# Patient Record
Sex: MALE | Race: WHITE | NOT HISPANIC OR LATINO | URBAN - METROPOLITAN AREA
[De-identification: names, ages, dates, MRNs, and addresses within clinical notes are randomized per-mention and may not be internally consistent; named-entity substitution may affect disease eponyms.]

---

## 2017-01-30 ENCOUNTER — OUTPATIENT (OUTPATIENT)
Dept: OUTPATIENT SERVICES | Facility: HOSPITAL | Age: 72
LOS: 1 days | End: 2017-01-30
Payer: MEDICARE

## 2017-01-30 ENCOUNTER — APPOINTMENT (OUTPATIENT)
Dept: NUCLEAR MEDICINE | Facility: HOSPITAL | Age: 72
End: 2017-01-30

## 2017-01-30 DIAGNOSIS — I72.0 ANEURYSM OF CAROTID ARTERY: ICD-10-CM

## 2017-01-31 ENCOUNTER — APPOINTMENT (OUTPATIENT)
Dept: NUCLEAR MEDICINE | Facility: HOSPITAL | Age: 72
End: 2017-01-31

## 2017-01-31 PROCEDURE — 78102 BONE MARROW IMAGING LTD: CPT

## 2017-01-31 PROCEDURE — 78802 RP LOCLZJ TUM WHBDY 1 D IMG: CPT

## 2017-01-31 PROCEDURE — A9541: CPT

## 2017-01-31 PROCEDURE — A9570: CPT

## 2017-03-16 ENCOUNTER — APPOINTMENT (OUTPATIENT)
Dept: VASCULAR SURGERY | Facility: CLINIC | Age: 72
End: 2017-03-16

## 2017-03-16 VITALS
BODY MASS INDEX: 18.96 KG/M2 | HEART RATE: 60 BPM | DIASTOLIC BLOOD PRESSURE: 80 MMHG | HEIGHT: 72 IN | SYSTOLIC BLOOD PRESSURE: 175 MMHG | TEMPERATURE: 97.5 F | WEIGHT: 140 LBS

## 2017-03-16 VITALS — DIASTOLIC BLOOD PRESSURE: 87 MMHG | SYSTOLIC BLOOD PRESSURE: 158 MMHG | HEART RATE: 74 BPM

## 2017-03-16 DIAGNOSIS — I72.0 ANEURYSM OF CAROTID ARTERY: ICD-10-CM

## 2017-04-11 ENCOUNTER — OUTPATIENT (OUTPATIENT)
Dept: OUTPATIENT SERVICES | Facility: HOSPITAL | Age: 72
LOS: 1 days | End: 2017-04-11
Payer: MEDICARE

## 2017-04-11 VITALS
OXYGEN SATURATION: 97 % | TEMPERATURE: 98 F | SYSTOLIC BLOOD PRESSURE: 128 MMHG | DIASTOLIC BLOOD PRESSURE: 51 MMHG | HEIGHT: 68 IN | HEART RATE: 55 BPM | WEIGHT: 141.98 LBS | RESPIRATION RATE: 15 BRPM

## 2017-04-11 DIAGNOSIS — I72.0 ANEURYSM OF CAROTID ARTERY: ICD-10-CM

## 2017-04-11 DIAGNOSIS — Z95.810 PRESENCE OF AUTOMATIC (IMPLANTABLE) CARDIAC DEFIBRILLATOR: ICD-10-CM

## 2017-04-11 DIAGNOSIS — Z95.810 PRESENCE OF AUTOMATIC (IMPLANTABLE) CARDIAC DEFIBRILLATOR: Chronic | ICD-10-CM

## 2017-04-11 DIAGNOSIS — I48.91 UNSPECIFIED ATRIAL FIBRILLATION: ICD-10-CM

## 2017-04-11 DIAGNOSIS — I73.9 PERIPHERAL VASCULAR DISEASE, UNSPECIFIED: ICD-10-CM

## 2017-04-11 DIAGNOSIS — I65.21 OCCLUSION AND STENOSIS OF RIGHT CAROTID ARTERY: Chronic | ICD-10-CM

## 2017-04-11 DIAGNOSIS — R94.31 ABNORMAL ELECTROCARDIOGRAM [ECG] [EKG]: ICD-10-CM

## 2017-04-11 DIAGNOSIS — M48.9 SPONDYLOPATHY, UNSPECIFIED: ICD-10-CM

## 2017-04-11 DIAGNOSIS — E11.9 TYPE 2 DIABETES MELLITUS WITHOUT COMPLICATIONS: ICD-10-CM

## 2017-04-11 LAB
ANTIBODY ID 1_1: SIGNIFICANT CHANGE UP
APTT BLD: 48.4 SEC — HIGH (ref 27.5–37.4)
BLD GP AB SCN SERPL QL: POSITIVE — SIGNIFICANT CHANGE UP
BUN SERPL-MCNC: 22 MG/DL — SIGNIFICANT CHANGE UP (ref 7–23)
CALCIUM SERPL-MCNC: 9.8 MG/DL — SIGNIFICANT CHANGE UP (ref 8.4–10.5)
CHLORIDE SERPL-SCNC: 93 MMOL/L — LOW (ref 98–107)
CO2 SERPL-SCNC: 26 MMOL/L — SIGNIFICANT CHANGE UP (ref 22–31)
CREAT SERPL-MCNC: 0.94 MG/DL — SIGNIFICANT CHANGE UP (ref 0.5–1.3)
DAT C3-SP REAG RBC QL: NEGATIVE — SIGNIFICANT CHANGE UP
DAT POLY-SP REAG RBC QL: NEGATIVE — SIGNIFICANT CHANGE UP
DIRECT COOMBS IGG: NEGATIVE — SIGNIFICANT CHANGE UP
GLUCOSE SERPL-MCNC: 130 MG/DL — HIGH (ref 70–99)
HBA1C BLD-MCNC: 7.1 % — HIGH (ref 4–5.6)
HCT VFR BLD CALC: 37.4 % — LOW (ref 39–50)
HGB BLD-MCNC: 12.4 G/DL — LOW (ref 13–17)
INR BLD: 2.36 — HIGH (ref 0.88–1.17)
MCHC RBC-ENTMCNC: 29.2 PG — SIGNIFICANT CHANGE UP (ref 27–34)
MCHC RBC-ENTMCNC: 33.2 % — SIGNIFICANT CHANGE UP (ref 32–36)
MCV RBC AUTO: 88.2 FL — SIGNIFICANT CHANGE UP (ref 80–100)
PLATELET # BLD AUTO: 183 K/UL — SIGNIFICANT CHANGE UP (ref 150–400)
PMV BLD: 11.3 FL — SIGNIFICANT CHANGE UP (ref 7–13)
POTASSIUM SERPL-MCNC: 5.1 MMOL/L — SIGNIFICANT CHANGE UP (ref 3.5–5.3)
POTASSIUM SERPL-SCNC: 5.1 MMOL/L — SIGNIFICANT CHANGE UP (ref 3.5–5.3)
PROTHROM AB SERPL-ACNC: 26.9 SEC — HIGH (ref 9.8–13.1)
RBC # BLD: 4.24 M/UL — SIGNIFICANT CHANGE UP (ref 4.2–5.8)
RBC # FLD: 15.4 % — HIGH (ref 10.3–14.5)
RH IG SCN BLD-IMP: POSITIVE — SIGNIFICANT CHANGE UP
SODIUM SERPL-SCNC: 135 MMOL/L — SIGNIFICANT CHANGE UP (ref 135–145)
WBC # BLD: 6.64 K/UL — SIGNIFICANT CHANGE UP (ref 3.8–10.5)
WBC # FLD AUTO: 6.64 K/UL — SIGNIFICANT CHANGE UP (ref 3.8–10.5)

## 2017-04-11 PROCEDURE — 93010 ELECTROCARDIOGRAM REPORT: CPT

## 2017-04-11 PROCEDURE — 86077 PHYS BLOOD BANK SERV XMATCH: CPT

## 2017-04-11 NOTE — H&P PST ADULT - PROBLEM SELECTOR PLAN 7
EKG faxed to Dr Gatica s/w farzaneh   As per Farzaneh ekg comparison by Cardiologist melvi office " no change" EKG faxed to Dr Gatica s/w Farzaneh   As per Farzaneh ekg comparison by Cardiologist in office " no change"

## 2017-04-11 NOTE — H&P PST ADULT - RS GEN PE MLT RESP DETAILS PC
clear to auscultation bilaterally/good air movement/O2 sat 97 %/breath sounds equal/respirations non-labored/airway patent

## 2017-04-11 NOTE — H&P PST ADULT - PROBLEM SELECTOR PLAN 1
Right Carotid Stent Insertion Arch Aortofram , Right Carotid Angiogram, Insertion Distal Protective Device , Protective Device Left Atrium Sten Procedure as scheduled ; Right Carotid Stent Insertion Arch Aortofram , Right Carotid Angiogram, Insertion Distal Protective Device , Protective Device Left Atrium Stent ; pt denies surgery Left ; call to surgeons office ; s/w Jocelin ; Jocelin to s/w surgeon regarding procedure ; consent to signed dos with surgeon    Pre op instructions including Pepcid and Hibeclens ; pt appears to have a good understanding of pre o-p instructions    Pt to Dr Ybarra for pre op m/c  Pt to Dr Gatica for pre op cardiac clearance

## 2017-04-11 NOTE — H&P PST ADULT - PROBLEM SELECTOR PLAN 3
rx xarelto  Call to surgeon regarding xarelto; s/w Jocelin   awaiting response rx xarelto  Call to surgeon regarding xarelto; s/w Jocelin   awaiting response    As per cardiologist Dr Gatica ; may DC 3 days pre op if necessary ;pt denies asa cardiac clearance sent Jocelin @ surgeons office awaiting response regarding xarelto    To call Dr Gatica 4/12/17 regarding xarelto rx xarelto  Call to surgeon regarding xarelto; s/w Jocelin   awaiting response    As per cardiologist Dr Gatica ; may DC 3 days pre op if necessary ;pt denies asa cardiac clearance sent Jocelin @ surgeons office awaiting response regarding xarelto    As per Marilyn @ surgeons office ; to follow Dr Gatica instructions ; Jocelin contacted pt with instruction    ? remains need asa; "s/p angiogram with peripheral stent ;reviewed with Dr Vail s/w Jocelin @ surgeons office defers to cardiologist s/w  Farzaneh @ Dr Gatica office . Farzaneh will s/w  cardiologist and they will contact surgeons office 4/13/17 with recommendations rx xarelto  Call to surgeon regarding xarelto; s/w Jocelin   awaiting response    As per cardiologist Dr Gatica ; may DC 3 days pre op if necessary ;pt denies asa cardiac clearance sent Jocelin @ surgeons office awaiting response regarding xarelto    As per Marilyn @ surgeons office ; to follow Dr Gatica instructions ; Jocelin contacted pt with instruction    ? remains :? need asa; "s/p angiogram with peripheral stent ;reviewed with Dr Vail s/w Jocelin @ surgeons office defers to cardiologist s/w  Farzaneh @ Dr Gatica office . Farzaneh will s/w  cardiologist and they will contact surgeons office 4/13/17 with recommendations  Request updated cardiac clearance addressing  any medication changes pre op

## 2017-04-11 NOTE — H&P PST ADULT - PROBLEM SELECTOR PLAN 2
s/p angiogram with stent s/p peripheral  angiogram with stent s/p peripheral  angiogram with stent  see problem 3

## 2017-04-11 NOTE — H&P PST ADULT - MALLAMPATI CLASS
Class II - visualization of the soft palate, fauces, and uvula s/p Cervical Fusion ; pt with  extremely limited mobility Cervical Spine/Class IV (difficult) - the soft palate is not visible at all

## 2017-04-11 NOTE — H&P PST ADULT - PSH
Carotid stenosis, right  2013 ? right CEA  S/P angioplasty with stent  left leg  S/P cervical spinal fusion  2002, 1 plate & 4 rods  S/P femoral-popliteal bypass surgery  x 2, 2013, right s/p Revision AICD (automatic cardioverter/defibrillator) present  10/21/14  Carotid stenosis, right  2013 ? right CEA  S/P angioplasty with stent  left leg  S/P cervical spinal fusion  2002, 1 plate & 4 rods  S/P femoral-popliteal bypass surgery  x 2, 2013, right s/p Revision AICD (automatic cardioverter/defibrillator) present  10/21/14  Carotid stenosis, right  Right CEA 2013  S/P angioplasty with stent  left leg  S/P cervical spinal fusion  2002, 1 plate & 4 rods  S/P femoral-popliteal bypass surgery  x 2, 2013, right s/p Revision

## 2017-04-11 NOTE — H&P PST ADULT - HISTORY OF PRESENT ILLNESS
Pt is a 72 y.o. male ; pt scheduled 12/16 for cataract surgery ; during pre op clearance ; pt was referred to surgeon for evaluation right carotid ; pt states studies showed " pseudo aneurysm right carotid ; pt now presents for Right Carotid Stent Insertion Right Carotid Angiogram Pt is a 72 y.o. male ; pt scheduled 12/16 for cataract surgery ; during pre op clearance ; pt was referred to surgeon for evaluation right carotid artery  ; pt states studies showed " pseudo aneurysm right carotid ; pt now presents for Right Carotid Stent Insertion Right Carotid Angiogram;[ see plan of care ]

## 2017-04-11 NOTE — H&P PST ADULT - FUNCTIONAL LEVEL PRIOR: TOILETING
(1) assistive equipment/or toilet high seat or toilet high seat/  Needs to be catheterized/(1) assistive equipment

## 2017-04-11 NOTE — H&P PST ADULT - LYMPHATIC
supraclavicular L/supraclavicular R/anterior cervical R/anterior cervical L/posterior cervical R/posterior cervical L

## 2017-04-11 NOTE — H&P PST ADULT - PMH
Atrial fibrillation  since 2013  Carotid artery occlusion    GERD (gastroesophageal reflux disease)    HTN (hypertension)    Hyperlipidemia    Lung cancer  small cell, stage 4, 1997, radiation & chemo- on remission  Neuropathy    PAD (peripheral artery disease)    Type 2 diabetes mellitus  glucose this am 116 Atrial fibrillation  since 2013  Carotid artery occlusion    GERD (gastroesophageal reflux disease)    HTN (hypertension)    Hyperlipidemia    Lung cancer  small cell, stage 4, 1997, radiation & chemo- on remission  Neuropathy    PAD (peripheral artery disease)    PVD (peripheral vascular disease)  s/p angioplasty with stent  Type 2 diabetes mellitus  glucose this am 116 AICD (automatic cardioverter/defibrillator) present    Atrial fibrillation  since 2013  Carotid artery occlusion    GERD (gastroesophageal reflux disease)    HTN (hypertension)    Hyperlipidemia    Lung cancer  small cell, stage 4, 1997, radiation & chemo- on remission  Neuropathy    PAD (peripheral artery disease)    PVD (peripheral vascular disease)  s/p angioplasty with stent  Type 2 diabetes mellitus  glucose this am 116 AICD (automatic cardioverter/defibrillator) present    Atrial fibrillation  since 2013  Carotid artery occlusion    GERD (gastroesophageal reflux disease)    HTN (hypertension)    Hyperlipidemia    Lung cancer  small cell, stage 4, 1997, radiation & chemo- on remission  Neuropathy    PAD (peripheral artery disease)    PVD (peripheral vascular disease)  s/p angioplasty with stent ; pt states Left Carotid Artery occluded  Type 2 diabetes mellitus  glucose this am 116

## 2017-04-11 NOTE — H&P PST ADULT - NSANTHOSAYNRD_GEN_A_CORE
No. GILDARDO screening performed.  STOP BANG Legend: 0-2 = LOW Risk; 3-4 = INTERMEDIATE Risk; 5-8 = HIGH Risk

## 2017-04-12 ENCOUNTER — OUTPATIENT (OUTPATIENT)
Dept: OUTPATIENT SERVICES | Facility: HOSPITAL | Age: 72
LOS: 1 days | End: 2017-04-12

## 2017-04-12 DIAGNOSIS — Z95.810 PRESENCE OF AUTOMATIC (IMPLANTABLE) CARDIAC DEFIBRILLATOR: Chronic | ICD-10-CM

## 2017-04-12 DIAGNOSIS — I65.21 OCCLUSION AND STENOSIS OF RIGHT CAROTID ARTERY: Chronic | ICD-10-CM

## 2017-04-12 LAB
BLD GP AB SCN SERPL QL: NEGATIVE — SIGNIFICANT CHANGE UP
RH IG SCN BLD-IMP: POSITIVE — SIGNIFICANT CHANGE UP

## 2017-04-18 RX ORDER — SODIUM CHLORIDE 9 MG/ML
1000 INJECTION, SOLUTION INTRAVENOUS
Qty: 0 | Refills: 0 | Status: DISCONTINUED | OUTPATIENT
Start: 2017-04-19 | End: 2017-04-19

## 2017-04-18 NOTE — ASU PATIENT PROFILE, ADULT - PSH
AICD (automatic cardioverter/defibrillator) present  10/21/14  Carotid stenosis, right  Right CEA 2013  S/P angioplasty with stent  left leg  S/P cervical spinal fusion  2002, 1 plate & 4 rods  S/P femoral-popliteal bypass surgery  x 2, 2013, right s/p Revision

## 2017-04-18 NOTE — ASU PATIENT PROFILE, ADULT - PMH
AICD (automatic cardioverter/defibrillator) present    Atrial fibrillation  since 2013  Carotid artery occlusion    GERD (gastroesophageal reflux disease)    HTN (hypertension)    Hyperlipidemia    Lung cancer  small cell, stage 4, 1997, radiation & chemo- on remission  Neuropathy    PAD (peripheral artery disease)    PVD (peripheral vascular disease)  s/p angioplasty with stent ; pt states Left Carotid Artery occluded  Type 2 diabetes mellitus  glucose this am 116

## 2017-04-19 ENCOUNTER — APPOINTMENT (OUTPATIENT)
Dept: VASCULAR SURGERY | Facility: HOSPITAL | Age: 72
End: 2017-04-19

## 2017-04-19 ENCOUNTER — INPATIENT (INPATIENT)
Facility: HOSPITAL | Age: 72
LOS: 0 days | Discharge: HOME | End: 2017-04-20
Attending: SURGERY | Admitting: SURGERY
Payer: MEDICARE

## 2017-04-19 VITALS
HEIGHT: 68 IN | OXYGEN SATURATION: 100 % | HEART RATE: 53 BPM | SYSTOLIC BLOOD PRESSURE: 155 MMHG | DIASTOLIC BLOOD PRESSURE: 62 MMHG | TEMPERATURE: 98 F | WEIGHT: 141.98 LBS | RESPIRATION RATE: 16 BRPM

## 2017-04-19 DIAGNOSIS — I65.21 OCCLUSION AND STENOSIS OF RIGHT CAROTID ARTERY: Chronic | ICD-10-CM

## 2017-04-19 DIAGNOSIS — Z95.810 PRESENCE OF AUTOMATIC (IMPLANTABLE) CARDIAC DEFIBRILLATOR: Chronic | ICD-10-CM

## 2017-04-19 DIAGNOSIS — I72.0 ANEURYSM OF CAROTID ARTERY: ICD-10-CM

## 2017-04-19 PROCEDURE — 37215 TRANSCATH STENT CCA W/EPS: CPT | Mod: GC

## 2017-04-19 PROCEDURE — 37216 TRANSCATH STENT CCA W/O EPS: CPT

## 2017-04-19 RX ORDER — METOPROLOL TARTRATE 50 MG
100 TABLET ORAL DAILY
Qty: 0 | Refills: 0 | Status: DISCONTINUED | OUTPATIENT
Start: 2017-04-20 | End: 2017-04-20

## 2017-04-19 RX ORDER — HYDRALAZINE HCL 50 MG
5 TABLET ORAL ONCE
Qty: 0 | Refills: 0 | Status: COMPLETED | OUTPATIENT
Start: 2017-04-19 | End: 2017-04-19

## 2017-04-19 RX ORDER — DOCUSATE SODIUM 100 MG
100 CAPSULE ORAL THREE TIMES A DAY
Qty: 0 | Refills: 0 | Status: DISCONTINUED | OUTPATIENT
Start: 2017-04-19 | End: 2017-04-20

## 2017-04-19 RX ORDER — ATORVASTATIN CALCIUM 80 MG/1
20 TABLET, FILM COATED ORAL AT BEDTIME
Qty: 0 | Refills: 0 | Status: DISCONTINUED | OUTPATIENT
Start: 2017-04-19 | End: 2017-04-20

## 2017-04-19 RX ORDER — OXYCODONE HYDROCHLORIDE 5 MG/1
5 TABLET ORAL EVERY 4 HOURS
Qty: 0 | Refills: 0 | Status: DISCONTINUED | OUTPATIENT
Start: 2017-04-19 | End: 2017-04-19

## 2017-04-19 RX ORDER — DEXTROSE 50 % IN WATER 50 %
25 SYRINGE (ML) INTRAVENOUS ONCE
Qty: 0 | Refills: 0 | Status: DISCONTINUED | OUTPATIENT
Start: 2017-04-19 | End: 2017-04-20

## 2017-04-19 RX ORDER — SODIUM CHLORIDE 9 MG/ML
1000 INJECTION INTRAMUSCULAR; INTRAVENOUS; SUBCUTANEOUS
Qty: 0 | Refills: 0 | Status: DISCONTINUED | OUTPATIENT
Start: 2017-04-19 | End: 2017-04-19

## 2017-04-19 RX ORDER — OXYCODONE HYDROCHLORIDE 5 MG/1
10 TABLET ORAL EVERY 6 HOURS
Qty: 0 | Refills: 0 | Status: DISCONTINUED | OUTPATIENT
Start: 2017-04-19 | End: 2017-04-19

## 2017-04-19 RX ORDER — INSULIN LISPRO 100/ML
VIAL (ML) SUBCUTANEOUS
Qty: 0 | Refills: 0 | Status: DISCONTINUED | OUTPATIENT
Start: 2017-04-19 | End: 2017-04-20

## 2017-04-19 RX ORDER — SODIUM CHLORIDE 9 MG/ML
1000 INJECTION, SOLUTION INTRAVENOUS
Qty: 0 | Refills: 0 | Status: DISCONTINUED | OUTPATIENT
Start: 2017-04-19 | End: 2017-04-20

## 2017-04-19 RX ORDER — GLUCAGON INJECTION, SOLUTION 0.5 MG/.1ML
1 INJECTION, SOLUTION SUBCUTANEOUS ONCE
Qty: 0 | Refills: 0 | Status: DISCONTINUED | OUTPATIENT
Start: 2017-04-19 | End: 2017-04-20

## 2017-04-19 RX ORDER — DEXTROSE 50 % IN WATER 50 %
1 SYRINGE (ML) INTRAVENOUS ONCE
Qty: 0 | Refills: 0 | Status: DISCONTINUED | OUTPATIENT
Start: 2017-04-19 | End: 2017-04-20

## 2017-04-19 RX ORDER — DEXTROSE 50 % IN WATER 50 %
12.5 SYRINGE (ML) INTRAVENOUS ONCE
Qty: 0 | Refills: 0 | Status: DISCONTINUED | OUTPATIENT
Start: 2017-04-19 | End: 2017-04-20

## 2017-04-19 RX ORDER — HEPARIN SODIUM 5000 [USP'U]/ML
5000 INJECTION INTRAVENOUS; SUBCUTANEOUS EVERY 8 HOURS
Qty: 0 | Refills: 0 | Status: DISCONTINUED | OUTPATIENT
Start: 2017-04-19 | End: 2017-04-20

## 2017-04-19 RX ORDER — ONDANSETRON 8 MG/1
4 TABLET, FILM COATED ORAL ONCE
Qty: 0 | Refills: 0 | Status: DISCONTINUED | OUTPATIENT
Start: 2017-04-19 | End: 2017-04-19

## 2017-04-19 RX ORDER — CLOPIDOGREL BISULFATE 75 MG/1
75 TABLET, FILM COATED ORAL DAILY
Qty: 0 | Refills: 0 | Status: DISCONTINUED | OUTPATIENT
Start: 2017-04-20 | End: 2017-04-20

## 2017-04-19 RX ORDER — ACETAMINOPHEN 500 MG
650 TABLET ORAL EVERY 6 HOURS
Qty: 0 | Refills: 0 | Status: DISCONTINUED | OUTPATIENT
Start: 2017-04-19 | End: 2017-04-19

## 2017-04-19 RX ORDER — DIGOXIN 250 MCG
0.12 TABLET ORAL DAILY
Qty: 0 | Refills: 0 | Status: DISCONTINUED | OUTPATIENT
Start: 2017-04-19 | End: 2017-04-20

## 2017-04-19 RX ORDER — FENTANYL CITRATE 50 UG/ML
25 INJECTION INTRAVENOUS
Qty: 0 | Refills: 0 | Status: DISCONTINUED | OUTPATIENT
Start: 2017-04-19 | End: 2017-04-19

## 2017-04-19 RX ORDER — LOSARTAN POTASSIUM 100 MG/1
100 TABLET, FILM COATED ORAL DAILY
Qty: 0 | Refills: 0 | Status: DISCONTINUED | OUTPATIENT
Start: 2017-04-20 | End: 2017-04-20

## 2017-04-19 RX ADMIN — Medication 5 MILLIGRAM(S): at 10:57

## 2017-04-19 RX ADMIN — SODIUM CHLORIDE 50 MILLILITER(S): 9 INJECTION INTRAMUSCULAR; INTRAVENOUS; SUBCUTANEOUS at 10:45

## 2017-04-19 RX ADMIN — HEPARIN SODIUM 5000 UNIT(S): 5000 INJECTION INTRAVENOUS; SUBCUTANEOUS at 21:55

## 2017-04-20 ENCOUNTER — TRANSCRIPTION ENCOUNTER (OUTPATIENT)
Age: 72
End: 2017-04-20

## 2017-04-20 VITALS
TEMPERATURE: 98 F | OXYGEN SATURATION: 99 % | RESPIRATION RATE: 18 BRPM | HEART RATE: 55 BPM | SYSTOLIC BLOOD PRESSURE: 148 MMHG | DIASTOLIC BLOOD PRESSURE: 82 MMHG

## 2017-04-20 LAB
BUN SERPL-MCNC: 17 MG/DL — SIGNIFICANT CHANGE UP (ref 7–23)
CALCIUM SERPL-MCNC: 9.4 MG/DL — SIGNIFICANT CHANGE UP (ref 8.4–10.5)
CHLORIDE SERPL-SCNC: 93 MMOL/L — LOW (ref 98–107)
CO2 SERPL-SCNC: 24 MMOL/L — SIGNIFICANT CHANGE UP (ref 22–31)
CREAT SERPL-MCNC: 0.8 MG/DL — SIGNIFICANT CHANGE UP (ref 0.5–1.3)
GLUCOSE SERPL-MCNC: 129 MG/DL — HIGH (ref 70–99)
HCT VFR BLD CALC: 36.2 % — LOW (ref 39–50)
HGB BLD-MCNC: 11.8 G/DL — LOW (ref 13–17)
MAGNESIUM SERPL-MCNC: 1.3 MG/DL — LOW (ref 1.6–2.6)
MCHC RBC-ENTMCNC: 28.6 PG — SIGNIFICANT CHANGE UP (ref 27–34)
MCHC RBC-ENTMCNC: 32.6 % — SIGNIFICANT CHANGE UP (ref 32–36)
MCV RBC AUTO: 87.9 FL — SIGNIFICANT CHANGE UP (ref 80–100)
PHOSPHATE SERPL-MCNC: 2.7 MG/DL — SIGNIFICANT CHANGE UP (ref 2.5–4.5)
PLATELET # BLD AUTO: 134 K/UL — LOW (ref 150–400)
PMV BLD: 11.3 FL — SIGNIFICANT CHANGE UP (ref 7–13)
POTASSIUM SERPL-MCNC: 3.8 MMOL/L — SIGNIFICANT CHANGE UP (ref 3.5–5.3)
POTASSIUM SERPL-SCNC: 3.8 MMOL/L — SIGNIFICANT CHANGE UP (ref 3.5–5.3)
RBC # BLD: 4.12 M/UL — LOW (ref 4.2–5.8)
RBC # FLD: 15.2 % — HIGH (ref 10.3–14.5)
SODIUM SERPL-SCNC: 133 MMOL/L — LOW (ref 135–145)
WBC # BLD: 6.72 K/UL — SIGNIFICANT CHANGE UP (ref 3.8–10.5)
WBC # FLD AUTO: 6.72 K/UL — SIGNIFICANT CHANGE UP (ref 3.8–10.5)

## 2017-04-20 PROCEDURE — 93882 EXTRACRANIAL UNI/LTD STUDY: CPT | Mod: 26,RT

## 2017-04-20 RX ORDER — DOCUSATE SODIUM 100 MG
1 CAPSULE ORAL
Qty: 0 | Refills: 0 | COMMUNITY
Start: 2017-04-20

## 2017-04-20 RX ORDER — MAGNESIUM SULFATE 500 MG/ML
2 VIAL (ML) INJECTION
Qty: 0 | Refills: 0 | Status: COMPLETED | OUTPATIENT
Start: 2017-04-20 | End: 2017-04-20

## 2017-04-20 RX ORDER — DIGOXIN 250 MCG
1 TABLET ORAL
Qty: 0 | Refills: 0 | COMMUNITY

## 2017-04-20 RX ORDER — RIVAROXABAN 15 MG-20MG
1 KIT ORAL
Qty: 0 | Refills: 0 | COMMUNITY

## 2017-04-20 RX ORDER — POTASSIUM CHLORIDE 20 MEQ
20 PACKET (EA) ORAL ONCE
Qty: 0 | Refills: 0 | Status: COMPLETED | OUTPATIENT
Start: 2017-04-20 | End: 2017-04-20

## 2017-04-20 RX ORDER — DIGOXIN 250 MCG
1 TABLET ORAL
Qty: 0 | Refills: 0 | DISCHARGE
Start: 2017-04-20

## 2017-04-20 RX ADMIN — HEPARIN SODIUM 5000 UNIT(S): 5000 INJECTION INTRAVENOUS; SUBCUTANEOUS at 05:56

## 2017-04-20 RX ADMIN — Medication 20 MILLIEQUIVALENT(S): at 10:57

## 2017-04-20 RX ADMIN — Medication 50 GRAM(S): at 10:56

## 2017-04-20 RX ADMIN — Medication 0.12 MILLIGRAM(S): at 05:56

## 2017-04-20 RX ADMIN — Medication 100 MILLIGRAM(S): at 05:56

## 2017-04-20 RX ADMIN — Medication 63.75 MILLIMOLE(S): at 10:57

## 2017-04-20 RX ADMIN — LOSARTAN POTASSIUM 100 MILLIGRAM(S): 100 TABLET, FILM COATED ORAL at 05:56

## 2017-04-20 NOTE — PHYSICAL THERAPY INITIAL EVALUATION ADULT - ADDITIONAL COMMENTS
Pt reports that he lives in a private house with wife with ~ 3 steps to negotiate to enter; bedroom is on the first floor. Prior to hospital admission pt ambulated independently using Rolling walker. Pt required occasional assistance with dressing. Pt denies any recent fall; with his last fall being from 3 years ago.    Pt was left comfortable in bed, NAD, all lines intact, all precautions maintained, with call bell in reach, bed alarm on, wife @ bedside, and RN aware of PT evaluation.

## 2017-04-20 NOTE — DISCHARGE NOTE ADULT - MEDICATION SUMMARY - MEDICATIONS TO TAKE
I will START or STAY ON the medications listed below when I get home from the hospital:    vitamin D  --   1 tab oral daily  -- Indication: For supplement    losartan 100 mg oral tablet  -- 1 tab(s) by mouth once a day  -- Indication: For BP med    digoxin 125 mcg (0.125 mg) oral tablet  -- 1 tab(s) by mouth once a day  -- Indication: For Atrial fibrillation    Tradjenta 5 mg oral tablet  -- 1 tab(s) by mouth once a day  -- Indication: For Diabetes mellitus    metFORMIN 1000 mg oral tablet  --  by mouth once a day  -- Indication: For Diabetes mellitus    atorvastatin 20 mg oral tablet  -- 1 tab(s) by mouth once a day  -- Indication: For Cardiovascular medication    Metoprolol Succinate  mg oral tablet, extended release  --  by mouth once a day  -- Indication: For Atrial fibrillation    docusate sodium 100 mg oral capsule  -- 1 cap(s) by mouth 3 times a day  -- Indication: For stool softener    multivitamin  -- 1 tab(s) by mouth once a day. last dose 04/10/2017  -- Indication: For supplement    Vitamin B12  --  by mouth 3000mcg daily  -- Indication: For supplement

## 2017-04-20 NOTE — DISCHARGE NOTE ADULT - CARE PROVIDERS DIRECT ADDRESSES
[email or web address]

## 2017-04-20 NOTE — PHYSICAL THERAPY INITIAL EVALUATION ADULT - DIAGNOSIS, PT EVAL
Pt admitted for pseudo aneurysm right carotid and presents with decreased strength, decreased balance, and difficulty with ambulation.

## 2017-04-20 NOTE — DISCHARGE NOTE ADULT - PLAN OF CARE
s/p right carotid stent insertion WOUND CARE:    BATHING: Please do not submerge wound underwater. You may shower and/or sponge bathe.  ACTIVITY: No heavy lifting or straining. Otherwise, you may return to your usual level of physical activity. If you are taking narcotic pain medication (such as Percocet) DO NOT drive a car, operate machinery or make important decisions.  DIET: Return to your usual diet.  NOTIFY YOUR SURGEON IF: You have any bleeding that does not stop, any pus draining from your wound(s), any fever (over 100.4 F) or chills, persistent nausea/vomiting, persistent diarrhea, or if your pain is not controlled on your discharge pain medications.  FOLLOW-UP: Please follow up with your primary care physician in one week regarding your hospitalization  Please f/u with Dr Wright as an outpatient, please call [phone] to schedule appointment please monitor your glucose level and f/u with your PMD/Endocrinologist as an outpatient please continue to take your home meds and f/u with your PMD/Cardiologist please continue to take your home meds and f/u with your Cardiologist WOUND CARE: please keep incision clean and dry    BATHING: Please do not submerge wound underwater. You may shower and/or sponge bathe.  ACTIVITY: No heavy lifting or straining. Otherwise, you may return to your usual level of physical activity. If you are taking narcotic pain medication (such as Percocet) DO NOT drive a car, operate machinery or make important decisions.  DIET: Return to your usual diet.  NOTIFY YOUR SURGEON IF: You have any bleeding that does not stop, any pus draining from your wound(s), any fever (over 100.4 F) or chills, persistent nausea/vomiting, persistent diarrhea, or if your pain is not controlled on your discharge pain medications.  FOLLOW-UP: Please follow up with your primary care physician in one week regarding your hospitalization  Please f/u with Dr Wright as an outpatient on Thursday, please call [phone] to schedule appointment

## 2017-04-20 NOTE — DISCHARGE NOTE ADULT - MEDICATION SUMMARY - MEDICATIONS TO STOP TAKING
I will STOP taking the medications listed below when I get home from the hospital:    Xarelto 20 mg oral tablet  -- 1 tab(s) by mouth once a day

## 2017-04-20 NOTE — DISCHARGE NOTE ADULT - CARE PROVIDER_API CALL
David Wright), Surgery; Vascular Surgery  [address] [place] [address]  Phone: [phone]  Fax: [phone]

## 2017-04-20 NOTE — PHYSICAL THERAPY INITIAL EVALUATION ADULT - GENERAL OBSERVATIONS, REHAB EVAL
Pt encountered in semisupine position, no distress, AxOx4, with periods of forgetfulness, with +IV, and wife @ bedside

## 2017-04-20 NOTE — DISCHARGE NOTE ADULT - CARE PLAN
Principal Discharge DX:	Aneurysm of carotid artery  Goal:	s/p right carotid stent insertion  Instructions for follow-up, activity and diet:	WOUND CARE:    BATHING: Please do not submerge wound underwater. You may shower and/or sponge bathe.  ACTIVITY: No heavy lifting or straining. Otherwise, you may return to your usual level of physical activity. If you are taking narcotic pain medication (such as Percocet) DO NOT drive a car, operate machinery or make important decisions.  DIET: Return to your usual diet.  NOTIFY YOUR SURGEON IF: You have any bleeding that does not stop, any pus draining from your wound(s), any fever (over 100.4 F) or chills, persistent nausea/vomiting, persistent diarrhea, or if your pain is not controlled on your discharge pain medications.  FOLLOW-UP: Please follow up with your primary care physician in one week regarding your hospitalization  Please f/u with Dr Wright as an outpatient, please call [phone] to schedule appointment  Secondary Diagnosis:	Diabetes mellitus  Instructions for follow-up, activity and diet:	please monitor your glucose level and f/u with your PMD/Endocrinologist as an outpatient  Secondary Diagnosis:	HTN (hypertension)  Instructions for follow-up, activity and diet:	please continue to take your home meds and f/u with your PMD/Cardiologist  Secondary Diagnosis:	Atrial fibrillation  Instructions for follow-up, activity and diet:	please continue to take your home meds and f/u with your Cardiologist Principal Discharge DX:	Aneurysm of carotid artery  Goal:	s/p right carotid stent insertion  Instructions for follow-up, activity and diet:	WOUND CARE: please keep incision clean and dry    BATHING: Please do not submerge wound underwater. You may shower and/or sponge bathe.  ACTIVITY: No heavy lifting or straining. Otherwise, you may return to your usual level of physical activity. If you are taking narcotic pain medication (such as Percocet) DO NOT drive a car, operate machinery or make important decisions.  DIET: Return to your usual diet.  NOTIFY YOUR SURGEON IF: You have any bleeding that does not stop, any pus draining from your wound(s), any fever (over 100.4 F) or chills, persistent nausea/vomiting, persistent diarrhea, or if your pain is not controlled on your discharge pain medications.  FOLLOW-UP: Please follow up with your primary care physician in one week regarding your hospitalization  Please f/u with Dr Wright as an outpatient on Thursday, please call [phone] to schedule appointment  Secondary Diagnosis:	Diabetes mellitus  Instructions for follow-up, activity and diet:	please monitor your glucose level and f/u with your PMD/Endocrinologist as an outpatient  Secondary Diagnosis:	HTN (hypertension)  Instructions for follow-up, activity and diet:	please continue to take your home meds and f/u with your PMD/Cardiologist  Secondary Diagnosis:	Atrial fibrillation  Instructions for follow-up, activity and diet:	please continue to take your home meds and f/u with your Cardiologist Principal Discharge DX:	Aneurysm of carotid artery  Goal:	s/p right carotid stent insertion  Instructions for follow-up, activity and diet:	WOUND CARE: please keep incision clean and dry    BATHING: Please do not submerge wound underwater. You may shower and/or sponge bathe.  ACTIVITY: No heavy lifting or straining. Otherwise, you may return to your usual level of physical activity. If you are taking narcotic pain medication (such as Percocet) DO NOT drive a car, operate machinery or make important decisions.  DIET: Return to your usual diet.  NOTIFY YOUR SURGEON IF: You have any bleeding that does not stop, any pus draining from your wound(s), any fever (over 100.4 F) or chills, persistent nausea/vomiting, persistent diarrhea, or if your pain is not controlled on your discharge pain medications.  FOLLOW-UP: Please follow up with your primary care physician in one week regarding your hospitalization  Please f/u with Dr Wright as an outpatient on Thursday, please call [phone] to schedule appointment  Secondary Diagnosis:	Diabetes mellitus  Instructions for follow-up, activity and diet:	please monitor your glucose level and f/u with your PMD/Endocrinologist as an outpatient  Secondary Diagnosis:	HTN (hypertension)  Instructions for follow-up, activity and diet:	please continue to take your home meds and f/u with your PMD/Cardiologist  Secondary Diagnosis:	Atrial fibrillation  Instructions for follow-up, activity and diet:	please continue to take your home meds and f/u with your Cardiologist Principal Discharge DX:	Aneurysm of carotid artery  Goal:	s/p right carotid stent insertion  Instructions for follow-up, activity and diet:	WOUND CARE: please keep incision clean and dry    BATHING: Please do not submerge wound underwater. You may shower and/or sponge bathe.  ACTIVITY: No heavy lifting or straining. Otherwise, you may return to your usual level of physical activity. If you are taking narcotic pain medication (such as Percocet) DO NOT drive a car, operate machinery or make important decisions.  DIET: Return to your usual diet.  NOTIFY YOUR SURGEON IF: You have any bleeding that does not stop, any pus draining from your wound(s), any fever (over 100.4 F) or chills, persistent nausea/vomiting, persistent diarrhea, or if your pain is not controlled on your discharge pain medications.  FOLLOW-UP: Please follow up with your primary care physician in one week regarding your hospitalization  Please f/u with Dr Wright as an outpatient on Thursday, please call [phone] to schedule appointment  Secondary Diagnosis:	Diabetes mellitus  Instructions for follow-up, activity and diet:	please monitor your glucose level and f/u with your PMD/Endocrinologist as an outpatient  Secondary Diagnosis:	HTN (hypertension)  Instructions for follow-up, activity and diet:	please continue to take your home meds and f/u with your PMD/Cardiologist  Secondary Diagnosis:	Atrial fibrillation  Instructions for follow-up, activity and diet:	please continue to take your home meds and f/u with your Cardiologist

## 2017-04-20 NOTE — DISCHARGE NOTE ADULT - PATIENT PORTAL LINK FT
“You can access the Follow[facility] Patient Portal, offered by [facility], by registering with the following website: [email or web address]”

## 2017-04-20 NOTE — DISCHARGE NOTE ADULT - HOSPITAL COURSE
Pt is a 72 y.o. male how was scheduled 12/16 for cataract surgery, during pre op clearance pt was referred to surgeon for evaluation of right carotid artery.  Studies showed pseudo aneurysm of right carotid    4/19 pt same day admission for right carotid stent insertion.  Pt tolerated procedure well. Post operative course uncomplicated.    4/20 pt underwent follow up carotid duplex which showed ....................    Per Attending pt now stable for discharge. Pt to follow up with Dr Wright as an outpatient, instructed to call to schedule appointment Pt is a 72 y.o. male how was scheduled 12/16 for cataract surgery, during pre op clearance pt was referred to surgeon for evaluation of right carotid artery.  Studies showed pseudo aneurysm of right carotid    4/19 pt same day admission for right carotid stent insertion.  Pt tolerated procedure well. Post operative course uncomplicated.    4/20 pt underwent follow up carotid duplex prior to discharge.    Per Attending pt now stable for discharge. Pt to follow up with Dr Wright as an outpatient on Thursday, instructed to call to schedule appointment Pt is a 72 y.o. male how was scheduled 12/16 for cataract surgery, during pre op clearance pt was referred to surgeon for evaluation of right carotid artery.  Studies showed pseudo aneurysm of right carotid    4/19 pt same day admission for right carotid stent insertion.  Pt tolerated procedure well. Post operative course uncomplicated.    4/20 pt underwent follow up carotid duplex prior to discharge.    Per Attending pt now stable for discharge. Pt to stop Xarelto and Plavix and follow up with Dr Wright as an outpatient on Thursday, instructed to call to schedule appointment

## 2017-04-20 NOTE — PHYSICAL THERAPY INITIAL EVALUATION ADULT - PERTINENT HX OF CURRENT PROBLEM, REHAB EVAL
Pt is a 72 y.o. male ; pt scheduled 12/16 for cataract surgery ; during pre op clearance ; pt was referred to surgeon for evaluation right carotid artery  ; pt states studies showed " pseudo aneurysm right carotid ; pt now presents for Right Carotid Stent Insertion Right Carotid Angiogram;

## 2017-04-20 NOTE — PHYSICAL THERAPY INITIAL EVALUATION ADULT - PLANNED THERAPY INTERVENTIONS, PT EVAL
bed mobility training/gait training/strengthening/balance training/transfer training/postural re-education

## 2017-04-21 DIAGNOSIS — I72.0 ANEURYSM OF CAROTID ARTERY: ICD-10-CM

## 2017-04-27 ENCOUNTER — TRANSCRIPTION ENCOUNTER (OUTPATIENT)
Age: 72
End: 2017-04-27

## 2017-05-08 ENCOUNTER — TRANSCRIPTION ENCOUNTER (OUTPATIENT)
Age: 72
End: 2017-05-08

## 2017-07-24 ENCOUNTER — OUTPATIENT (OUTPATIENT)
Dept: OUTPATIENT SERVICES | Facility: HOSPITAL | Age: 72
LOS: 1 days | End: 2017-07-24
Payer: MEDICARE

## 2017-07-24 VITALS
HEART RATE: 66 BPM | SYSTOLIC BLOOD PRESSURE: 110 MMHG | RESPIRATION RATE: 16 BRPM | HEIGHT: 72 IN | WEIGHT: 130.95 LBS | TEMPERATURE: 97 F | DIASTOLIC BLOOD PRESSURE: 60 MMHG

## 2017-07-24 DIAGNOSIS — I77.1 STRICTURE OF ARTERY: ICD-10-CM

## 2017-07-24 DIAGNOSIS — I65.21 OCCLUSION AND STENOSIS OF RIGHT CAROTID ARTERY: Chronic | ICD-10-CM

## 2017-07-24 DIAGNOSIS — R23.8 OTHER SKIN CHANGES: ICD-10-CM

## 2017-07-24 DIAGNOSIS — R94.31 ABNORMAL ELECTROCARDIOGRAM [ECG] [EKG]: ICD-10-CM

## 2017-07-24 DIAGNOSIS — E11.9 TYPE 2 DIABETES MELLITUS WITHOUT COMPLICATIONS: ICD-10-CM

## 2017-07-24 DIAGNOSIS — Z95.810 PRESENCE OF AUTOMATIC (IMPLANTABLE) CARDIAC DEFIBRILLATOR: ICD-10-CM

## 2017-07-24 DIAGNOSIS — I65.29 OCCLUSION AND STENOSIS OF UNSPECIFIED CAROTID ARTERY: Chronic | ICD-10-CM

## 2017-07-24 DIAGNOSIS — I48.91 UNSPECIFIED ATRIAL FIBRILLATION: ICD-10-CM

## 2017-07-24 DIAGNOSIS — Z95.810 PRESENCE OF AUTOMATIC (IMPLANTABLE) CARDIAC DEFIBRILLATOR: Chronic | ICD-10-CM

## 2017-07-24 DIAGNOSIS — N39.0 URINARY TRACT INFECTION, SITE NOT SPECIFIED: ICD-10-CM

## 2017-07-24 LAB
APTT BLD: 31.8 SEC — SIGNIFICANT CHANGE UP (ref 27.5–37.4)
BLD GP AB SCN SERPL QL: NEGATIVE — SIGNIFICANT CHANGE UP
HBA1C BLD-MCNC: 5.9 % — HIGH (ref 4–5.6)
INR BLD: 1.1 — SIGNIFICANT CHANGE UP (ref 0.88–1.17)
PROTHROM AB SERPL-ACNC: 12.3 SEC — SIGNIFICANT CHANGE UP (ref 9.8–13.1)
RH IG SCN BLD-IMP: POSITIVE — SIGNIFICANT CHANGE UP

## 2017-07-24 PROCEDURE — 93010 ELECTROCARDIOGRAM REPORT: CPT

## 2017-07-24 RX ORDER — LOSARTAN POTASSIUM 100 MG/1
1 TABLET, FILM COATED ORAL
Qty: 0 | Refills: 0 | COMMUNITY

## 2017-07-24 RX ORDER — SODIUM CHLORIDE 9 MG/ML
1000 INJECTION, SOLUTION INTRAVENOUS
Qty: 0 | Refills: 0 | Status: DISCONTINUED | OUTPATIENT
Start: 2017-07-26 | End: 2017-07-26

## 2017-07-24 RX ORDER — METOPROLOL TARTRATE 50 MG
0 TABLET ORAL
Qty: 0 | Refills: 0 | COMMUNITY

## 2017-07-24 RX ORDER — METFORMIN HYDROCHLORIDE 850 MG/1
0 TABLET ORAL
Qty: 0 | Refills: 0 | COMMUNITY

## 2017-07-24 NOTE — H&P PST ADULT - PROBLEM SELECTOR PLAN 2
Patient instructed not to take diabetic medication in am of surgery and to hold evening dose of Metformin on 7/25/2017  Will check FSBS in am of surgery

## 2017-07-24 NOTE — H&P PST ADULT - GENITOURINARY COMMENTS
last UTI 7/2017 on Nitrofurantion last UTI 7/2017 on Nitrofurantion, h/o urinary retention and self cath 3 - 4 x day

## 2017-07-24 NOTE — H&P PST ADULT - PROBLEM SELECTOR PLAN 3
OR booking notified, will obtain pacemaker interrogation report  Cardiology clearance requested, will obtain echo result

## 2017-07-24 NOTE — H&P PST ADULT - RS GEN PE MLT RESP DETAILS PC
airway patent/good air movement/O2 sat 97 %/respirations non-labored/clear to auscultation bilaterally/breath sounds equal no rales/good air movement/respirations non-labored/clear to auscultation bilaterally/breath sounds equal/no wheezes/no rhonchi/airway patent

## 2017-07-24 NOTE — H&P PST ADULT - MALLAMPATI CLASS
Class IV (difficult) - the soft palate is not visible at all/s/p Cervical Fusion ; pt with  extremely limited mobility Cervical Spine s/p Cervical Fusion ; pt with  extremely limited mobility Cervical Spine/Class II - visualization of the soft palate, fauces, and uvula

## 2017-07-24 NOTE — H&P PST ADULT - GASTROINTESTINAL DETAILS
soft/nontender/bowel sounds normal no masses palpable/soft/nontender/no distention/bowel sounds normal

## 2017-07-24 NOTE — H&P PST ADULT - PROBLEM SELECTOR PLAN 1
72 yr old male scheduled for angioplasty right leg bypass right leg angiogram on 7/26/2017  Pre op instruction given and patient verbalized understanding  3 Positive on STOP BANG questioner, GILDARDO precaution recommended, OR booking notified

## 2017-07-24 NOTE — H&P PST ADULT - ATTENDING COMMENTS
72 year old gentleman with severe PAD. He has a stenosis in his right leg bypass. He presents for an angioplasty of the right leg bypass. It cannot be done percutaneously via the contra-lateral groin due to iliac stents. The procedure, risks and alternatives were explained and informed consent was obtained.

## 2017-07-24 NOTE — H&P PST ADULT - PMH
AICD (automatic cardioverter/defibrillator) present    Atrial fibrillation  since 2013  Carotid artery occlusion  left  GERD (gastroesophageal reflux disease)    HTN (hypertension)    Hyperlipidemia    Lung cancer  small cell, stage 4, 1997, radiation & chemo- on remission  Neuropathy    PAD (peripheral artery disease)    PVD (peripheral vascular disease)  s/p angioplasty with stent ; pt states Left Carotid Artery occluded  Type 2 diabetes mellitus  glucose this am 116

## 2017-07-24 NOTE — H&P PST ADULT - LYMPHATIC
anterior cervical L/posterior cervical R/supraclavicular R/anterior cervical R/posterior cervical L/supraclavicular L

## 2017-07-24 NOTE — H&P PST ADULT - PSH
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

## 2017-07-24 NOTE — H&P PST ADULT - PROBLEM SELECTOR PLAN 6
Noted open wound stage 2 sacrum, surgical coordinator notified, according to patient's spouse PCP recommended wound care management by wound specialist

## 2017-07-24 NOTE — H&P PST ADULT - GENERAL GENITOURINARY SYMPTOMS
hematuria/pt self catherizes 4 x daily with assistance of spouse/dysuria/urinary hesitancy pt self cauterizes 4 x daily with assistance of spouse/urinary hesitancy

## 2017-07-24 NOTE — H&P PST ADULT - HISTORY OF PRESENT ILLNESS
Pt is a 72 y.o. male ; pt scheduled 12/16 for cataract surgery ; during pre op clearance ; pt was referred to surgeon for evaluation right carotid artery  ; pt states studies showed " pseudo aneurysm right carotid ; pt now presents for Right Carotid Stent Insertion 4/2017 Pt is a 72 yr old male PMH PAD, PVD, HTN scheduled 12/16 for cataract surgery ; during pre op clearance ; pt was referred to surgeon for evaluation right carotid artery  pt states studies showed " pseudo aneurysm right carotid s/p Right Carotid Stent Insertion 4/2017. Patient was seen by Dr. Wright last Friday s/p angiogram patient stated" the circulation of right leg is very bad scheduled for bypass". Patient scheduled for Angioplasty right leg bypass right leg angiogram on 7/26/2017 Pt is a 72 yr old male PMH PAD, PVD, HTN scheduled 12/16 for cataract surgery ; during pre op clearance ; pt was referred to surgeon for evaluation right carotid artery  pt states studies showed " pseudo aneurysm right carotid s/p Right Carotid Stent Insertion 4/2017. Patient was seen by Dr. Wright last Friday 7/21 s/p angioplasty patient stated" the circulation of right leg is very bad scheduled for bypass". Patient scheduled for Angioplasty right leg bypass right leg angiogram on 7/26/2017

## 2017-07-25 NOTE — ASU PATIENT PROFILE, ADULT - LOCATION
coccyx- no drainage, yellow in appearance, approximately 1.5cm w by 1.5cm l. coccyx- no drainage, yellow in appearance, approximately 1.5cm w by 1.5cm l. pt has redness to his sacral area

## 2017-07-26 ENCOUNTER — OUTPATIENT (OUTPATIENT)
Dept: INPATIENT UNIT | Facility: HOSPITAL | Age: 72
LOS: 1 days | Discharge: HOME | End: 2017-07-26

## 2017-07-26 VITALS
TEMPERATURE: 98 F | HEART RATE: 76 BPM | RESPIRATION RATE: 16 BRPM | OXYGEN SATURATION: 98 % | DIASTOLIC BLOOD PRESSURE: 878 MMHG | SYSTOLIC BLOOD PRESSURE: 182 MMHG

## 2017-07-26 VITALS
DIASTOLIC BLOOD PRESSURE: 70 MMHG | HEIGHT: 72 IN | HEART RATE: 50 BPM | WEIGHT: 130.95 LBS | OXYGEN SATURATION: 96 % | RESPIRATION RATE: 16 BRPM | TEMPERATURE: 98 F | SYSTOLIC BLOOD PRESSURE: 165 MMHG

## 2017-07-26 DIAGNOSIS — I65.29 OCCLUSION AND STENOSIS OF UNSPECIFIED CAROTID ARTERY: Chronic | ICD-10-CM

## 2017-07-26 DIAGNOSIS — I65.21 OCCLUSION AND STENOSIS OF RIGHT CAROTID ARTERY: Chronic | ICD-10-CM

## 2017-07-26 DIAGNOSIS — I77.1 STRICTURE OF ARTERY: ICD-10-CM

## 2017-07-26 DIAGNOSIS — Z95.810 PRESENCE OF AUTOMATIC (IMPLANTABLE) CARDIAC DEFIBRILLATOR: Chronic | ICD-10-CM

## 2017-07-26 LAB
BUN SERPL-MCNC: 20 MG/DL — SIGNIFICANT CHANGE UP (ref 7–23)
CALCIUM SERPL-MCNC: 9.4 MG/DL — SIGNIFICANT CHANGE UP (ref 8.4–10.5)
CHLORIDE SERPL-SCNC: 93 MMOL/L — LOW (ref 98–107)
CO2 SERPL-SCNC: 26 MMOL/L — SIGNIFICANT CHANGE UP (ref 22–31)
CREAT SERPL-MCNC: 0.85 MG/DL — SIGNIFICANT CHANGE UP (ref 0.5–1.3)
GLUCOSE SERPL-MCNC: 133 MG/DL — HIGH (ref 70–99)
POTASSIUM SERPL-MCNC: 4.8 MMOL/L — SIGNIFICANT CHANGE UP (ref 3.5–5.3)
POTASSIUM SERPL-SCNC: 4.8 MMOL/L — SIGNIFICANT CHANGE UP (ref 3.5–5.3)
SODIUM SERPL-SCNC: 131 MMOL/L — LOW (ref 135–145)

## 2017-07-26 RX ORDER — HYDRALAZINE HCL 50 MG
10 TABLET ORAL ONCE
Qty: 0 | Refills: 0 | Status: COMPLETED | OUTPATIENT
Start: 2017-07-26 | End: 2017-07-26

## 2017-07-26 RX ORDER — FENTANYL CITRATE 50 UG/ML
25 INJECTION INTRAVENOUS
Qty: 0 | Refills: 0 | Status: DISCONTINUED | OUTPATIENT
Start: 2017-07-26 | End: 2017-07-26

## 2017-07-26 RX ADMIN — Medication 10 MILLIGRAM(S): at 11:50

## 2017-07-26 NOTE — ASU DISCHARGE PLAN (ADULT/PEDIATRIC). - MEDICATION SUMMARY - MEDICATIONS TO TAKE
I will START or STAY ON the medications listed below when I get home from the hospital:    vitamin D  --   2000 units daily  -- Indication: For Supplement    digoxin 125 mcg (0.125 mg) oral tablet  -- 1 tab(s) by mouth once a day  -- Indication: For Cardiovascular    Xarelto 20 mg oral tablet  -- 1 tab(s) by mouth once a day, last dose 7/19  -- Indication: For anticoagulation    Tradjenta 5 mg oral tablet  -- 1 tab(s) by mouth once a day  -- Indication: For DM    metFORMIN 1000 mg oral tablet  --  by mouth once a day,  -- Indication: For DM    atorvastatin 20 mg oral tablet  -- 1 tab(s) by mouth once a day  -- Indication: For HLD    Plavix 75 mg oral tablet  -- 1 tab(s) by mouth once a day am  -- Indication: For anticoagulation    metoprolol tartrate 100 mg oral tablet  --  1.5 tabs by mouth  daily  -- Indication: For HTN    nitrofurantoin macrocrystals 100 mg oral capsule  --  by mouth 2 times a day for 7 days, began 7/20/17   -- Indication: For infection    multivitamin  -- 1 tab(s) by mouth once a day last dose 7/24  -- Indication: For Supplement    Vitamin B12  --  by mouth 3000 mcg daily  -- Indication: For Supplement

## 2017-07-26 NOTE — ASU DISCHARGE PLAN (ADULT/PEDIATRIC). - ASU FOLLOWUP
911 or go to the nearest Emergency Room or call 911 or go to the nearest emergency room/911 or go to the nearest Emergency Room

## 2017-07-26 NOTE — ASU PREOP CHECKLIST - COMMENTS
pt took nitrofurantin, digoxin, plavix, famotiidine, lopressor and antibotic at 4 am with sip of water pt took nitrofurantin, digoxin, plavix, famotiidine, lopressor, tradjenta and atorvastatin at 4 am with a sip of water

## 2017-07-26 NOTE — ASU DISCHARGE PLAN (ADULT/PEDIATRIC). - SPECIAL INSTRUCTIONS
WOUND CARE:  Please keep incisions clean and dry. Please do not Scrub or rub incisions. Do not use lotion or powder on incisions  BATHING: Please do not submerge wound underwater. You may shower and/or sponge bathe.  ACTIVITY: No heavy lifting or straining. Otherwise, you may return to your usual level of physical activity. If you are taking narcotic pain medication (such as Percocet) DO NOT drive a car, operate machinery or make important decisions.  DIET: Return to your usual diet.  NOTIFY YOUR SURGEON IF: You have any bleeding that does not stop, any pus draining from your wound(s), any fever (over 100.4 F) or chills, persistent nausea/vomiting, persistent diarrhea, or if your pain is not controlled on your discharge pain medications.  FOLLOW-UP: Please follow up with your primary care physician in one week regarding your hospitalization.  Please follow up with your surgeon, Dr. Wright [phone].  Please make an appointment for 1 to 2 weeks after your procedure.

## 2017-07-26 NOTE — ASU PREOP CHECKLIST - 2.
pt has a wound to the coccyx area of buttocks, pt also has redness to the sacral area pt has a decubti wound to the coccyx area above his buttocks. the wound is yellow in appearance, no drainage.pt also has redness to the sacral area.

## 2017-07-26 NOTE — ASU DISCHARGE PLAN (ADULT/PEDIATRIC). - NOTIFY
Swelling that continues/Unable to Urinate/Pain not relieved by Medications/Excessive Diarrhea/Numbness, color, or temperature change to extremity/Numbness, tingling/Inability to Tolerate Liquids or Foods/Fever greater than 101/Persistent Nausea and Vomiting/Bleeding that does not stop

## 2017-08-05 ENCOUNTER — TRANSCRIPTION ENCOUNTER (OUTPATIENT)
Age: 72
End: 2017-08-05

## 2017-10-05 NOTE — ASU PREOP CHECKLIST - HAIR REMOVAL
Received letter from [employer] stating that clindamycin gel 1% is not covered by pt's insurance, no alternative given. PA paperwork for [employer] completed and faxed. hair removal not indicated

## 2018-07-16 PROBLEM — I73.9 PERIPHERAL VASCULAR DISEASE, UNSPECIFIED: Chronic | Status: ACTIVE | Noted: 2017-04-11

## 2019-03-24 ENCOUNTER — INPATIENT (INPATIENT)
Facility: HOSPITAL | Age: 74
LOS: 2 days | Discharge: OTHER INSTITUTION | DRG: 699 | End: 2019-03-27
Attending: HOSPITALIST | Admitting: HOSPITALIST
Payer: MEDICARE

## 2019-03-24 VITALS — WEIGHT: 125 LBS

## 2019-03-24 DIAGNOSIS — I65.29 OCCLUSION AND STENOSIS OF UNSPECIFIED CAROTID ARTERY: Chronic | ICD-10-CM

## 2019-03-24 DIAGNOSIS — Z95.810 PRESENCE OF AUTOMATIC (IMPLANTABLE) CARDIAC DEFIBRILLATOR: Chronic | ICD-10-CM

## 2019-03-24 DIAGNOSIS — I65.21 OCCLUSION AND STENOSIS OF RIGHT CAROTID ARTERY: Chronic | ICD-10-CM

## 2019-03-24 DIAGNOSIS — K59.00 CONSTIPATION, UNSPECIFIED: ICD-10-CM

## 2019-03-24 LAB
ALBUMIN SERPL ELPH-MCNC: 3.3 G/DL — SIGNIFICANT CHANGE UP (ref 3.3–5)
ALP SERPL-CCNC: 81 U/L — SIGNIFICANT CHANGE UP (ref 40–120)
ALT FLD-CCNC: 15 U/L — SIGNIFICANT CHANGE UP (ref 12–78)
ANION GAP SERPL CALC-SCNC: 11 MMOL/L — SIGNIFICANT CHANGE UP (ref 5–17)
APPEARANCE UR: ABNORMAL
AST SERPL-CCNC: 21 U/L — SIGNIFICANT CHANGE UP (ref 15–37)
BASOPHILS # BLD AUTO: 0 K/UL — SIGNIFICANT CHANGE UP (ref 0–0.2)
BASOPHILS NFR BLD AUTO: 0 % — SIGNIFICANT CHANGE UP (ref 0–2)
BILIRUB SERPL-MCNC: 0.7 MG/DL — SIGNIFICANT CHANGE UP (ref 0.2–1.2)
BILIRUB UR-MCNC: NEGATIVE — SIGNIFICANT CHANGE UP
BUN SERPL-MCNC: 36 MG/DL — HIGH (ref 7–23)
CALCIUM SERPL-MCNC: 9.4 MG/DL — SIGNIFICANT CHANGE UP (ref 8.5–10.1)
CHLORIDE SERPL-SCNC: 93 MMOL/L — LOW (ref 96–108)
CO2 SERPL-SCNC: 26 MMOL/L — SIGNIFICANT CHANGE UP (ref 22–31)
COLOR SPEC: YELLOW — SIGNIFICANT CHANGE UP
CREAT SERPL-MCNC: 0.83 MG/DL — SIGNIFICANT CHANGE UP (ref 0.5–1.3)
DIFF PNL FLD: ABNORMAL
DIGOXIN SERPL-MCNC: 1 NG/ML — SIGNIFICANT CHANGE UP (ref 0.8–2)
EOSINOPHIL # BLD AUTO: 0 K/UL — SIGNIFICANT CHANGE UP (ref 0–0.5)
EOSINOPHIL NFR BLD AUTO: 0 % — SIGNIFICANT CHANGE UP (ref 0–6)
GLUCOSE SERPL-MCNC: 177 MG/DL — HIGH (ref 70–99)
GLUCOSE UR QL: NEGATIVE — SIGNIFICANT CHANGE UP
HCT VFR BLD CALC: 34.1 % — LOW (ref 39–50)
HGB BLD-MCNC: 11.1 G/DL — LOW (ref 13–17)
KETONES UR-MCNC: ABNORMAL
LEUKOCYTE ESTERASE UR-ACNC: ABNORMAL
LYMPHOCYTES # BLD AUTO: 0.87 K/UL — LOW (ref 1–3.3)
LYMPHOCYTES # BLD AUTO: 8 % — LOW (ref 13–44)
MCHC RBC-ENTMCNC: 27.3 PG — SIGNIFICANT CHANGE UP (ref 27–34)
MCHC RBC-ENTMCNC: 32.6 GM/DL — SIGNIFICANT CHANGE UP (ref 32–36)
MCV RBC AUTO: 83.8 FL — SIGNIFICANT CHANGE UP (ref 80–100)
MONOCYTES # BLD AUTO: 1.74 K/UL — HIGH (ref 0–0.9)
MONOCYTES NFR BLD AUTO: 16 % — HIGH (ref 2–14)
NEUTROPHILS # BLD AUTO: 8.17 K/UL — HIGH (ref 1.8–7.4)
NEUTROPHILS NFR BLD AUTO: 73 % — SIGNIFICANT CHANGE UP (ref 43–77)
NITRITE UR-MCNC: NEGATIVE — SIGNIFICANT CHANGE UP
NRBC # BLD: SIGNIFICANT CHANGE UP /100 WBCS (ref 0–0)
OB PNL STL: NEGATIVE — SIGNIFICANT CHANGE UP
PH UR: 5 — SIGNIFICANT CHANGE UP (ref 5–8)
PLATELET # BLD AUTO: 280 K/UL — SIGNIFICANT CHANGE UP (ref 150–400)
POTASSIUM SERPL-MCNC: 3.8 MMOL/L — SIGNIFICANT CHANGE UP (ref 3.5–5.3)
POTASSIUM SERPL-SCNC: 3.8 MMOL/L — SIGNIFICANT CHANGE UP (ref 3.5–5.3)
PROT SERPL-MCNC: 8.2 G/DL — SIGNIFICANT CHANGE UP (ref 6–8.3)
PROT UR-MCNC: 150 MG/DL
RBC # BLD: 4.07 M/UL — LOW (ref 4.2–5.8)
RBC # FLD: 14.6 % — HIGH (ref 10.3–14.5)
SODIUM SERPL-SCNC: 130 MMOL/L — LOW (ref 135–145)
SP GR SPEC: 1.02 — SIGNIFICANT CHANGE UP (ref 1.01–1.02)
UROBILINOGEN FLD QL: NEGATIVE — SIGNIFICANT CHANGE UP
WBC # BLD: 10.89 K/UL — HIGH (ref 3.8–10.5)
WBC # FLD AUTO: 10.89 K/UL — HIGH (ref 3.8–10.5)

## 2019-03-24 PROCEDURE — 74019 RADEX ABDOMEN 2 VIEWS: CPT | Mod: 26

## 2019-03-24 PROCEDURE — 99285 EMERGENCY DEPT VISIT HI MDM: CPT

## 2019-03-24 RX ORDER — SODIUM CHLORIDE 9 MG/ML
1000 INJECTION INTRAMUSCULAR; INTRAVENOUS; SUBCUTANEOUS ONCE
Qty: 0 | Refills: 0 | Status: COMPLETED | OUTPATIENT
Start: 2019-03-24 | End: 2019-03-24

## 2019-03-24 RX ORDER — METHENAMINE MANDELATE 1 G
0 TABLET ORAL
Qty: 0 | Refills: 0 | COMMUNITY

## 2019-03-24 RX ADMIN — SODIUM CHLORIDE 1000 MILLILITER(S): 9 INJECTION INTRAMUSCULAR; INTRAVENOUS; SUBCUTANEOUS at 22:30

## 2019-03-24 RX ADMIN — SODIUM CHLORIDE 1000 MILLILITER(S): 9 INJECTION INTRAMUSCULAR; INTRAVENOUS; SUBCUTANEOUS at 21:30

## 2019-03-24 RX ADMIN — Medication 5 MILLIGRAM(S): at 23:30

## 2019-03-24 RX ADMIN — Medication 1 ENEMA: at 21:30

## 2019-03-24 NOTE — ED PROVIDER NOTE - ATTENDING CONTRIBUTION TO CARE
75 yo male c/o constipation for over 2 weeks, with associated abdominal pain, taking colace x 1 week, vomited his miralax today.  Unable to ambulate, not eating/drinking, no help at home as per family.  No fever/chills.      pt alert, awake, no acute distress, ncat, rrr, cta b/l, abd hard, mildly distended, no rebound, no guarding, nontender    labs wnl, large of amount of stool removed from disimpaction, will admit for social reasons, possible rehab

## 2019-03-24 NOTE — ED PROVIDER NOTE - SKIN, MLM
Skin normal color for race, warm, dry and intact. No evidence of rash. stage 2 ulcer buttock, back, sacrum

## 2019-03-24 NOTE — ED PROVIDER NOTE - CARE PLAN
Principal Discharge DX:	Constipation, unspecified constipation type Principal Discharge DX:	Constipation, unspecified constipation type  Secondary Diagnosis:	Difficulty walking  Secondary Diagnosis:	Failure to thrive in adult

## 2019-03-24 NOTE — ED PROVIDER NOTE - OBJECTIVE STATEMENT
74 y male presents with constipation, 74 y male presents with constipation, abdominal pain, started on colace 3 tabs qd x 1 week , seen by PMD Dr Ybarra 1 week ago, and seen by Dr Messina 1 week ago, wife states given miralax today x 1 dose, and patient vomited.  denies hx of abdominal surgeries, former smoker.  PMH:  AICD (automatic cardioverter/defibrillator) present    Atrial fibrillation  since 2013  Carotid artery occlusion  left  GERD (gastroesophageal reflux disease)    HTN (hypertension)    Hyperlipidemia    Lung cancer  small cell, stage 4, 1997, radiation & chemo- on remission  Neuropathy    PAD (peripheral artery disease)    PVD (peripheral vascular disease)  s/p angioplasty with stent ; pt states Left Carotid Artery occluded  Type 2 diabetes mellitus  glucose this am 116

## 2019-03-24 NOTE — ED ADULT NURSE REASSESSMENT NOTE - NS ED NURSE REASSESS COMMENT FT1
Pt was disimpacted by MD Rios- copious amount of stool was obtained.  Wife states that she catheterizes pt 4 times a day,  he does not urinate on his own-pt was straight cathed for urine sample about 100cc of cloudy yellow urine obtained

## 2019-03-24 NOTE — ED ADULT NURSE NOTE - CADM POA URETHRAL CATHETER
After Visit Summary   7/27/2017    Hoda Brush    MRN: [id number]           Patient Information     Date Of Birth          1954        Visit Information        Provider Department      7/27/2017 7:45 AM Jeremiah Enriquez; Steffi Webb PA-C [facility]        Today's Diagnoses     Malignant neoplasm of left female breast, unspecified estrogen receptor status, unspecified site of breast (H)    -  1      Care Instructions    Schedule general physical and come fasting for 8 hours so we can check cholesterol  Follow up with breast center  Return to clinic for any new or worsening symptoms or go to ER Urgent care in off hours              Follow-ups after your visit        Additional Services     BREAST CENTER REFERRAL       Your provider has referred you to: [facility]: [facility] [phone]   [email or web address]    Please be aware that coverage of these services is subject to the terms and limitations of your health insurance plan.  Call member services at your health plan with any benefit or coverage questions.      Please bring the following with you to your appointment:    (1) Any X-Rays, CTs or MRIs which have been performed.  Contact the facility where they were done to arrange for  prior to your scheduled appointment.    (2) List of current medications   (3) This referral request   (4) Any documents/labs given to you for this referral                  Who to contact     If you have questions or need follow up information about today's clinic visit or your schedule please contact [facility] directly at [phone].  Normal or non-critical lab and imaging results will be communicated to you by MyChart, letter or phone within 4 business days after the clinic has received the results. If you do not hear from us within 7 days, please contact the  "clinic through [employer] or phone. If you have a critical or abnormal lab result, we will notify you by phone as soon as possible.  Submit refill requests through "[employer] or call your pharmacy and they will forward the refill request to us. Please allow 3 business days for your refill to be completed.          Additional Information About Your Visit        [employer] Information     "[employer] lets you send messages to your doctor, view your test results, renew your prescriptions, schedule appointments and more. To sign up, go to [email or web address] . Click on \"Log in\" on the left side of the screen, which will take you to the Welcome page. Then click on \"Sign up Now\" on the right side of the page.     You will be asked to enter the access code listed below, as well as some personal information. Please follow the directions to create your username and password.     Your access code is: [email or web address]  Expires: 10/25/2017  8:18 AM     Your access code will  in 90 days. If you need help or a new code, please call your [place] clinic or [phone].        Care EveryWhere ID     This is your Care EveryWhere ID. This could be used by other organizations to access your [place] medical records  [id number]        Your Vitals Were     Pulse Temperature Pulse Oximetry BMI (Body Mass Index)          83 98.4  F (36.9  C) (Oral) 99% 29.89 kg/m2         Blood Pressure from Last 3 Encounters:   17 124/80   16 118/61    Weight from Last 3 Encounters:   17 175 lb 1.6 oz (79.4 kg)   16 174 lb 2.6 oz (79 kg)              We Performed the Following     BREAST CENTER REFERRAL        Primary Care Provider    Physician No Ref-Primary       No address on file        Equal Access to Services     NOE VAZQUEZ : Luz Marina Ramsey, ro jefferson, jamal wilkins, alexa beltran. So [facility] [phone].    ATENCIÓN: Si habla español, tiene a lagunas disposición servicios " juan de asistencia lingüística. Luis joshi [phone].    We comply with applicable federal civil rights laws and [place] laws. We do not discriminate on the basis of race, color, national origin, age, disability sex, sexual orientation or gender identity.            Thank you!     Thank you for choosing [facility]  for your care. Our goal is always to provide you with excellent care. Hearing back from our patients is one way we can continue to improve our services. Please take a few minutes to complete the written survey that you may receive in the mail after your visit with us. Thank you!             Your Updated Medication List - Protect others around you: Learn how to safely use, store and throw away your medicines at www.disposemymeds.org.          This list is accurate as of: 7/27/17  8:18 AM.  Always use your most recent med list.                   Brand Name Dispense Instructions for use Diagnosis    HYDROcodone-acetaminophen 5-325 MG per tablet    NORCO    8 tablet    Take 1 tablet by mouth every 8 hours as needed for moderate to severe pain        TAMOXIFEN CITRATE PO              No

## 2019-03-24 NOTE — ED ADULT TRIAGE NOTE - CHIEF COMPLAINT QUOTE
constipation * 17 days- abdominal pain- was prescribed colace since thursday- was given miralax today by family

## 2019-03-24 NOTE — ED ADULT NURSE NOTE - OBJECTIVE STATEMENT
Pt BIBA for constipation for 3 weeks as per family - pt hasn't been eating or drinking as per wife and has been very weak -not getting out of bed - family uses a lift to get him OOB

## 2019-03-24 NOTE — ED ADULT NURSE NOTE - NSIMPLEMENTINTERV_GEN_ALL_ED
Implemented All Fall with Harm Risk Interventions:  [place] to call system. Call bell, personal items and telephone within reach. Instruct patient to call for assistance. Room bathroom lighting operational. Non-slip footwear when patient is off stretcher. Physically safe environment: no spills, clutter or unnecessary equipment. Stretcher in lowest position, wheels locked, appropriate side rails in place. Provide visual cue, wrist band, yellow gown, etc. Monitor gait and stability. Monitor for mental status changes and reorient to person, place, and time. Review medications for side effects contributing to fall risk. Reinforce activity limits and safety measures with patient and family. Provide visual clues: red socks.

## 2019-03-24 NOTE — ED ADULT NURSE NOTE - NS ED NOTE ABUSE SUSPICION NEGLECT YN
Your current Orthopaedic problem we are working together to treat is:  Left knee revision.      It is recommended you schedule a follow-up appointment with Dr. Marko Jansen in 4 weeks.    Office hours are 8:00 am to 5:00 pm Monday through Friday. If it is urgent that you speak with someone outside of these hours, our [place] 3ROAM Call Center will be able to assist you. You can reach the office by calling the [facility] scheduling line at [phone].    We do highly recommend [employer], if you do not already have this. You can request access via the internet or by simply talking with a  at any of the clinics.  [email or web address].      Thank you for choosing [facility] as your Orthopaedic provider!     No

## 2019-03-25 DIAGNOSIS — R33.9 RETENTION OF URINE, UNSPECIFIED: ICD-10-CM

## 2019-03-25 DIAGNOSIS — I65.29 OCCLUSION AND STENOSIS OF UNSPECIFIED CAROTID ARTERY: ICD-10-CM

## 2019-03-25 DIAGNOSIS — E11.9 TYPE 2 DIABETES MELLITUS WITHOUT COMPLICATIONS: ICD-10-CM

## 2019-03-25 DIAGNOSIS — R62.7 ADULT FAILURE TO THRIVE: ICD-10-CM

## 2019-03-25 DIAGNOSIS — K59.00 CONSTIPATION, UNSPECIFIED: ICD-10-CM

## 2019-03-25 DIAGNOSIS — I48.91 UNSPECIFIED ATRIAL FIBRILLATION: ICD-10-CM

## 2019-03-25 DIAGNOSIS — R11.10 VOMITING, UNSPECIFIED: ICD-10-CM

## 2019-03-25 DIAGNOSIS — N39.0 URINARY TRACT INFECTION, SITE NOT SPECIFIED: ICD-10-CM

## 2019-03-25 DIAGNOSIS — I10 ESSENTIAL (PRIMARY) HYPERTENSION: ICD-10-CM

## 2019-03-25 DIAGNOSIS — K21.9 GASTRO-ESOPHAGEAL REFLUX DISEASE WITHOUT ESOPHAGITIS: ICD-10-CM

## 2019-03-25 DIAGNOSIS — Z29.9 ENCOUNTER FOR PROPHYLACTIC MEASURES, UNSPECIFIED: ICD-10-CM

## 2019-03-25 DIAGNOSIS — E78.5 HYPERLIPIDEMIA, UNSPECIFIED: ICD-10-CM

## 2019-03-25 PROBLEM — Z95.810 PRESENCE OF AUTOMATIC (IMPLANTABLE) CARDIAC DEFIBRILLATOR: Chronic | Status: ACTIVE | Noted: 2017-04-11

## 2019-03-25 LAB
ANION GAP SERPL CALC-SCNC: 9 MMOL/L — SIGNIFICANT CHANGE UP (ref 5–17)
BUN SERPL-MCNC: 29 MG/DL — HIGH (ref 7–23)
CALCIUM SERPL-MCNC: 9.1 MG/DL — SIGNIFICANT CHANGE UP (ref 8.5–10.1)
CHLORIDE SERPL-SCNC: 95 MMOL/L — LOW (ref 96–108)
CO2 SERPL-SCNC: 28 MMOL/L — SIGNIFICANT CHANGE UP (ref 22–31)
CREAT SERPL-MCNC: 0.84 MG/DL — SIGNIFICANT CHANGE UP (ref 0.5–1.3)
GLUCOSE SERPL-MCNC: 203 MG/DL — HIGH (ref 70–99)
HBA1C BLD-MCNC: 5.9 % — HIGH (ref 4–5.6)
HCT VFR BLD CALC: 31.3 % — LOW (ref 39–50)
HCV AB S/CO SERPL IA: 0.68 S/CO — SIGNIFICANT CHANGE UP (ref 0–0.79)
HCV AB SERPL-IMP: SIGNIFICANT CHANGE UP
HGB BLD-MCNC: 10.1 G/DL — LOW (ref 13–17)
MCHC RBC-ENTMCNC: 27.3 PG — SIGNIFICANT CHANGE UP (ref 27–34)
MCHC RBC-ENTMCNC: 32.3 GM/DL — SIGNIFICANT CHANGE UP (ref 32–36)
MCV RBC AUTO: 84.6 FL — SIGNIFICANT CHANGE UP (ref 80–100)
NRBC # BLD: 0 /100 WBCS — SIGNIFICANT CHANGE UP (ref 0–0)
PLATELET # BLD AUTO: 245 K/UL — SIGNIFICANT CHANGE UP (ref 150–400)
POTASSIUM SERPL-MCNC: 4 MMOL/L — SIGNIFICANT CHANGE UP (ref 3.5–5.3)
POTASSIUM SERPL-SCNC: 4 MMOL/L — SIGNIFICANT CHANGE UP (ref 3.5–5.3)
PROCALCITONIN SERPL-MCNC: 0.11 NG/ML — HIGH (ref 0–0.04)
RBC # BLD: 3.7 M/UL — LOW (ref 4.2–5.8)
RBC # FLD: 14.6 % — HIGH (ref 10.3–14.5)
SODIUM SERPL-SCNC: 132 MMOL/L — LOW (ref 135–145)
WBC # BLD: 14.6 K/UL — HIGH (ref 3.8–10.5)
WBC # FLD AUTO: 14.6 K/UL — HIGH (ref 3.8–10.5)

## 2019-03-25 PROCEDURE — 74019 RADEX ABDOMEN 2 VIEWS: CPT | Mod: 26

## 2019-03-25 PROCEDURE — 99223 1ST HOSP IP/OBS HIGH 75: CPT | Mod: AI,GC

## 2019-03-25 PROCEDURE — 12345: CPT | Mod: NC

## 2019-03-25 RX ORDER — DEXTROSE 50 % IN WATER 50 %
15 SYRINGE (ML) INTRAVENOUS ONCE
Qty: 0 | Refills: 0 | Status: DISCONTINUED | OUTPATIENT
Start: 2019-03-25 | End: 2019-03-27

## 2019-03-25 RX ORDER — DEXTROSE 50 % IN WATER 50 %
12.5 SYRINGE (ML) INTRAVENOUS ONCE
Qty: 0 | Refills: 0 | Status: DISCONTINUED | OUTPATIENT
Start: 2019-03-25 | End: 2019-03-27

## 2019-03-25 RX ORDER — METOPROLOL TARTRATE 50 MG
150 TABLET ORAL DAILY
Qty: 0 | Refills: 0 | Status: DISCONTINUED | OUTPATIENT
Start: 2019-03-25 | End: 2019-03-27

## 2019-03-25 RX ORDER — INSULIN LISPRO 100/ML
VIAL (ML) SUBCUTANEOUS AT BEDTIME
Qty: 0 | Refills: 0 | Status: DISCONTINUED | OUTPATIENT
Start: 2019-03-25 | End: 2019-03-27

## 2019-03-25 RX ORDER — CLOPIDOGREL BISULFATE 75 MG/1
75 TABLET, FILM COATED ORAL DAILY
Qty: 0 | Refills: 0 | Status: DISCONTINUED | OUTPATIENT
Start: 2019-03-25 | End: 2019-03-27

## 2019-03-25 RX ORDER — GLYCERIN ADULT
1 SUPPOSITORY, RECTAL RECTAL AT BEDTIME
Qty: 0 | Refills: 0 | Status: DISCONTINUED | OUTPATIENT
Start: 2019-03-25 | End: 2019-03-27

## 2019-03-25 RX ORDER — RIVAROXABAN 15 MG-20MG
20 KIT ORAL EVERY 24 HOURS
Qty: 0 | Refills: 0 | Status: DISCONTINUED | OUTPATIENT
Start: 2019-03-25 | End: 2019-03-27

## 2019-03-25 RX ORDER — CEFTRIAXONE 500 MG/1
2 INJECTION, POWDER, FOR SOLUTION INTRAMUSCULAR; INTRAVENOUS ONCE
Qty: 0 | Refills: 0 | Status: COMPLETED | OUTPATIENT
Start: 2019-03-25 | End: 2019-03-25

## 2019-03-25 RX ORDER — ACETAMINOPHEN 500 MG
650 TABLET ORAL EVERY 6 HOURS
Qty: 0 | Refills: 0 | Status: DISCONTINUED | OUTPATIENT
Start: 2019-03-25 | End: 2019-03-27

## 2019-03-25 RX ORDER — PREGABALIN 225 MG/1
1000 CAPSULE ORAL DAILY
Qty: 0 | Refills: 0 | Status: DISCONTINUED | OUTPATIENT
Start: 2019-03-25 | End: 2019-03-27

## 2019-03-25 RX ORDER — CEFTRIAXONE 500 MG/1
INJECTION, POWDER, FOR SOLUTION INTRAMUSCULAR; INTRAVENOUS
Qty: 0 | Refills: 0 | Status: DISCONTINUED | OUTPATIENT
Start: 2019-03-25 | End: 2019-03-25

## 2019-03-25 RX ORDER — SENNA PLUS 8.6 MG/1
2 TABLET ORAL AT BEDTIME
Qty: 0 | Refills: 0 | Status: DISCONTINUED | OUTPATIENT
Start: 2019-03-25 | End: 2019-03-27

## 2019-03-25 RX ORDER — POLYETHYLENE GLYCOL 3350 17 G/17G
17 POWDER, FOR SOLUTION ORAL DAILY
Qty: 0 | Refills: 0 | Status: DISCONTINUED | OUTPATIENT
Start: 2019-03-25 | End: 2019-03-27

## 2019-03-25 RX ORDER — ASCORBIC ACID 60 MG
500 TABLET,CHEWABLE ORAL DAILY
Qty: 0 | Refills: 0 | Status: DISCONTINUED | OUTPATIENT
Start: 2019-03-25 | End: 2019-03-27

## 2019-03-25 RX ORDER — DOCUSATE SODIUM 100 MG
100 CAPSULE ORAL THREE TIMES A DAY
Qty: 0 | Refills: 0 | Status: DISCONTINUED | OUTPATIENT
Start: 2019-03-25 | End: 2019-03-27

## 2019-03-25 RX ORDER — ATORVASTATIN CALCIUM 80 MG/1
20 TABLET, FILM COATED ORAL AT BEDTIME
Qty: 0 | Refills: 0 | Status: DISCONTINUED | OUTPATIENT
Start: 2019-03-25 | End: 2019-03-27

## 2019-03-25 RX ORDER — DEXTROSE 50 % IN WATER 50 %
25 SYRINGE (ML) INTRAVENOUS ONCE
Qty: 0 | Refills: 0 | Status: DISCONTINUED | OUTPATIENT
Start: 2019-03-25 | End: 2019-03-27

## 2019-03-25 RX ORDER — CHOLECALCIFEROL (VITAMIN D3) 125 MCG
1000 CAPSULE ORAL DAILY
Qty: 0 | Refills: 0 | Status: DISCONTINUED | OUTPATIENT
Start: 2019-03-25 | End: 2019-03-27

## 2019-03-25 RX ORDER — INSULIN LISPRO 100/ML
VIAL (ML) SUBCUTANEOUS
Qty: 0 | Refills: 0 | Status: DISCONTINUED | OUTPATIENT
Start: 2019-03-25 | End: 2019-03-27

## 2019-03-25 RX ORDER — DIGOXIN 250 MCG
0.12 TABLET ORAL DAILY
Qty: 0 | Refills: 0 | Status: DISCONTINUED | OUTPATIENT
Start: 2019-03-25 | End: 2019-03-27

## 2019-03-25 RX ORDER — SIMETHICONE 80 MG/1
80 TABLET, CHEWABLE ORAL THREE TIMES A DAY
Qty: 0 | Refills: 0 | Status: DISCONTINUED | OUTPATIENT
Start: 2019-03-25 | End: 2019-03-27

## 2019-03-25 RX ORDER — LOSARTAN POTASSIUM 100 MG/1
25 TABLET, FILM COATED ORAL DAILY
Qty: 0 | Refills: 0 | Status: DISCONTINUED | OUTPATIENT
Start: 2019-03-25 | End: 2019-03-27

## 2019-03-25 RX ORDER — GLUCAGON INJECTION, SOLUTION 0.5 MG/.1ML
1 INJECTION, SOLUTION SUBCUTANEOUS ONCE
Qty: 0 | Refills: 0 | Status: DISCONTINUED | OUTPATIENT
Start: 2019-03-25 | End: 2019-03-27

## 2019-03-25 RX ORDER — METOPROLOL TARTRATE 50 MG
0 TABLET ORAL
Qty: 0 | Refills: 0 | COMMUNITY

## 2019-03-25 RX ORDER — SODIUM CHLORIDE 9 MG/ML
1000 INJECTION, SOLUTION INTRAVENOUS
Qty: 0 | Refills: 0 | Status: DISCONTINUED | OUTPATIENT
Start: 2019-03-25 | End: 2019-03-27

## 2019-03-25 RX ORDER — LACTOBACILLUS ACIDOPHILUS 100MM CELL
1 CAPSULE ORAL
Qty: 0 | Refills: 0 | Status: DISCONTINUED | OUTPATIENT
Start: 2019-03-25 | End: 2019-03-27

## 2019-03-25 RX ADMIN — RIVAROXABAN 20 MILLIGRAM(S): KIT at 17:28

## 2019-03-25 RX ADMIN — ATORVASTATIN CALCIUM 20 MILLIGRAM(S): 80 TABLET, FILM COATED ORAL at 21:40

## 2019-03-25 RX ADMIN — Medication 100 MILLIGRAM(S): at 05:58

## 2019-03-25 RX ADMIN — PREGABALIN 1000 MICROGRAM(S): 225 CAPSULE ORAL at 11:55

## 2019-03-25 RX ADMIN — Medication 1000 UNIT(S): at 11:55

## 2019-03-25 RX ADMIN — Medication 1: at 17:28

## 2019-03-25 RX ADMIN — Medication 100 MILLIGRAM(S): at 13:08

## 2019-03-25 RX ADMIN — Medication 1 TABLET(S): at 11:55

## 2019-03-25 RX ADMIN — POLYETHYLENE GLYCOL 3350 17 GRAM(S): 17 POWDER, FOR SOLUTION ORAL at 11:55

## 2019-03-25 RX ADMIN — LOSARTAN POTASSIUM 25 MILLIGRAM(S): 100 TABLET, FILM COATED ORAL at 05:58

## 2019-03-25 RX ADMIN — Medication 1 TABLET(S): at 17:30

## 2019-03-25 RX ADMIN — Medication 150 MILLIGRAM(S): at 06:00

## 2019-03-25 RX ADMIN — Medication 0.12 MILLIGRAM(S): at 05:58

## 2019-03-25 RX ADMIN — Medication 1 TABLET(S): at 12:46

## 2019-03-25 RX ADMIN — CEFTRIAXONE 100 GRAM(S): 500 INJECTION, POWDER, FOR SOLUTION INTRAMUSCULAR; INTRAVENOUS at 05:58

## 2019-03-25 RX ADMIN — Medication 2: at 11:55

## 2019-03-25 RX ADMIN — SIMETHICONE 80 MILLIGRAM(S): 80 TABLET, CHEWABLE ORAL at 21:41

## 2019-03-25 RX ADMIN — CLOPIDOGREL BISULFATE 75 MILLIGRAM(S): 75 TABLET, FILM COATED ORAL at 11:55

## 2019-03-25 RX ADMIN — SIMETHICONE 80 MILLIGRAM(S): 80 TABLET, CHEWABLE ORAL at 13:08

## 2019-03-25 RX ADMIN — Medication 500 MILLIGRAM(S): at 11:55

## 2019-03-25 NOTE — PHYSICAL THERAPY INITIAL EVALUATION ADULT - PERTINENT HX OF CURRENT PROBLEM, REHAB EVAL
pt admitted 3/24 due ti abdominal pain and constipation x 1 week. Pt's wife states that pt has hard;y stood or walked in 2 weeks. PMH: HTN ,a-fib, ACID, DM type II, CAD ,s/p angioplasty w/stent. HLD, h/o lung CA, FTT

## 2019-03-25 NOTE — CONSULT NOTE ADULT - ASSESSMENT
75yo male with pmhx HTN, atrial fibrillation, AICD in place, diabetes mellitus type 2, CAD s/p angioplasty and stent, HLD, Lung CA (s/p radiation and chemo in remission 1997), presents with c/o abd and constipation x 1 week.

## 2019-03-25 NOTE — H&P ADULT - NSHPPHYSICALEXAM_GEN_ALL_CORE
T(F): 97.5 (03-24-19 @ 20:48)  HR: 89 (03-24-19 @ 20:48)  BP: 141/70 (03-24-19 @ 20:48)  RR: 16 (03-24-19 @ 20:48)  SpO2: 98% (03-24-19 @ 20:48)      Physical Exam:  General: Well developed, well nourished, NAD  HEENT: NCAT, PERRLA, EOMI bl, moist mucous membranes   Neck: Supple, nontender, no mass  Neurology: A&Ox3, nonfocal, CN II-XII grossly intact, sensation intact, no gait abnormalities   Respiratory: CTA B/L, No W/R/R  CV: RRR, +S1/S2, no murmurs, rubs or gallops  Abdominal: Soft, NT, ND +BSx4  Extremities: No C/C/E, + peripheral pulses  MSK: Normal ROM, no joint erythema or warmth, no joint swelling   Skin: warm, dry, normal color, no rash or abnormal lesions T(F): 97.5 (03-24-19 @ 20:48)  HR: 89 (03-24-19 @ 20:48)  BP: 141/70 (03-24-19 @ 20:48)  RR: 16 (03-24-19 @ 20:48)  SpO2: 98% (03-24-19 @ 20:48)      Physical Exam:  General: elderly, NAD  HEENT: NCAT, PERRLA, EOMI bl, moist mucous membranes   Neck: Supple, nontender, no mass, surgical scar noted on left neck from endarterectomy   Neurology: A&Ox3, nonfocal, CN II-XII grossly intact, sensation intact   Respiratory: CTA B/L, No W/R/R  CV: irregularly irregular, +S1/S2, no murmurs, rubs or gallops  Abdominal: firm, NT, distended, +BSx4  Extremities: No C/C/E, + peripheral pulses  MSK: Normal ROM, no joint erythema or warmth, no joint swelling, no CVA tenderness

## 2019-03-25 NOTE — H&P ADULT - PROBLEM SELECTOR PLAN 10
Patient on Xarelto, Plavix  SCDs for DVT ppx  Carb consistent diet  Ambulate with assistance, walker    IMPROVE VTE Individual Risk Assessment          RISK                                                          Points    [  ] Previous VTE                                                3  [  ] Thrombophilia                                             2  [  ] Lower limb paralysis                                   2        (unable to hold up >15 seconds)    [  ] Current Cancer                                            2         (within 6 months)  [  ] Immobilization > 24 hrs                              1  [  ] ICU/CCU stay > 24 hours                            1  [  ] Age > 60                                                    1    IMPROVE VTE Score _____1____

## 2019-03-25 NOTE — PROGRESS NOTE ADULT - ATTENDING COMMENTS
Repeat abd xray results noted, GI to see patient , might need Colonoscopy   Nutritional Consult Repeat abd xray results noted, GI to see patient , might need Colonoscopy   Nutritional Consult  D/w daughter at bedside. All questions answered.  PT evaluation

## 2019-03-25 NOTE — H&P ADULT - PROBLEM SELECTOR PLAN 3
UA exhibited small amount of ketones, moderate leukocyte esterase, large amount of blood, and moderate bacteria load  Start Rocephin  f/u blood and urine cx  Tylenol 650mg PO PRN fever, pain

## 2019-03-25 NOTE — H&P ADULT - NSICDXPASTMEDICALHX_GEN_ALL_CORE_FT
PAST MEDICAL HISTORY:  AICD (automatic cardioverter/defibrillator) present     Atrial fibrillation since 2013    Carotid artery occlusion left    GERD (gastroesophageal reflux disease)     HTN (hypertension)     Hyperlipidemia     Lung cancer small cell, stage 4, 1997, radiation & chemo- on remission    Neuropathy     PAD (peripheral artery disease)     PVD (peripheral vascular disease) s/p angioplasty with stent ; pt states Left Carotid Artery occluded    Type 2 diabetes mellitus glucose this am 116

## 2019-03-25 NOTE — H&P ADULT - PROBLEM SELECTOR PLAN 1
s/p disimpaction with significant fecal load release  Start bowel regimen with colace, dulcolax, miralax and fleet enema PRN  Repeat abd xray in AM

## 2019-03-25 NOTE — H&P ADULT - NSHPSOCIALHISTORY_GEN_ALL_CORE
Former smoker, 50 pack year history (25years x 2ppd), quit 25 years ago  Drinks 1 beer per day  Denies drug use  Ambulates with walker  Diabetic diet  Retired,

## 2019-03-25 NOTE — CONSULT NOTE ADULT - SUBJECTIVE AND OBJECTIVE BOX
Chief Complaint:  Patient is a 74y old  Male who presents with a chief complaint of Constipation, Abd pain (25 Mar 2019 08:40)    AICD (automatic cardioverter/defibrillator) present  PVD (peripheral vascular disease)  Atrial fibrillation  Hyperlipidemia  HTN (hypertension)  Lung cancer  PAD (peripheral artery disease)  GERD (gastroesophageal reflux disease)  Carotid artery occlusion  Neuropathy  Type 2 diabetes mellitus  Carotid artery occlusion  AICD (automatic cardioverter/defibrillator) present  Carotid stenosis, right  S/P femoral-popliteal bypass surgery  S/P angioplasty with stent  S/P cervical spinal fusion     HPI:  73yo male with pmhx HTN, atrial fibrillation, AICD in place, diabetes mellitus type 2, CAD s/p angioplasty and stent, HLD, Lung CA (s/p radiation and chemo in remission ), presents with c/o abd and constipation x 1 week. Patient was seen by PMD and started on colace TID last week, without significant improvement. Patient could not tolerate miralax today, and vomited. Patient states he vomited with every attempt to BM. Patient sought further medical evaluation at [facility] ED. Patient also reports feeling progressively weaker over the past several weeks.  He stopped ambulating 2 weeks ago.  He just felt weak and didn't want to walk anymore.   Wife and daughter are concerned about this and felt he needed rehab.  Of note, pt also gets straight cath 3 times a day.     In the ED, patient vitals were T(F): 97.5, HR: 89, BP: 141/70, RR: 16, SpO2: 98% on RA. CBC exhibited WBC 10.80, RBC 4.07, Hgb 11.1, Hct 34.1. CMP exhibited Na 130, K 3.8, Cl 93, CO2 26, BUN 36, Cr 0.83, Gluc 177. UA exhibited small amount of ketones, moderate leukocyte esterase, large amount of blood, and moderate bacteria load. Abd Xray exhibited significant fecal load. Patient was disimpacted in the ED, with significant effect and relief. Patient was administered dulcolax and fleet enema x 1. Patient felt much improved after disimpaction. (25 Mar 2019 00:21)    gi consulted for constipation. chart reviewed. pt seen and examined. reports lower abd pain 2/2 no bm x5-6 days. associated w nausea, non bloody vomiting and dec po. reports hx of chronic constipation. denies prior hx of colon. thinks he had egd remotely and reportedly normal. denies abd sx. fhx nc. on plavix and xarelto at home. denies f/c/hematemesis/melena/brbpr. upon eval reports improvement in symptoms, no further n/v, no abd pain, having +bms. per rn, stools initially loose but now softer.    recent vs/labs/imaging reviewed- afebrile  mild leukocytosis no bands  ob neg  axrs as below    No Known Allergies      acetaminophen   Tablet .. 650 milliGRAM(s) Oral every 6 hours PRN  ascorbic acid 500 milliGRAM(s) Oral daily  atorvastatin 20 milliGRAM(s) Oral at bedtime  bisacodyl 5 milliGRAM(s) Oral every 12 hours PRN  cefTRIAXone   IVPB      cholecalciferol 1000 Unit(s) Oral daily  clopidogrel Tablet 75 milliGRAM(s) Oral daily  cyanocobalamin 1000 MICROGram(s) Oral daily  dextrose 40% Gel 15 Gram(s) Oral once PRN  dextrose 5%. 1000 milliLiter(s) IV Continuous <Continuous>  dextrose 50% Injectable 12.5 Gram(s) IV Push once  dextrose 50% Injectable 25 Gram(s) IV Push once  dextrose 50% Injectable 25 Gram(s) IV Push once  digoxin     Tablet 0.125 milliGRAM(s) Oral daily  docusate sodium 100 milliGRAM(s) Oral three times a day  glucagon  Injectable 1 milliGRAM(s) IntraMuscular once PRN  insulin lispro (HumaLOG) corrective regimen sliding scale   SubCutaneous three times a day before meals  insulin lispro (HumaLOG) corrective regimen sliding scale   SubCutaneous at bedtime  losartan 25 milliGRAM(s) Oral daily  metoprolol succinate  milliGRAM(s) Oral daily  multivitamin 1 Tablet(s) Oral daily  polyethylene glycol 3350 17 Gram(s) Oral daily  rivaroxaban 20 milliGRAM(s) Oral every 24 hours  saline laxative (FLEET) Rectal Enema 1 Enema Rectal once PRN  senna 2 Tablet(s) Oral at bedtime        FAMILY HISTORY:  No pertinent family history in first degree relatives        Review of Systems:    General:  No wt loss, fevers, chills, night sweats, fatigue  Eyes:  Good vision, no reported pain  ENT:  No sore throat, pain, runny nose, dysphagia  CV:  No pain, palpitations, no lightheadedness  Resp:  No dyspnea, cough, tachypnea, wheezing  GI: see above  :  No pain, bleeding, incontinence, nocturia  Muscle:  No pain, weakness  Neuro:  No weakness, tingling, memory problems  Psych:  No fatigue, insomnia, mood problems, depression  Endocrine:  No polyuria, polydypsia, cold/heat intolerance  Heme:  No petechiae, ecchymosis, easy bruisability  Skin:  No rash, tattoos, scars, edema    Relevant Family History:   n/c    Relevant Social History: n/c      Physical Exam:    Vital Signs:  Vital Signs Last 24 Hrs  T(C): 36.8 (25 Mar 2019 11:18), Max: 36.8 (25 Mar 2019 08:35)  T(F): 98.2 (25 Mar 2019 11:18), Max: 98.3 (25 Mar 2019 08:35)  HR: 70 (25 Mar 2019 11:18) (70 - 89)  BP: 154/61 (25 Mar 2019 11:18) (136/66 - 154/80)  BP(mean): --  RR: 18 (25 Mar 2019 11:18) (16 - 20)  SpO2: 99% (25 Mar 2019 11:18) (97% - 99%)  Daily     Daily     General:  nad frail  HEENT:  NC/AT  Chest:  dec bs  Cardiovascular:  Regular rhythm, S1, S2  Abdomen:  Soft, nt mild dt  Extremities:  no edema  Skin:  No rash  Neuro/Psych:  Awake alert responds appropriately    Laboratory:                            11.1   10.89 )-----------( 280      ( 24 Mar 2019 21:26 )             34.1     03-24    130<L>  |  93<L>  |  36<H>  ----------------------------<  177<H>  3.8   |  26  |  0.83    Ca    9.4      24 Mar 2019 21:26    TPro  8.2  /  Alb  3.3  /  TBili  0.7  /  DBili  x   /  AST  21  /  ALT  15  /  AlkPhos  81  03-24    LIVER FUNCTIONS - ( 24 Mar 2019 21:26 )  Alb: 3.3 g/dL / Pro: 8.2 g/dL / ALK PHOS: 81 U/L / ALT: 15 U/L / AST: 21 U/L / GGT: x             Urinalysis Basic - ( 24 Mar 2019 22:00 )    Color: Yellow / Appearance: Slightly Turbid / S.020 / pH: x  Gluc: x / Ketone: Small  / Bili: Negative / Urobili: Negative   Blood: x / Protein: 150 mg/dL / Nitrite: Negative   Leuk Esterase: Moderate / RBC: 11-25 /HPF / WBC 3-5   Sq Epi: x / Non Sq Epi: Few / Bacteria: Moderate        Imaging:      < from: Xray Abdomen 2 View PORTABLE -Routine (19 @ 07:47) >    EXAM:  XR ABDOMEN PORTABLE ROUTINE 2V                            PROCEDURE DATE:  2019          INTERPRETATION:  Clinical information: Abdominal distention and   constipation.    Abdomen supine and upright view.    Comparison: 3/24/2019.    There is persistent redundant, air distended colon with stool noted at   the level of the rectum.  No gross free intraperitoneal air is noted.    Impression:    Persistent distended colon, findings may be secondary to obstruction from   distal colonic or rectal fecal impaction.  Recommend further clinical correlation and evaluation with CT scan of the   abdomen and pelvis as clinically indicated.                ROSIE SIMS M.D., ATTENDING RADIOLOGIST  This document has been electronically signed. Mar 25 2019  8:29AM                < end of copied text >  < from: Xray Abdomen 2 Views (19 @ 22:33) >    EXAM:  XR ABDOMEN 2V                            PROCEDURE DATE:  2019          INTERPRETATION:  Clinical information: Constipation.    Abdomen supine and upright views.    There is redundant, air distended colon, with a large fecal load noted  within the right colon.  Air is noted to the level of the sigmoid colon.    No gross free intraperitoneal air is noted.    Bilateral surgical clips are present.    Impression:    Findings as discussed above, may be secondary to obstruction from distal   fecal impaction.                ROSIE SIMS M.D., ATTENDING RADIOLOGIST  This document has been electronically signed. Mar 25 2019  8:25AM                < end of copied text >

## 2019-03-25 NOTE — PROGRESS NOTE ADULT - SUBJECTIVE AND OBJECTIVE BOX
Patient is a 74y old  Male who presents with a chief complaint of Constipation, Abd pain (25 Mar 2019 00:21)       INTERVAL HPI/OVERNIGHT EVENTS: 73yo male with pmhx HTN, atrial fibrillation, diabetes mellitus type 2, CAD s/p angioplasty and stent, HLD, lung cancer ( s/p chemo, radiation years ago, now in remission)  presents with c/o abd and constipation x 1 week, admitted for constipation, failure to thrive .  No new symptoms, complaints. Denies chest pain, sob, abdominal pain now.         MEDICATIONS  (STANDING):  ascorbic acid 500 milliGRAM(s) Oral daily  atorvastatin 20 milliGRAM(s) Oral at bedtime  cefTRIAXone   IVPB      cholecalciferol 1000 Unit(s) Oral daily  clopidogrel Tablet 75 milliGRAM(s) Oral daily  cyanocobalamin 1000 MICROGram(s) Oral daily  dextrose 5%. 1000 milliLiter(s) (50 mL/Hr) IV Continuous <Continuous>  dextrose 50% Injectable 12.5 Gram(s) IV Push once  dextrose 50% Injectable 25 Gram(s) IV Push once  dextrose 50% Injectable 25 Gram(s) IV Push once  digoxin     Tablet 0.125 milliGRAM(s) Oral daily  docusate sodium 100 milliGRAM(s) Oral three times a day  insulin lispro (HumaLOG) corrective regimen sliding scale   SubCutaneous three times a day before meals  insulin lispro (HumaLOG) corrective regimen sliding scale   SubCutaneous at bedtime  losartan 25 milliGRAM(s) Oral daily  metoprolol succinate  milliGRAM(s) Oral daily  multivitamin 1 Tablet(s) Oral daily  polyethylene glycol 3350 17 Gram(s) Oral daily  rivaroxaban 20 milliGRAM(s) Oral every 24 hours  senna 2 Tablet(s) Oral at bedtime    MEDICATIONS  (PRN):  acetaminophen   Tablet .. 650 milliGRAM(s) Oral every 6 hours PRN Temp greater or equal to 38C (100.4F), Mild Pain (1 - 3)  bisacodyl 5 milliGRAM(s) Oral every 12 hours PRN Constipation  dextrose 40% Gel 15 Gram(s) Oral once PRN Blood Glucose LESS THAN 70 milliGRAM(s)/deciliter  glucagon  Injectable 1 milliGRAM(s) IntraMuscular once PRN Glucose LESS THAN 70 milligrams/deciliter  saline laxative (FLEET) Rectal Enema 1 Enema Rectal once PRN constipation      Allergies    No Known Allergies    Intolerances        REVIEW OF SYSTEMS:  CONSTITUTIONAL: No fever,  or fatigue  EYES: No eye pain, visual disturbances, or discharge  ENMT:  No difficulty hearing, tinnitus, vertigo; No sinus or throat pain  NECK: No pain or stiffness  RESPIRATORY: No cough, wheezing, chills or hemoptysis; No shortness of breath  CARDIOVASCULAR: No chest pain, palpitations, dizziness, or leg swelling  GASTROINTESTINAL: No abdominal or epigastric pain. No nausea, vomiting, or hematemesis; + Constipation   GENITOURINARY: No dysuria, frequency, hematuria, or incontinence  NEUROLOGICAL: No headaches, loss of strength, numbness, or tremors  SKIN: No itching, burning, rashes, or lesions   LYMPH NODES: No enlarged glands  ENDOCRINE: No heat or cold intolerance; No hair loss; No polydipsia or polyuria  MUSCULOSKELETAL: No joint pain or swelling; No muscle, back, or extremity pain  HEME/LYMPH: No easy bruising, or bleeding gums  ALLERGY AND IMMUNOLOGIC: No hives or eczema    Vital Signs Last 24 Hrs  T(C): 36.8 (25 Mar 2019 08:35), Max: 36.8 (25 Mar 2019 08:35)  T(F): 98.3 (25 Mar 2019 08:35), Max: 98.3 (25 Mar 2019 08:35)  HR: 70 (25 Mar 2019 08:35) (70 - 89)  BP: 149/76 (25 Mar 2019 08:35) (136/66 - 154/80)  BP(mean): --  RR: 18 (25 Mar 2019 08:35) (16 - 20)  SpO2: 98% (25 Mar 2019 08:35) (97% - 99%)    PHYSICAL EXAM:  GENERAL: NAD, Awake, Alert   HEAD:  Atraumatic, Normocephalic  EYES: EOMI, PERRLA, conjunctiva and sclera clear  ENMT: No tonsillar erythema, exudates, or enlargement; Moist mucous membranes  NECK: Supple, No JVD, Normal thyroid  NERVOUS SYSTEM:  Alert & Oriented X3, Good concentration; Motor Strength 5/5 B/L upper and lower extremities  CHEST/LUNG: Clear to auscultation bilaterally; No rales, rhonchi, wheezing, or rubs  HEART: S1S2+,  Regular rate and rhythm  ABDOMEN: Soft, Nontender, Nondistended; Bowel sounds present  EXTREMITIES:  2+ Peripheral Pulses, No clubbing, cyanosis, or edema  LYMPH: No lymphadenopathy noted    LABS:                        11.1   10.89 )-----------( 280      ( 24 Mar 2019 21:26 )             34.1     24 Mar 2019 21:26    130    |  93     |  36     ----------------------------<  177    3.8     |  26     |  0.83     Ca    9.4        24 Mar 2019 21:26    TPro  8.2    /  Alb  3.3    /  TBili  0.7    /  DBili  x      /  AST  21     /  ALT  15     /  AlkPhos  81     24 Mar 2019 21:26      CAPILLARY BLOOD GLUCOSE      POCT Blood Glucose.: 150 mg/dL (25 Mar 2019 07:51)    BLOOD CULTURE    RADIOLOGY & ADDITIONAL TESTS:    Imaging Personally Reviewed:  [ ] YES     Consultant(s) Notes Reviewed:      Care Discussed with Consultants/Other Providers:

## 2019-03-25 NOTE — H&P ADULT - ASSESSMENT
75yo male with pmhx HTN, atrial fibrillation, diabetes mellitus type 2, CAD s/p angioplasty and stent, HLD, presents with c/o abd and constipation x 1 week, admitted for constipation, failure to thrive

## 2019-03-25 NOTE — H&P ADULT - PROBLEM SELECTOR PLAN 2
Patient reports worsening weakness x several weeks  Physical therapy consult  Consider Social work consult pending PT eval

## 2019-03-25 NOTE — H&P ADULT - HISTORY OF PRESENT ILLNESS
75yo male with pmhx HTN, atrial fibrillation, diabetes mellitus type 2, CAD s/p angioplasty and stent, HLD, presents with c/o abd and constipation x 1 week. Patient was seen by PMD and started on colace TID last week, without significant improvement. Patient could not tolerated miralax today, and vomited. Patient sought further medical evaluation at [facility] ED.    In the ED, patient vitals were T(F): 97.5, HR: 89, BP: 141/70, RR: 16, SpO2: 98% on RA. CBC exhibited WBC 10.80, RBC 4.07, Hgb 11.1, Hct 34.1. CMP exhibited Na 130, K 3.8, Cl 93, CO2 26, BUN 36, Cr 0.83, Gluc 177. UA exhibited small amount of ketones, moderate leukocyte esterase, large amount of blood, and moderate bacteria load. Abd Xray exhibited significant fecal load. Patient was disimpacted in the ED, with significant effect and relief. Patient was administered dulcolax and fleet enema x 1. 75yo male with pmhx HTN, atrial fibrillation, AICD in place, diabetes mellitus type 2, CAD s/p angioplasty and stent, HLD, Lung CA (s/p radiation and chemo in remission 1997), presents with c/o abd and constipation x 1 week. Patient was seen by PMD and started on colace TID last week, without significant improvement. Patient could not tolerate miralax today, and vomited. Patient states he vomited with every attempt to BM. Patient sought further medical evaluation at [facility] ED. Patient also reports feeling progressively weaker over the past several weeks.    In the ED, patient vitals were T(F): 97.5, HR: 89, BP: 141/70, RR: 16, SpO2: 98% on RA. CBC exhibited WBC 10.80, RBC 4.07, Hgb 11.1, Hct 34.1. CMP exhibited Na 130, K 3.8, Cl 93, CO2 26, BUN 36, Cr 0.83, Gluc 177. UA exhibited small amount of ketones, moderate leukocyte esterase, large amount of blood, and moderate bacteria load. Abd Xray exhibited significant fecal load. Patient was disimpacted in the ED, with significant effect and relief. Patient was administered dulcolax and fleet enema x 1. Patient felt much improved after disimpaction. 73yo male with pmhx HTN, atrial fibrillation, AICD in place, diabetes mellitus type 2, CAD s/p angioplasty and stent, HLD, Lung CA (s/p radiation and chemo in remission 1997), presents with c/o abd and constipation x 1 week. Patient was seen by PMD and started on colace TID last week, without significant improvement. Patient could not tolerate miralax today, and vomited. Patient states he vomited with every attempt to BM. Patient sought further medical evaluation at [facility] ED. Patient also reports feeling progressively weaker over the past several weeks.  He stopped ambulating 2 weeks ago.  He just felt weak and didn't want to walk anymore.   Wife and daughter are concerned about this and felt he needed rehab.  Of note, pt also gets straight cath 3 times a day.     In the ED, patient vitals were T(F): 97.5, HR: 89, BP: 141/70, RR: 16, SpO2: 98% on RA. CBC exhibited WBC 10.80, RBC 4.07, Hgb 11.1, Hct 34.1. CMP exhibited Na 130, K 3.8, Cl 93, CO2 26, BUN 36, Cr 0.83, Gluc 177. UA exhibited small amount of ketones, moderate leukocyte esterase, large amount of blood, and moderate bacteria load. Abd Xray exhibited significant fecal load. Patient was disimpacted in the ED, with significant effect and relief. Patient was administered dulcolax and fleet enema x 1. Patient felt much improved after disimpaction.

## 2019-03-25 NOTE — PROGRESS NOTE ADULT - PROBLEM SELECTOR PLAN 1
s/p disimpaction with significant fecal load release  Start bowel regimen with colace, dulcolax, miralax and fleet enema PRN  Repeat abd xray results noted, GI to see patient , might need Colonoscopy

## 2019-03-25 NOTE — CONSULT NOTE ADULT - PROBLEM SELECTOR RECOMMENDATION 9
sp disimpaction in ed w +bms  axr shows persistent dt colon, may be 2/2 obstruction from distal colonic or rectal fecal impaction  currently asymptomatic  f/u bld cxs  give smog x1 now  cont aggressive bowel regimen- colace/senna/miralax/glycerin suppos qhs  start bacid tid, simethicone tid  diet as tolerated; if w obstructive symptoms revert to clears  monitor exam/stool output  may need further imaging/colonoscopy pending clinical course  will follow sp disimpaction in ed w +bms  axr shows persistent dt colon, may be 2/2 obstruction from distal colonic or rectal fecal impaction  currently asymptomatic  f/u bld cxs  give smog x1 now  cont aggressive bowel regimen- colace/senna/miralax/glycerin suppos qhs  start bacid tid, simethicone tid  diet as tolerated; if w obstructive symptoms revert to clears  monitor exam/stool output  repeat axr in am  denies hx of prior colonoscopy, will require further w/u if agreeable, will dw pt  will follow

## 2019-03-25 NOTE — PHYSICAL THERAPY INITIAL EVALUATION ADULT - RANGE OF MOTION EXAMINATION, REHAB EVAL
bilateral upper extremity ROM was WFL (within functional limits)/bilateral lower extremity ROM was WFL (within functional limits)/deficits as listed below/limited shoulder flexion and abduction to 60 degrees

## 2019-03-25 NOTE — H&P ADULT - NSICDXPASTSURGICALHX_GEN_ALL_CORE_FT
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

## 2019-03-25 NOTE — H&P ADULT - NSHPREVIEWOFSYSTEMS_GEN_ALL_CORE
Constitutional: denies fever, chills, diaphoresis   HEENT: denies blurry vision, difficulty hearing  Respiratory: denies SOB, OLIVARES, cough, sputum production, wheezing, hemoptysis  Cardiovascular: denies CP, palpitations, edema  Gastrointestinal: endorses constipation, denies nausea, vomiting, diarrhea, abdominal pain, melena, hematochezia   Genitourinary: denies dysuria, frequency, urgency, hematuria   Skin/Breast: denies rash, itching  Musculoskeletal: denies myalgias, joint swelling, muscle weakness  Neurologic: denies headache, weakness, dizziness, paresthesias, numbness/tingling  Psychiatric: denies feeling anxious, depressed, suicidal, homicidal thoughts  Hematology/Oncology: denies bruising, tender or enlarged lymph nodes   ROS negative except as noted above

## 2019-03-26 ENCOUNTER — TRANSCRIPTION ENCOUNTER (OUTPATIENT)
Age: 74
End: 2019-03-26

## 2019-03-26 LAB
-  AMIKACIN: SIGNIFICANT CHANGE UP
-  AMPICILLIN/SULBACTAM: SIGNIFICANT CHANGE UP
-  AMPICILLIN: SIGNIFICANT CHANGE UP
-  AZTREONAM: SIGNIFICANT CHANGE UP
-  CEFAZOLIN: SIGNIFICANT CHANGE UP
-  CEFEPIME: SIGNIFICANT CHANGE UP
-  CEFOXITIN: SIGNIFICANT CHANGE UP
-  CEFTRIAXONE: SIGNIFICANT CHANGE UP
-  CIPROFLOXACIN: SIGNIFICANT CHANGE UP
-  ERTAPENEM: SIGNIFICANT CHANGE UP
-  GENTAMICIN: SIGNIFICANT CHANGE UP
-  IMIPENEM: SIGNIFICANT CHANGE UP
-  LEVOFLOXACIN: SIGNIFICANT CHANGE UP
-  MEROPENEM: SIGNIFICANT CHANGE UP
-  NITROFURANTOIN: SIGNIFICANT CHANGE UP
-  PIPERACILLIN/TAZOBACTAM: SIGNIFICANT CHANGE UP
-  TIGECYCLINE: SIGNIFICANT CHANGE UP
-  TOBRAMYCIN: SIGNIFICANT CHANGE UP
-  TRIMETHOPRIM/SULFAMETHOXAZOLE: SIGNIFICANT CHANGE UP
ANION GAP SERPL CALC-SCNC: 7 MMOL/L — SIGNIFICANT CHANGE UP (ref 5–17)
BUN SERPL-MCNC: 23 MG/DL — SIGNIFICANT CHANGE UP (ref 7–23)
CALCIUM SERPL-MCNC: 8.9 MG/DL — SIGNIFICANT CHANGE UP (ref 8.5–10.1)
CHLORIDE SERPL-SCNC: 97 MMOL/L — SIGNIFICANT CHANGE UP (ref 96–108)
CO2 SERPL-SCNC: 30 MMOL/L — SIGNIFICANT CHANGE UP (ref 22–31)
CREAT SERPL-MCNC: 0.73 MG/DL — SIGNIFICANT CHANGE UP (ref 0.5–1.3)
CULTURE RESULTS: SIGNIFICANT CHANGE UP
GLUCOSE SERPL-MCNC: 135 MG/DL — HIGH (ref 70–99)
HCT VFR BLD CALC: 29 % — LOW (ref 39–50)
HGB BLD-MCNC: 9.4 G/DL — LOW (ref 13–17)
MCHC RBC-ENTMCNC: 27.6 PG — SIGNIFICANT CHANGE UP (ref 27–34)
MCHC RBC-ENTMCNC: 32.4 GM/DL — SIGNIFICANT CHANGE UP (ref 32–36)
MCV RBC AUTO: 85 FL — SIGNIFICANT CHANGE UP (ref 80–100)
METHOD TYPE: SIGNIFICANT CHANGE UP
NRBC # BLD: 0 /100 WBCS — SIGNIFICANT CHANGE UP (ref 0–0)
ORGANISM # SPEC MICROSCOPIC CNT: SIGNIFICANT CHANGE UP
ORGANISM # SPEC MICROSCOPIC CNT: SIGNIFICANT CHANGE UP
PLATELET # BLD AUTO: 218 K/UL — SIGNIFICANT CHANGE UP (ref 150–400)
POTASSIUM SERPL-MCNC: 3.5 MMOL/L — SIGNIFICANT CHANGE UP (ref 3.5–5.3)
POTASSIUM SERPL-SCNC: 3.5 MMOL/L — SIGNIFICANT CHANGE UP (ref 3.5–5.3)
RBC # BLD: 3.41 M/UL — LOW (ref 4.2–5.8)
RBC # FLD: 14.7 % — HIGH (ref 10.3–14.5)
SODIUM SERPL-SCNC: 134 MMOL/L — LOW (ref 135–145)
SPECIMEN SOURCE: SIGNIFICANT CHANGE UP
WBC # BLD: 9.55 K/UL — SIGNIFICANT CHANGE UP (ref 3.8–10.5)
WBC # FLD AUTO: 9.55 K/UL — SIGNIFICANT CHANGE UP (ref 3.8–10.5)

## 2019-03-26 PROCEDURE — 74177 CT ABD & PELVIS W/CONTRAST: CPT | Mod: 26

## 2019-03-26 PROCEDURE — 99233 SBSQ HOSP IP/OBS HIGH 50: CPT

## 2019-03-26 RX ORDER — SENNA PLUS 8.6 MG/1
2 TABLET ORAL
Qty: 0 | Refills: 0 | DISCHARGE
Start: 2019-03-26

## 2019-03-26 RX ORDER — SIMETHICONE 80 MG/1
1 TABLET, CHEWABLE ORAL
Qty: 0 | Refills: 0 | COMMUNITY
Start: 2019-03-26

## 2019-03-26 RX ORDER — IOHEXOL 300 MG/ML
500 INJECTION, SOLUTION INTRAVENOUS
Qty: 0 | Refills: 0 | Status: COMPLETED | OUTPATIENT
Start: 2019-03-26 | End: 2019-03-26

## 2019-03-26 RX ORDER — CEFTRIAXONE 500 MG/1
1 INJECTION, POWDER, FOR SOLUTION INTRAMUSCULAR; INTRAVENOUS ONCE
Qty: 0 | Refills: 0 | Status: COMPLETED | OUTPATIENT
Start: 2019-03-26 | End: 2019-03-26

## 2019-03-26 RX ORDER — POLYETHYLENE GLYCOL 3350 17 G/17G
17 POWDER, FOR SOLUTION ORAL
Qty: 0 | Refills: 0 | DISCHARGE
Start: 2019-03-26

## 2019-03-26 RX ORDER — CEFTRIAXONE 500 MG/1
INJECTION, POWDER, FOR SOLUTION INTRAMUSCULAR; INTRAVENOUS
Qty: 0 | Refills: 0 | Status: DISCONTINUED | OUTPATIENT
Start: 2019-03-26 | End: 2019-03-27

## 2019-03-26 RX ORDER — DOCUSATE SODIUM 100 MG
1 CAPSULE ORAL
Qty: 0 | Refills: 0 | DISCHARGE
Start: 2019-03-26

## 2019-03-26 RX ORDER — DOCUSATE SODIUM 100 MG
1 CAPSULE ORAL
Qty: 0 | Refills: 0 | COMMUNITY
Start: 2019-03-26

## 2019-03-26 RX ORDER — CEFTRIAXONE 500 MG/1
1 INJECTION, POWDER, FOR SOLUTION INTRAMUSCULAR; INTRAVENOUS EVERY 24 HOURS
Qty: 0 | Refills: 0 | Status: DISCONTINUED | OUTPATIENT
Start: 2019-03-27 | End: 2019-03-27

## 2019-03-26 RX ADMIN — Medication 1: at 17:12

## 2019-03-26 RX ADMIN — Medication 0.12 MILLIGRAM(S): at 05:27

## 2019-03-26 RX ADMIN — Medication 1 TABLET(S): at 17:12

## 2019-03-26 RX ADMIN — IOHEXOL 500 MILLILITER(S): 300 INJECTION, SOLUTION INTRAVENOUS at 12:08

## 2019-03-26 RX ADMIN — Medication 1 TABLET(S): at 12:08

## 2019-03-26 RX ADMIN — Medication 100 MILLIGRAM(S): at 17:12

## 2019-03-26 RX ADMIN — Medication 150 MILLIGRAM(S): at 05:29

## 2019-03-26 RX ADMIN — IOHEXOL 500 MILLILITER(S): 300 INJECTION, SOLUTION INTRAVENOUS at 11:22

## 2019-03-26 RX ADMIN — Medication 500 MILLIGRAM(S): at 12:08

## 2019-03-26 RX ADMIN — ATORVASTATIN CALCIUM 20 MILLIGRAM(S): 80 TABLET, FILM COATED ORAL at 21:18

## 2019-03-26 RX ADMIN — SIMETHICONE 80 MILLIGRAM(S): 80 TABLET, CHEWABLE ORAL at 05:27

## 2019-03-26 RX ADMIN — CLOPIDOGREL BISULFATE 75 MILLIGRAM(S): 75 TABLET, FILM COATED ORAL at 12:08

## 2019-03-26 RX ADMIN — CEFTRIAXONE 100 GRAM(S): 500 INJECTION, POWDER, FOR SOLUTION INTRAMUSCULAR; INTRAVENOUS at 16:26

## 2019-03-26 RX ADMIN — LOSARTAN POTASSIUM 25 MILLIGRAM(S): 100 TABLET, FILM COATED ORAL at 05:27

## 2019-03-26 RX ADMIN — POLYETHYLENE GLYCOL 3350 17 GRAM(S): 17 POWDER, FOR SOLUTION ORAL at 12:08

## 2019-03-26 RX ADMIN — Medication 1000 UNIT(S): at 12:10

## 2019-03-26 RX ADMIN — SIMETHICONE 80 MILLIGRAM(S): 80 TABLET, CHEWABLE ORAL at 17:12

## 2019-03-26 RX ADMIN — PREGABALIN 1000 MICROGRAM(S): 225 CAPSULE ORAL at 12:08

## 2019-03-26 RX ADMIN — Medication 1 TABLET(S): at 08:02

## 2019-03-26 RX ADMIN — Medication 100 MILLIGRAM(S): at 21:18

## 2019-03-26 RX ADMIN — RIVAROXABAN 20 MILLIGRAM(S): KIT at 17:12

## 2019-03-26 RX ADMIN — SENNA PLUS 2 TABLET(S): 8.6 TABLET ORAL at 21:18

## 2019-03-26 NOTE — DIETITIAN INITIAL EVALUATION ADULT. - PT NOT SOURCE
other (specify)/pt drowsy, disoriented; subjective information obtained mainly from pt's wife at bedside.

## 2019-03-26 NOTE — CHART NOTE - NSCHARTNOTEFT_GEN_A_CORE
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 Will start patient of IV Ceftriaxone, ID Dr. Reilly called. Also called Vascular Dr. Fisher and case discussed. He will see the patient in am. I d/w patient's wife and daughter at bedside. All questions answered.

## 2019-03-26 NOTE — PROGRESS NOTE ADULT - SUBJECTIVE AND OBJECTIVE BOX
Patient is a 74y old  Male who presents with a chief complaint of Constipation, Abd pain (26 Mar 2019 07:35)       INTERVAL HPI/OVERNIGHT EVENTS: No new symptoms, complaints. Has more bowel movements. Refusing Coloscopy.     MEDICATIONS  (STANDING):  ascorbic acid 500 milliGRAM(s) Oral daily  atorvastatin 20 milliGRAM(s) Oral at bedtime  cholecalciferol 1000 Unit(s) Oral daily  clopidogrel Tablet 75 milliGRAM(s) Oral daily  cyanocobalamin 1000 MICROGram(s) Oral daily  dextrose 5%. 1000 milliLiter(s) (50 mL/Hr) IV Continuous <Continuous>  dextrose 50% Injectable 12.5 Gram(s) IV Push once  dextrose 50% Injectable 25 Gram(s) IV Push once  dextrose 50% Injectable 25 Gram(s) IV Push once  digoxin     Tablet 0.125 milliGRAM(s) Oral daily  docusate sodium 100 milliGRAM(s) Oral three times a day  glycerin Suppository - Adult 1 Suppository(s) Rectal at bedtime  insulin lispro (HumaLOG) corrective regimen sliding scale   SubCutaneous three times a day before meals  insulin lispro (HumaLOG) corrective regimen sliding scale   SubCutaneous at bedtime  lactobacillus acidophilus 1 Tablet(s) Oral three times a day with meals  losartan 25 milliGRAM(s) Oral daily  metoprolol succinate  milliGRAM(s) Oral daily  multivitamin 1 Tablet(s) Oral daily  polyethylene glycol 3350 17 Gram(s) Oral daily  rivaroxaban 20 milliGRAM(s) Oral every 24 hours  senna 2 Tablet(s) Oral at bedtime  simethicone 80 milliGRAM(s) Chew three times a day    MEDICATIONS  (PRN):  acetaminophen   Tablet .. 650 milliGRAM(s) Oral every 6 hours PRN Temp greater or equal to 38C (100.4F), Mild Pain (1 - 3)  bisacodyl 5 milliGRAM(s) Oral every 12 hours PRN Constipation  dextrose 40% Gel 15 Gram(s) Oral once PRN Blood Glucose LESS THAN 70 milliGRAM(s)/deciliter  glucagon  Injectable 1 milliGRAM(s) IntraMuscular once PRN Glucose LESS THAN 70 milligrams/deciliter  saline laxative (FLEET) Rectal Enema 1 Enema Rectal once PRN constipation      Allergies    No Known Allergies    Intolerances        REVIEW OF SYSTEMS:  CONSTITUTIONAL: No fever, or fatigue  EYES: No eye pain, visual disturbances, or discharge  ENMT:  No difficulty hearing, tinnitus, vertigo; No sinus or throat pain  NECK: No pain or stiffness  RESPIRATORY: No cough, wheezing, chills or hemoptysis; No shortness of breath  CARDIOVASCULAR: No chest pain, palpitations, dizziness, or leg swelling  GASTROINTESTINAL: No abdominal or epigastric pain. No nausea, vomiting, or hematemesis; No diarrhea or constipation. No melena or hematochezia.  GENITOURINARY: No dysuria, frequency, hematuria, or incontinence  NEUROLOGICAL: No headaches, loss of strength, numbness, or tremors  SKIN: No itching, burning, rashes, or lesions   LYMPH NODES: No enlarged glands  ENDOCRINE: No heat or cold intolerance; No hair loss; No polydipsia or polyuria  MUSCULOSKELETAL: No joint pain or swelling; No muscle, back, or extremity pain  HEME/LYMPH: No easy bruising, or bleeding gums  ALLERGY AND IMMUNOLOGIC: No hives or eczema    Vital Signs Last 24 Hrs  T(C): 36.6 (26 Mar 2019 04:23), Max: 37 (25 Mar 2019 20:59)  T(F): 97.8 (26 Mar 2019 04:23), Max: 98.6 (25 Mar 2019 20:59)  HR: 84 (26 Mar 2019 04:23) (62 - 84)  BP: 124/63 (26 Mar 2019 04:23) (123/63 - 160/64)  BP(mean): --  RR: 18 (26 Mar 2019 04:23) (16 - 18)  SpO2: 98% (26 Mar 2019 04:23) (95% - 99%)    PHYSICAL EXAM:  GENERAL: NAD, Awake, Alert   HEAD:  Atraumatic, Normocephalic  EYES: EOMI, PERRLA, conjunctiva and sclera clear  ENMT: No tonsillar erythema, exudates, or enlargement; Moist mucous membranes  NECK: Supple, No JVD, Normal thyroid  NERVOUS SYSTEM:  Alert & Awake,  Motor Strength 5/5 B/L upper and lower extremities  CHEST/LUNG: Clear to auscultation bilaterally; No rales, rhonchi, wheezing, or rubs  HEART: S1S2+, Regular rate and rhythm  ABDOMEN: Soft, Nontender, Nondistended; Bowel sounds present  EXTREMITIES:  2+ Peripheral Pulses, No clubbing, cyanosis, or edema  LYMPH: No lymphadenopathy noted  SKIN: No rashes or lesions    LABS:                        10.1   14.60 )-----------( 245      ( 25 Mar 2019 14:50 )             31.3     25 Mar 2019 14:50    132    |  95     |  29     ----------------------------<  203    4.0     |  28     |  0.84     Ca    9.1        25 Mar 2019 14:50        CAPILLARY BLOOD GLUCOSE      POCT Blood Glucose.: 140 mg/dL (26 Mar 2019 07:31)  POCT Blood Glucose.: 182 mg/dL (25 Mar 2019 21:20)  POCT Blood Glucose.: 172 mg/dL (25 Mar 2019 16:39)  POCT Blood Glucose.: 221 mg/dL (25 Mar 2019 11:37)    BLOOD CULTURE  03-25 @ 00:34   >100,000 CFU/ml Gram Negative Rods  --  --    RADIOLOGY & ADDITIONAL TESTS:    Imaging Personally Reviewed:  [ ] YES     Consultant(s) Notes Reviewed:      Care Discussed with Consultants/Other Providers: Patient is a 74y old  Male who presents with a chief complaint of Constipation, Abd pain (26 Mar 2019 07:35)       INTERVAL HPI/OVERNIGHT EVENTS: No new symptoms, complaints. Had more bowel movements. Refusing Coloscopy.     MEDICATIONS  (STANDING):  ascorbic acid 500 milliGRAM(s) Oral daily  atorvastatin 20 milliGRAM(s) Oral at bedtime  cholecalciferol 1000 Unit(s) Oral daily  clopidogrel Tablet 75 milliGRAM(s) Oral daily  cyanocobalamin 1000 MICROGram(s) Oral daily  dextrose 5%. 1000 milliLiter(s) (50 mL/Hr) IV Continuous <Continuous>  dextrose 50% Injectable 12.5 Gram(s) IV Push once  dextrose 50% Injectable 25 Gram(s) IV Push once  dextrose 50% Injectable 25 Gram(s) IV Push once  digoxin     Tablet 0.125 milliGRAM(s) Oral daily  docusate sodium 100 milliGRAM(s) Oral three times a day  glycerin Suppository - Adult 1 Suppository(s) Rectal at bedtime  insulin lispro (HumaLOG) corrective regimen sliding scale   SubCutaneous three times a day before meals  insulin lispro (HumaLOG) corrective regimen sliding scale   SubCutaneous at bedtime  lactobacillus acidophilus 1 Tablet(s) Oral three times a day with meals  losartan 25 milliGRAM(s) Oral daily  metoprolol succinate  milliGRAM(s) Oral daily  multivitamin 1 Tablet(s) Oral daily  polyethylene glycol 3350 17 Gram(s) Oral daily  rivaroxaban 20 milliGRAM(s) Oral every 24 hours  senna 2 Tablet(s) Oral at bedtime  simethicone 80 milliGRAM(s) Chew three times a day    MEDICATIONS  (PRN):  acetaminophen   Tablet .. 650 milliGRAM(s) Oral every 6 hours PRN Temp greater or equal to 38C (100.4F), Mild Pain (1 - 3)  bisacodyl 5 milliGRAM(s) Oral every 12 hours PRN Constipation  dextrose 40% Gel 15 Gram(s) Oral once PRN Blood Glucose LESS THAN 70 milliGRAM(s)/deciliter  glucagon  Injectable 1 milliGRAM(s) IntraMuscular once PRN Glucose LESS THAN 70 milligrams/deciliter  saline laxative (FLEET) Rectal Enema 1 Enema Rectal once PRN constipation      Allergies    No Known Allergies    Intolerances        REVIEW OF SYSTEMS:  CONSTITUTIONAL: No fever, or fatigue  EYES: No eye pain, visual disturbances, or discharge  ENMT:  No difficulty hearing, tinnitus, vertigo; No sinus or throat pain  NECK: No pain or stiffness  RESPIRATORY: No cough, wheezing, chills or hemoptysis; No shortness of breath  CARDIOVASCULAR: No chest pain, palpitations, dizziness, or leg swelling  GASTROINTESTINAL: No abdominal or epigastric pain. No nausea, vomiting, or hematemesis; No diarrhea or constipation. No melena or hematochezia.  GENITOURINARY: No dysuria, frequency, hematuria, or incontinence  NEUROLOGICAL: No headaches, loss of strength, numbness, or tremors  SKIN: No itching, burning, rashes, or lesions   LYMPH NODES: No enlarged glands  ENDOCRINE: No heat or cold intolerance; No hair loss; No polydipsia or polyuria  MUSCULOSKELETAL: No joint pain or swelling; No muscle, back, or extremity pain  HEME/LYMPH: No easy bruising, or bleeding gums  ALLERGY AND IMMUNOLOGIC: No hives or eczema    Vital Signs Last 24 Hrs  T(C): 36.6 (26 Mar 2019 04:23), Max: 37 (25 Mar 2019 20:59)  T(F): 97.8 (26 Mar 2019 04:23), Max: 98.6 (25 Mar 2019 20:59)  HR: 84 (26 Mar 2019 04:23) (62 - 84)  BP: 124/63 (26 Mar 2019 04:23) (123/63 - 160/64)  BP(mean): --  RR: 18 (26 Mar 2019 04:23) (16 - 18)  SpO2: 98% (26 Mar 2019 04:23) (95% - 99%)    PHYSICAL EXAM:  GENERAL: NAD, Awake, Alert   HEAD:  Atraumatic, Normocephalic  EYES: EOMI, PERRLA, conjunctiva and sclera clear  ENMT: No tonsillar erythema, exudates, or enlargement; Moist mucous membranes  NECK: Supple, No JVD, Normal thyroid  NERVOUS SYSTEM:  Alert & Awake,  Motor Strength 5/5 B/L upper and lower extremities  CHEST/LUNG: Clear to auscultation bilaterally; No rales, rhonchi, wheezing, or rubs  HEART: S1S2+, Regular rate and rhythm  ABDOMEN: Soft, Nontender, Nondistended; Bowel sounds present  EXTREMITIES:  2+ Peripheral Pulses, No clubbing, cyanosis, or edema  LYMPH: No lymphadenopathy noted  SKIN: No rashes or lesions    LABS:                        10.1   14.60 )-----------( 245      ( 25 Mar 2019 14:50 )             31.3     25 Mar 2019 14:50    132    |  95     |  29     ----------------------------<  203    4.0     |  28     |  0.84     Ca    9.1        25 Mar 2019 14:50        CAPILLARY BLOOD GLUCOSE      POCT Blood Glucose.: 140 mg/dL (26 Mar 2019 07:31)  POCT Blood Glucose.: 182 mg/dL (25 Mar 2019 21:20)  POCT Blood Glucose.: 172 mg/dL (25 Mar 2019 16:39)  POCT Blood Glucose.: 221 mg/dL (25 Mar 2019 11:37)    BLOOD CULTURE  03-25 @ 00:34   >100,000 CFU/ml Gram Negative Rods  --  --    RADIOLOGY & ADDITIONAL TESTS:    Imaging Personally Reviewed:  [ ] YES     Consultant(s) Notes Reviewed:      Care Discussed with Consultants/Other Providers:

## 2019-03-26 NOTE — DIETITIAN INITIAL EVALUATION ADULT. - ENERGY NEEDS
Wt: 125lbs (admission), Ht: 72in, BMI: 16.9, IBW: 178lbs (+/-10%), %IBW: 70  No pressure injuries or edema noted at this time

## 2019-03-26 NOTE — DISCHARGE NOTE PROVIDER - NSDCCPCAREPLAN_GEN_ALL_CORE_FT
PRINCIPAL DISCHARGE DIAGNOSIS  Diagnosis: Constipation, unspecified constipation type  Assessment and Plan of Treatment: Resolved  Please f/u with GI if want to proceed with Coloscopy.   Continue bowel reimen as prescribed.      SECONDARY DISCHARGE DIAGNOSES  Diagnosis: Failure to thrive in adult  Assessment and Plan of Treatment: Continue Nutritional supplemnts  F/u with PCP

## 2019-03-26 NOTE — PROGRESS NOTE ADULT - PROBLEM SELECTOR PLAN 1
s/p disimpaction with significant fecal load release  Continue bowel regimen with colace, dulcolax, miralax and fleet enema PRN  Repeat abd xray today per GI. Refusing Coloscopy. Will order CT abd/Pelvis

## 2019-03-26 NOTE — DISCHARGE NOTE PROVIDER - CARE PROVIDER_API CALL
Francisco Hamilton (DO)  Gastroenterology; Internal Medicine  [address] [place] [id number]  Phone: [phone]  Fax: [phone]  Follow Up Time:

## 2019-03-26 NOTE — DISCHARGE NOTE PROVIDER - HOSPITAL COURSE
73yo male with pmhx HTN, atrial fibrillation, diabetes mellitus type 2, CAD s/p angioplasty and stent, HLD, presents with c/o abd and constipation x 1 week, admitted for constipation, failure to thrive. Patient was disimpacted and was started on bowel regimen. Serial abdominal X rays were taken. Patient refused Colonoscopy. Was seen by GI and Nutritional services. Symptoms improved after multiple bowel movements. PT suggested KEON and family in agreement . 75yo male with pmhx HTN, atrial fibrillation, diabetes mellitus type 2, CAD s/p angioplasty and stent, HLD, presents with c/o abd and constipation x 1 week, admitted for constipation, failure to thrive. Patient was disimpacted and was started on bowel regimen. Serial abdominal X rays were taken. Patient refused Colonoscopy. Was seen by GI and Nutritional services. Symptoms improved after multiple bowel movements. Ct abdomen pelvis  + for pyelonephritis and left femoral artery pseudoaneurysm . Started on antibiotics, will complete 7 days course of oral antibiotics at the [facility]. Vascular  Dr. Fisher also saw patient  and suggested likely chronic finding and patient refuse surgical repair anyway. Will f/u with his Vascular surgeon Dr. Wright as out patient if decides to go for surgical repair.  PT suggested KEON and family in agreement . 75yo male with pmhx HTN, atrial fibrillation, diabetes mellitus type 2, CAD s/p angioplasty and stent, HLD, presents with c/o abd and constipation x 1 week, admitted for constipation, failure to thrive. Patient was disimpacted and was started on bowel regimen. Serial abdominal X rays were taken. Patient refused Colonoscopy. Was seen by GI and Nutritional services. Symptoms improved after multiple bowel movements. Ct abdomen pelvis  + for pyelonephritis and left femoral artery pseudoaneurysm . Started on antibiotics. Also found to have left femoral artery pseudoaneurysm and is being transferred to [facility] under Dr. Wright's service for vascular intervention.

## 2019-03-26 NOTE — DIETITIAN INITIAL EVALUATION ADULT. - ADHERENCE
Pt was not following any dietary restrictions PTA, pt's wife understands the pt is a diabetic, however secondary to decreased appetite and PO intake was not restricting any of his food intake. Per chart pt was on Metformin PTA, current HgbA1c 5.9%, indicates good control.  Supplement use PTA includes Vitamin B12, MVI. Vitamin D, Folic acid with DHA, Iron, Calcium Magnesium citrate, Boost. NKFA./n/a

## 2019-03-26 NOTE — DISCHARGE NOTE PROVIDER - NSDCFUADDINST_GEN_ALL_CORE_FT
F/u with your Vacular Dr. Wright as soon as possible  F/u with PCP and all your doctors.   CBC, BMP in 1 to 2 days

## 2019-03-26 NOTE — CHART NOTE - NSCHARTNOTEFT_GEN_A_CORE
Upon Nutritional Assessment by the Registered Dietitian your patient was determined to meet criteria / has evidence of the following diagnosis/diagnoses:          [ ]  Mild Protein Calorie Malnutrition        [x ]  Moderate Protein Calorie Malnutrition        [ ] Severe Protein Calorie Malnutrition        [ ] Unspecified Protein Calorie Malnutrition        [x ] Underweight / BMI <19        [ ] Morbid Obesity / BMI > 40      Findings as based on:  •  Comprehensive nutrition assessment and consultation  consuming <75% of energy needs  13.79% wt loss  moderate to severe muscle and fat loss      Treatment:    The following diet has been recommended: Consistent CHO w/evening snack, Glucerna BID       PROVIDER Section:     By signing this assessment you are acknowledging and agree with the diagnosis/diagnoses assigned by the Registered Dietitian    Comments:

## 2019-03-26 NOTE — DIETITIAN INITIAL EVALUATION ADULT. - PROBLEM SELECTOR PROBLEM 5
Consult Date/Type/Reason


Admit Date/Time


Jan 22, 2019 at 00:03


Initial Consult Date


1/22/19


Type of Consultation:  Pulm/CCM


Requesting Provider:  DONALDO CARRILLO MD


Date/Time of Note


DATE: 2/19/19 


TIME: 09:35





Subjective


NO acute events - intubated - stable, now DNR





NO F/C/N/V/D + stable SOB





Objective


Vitals





Vital Signs


  Date      Temp  Pulse  Resp  B/P (MAP)   Pulse Ox  O2          O2 Flow    FiO2


Time                                                 Delivery    Rate


   2/19/19           93    17  99/71 (80)       100  Mechanical


     09:00                                           Ventilator


   2/19/19  98.4


     08:00


   2/19/19                                                                    40


     08:00








Intake and Output





2/18/19 2/18/19 2/19/19





1414:59


22:59


06:59





IntakeIntake Total


2004 ml


1900 ml


1862 ml





OutputOutput Total


535 ml


480 ml


795 ml





BalanceBalance


1469 ml


1420 ml


1067 ml











Exam


General: WN/WD/NAD, AOx comfortable 


HEENT: Unicetric/atraumatic/EOMI (does not follow commands)


NECK: JVD elevated, no thyromegaly - intubataed 


Lymph: no lymphadenopathy


HEART: regular with no S3, II/VI systolic murmur at apex


LUNGS: Coarse sounds


ABD: soft, NT, ND, +BS


: Intact


Neuro: non focal


SKIN: chronic changes


EXT: trace edema





Results/Medications


Result Diagram:  


2/19/19 0812                                                                    


           2/19/19 0500





Results 24 hrs





Laboratory Tests


Test
                                2/18/19
14:13  2/19/19
05:00  2/19/19
08:12


Sodium Level                                145  H          142


Potassium Level                              5.0            5.0


Chloride Level                               106            110


Carbon Dioxide Level                         32  H          35  H


Anion Gap                                      7            -3  L


Blood Urea Nitrogen                          55  H          68  H


Creatinine                                  0.86           0.78


Est Glomerular Filtrat Rate
mL/min   > 60  
        > 60  
        



Glucose Level                               229  H          217


Calcium Level                               8.3  L         8.0  L


White Blood Count                                          2.4  L         2.5  L


Red Blood Count                                           2.44  L        2.59  L


Hemoglobin                                                 7.0  L         7.2  L


Hematocrit                                                23.0  L        24.0  L


Mean Corpuscular Volume                                    94.3           92.7


Mean Corpuscular Hemoglobin                               28.7  L        27.8  L


Mean Corpuscular Hemoglobin
Concent  
                   30.4  L
       30.0  L



Red Cell Distribution Width                               16.6  H        16.7  H


Platelet Count                                              37  L          36  L


Mean Platelet Volume                                      12.5  H        12.4  H


Immature Granulocytes %                                  0.800  H        0.400


Neutrophils %                                             88.7  H        89.9  H


Lymphocytes %                                              7.6  L         7.3  L


Monocytes %                                                 2.9            2.4


Eosinophils %                                               0.0            0.0


Basophils %                                                 0.0            0.0


Nucleated Red Blood Cells %                                0.8  H          0.0


Immature Granulocytes #                                   0.020          0.010


Neutrophils #                                               2.1            2.2


Lymphocytes #                                              0.2  L         0.2  L


Monocytes #                                                0.1  L         0.1  L


Eosinophils #                                               0.0            0.0


Basophils #                                                 0.0            0.0


Nucleated Red Blood Cells #                                 0.0            0.0


Phosphorus Level                                           2.3  L


Magnesium Level                                            2.6  H





Home Meds


Reported Medications


Docusate Sodium* (Colace*) 100 Mg Capsule, 100 MG PO Q24H PRN for CONSTIPATION, 


#30 CAP


   1/21/19


Polyethylene Glycol* (Miralax*) 17 Gm Powd.pack, 17 GM PO DAILY PRN for AS 


NEEDED, #30 PACKET


   1/21/19


Acetaminophen* (Acetaminophen*) 325 Mg Tablet, 650 MG PO Q4H PRN for PAIN 1-


10/10, #30 TAB


   AND FEVER>101F


   1/21/19


Sennosides* (Senna Lax*) 8.6 Mg Tablet, 1 TAB PO AS NEEDED, TAB


   1/21/19


Mv,Minerals/Fa/Lycopene/Ginkgo (ONE DAILY MEN'S 50+ TABLET) 1 Each Tablet, 1 


EACH PO DAILY, TAB


   1/21/19


Divalproex Sodium* (Depakote*) 125 Mg Tablet.dr, 250 MG PO Q8H, #90 TAB


   1/21/19


Quetiapine Fumarate* (Seroquel*) 50 Mg Tablet, 50 MG PO Q8H, TAB


   1/21/19


Ipratropium-Albuterol (Ipratropium-Albuterol) 0.5-3 Mg/3 Ml Ampul.neb, 3 ML 


INHALATION Q4H PRN for WHEEZING AND SOB, #30 VIAL


   1/21/19


Budesonide-Formoterol Fumarate* (Symbicort*) 160-4.5 Hfa.aer.ad, 2 PUFF 


INHALATION BID, #1 EACH


   1/21/19


Medications





Current Medications


Ondansetron HCl (Zofran Inj) 4 mg Q6H  PRN IV NAUSEA AND/OR VOMITING Last admi


nistered on 2/1/19at 18:46; Admin Dose 4 MG;  Start 1/21/19 at 23:30


Acetaminophen (Tylenol Supp) 650 mg Q4H  PRN [place] PAIN LEVEL 1-3 OR FEVER;  Start 


1/21/19 at 23:30


Aspirin (Aspirin) 81 mg DAILY PO  Last administered on 2/19/19at 08:20; Admin 


Dose 81 MG;  Start 1/24/19 at 09:00


Famotidine (Pepcid) 20 mg Q12 PO  Last administered on 2/19/19at 08:20; Admin 


Dose 20 MG;  Start 1/24/19 at 21:00


Zolpidem Tartrate (Ambien) 5 mg HS  PRN PO INSOMNIA Last administered on 


2/5/19at 20:31; Admin Dose 5 MG;  Start 1/27/19 at 00:00


Guaifenesin/ Dextromethorphan (Robitussin Dm Liquid Cup) 5 ml Q6H  PRN PO COUGH 


Last administered on 2/10/19at 05:16; Admin Dose 5 ML;  Start 1/27/19 at 20:00


Morphine Sulfate (morphine) 6 mg Q4H  PRN PO SEVERE PAIN LEVEL 7-10 Last 


administered on 2/16/19at 08:35; Admin Dose 6 MG;  Start 1/30/19 at 21:30


Albuterol/ Ipratropium (Duoneb) 3 ml Q3H RESP THERAPY  PRN HHN SHORTNESS OF 


BREATH Last administered on 2/7/19at 18:47; Admin Dose 3 ML;  Start 1/31/19 at 


20:00


Methylprednisolone Sodium Succinate (Solu-Medrol) 40 mg Q6 IV  Last administered


on 2/19/19at 05:10; Admin Dose 40 MG;  Start 2/2/19 at 12:00


Alteplase, Recombinant (Cathflo (Activase)) 2 mg MAY REPEAT X1 PRN CATHETER IF 


CATHETER REMAINS OCCULUDED;  Start 2/2/19 at 11:00


Lorazepam (Ativan) 1 mg Q6H  PRN IV PSYCHOSIS Last administered on 2/15/19at 


02:58; Admin Dose 1 MG;  Start 2/7/19 at 02:00


Haloperidol (Haldol) 2 mg PRN  PRN IM AGITATION Last administered on 2/12/19at 


00:44; Admin Dose 2 MG;  Start 2/8/19 at 06:30


Risperidone (Risperdal) 2 mg BID PO  Last administered on 2/19/19at 08:20; Admin


Dose 2 MG;  Start 2/9/19 at 21:00


Divalproex Sodium (Depakote Sprinkle) 250 mg Q8H PO  Last administered on 


2/19/19at 05:10; Admin Dose 250 MG;  Start 2/10/19 at 21:00


Propofol 100 ml @  1.875 mls/ hr Q12H IV  Last administered on 2/15/19at 01:14; 


Admin Dose 7.5 MLS/HR;  Start 2/13/19 at 15:30


Ascorbic Acid (Vitamin C) 500 mg DAILY GTB  Last administered on 2/19/19at 


08:20; Admin Dose 500 MG;  Start 2/15/19 at 10:30


Phenylephrine HCl 80 mg/Dextrose 250 ml @  18.75 mls/ hr TITRATE IV  Last 


administered on 2/16/19at 21:39; Admin Dose 7.5 MLS/HR;  Start 2/16/19 at 01:00


Albuterol (Ventolin Hfa) 4 puff Q6H RESP  THERAPY INH  Last administered on 


2/19/19at 08:32; Admin Dose 4 PUFF;  Start 2/16/19 at 14:00


Ipratropium Bromide (Atrovent Hfa) 4 puff Q6H RESP  THERAPY INH  Last 


administered on 2/19/19at 08:32; Admin Dose 4 PUFF;  Start 2/16/19 at 14:00


Levofloxacin/ Dextrose 100 ml @  100 mls/hr Q24H IVPB  Last administered on 


2/18/19at 11:30; Admin Dose 100 MLS/HR;  Start 2/16/19 at 12:00


Midazolam HCl 50 ml @ 1 mls/hr TITRATE IV  Last administered on 2/18/19at 17:11;


Admin Dose 4 MLS/HR;  Start 2/17/19 at 10:00


Hydrochlorothiazide (Hydrochlorothiazide) 25 mg DAILY GTB ;  Start 2/18/19 at 


09:00





Assessment/Plan


Hospital Course (Demo Recall)


1.Hypotension- BP now maintained. Con't to monitor - BP better. 


2.Resp failure s/p re-intubation - pulm team follows.  NOt able to extubate now.





3.h/o cardiac arrest - etiology unclear - con't supportive RX now. Stable now - 


of tele. No cardiac intervention currently planned. Stable Vs. 


4.Pneumoperitoneum - self d/c chest tube  - stable SOB now.  Con't Rx. NO new 


fevers. 


5.Positive troponin-now trended neg - will monitor clinically for now. No 


intervention planned.  NO CP noted. 


6.renal failure - good urine output. 


7.Leukocytosis - on anti-Bx, con't med rx.  On anti-Bx. 


8. anemia - H/H stable - no bleeding noted. 


9, Thrombocytopenia - no active bleeds noted. 


10 DVT - low plts - defer to hematology for anti-coagulation. 


11. Bardy - sinus jose - no indication for pacer now. HR well controlled.











HARDY GUZMÁN MD                 Feb 19, 2019 09:37 Type 2 diabetes mellitus

## 2019-03-26 NOTE — DIETITIAN INITIAL EVALUATION ADULT. - OTHER INFO
Pt seen for nutrition consult for nutrition assessment. As per chart pt is a 74 year old male with a PMH of HTN, atrial fibrillation, diabetes mellitus type 2, CAD s/p angioplasty and stent, HLD, presents with c/o abd and constipation x 1 week, admitted for constipation, failure to thrive, possible GI obstruction.  Pt's wife reports pt currently with slightly improving appetite. Pt's current weight per chart (3/26) 132.9lbs, admission weight 125lbs. Pt's wife reports pt's current weight of 125-130lbs, reports UBW of 145lbs and states that he lost about 20lbs over the past 1-2 years secondary to decreased PO intake. Reports some chewing difficulties secondary missing teeth however reports that the food is soft enough on his current diet. Denies any GI distress at this time, +BM 3/25.

## 2019-03-26 NOTE — DIETITIAN INITIAL EVALUATION ADULT. - NS AS NUTRI INTERV MEALS SNACK
Continue with current diet of Consistent CHO w/ evening snack./Carbohydrate - modified diet/General/healthful diet

## 2019-03-26 NOTE — PROGRESS NOTE ADULT - SUBJECTIVE AND OBJECTIVE BOX
INTERVAL HPI/OVERNIGHT EVENTS:  pt seen and examined  did not sleep all night  denies n/v/abd pain  sp enema w +bms, now refusing am bowel regimen per nursing  tolerated some po yesterday  afebrile overnight labs pending    MEDICATIONS  (STANDING):  ascorbic acid 500 milliGRAM(s) Oral daily  atorvastatin 20 milliGRAM(s) Oral at bedtime  cholecalciferol 1000 Unit(s) Oral daily  clopidogrel Tablet 75 milliGRAM(s) Oral daily  cyanocobalamin 1000 MICROGram(s) Oral daily  dextrose 5%. 1000 milliLiter(s) (50 mL/Hr) IV Continuous <Continuous>  dextrose 50% Injectable 12.5 Gram(s) IV Push once  dextrose 50% Injectable 25 Gram(s) IV Push once  dextrose 50% Injectable 25 Gram(s) IV Push once  digoxin     Tablet 0.125 milliGRAM(s) Oral daily  docusate sodium 100 milliGRAM(s) Oral three times a day  glycerin Suppository - Adult 1 Suppository(s) Rectal at bedtime  insulin lispro (HumaLOG) corrective regimen sliding scale   SubCutaneous three times a day before meals  insulin lispro (HumaLOG) corrective regimen sliding scale   SubCutaneous at bedtime  lactobacillus acidophilus 1 Tablet(s) Oral three times a day with meals  losartan 25 milliGRAM(s) Oral daily  metoprolol succinate  milliGRAM(s) Oral daily  multivitamin 1 Tablet(s) Oral daily  polyethylene glycol 3350 17 Gram(s) Oral daily  rivaroxaban 20 milliGRAM(s) Oral every 24 hours  senna 2 Tablet(s) Oral at bedtime  simethicone 80 milliGRAM(s) Chew three times a day    MEDICATIONS  (PRN):  acetaminophen   Tablet .. 650 milliGRAM(s) Oral every 6 hours PRN Temp greater or equal to 38C (100.4F), Mild Pain (1 - 3)  bisacodyl 5 milliGRAM(s) Oral every 12 hours PRN Constipation  dextrose 40% Gel 15 Gram(s) Oral once PRN Blood Glucose LESS THAN 70 milliGRAM(s)/deciliter  glucagon  Injectable 1 milliGRAM(s) IntraMuscular once PRN Glucose LESS THAN 70 milligrams/deciliter  saline laxative (FLEET) Rectal Enema 1 Enema Rectal once PRN constipation      Allergies    No Known Allergies    Intolerances        Review of Systems:    General:  No wt loss, fevers, chills, night sweats, fatigue   Eyes:  Good vision, no reported pain  ENT:  No sore throat, pain, runny nose, dysphagia  CV:  No pain, palpitations, hypo/hypertension  Resp:  No dyspnea, cough, tachypnea, wheezing  GI:  No pain, No nausea, No vomiting, No diarrhea, No constipation, No weight loss, No fever, No pruritis, No rectal bleeding, No melena, No dysphagia  :  No pain, bleeding, incontinence, nocturia  Muscle:  No pain, weakness  Neuro:  No weakness, tingling, memory problems  Psych:  No fatigue, insomnia, mood problems, depression  Endocrine:  No polyuria, polydypsia, cold/heat intolerance  Heme:  No petechiae, ecchymosis, easy bruisability  Skin:  No rash, tattoos, scars, edema      Vital Signs Last 24 Hrs  T(C): 36.6 (26 Mar 2019 04:23), Max: 37 (25 Mar 2019 20:59)  T(F): 97.8 (26 Mar 2019 04:23), Max: 98.6 (25 Mar 2019 20:59)  HR: 84 (26 Mar 2019 04:23) (62 - 84)  BP: 124/63 (26 Mar 2019 04:23) (123/63 - 160/64)  BP(mean): --  RR: 18 (26 Mar 2019 04:23) (16 - 18)  SpO2: 98% (26 Mar 2019 04:23) (95% - 99%)    PHYSICAL EXAM:  General:  nad frail  HEENT:  NC/AT  Chest:  dec bs  Cardiovascular:  Regular rhythm, S1, S2  Abdomen:  Soft, nt mild dt  Extremities:  no edema  Skin:  No rash  Neuro/Psych:  Awake alert responds appropriately    LABS:                        10.1   14.60 )-----------( 245      ( 25 Mar 2019 14:50 )             31.3     03-25    132<L>  |  95<L>  |  29<H>  ----------------------------<  203<H>  4.0   |  28  |  0.84    Ca    9.1      25 Mar 2019 14:50    TPro  8.2  /  Alb  3.3  /  TBili  0.7  /  DBili  x   /  AST  21  /  ALT  15  /  AlkPhos  81  03-24      Urinalysis Basic - ( 24 Mar 2019 22:00 )    Color: Yellow / Appearance: Slightly Turbid / S.020 / pH: x  Gluc: x / Ketone: Small  / Bili: Negative / Urobili: Negative   Blood: x / Protein: 150 mg/dL / Nitrite: Negative   Leuk Esterase: Moderate / RBC: 11-25 /HPF / WBC 3-5   Sq Epi: x / Non Sq Epi: Few / Bacteria: Moderate        RADIOLOGY & ADDITIONAL TESTS:

## 2019-03-26 NOTE — DIETITIAN INITIAL EVALUATION ADULT. - PHYSICAL APPEARANCE
BMI 17.0; Pt agreeable to Nutrition Focused Physical Exam. Findings upon examination include severe clavicle, moderate temporale, moderate shoulder muscle loss; moderate orbital, mild triceps fat loss./underweight

## 2019-03-26 NOTE — PROGRESS NOTE ADULT - PROBLEM SELECTOR PLAN 1
resolving  sp disimpaction and enema w ++bms  f/u am labs  f/u am axr  f/u bld cxs  cont bowel regimen- colace/senna/miralax/glycerin suppos qhs  cont bacid tid, simethicone tid  diet as tolerated  monitor exam/stool output  denies hx of prior colonoscopy, dw dtr, she is unsure if pt will be able to tolerate procedure, final decision pending, if no plans for colon, rec ct a/p w con for further eval to r/o possible obstructing mass  will follow

## 2019-03-26 NOTE — CONSULT NOTE ADULT - SUBJECTIVE AND OBJECTIVE BOX
HPI:  75yo male with pmhx HTN, atrial fibrillation, AICD, DM 2, CAD s/p PCI, HLD, Lung CA (s/p  radiation and chemo in remission 1997), BPH with chronic retention and he get straight cathed   by wife 4x/day who presents with CC of weakness poor po intake. Pt is a poor historian. Also   constipated per wife  x 1 week. Had vomiting x 1 and with worsening weakness. In ED he was  disimpacted. UA +- positive and ucx with >100cfu/ml Kleb.     Infectious Disease consult was called to evaluate pt and for antibiotic management.    Past Medical & Surgical Hx:  PAST MEDICAL & SURGICAL HISTORY:  AICD (automatic cardioverter/defibrillator) present  PVD (peripheral vascular disease): s/p angioplasty with stent ; pt states Left Carotid Artery occluded  Atrial fibrillation: since 2013  Hyperlipidemia  HTN (hypertension)  Lung cancer: small cell, stage 4, 1997, radiation &amp; chemo- on remission  PAD (peripheral artery disease)  GERD (gastroesophageal reflux disease)  Carotid artery occlusion: left  Neuropathy  Type 2 diabetes mellitus: glucose this am 116  Carotid artery occlusion: right with pseudoaneurysm s/p stent and mesh 4/2017  AICD (automatic cardioverter/defibrillator) present: 10/21/14  Carotid stenosis, right: Right CEA 2013  S/P femoral-popliteal bypass surgery: x 2, 2013, right s/p Revision  S/P angioplasty with stent: left leg  S/P cervical spinal fusion: 2002, 1 plate &amp; 4 rods      Social History--  EtOH: denies   Tobacco: denies   Drug Use: denies   Lives with familiy    FAMILY HISTORY:  No pertinent family history in first degree relatives      Allergies  No Known Allergies    Home Medications:  atorvastatin 20 mg oral tablet: 1 tab(s) orally once a day (25 Mar 2019 00:33)  bisacodyl 5 mg oral delayed release tablet: 1 tab(s) orally every 12 hours, As needed, Constipation (26 Mar 2019 14:24)  calcium magnesium citrate: 1 tab(s) orally once a day (25 Mar 2019 00:33)  digoxin 125 mcg (0.125 mg) oral tablet: 1 tab(s) orally once a day (25 Mar 2019 00:33)  docusate sodium 100 mg oral capsule: 1 cap(s) orally 3 times a day (26 Mar 2019 14:24)  folic acid: orally once a day (25 Mar 2019 00:33)  iron: 130 milligram(s) orally once a day (25 Mar 2019 00:33)  losartan 25 mg oral tablet: 1 tab(s) orally once a day (25 Mar 2019 00:33)  metFORMIN 1000 mg oral tablet:  orally once a day, (25 Mar 2019 00:33)  Metoprolol Succinate  mg oral tablet, extended release: 1.5 tab(s) orally once a day (25 Mar 2019 00:33)  multivitamin: 1 tab(s) orally once a day last dose 7/24 (25 Mar 2019 00:33)  Plavix 75 mg oral tablet: 1 tab(s) orally once a day am (25 Mar 2019 00:33)  polyethylene glycol 3350 oral powder for reconstitution: 17 gram(s) orally once a day (26 Mar 2019 14:24)  senna oral tablet: 2 tab(s) orally once a day (at bedtime) (26 Mar 2019 14:24)  simethicone 80 mg oral tablet, chewable: 1 tab(s) orally 3 times a day (26 Mar 2019 14:24)  Tradjenta 5 mg oral tablet: 1 tab(s) orally once a day (25 Mar 2019 00:33)  Vitamin B12:  orally 3000 mcg daily (25 Mar 2019 00:33)  Vitamin C 500 mg oral tablet: 1 tab(s) orally once a day (25 Mar 2019 00:33)  vitamin D:   2000 units daily (25 Mar 2019 00:33)  Xarelto 20 mg oral tablet: 1 tab(s) orally once a day, last dose 7/19 (25 Mar 2019 00:33)      Current Inpatient Medications :    ANTIBIOTICS:   cefTRIAXone   IVPB          OTHER RELEVANT MEDICATIONS :  acetaminophen   Tablet .. 650 milliGRAM(s) Oral every 6 hours PRN  ascorbic acid 500 milliGRAM(s) Oral daily  atorvastatin 20 milliGRAM(s) Oral at bedtime  bisacodyl 5 milliGRAM(s) Oral every 12 hours PRN  cholecalciferol 1000 Unit(s) Oral daily  clopidogrel Tablet 75 milliGRAM(s) Oral daily  cyanocobalamin 1000 MICROGram(s) Oral daily  dextrose 40% Gel 15 Gram(s) Oral once PRN  dextrose 5%. 1000 milliLiter(s) IV Continuous <Continuous>  dextrose 50% Injectable 12.5 Gram(s) IV Push once  dextrose 50% Injectable 25 Gram(s) IV Push once  dextrose 50% Injectable 25 Gram(s) IV Push once  digoxin     Tablet 0.125 milliGRAM(s) Oral daily  docusate sodium 100 milliGRAM(s) Oral three times a day  glucagon  Injectable 1 milliGRAM(s) IntraMuscular once PRN  glycerin Suppository - Adult 1 Suppository(s) Rectal at bedtime  insulin lispro (HumaLOG) corrective regimen sliding scale   SubCutaneous three times a day before meals  insulin lispro (HumaLOG) corrective regimen sliding scale   SubCutaneous at bedtime  losartan 25 milliGRAM(s) Oral daily  metoprolol succinate  milliGRAM(s) Oral daily  multivitamin 1 Tablet(s) Oral daily  polyethylene glycol 3350 17 Gram(s) Oral daily  rivaroxaban 20 milliGRAM(s) Oral every 24 hours  saline laxative (FLEET) Rectal Enema 1 Enema Rectal once PRN  senna 2 Tablet(s) Oral at bedtime  simethicone 80 milliGRAM(s) Chew three times a day      ROS:  Unable to obtain due to : pt's condition      I&O's Detail    25 Mar 2019 07:01  -  26 Mar 2019 07:00  --------------------------------------------------------  IN:  Total IN: 0 mL    OUT:    Voided: 220 mL  Total OUT: 220 mL    Total NET: -220 mL      26 Mar 2019 07:01  -  26 Mar 2019 20:41  --------------------------------------------------------  IN:  Total IN: 0 mL    OUT:    Voided: 350 mL  Total OUT: 350 mL    Total NET: -350 mL      Physical Exam:  Vital Signs Last 24 Hrs  T(C): 36.6 (26 Mar 2019 20:39), Max: 37 (25 Mar 2019 20:59)  T(F): 97.8 (26 Mar 2019 20:39), Max: 98.6 (25 Mar 2019 20:59)  HR: 76 (26 Mar 2019 20:39) (52 - 84)  BP: 123/71 (26 Mar 2019 20:39) (123/63 - 124/63)  RR: 19 (26 Mar 2019 20:39) (18 - 19)  SpO2: 95% (26 Mar 2019 20:39) (95% - 98%)      General:, in no acute distress Weak frail  Eyes: sclera anicteric, pupils equal and reactive to light  ENMT: buccal mucosa moist, pharynx not injected  Neck: supple, trachea midline  Lungs: Decreased  no wheeze/rhonchi  Cardiovascular: regular rate and rhythm, S1 S2  Abdomen: soft, nontender, no organomegaly present, bowel sounds normal  Neurological:  alert and oriented x1, Cranial Nerves II-XII grossly intact  Skin:no increased ecchymosis/petechiae/purpura  Lymph Nodes: no palpable cervical/supraclavicular lymph nodes enlargements  Extremities: no cyanosis/clubbing/edema    Labs:  Complete Blood Count (03.25.19 @ 14:50)    Nucleated RBC: 0 /100 WBCs    WBC Count: 14.60 K/uL    RBC Count: 3.70 M/uL    Hemoglobin: 10.1 g/dL    Hematocrit: 31.3 %    Mean Cell Volume: 84.6 fl    Mean Cell Hemoglobin: 27.3 pg    Mean Cell Hemoglobin Conc: 32.3 gm/dL    Red Cell Distrib Width: 14.6 %    Platelet Count - Automated: 245 K/uL                           9.4    9.55  )-----------( 218      ( 26 Mar 2019 09:04 )             29.0   03-26    134<L>  |  97  |  23  ----------------------------<  135<H>  3.5   |  30  |  0.73    Ca    8.9      26 Mar 2019 09:04    TPro  8.2  /  Alb  3.3  /  TBili  0.7  /  DBili  x   /  AST  21  /  ALT  15  /  AlkPhos  81  03-24      RECENT CULTURES:    Culture - Blood (collected 25 Mar 2019 09:12)  Source: .Blood Blood-Peripheral  Preliminary Report (26 Mar 2019 10:01):    No growth to date.    Culture - Blood (collected 25 Mar 2019 09:12)  Source: .Blood Blood-Peripheral  Preliminary Report (26 Mar 2019 10:01):    No growth to date.    Culture - Urine (collected 25 Mar 2019 00:34)  Source: .Urine Clean Catch (Midstream)  Final Report (26 Mar 2019 20:13):    >100,000 CFU/ml Klebsiella pneumoniae  Organism: Klebsiella pneumoniae (26 Mar 2019 20:13)  Organism: Klebsiella pneumoniae (26 Mar 2019 20:13)      -  Amikacin: S <=16      -  Ampicillin: R >16 These ampicillin results predict results for amoxicillin      -  Ampicillin/Sulbactam: S <=8/4      -  Aztreonam: S <=4      -  Cefazolin: S <=8 For uncomplicated UTI with K. pneumoniae, E. coli, or P. mirablis: NISHI <=16 is sensitive and NISHI >=32 is resistant. This also predicts results for oral agents cefaclor, cefdinir, cefpodoxime, cefprozil, cefuroxime axetil, cephalexin and locarbef for uncomplicated UTI. Note that some isolates may be susceptible to these agents while testing resistant to cefazolin.      -  Cefepime: S <=4      -  Cefoxitin: S <=8      -  Ceftriaxone: S <=1 Enterobacter, Citrobacter, and Serratia may develop resistance during prolonged therapy      -  Ciprofloxacin: S <=1      -  Ertapenem: S <=1      -  Gentamicin: S <=4      -  Imipenem: S <=1      -  Levofloxacin: S <=2      -  Meropenem: S <=1      -  Nitrofurantoin: R >64 Should not be used to treat pyelonephritis      -  Piperacillin/Tazobactam: S <=16      -  Tigecycline: S <=2      -  Tobramycin: S <=4      -  Trimethoprim/Sulfamethoxazole: S <=2/38      Method Type: NISHI    RADIOLOGY & ADDITIONAL STUDIES:    EXAM:  CT ABDOMEN AND PELVIS OC IC                            PROCEDURE DATE:  03/26/2019          INTERPRETATION:  CT ABDOMEN AND PELVIS    CLINICAL INFORMATION:  Abdominal pain and severe constipation.    PROCEDURE:  Using multislice helical CT, oral contrast, and following the   intravenous administration of 95 cc Omnipaque 350 , 2.5 mm sections were   obtained from the domes of the diaphragm to the ischial tuberosities.    Multiplanar MPR's were performed.    COMPARISON: None available    FINDINGS:      Partially imaged branching nodular opacities right middle lobe, which   could reflect infectious/inflammatory airway disease.  There is probable trace right-sided pleural effusion and atelectatic   changes at the right lung base.    The liver and spleen appear unremarkable.    Cholelithiasis is noted.  No biliary ductal dilatation is noted.    The adrenal glands and pancreas are unremarkable in appearance.    There is bilateral perinephric stranding, more pronounced on the right.  There is patchy decreased enhancement at the interpolar kidney.  No hydronephrosis is noted.  Urinary bladder is distended, with small amount of air. Correlate for   recent instrumentation versus infection.    Above findings may be secondary to urinary tract infection with   pyelonephritis.    There is prominent atherosclerotic calcification of the abdominal aorta,   with aortobiiliac stent.  There is a large contrast-filled, lobular sac filling the left inguinal   canal, that is immediately adjacent to and appears to communicate with   the femoral artery. Finding is compatible with a pseudoaneurysm, and   measures approximately 8.7 cm in craniocaudad dimension.    There are bilateral inguinal helical scanning luminal surgical clips.    The evaluation of the stomach is limited without distention.  There is a large fecal load with distention of the transverse and right   colon.  No bowel obstruction is noted.  There is bowel wall thickening/submucosal edema involving the rectum and   distal sigmoid colon, finding which may be associated with a   proctocolitis.  No localized intra-abdominal fluid collection or pneumoperitoneum is   noted.    There are multilevel degenerative changes of lumbar spine.  There is a age indeterminate compression deformities of the L1 and L3   vertebral bodies.  There are chronic left-sided rib fracture deformities.    Impression:    Large left femoral artery pseudoaneurysm.  This finding was discussed with Dr. Best  by telephone at the time of   interpretation on 3/26/2019.    Bilateral perinephric stranding with patchy decreased enhancement right   kidney, and small amount of air within the urinary bladder, recommend   further clinical correlation for urinary tract infection and   pyelonephritis.  Differential diagnosis for peripheral decreased attenuation within the   kidney includes renal infarct.    Findings suggestive of proctocolitis as discussed; no bowel obstruction   noted.    Cholelithiasis.    Assessment :   75yo male with pmhx HTN, atrial fibrillation, AICD, DM 2, CAD s/p PCI, HLD, Lung CA (s/p  radiation and chemo in remission 1997), BPH with chronic retention and he get straight cathed   by wife 4x/day admitted with fecal impaction and stercoral colitis- disimpacted   Also with likely UTI ?rt pyelo on CT  Large left femoral artery pseudoaneurysm      Plan :   Cont Rocephin  Trend wbc  Asp precautions  Bowel regiment per GI    D/w Hospitalist    Continue with present regiment .  Approptiate use of antibiotics and adverse effects reviewed.      I have discussed the above plan of care with patient's family in detail. They expressed understanding of the treatment plan . Risks, benefits and alternatives discussed in detail. I have asked if they have any questions or concerns and appropriately addressed them to the best of my ability .      > 45 minutes spent in direct patient care reviewing  the notes, lab data/ imaging , discussion with multidisciplinary team. All questions were addressed and answered to the best of my capacity .    Thank you for allowing me to participate in the care of your patient .      Bradley Elliott MD  Infectious Disease  [phone] HPI:  75yo male with pmhx HTN, atrial fibrillation, AICD, DM 2, CAD s/p PCI, HLD, Lung CA (s/p  radiation and chemo in remission 1997), BPH with chronic retention and he get straight cathed   by wife 4x/day who presents with CC of weakness poor po intake. Pt is a poor historian. Also   constipated per wife  x 1 week. Had vomiting x 1 and with worsening weakness. In ED he was  disimpacted. UA +- positive and ucx with >100cfu/ml Kleb.     Infectious Disease consult was called to evaluate pt and for antibiotic management.    Past Medical & Surgical Hx:  PAST MEDICAL & SURGICAL HISTORY:  AICD (automatic cardioverter/defibrillator) present  PVD (peripheral vascular disease): s/p angioplasty with stent ; pt states Left Carotid Artery occluded  Atrial fibrillation: since 2013  Hyperlipidemia  HTN (hypertension)  Lung cancer: small cell, stage 4, 1997, radiation &amp; chemo- on remission  PAD (peripheral artery disease)  GERD (gastroesophageal reflux disease)  Carotid artery occlusion: left  Neuropathy  Type 2 diabetes mellitus: glucose this am 116  Carotid artery occlusion: right with pseudoaneurysm s/p stent and mesh 4/2017  AICD (automatic cardioverter/defibrillator) present: 10/21/14  Carotid stenosis, right: Right CEA 2013  S/P femoral-popliteal bypass surgery: x 2, 2013, right s/p Revision  S/P angioplasty with stent: left leg  S/P cervical spinal fusion: 2002, 1 plate &amp; 4 rods      Social History--  EtOH: denies   Tobacco: denies   Drug Use: denies   Lives with familiy    FAMILY HISTORY:  No pertinent family history in first degree relatives      Allergies  No Known Allergies    Home Medications:  atorvastatin 20 mg oral tablet: 1 tab(s) orally once a day (25 Mar 2019 00:33)  bisacodyl 5 mg oral delayed release tablet: 1 tab(s) orally every 12 hours, As needed, Constipation (26 Mar 2019 14:24)  calcium magnesium citrate: 1 tab(s) orally once a day (25 Mar 2019 00:33)  digoxin 125 mcg (0.125 mg) oral tablet: 1 tab(s) orally once a day (25 Mar 2019 00:33)  docusate sodium 100 mg oral capsule: 1 cap(s) orally 3 times a day (26 Mar 2019 14:24)  folic acid: orally once a day (25 Mar 2019 00:33)  iron: 130 milligram(s) orally once a day (25 Mar 2019 00:33)  losartan 25 mg oral tablet: 1 tab(s) orally once a day (25 Mar 2019 00:33)  metFORMIN 1000 mg oral tablet:  orally once a day, (25 Mar 2019 00:33)  Metoprolol Succinate  mg oral tablet, extended release: 1.5 tab(s) orally once a day (25 Mar 2019 00:33)  multivitamin: 1 tab(s) orally once a day last dose 7/24 (25 Mar 2019 00:33)  Plavix 75 mg oral tablet: 1 tab(s) orally once a day am (25 Mar 2019 00:33)  polyethylene glycol 3350 oral powder for reconstitution: 17 gram(s) orally once a day (26 Mar 2019 14:24)  senna oral tablet: 2 tab(s) orally once a day (at bedtime) (26 Mar 2019 14:24)  simethicone 80 mg oral tablet, chewable: 1 tab(s) orally 3 times a day (26 Mar 2019 14:24)  Tradjenta 5 mg oral tablet: 1 tab(s) orally once a day (25 Mar 2019 00:33)  Vitamin B12:  orally 3000 mcg daily (25 Mar 2019 00:33)  Vitamin C 500 mg oral tablet: 1 tab(s) orally once a day (25 Mar 2019 00:33)  vitamin D:   2000 units daily (25 Mar 2019 00:33)  Xarelto 20 mg oral tablet: 1 tab(s) orally once a day, last dose 7/19 (25 Mar 2019 00:33)      Current Inpatient Medications :    ANTIBIOTICS:   cefTRIAXone   IVPB          OTHER RELEVANT MEDICATIONS :  acetaminophen   Tablet .. 650 milliGRAM(s) Oral every 6 hours PRN  ascorbic acid 500 milliGRAM(s) Oral daily  atorvastatin 20 milliGRAM(s) Oral at bedtime  bisacodyl 5 milliGRAM(s) Oral every 12 hours PRN  cholecalciferol 1000 Unit(s) Oral daily  clopidogrel Tablet 75 milliGRAM(s) Oral daily  cyanocobalamin 1000 MICROGram(s) Oral daily  dextrose 40% Gel 15 Gram(s) Oral once PRN  dextrose 5%. 1000 milliLiter(s) IV Continuous <Continuous>  dextrose 50% Injectable 12.5 Gram(s) IV Push once  dextrose 50% Injectable 25 Gram(s) IV Push once  dextrose 50% Injectable 25 Gram(s) IV Push once  digoxin     Tablet 0.125 milliGRAM(s) Oral daily  docusate sodium 100 milliGRAM(s) Oral three times a day  glucagon  Injectable 1 milliGRAM(s) IntraMuscular once PRN  glycerin Suppository - Adult 1 Suppository(s) Rectal at bedtime  insulin lispro (HumaLOG) corrective regimen sliding scale   SubCutaneous three times a day before meals  insulin lispro (HumaLOG) corrective regimen sliding scale   SubCutaneous at bedtime  losartan 25 milliGRAM(s) Oral daily  metoprolol succinate  milliGRAM(s) Oral daily  multivitamin 1 Tablet(s) Oral daily  polyethylene glycol 3350 17 Gram(s) Oral daily  rivaroxaban 20 milliGRAM(s) Oral every 24 hours  saline laxative (FLEET) Rectal Enema 1 Enema Rectal once PRN  senna 2 Tablet(s) Oral at bedtime  simethicone 80 milliGRAM(s) Chew three times a day      ROS:  Unable to obtain due to : pt's condition      I&O's Detail    25 Mar 2019 07:01  -  26 Mar 2019 07:00  --------------------------------------------------------  IN:  Total IN: 0 mL    OUT:    Voided: 220 mL  Total OUT: 220 mL    Total NET: -220 mL      26 Mar 2019 07:01  -  26 Mar 2019 20:41  --------------------------------------------------------  IN:  Total IN: 0 mL    OUT:    Voided: 350 mL  Total OUT: 350 mL    Total NET: -350 mL      Physical Exam:  Vital Signs Last 24 Hrs  T(C): 36.6 (26 Mar 2019 20:39), Max: 37 (25 Mar 2019 20:59)  T(F): 97.8 (26 Mar 2019 20:39), Max: 98.6 (25 Mar 2019 20:59)  HR: 76 (26 Mar 2019 20:39) (52 - 84)  BP: 123/71 (26 Mar 2019 20:39) (123/63 - 124/63)  RR: 19 (26 Mar 2019 20:39) (18 - 19)  SpO2: 95% (26 Mar 2019 20:39) (95% - 98%)      General:, in no acute distress Weak frail  Eyes: sclera anicteric, pupils equal and reactive to light  ENMT: buccal mucosa moist, pharynx not injected  Neck: supple, trachea midline  Lungs: Decreased  no wheeze/rhonchi  Cardiovascular: regular rate and rhythm, S1 S2  Abdomen: soft, nontender, no organomegaly present, bowel sounds normal  Neurological:  alert and oriented x1, Cranial Nerves II-XII grossly intact  Skin:no increased ecchymosis/petechiae/purpura  Lymph Nodes: no palpable cervical/supraclavicular lymph nodes enlargements  Extremities: no cyanosis/clubbing/edema    Labs:  Complete Blood Count (03.25.19 @ 14:50)    Nucleated RBC: 0 /100 WBCs    WBC Count: 14.60 K/uL    RBC Count: 3.70 M/uL    Hemoglobin: 10.1 g/dL    Hematocrit: 31.3 %    Mean Cell Volume: 84.6 fl    Mean Cell Hemoglobin: 27.3 pg    Mean Cell Hemoglobin Conc: 32.3 gm/dL    Red Cell Distrib Width: 14.6 %    Platelet Count - Automated: 245 K/uL                           9.4    9.55  )-----------( 218      ( 26 Mar 2019 09:04 )             29.0   03-26    134<L>  |  97  |  23  ----------------------------<  135<H>  3.5   |  30  |  0.73    Ca    8.9      26 Mar 2019 09:04    TPro  8.2  /  Alb  3.3  /  TBili  0.7  /  DBili  x   /  AST  21  /  ALT  15  /  AlkPhos  81  03-24      RECENT CULTURES:    Culture - Blood (collected 25 Mar 2019 09:12)  Source: .Blood Blood-Peripheral  Preliminary Report (26 Mar 2019 10:01):    No growth to date.    Culture - Blood (collected 25 Mar 2019 09:12)  Source: .Blood Blood-Peripheral  Preliminary Report (26 Mar 2019 10:01):    No growth to date.    Culture - Urine (collected 25 Mar 2019 00:34)  Source: .Urine Clean Catch (Midstream)  Final Report (26 Mar 2019 20:13):    >100,000 CFU/ml Klebsiella pneumoniae  Organism: Klebsiella pneumoniae (26 Mar 2019 20:13)  Organism: Klebsiella pneumoniae (26 Mar 2019 20:13)      -  Amikacin: S <=16      -  Ampicillin: R >16 These ampicillin results predict results for amoxicillin      -  Ampicillin/Sulbactam: S <=8/4      -  Aztreonam: S <=4      -  Cefazolin: S <=8 For uncomplicated UTI with K. pneumoniae, E. coli, or P. mirablis: NISHI <=16 is sensitive and NISHI >=32 is resistant. This also predicts results for oral agents cefaclor, cefdinir, cefpodoxime, cefprozil, cefuroxime axetil, cephalexin and locarbef for uncomplicated UTI. Note that some isolates may be susceptible to these agents while testing resistant to cefazolin.      -  Cefepime: S <=4      -  Cefoxitin: S <=8      -  Ceftriaxone: S <=1 Enterobacter, Citrobacter, and Serratia may develop resistance during prolonged therapy      -  Ciprofloxacin: S <=1      -  Ertapenem: S <=1      -  Gentamicin: S <=4      -  Imipenem: S <=1      -  Levofloxacin: S <=2      -  Meropenem: S <=1      -  Nitrofurantoin: R >64 Should not be used to treat pyelonephritis      -  Piperacillin/Tazobactam: S <=16      -  Tigecycline: S <=2      -  Tobramycin: S <=4      -  Trimethoprim/Sulfamethoxazole: S <=2/38      Method Type: NISHI    RADIOLOGY & ADDITIONAL STUDIES:    EXAM:  CT ABDOMEN AND PELVIS OC IC                            PROCEDURE DATE:  03/26/2019          INTERPRETATION:  CT ABDOMEN AND PELVIS    CLINICAL INFORMATION:  Abdominal pain and severe constipation.    PROCEDURE:  Using multislice helical CT, oral contrast, and following the   intravenous administration of 95 cc Omnipaque 350 , 2.5 mm sections were   obtained from the domes of the diaphragm to the ischial tuberosities.    Multiplanar MPR's were performed.    COMPARISON: None available    FINDINGS:      Partially imaged branching nodular opacities right middle lobe, which   could reflect infectious/inflammatory airway disease.  There is probable trace right-sided pleural effusion and atelectatic   changes at the right lung base.    The liver and spleen appear unremarkable.    Cholelithiasis is noted.  No biliary ductal dilatation is noted.    The adrenal glands and pancreas are unremarkable in appearance.    There is bilateral perinephric stranding, more pronounced on the right.  There is patchy decreased enhancement at the interpolar kidney.  No hydronephrosis is noted.  Urinary bladder is distended, with small amount of air. Correlate for   recent instrumentation versus infection.    Above findings may be secondary to urinary tract infection with   pyelonephritis.    There is prominent atherosclerotic calcification of the abdominal aorta,   with aortobiiliac stent.  There is a large contrast-filled, lobular sac filling the left inguinal   canal, that is immediately adjacent to and appears to communicate with   the femoral artery. Finding is compatible with a pseudoaneurysm, and   measures approximately 8.7 cm in craniocaudad dimension.    There are bilateral inguinal helical scanning luminal surgical clips.    The evaluation of the stomach is limited without distention.  There is a large fecal load with distention of the transverse and right   colon.  No bowel obstruction is noted.  There is bowel wall thickening/submucosal edema involving the rectum and   distal sigmoid colon, finding which may be associated with a   proctocolitis.  No localized intra-abdominal fluid collection or pneumoperitoneum is   noted.    There are multilevel degenerative changes of lumbar spine.  There is a age indeterminate compression deformities of the L1 and L3   vertebral bodies.  There are chronic left-sided rib fracture deformities.    Impression:    Large left femoral artery pseudoaneurysm.  This finding was discussed with Dr. Best  by telephone at the time of   interpretation on 3/26/2019.    Bilateral perinephric stranding with patchy decreased enhancement right   kidney, and small amount of air within the urinary bladder, recommend   further clinical correlation for urinary tract infection and   pyelonephritis.  Differential diagnosis for peripheral decreased attenuation within the   kidney includes renal infarct.    Findings suggestive of proctocolitis as discussed; no bowel obstruction   noted.    Cholelithiasis.    Assessment :   75yo male with pmhx HTN, atrial fibrillation, AICD, DM 2, CAD s/p PCI, HLD, Lung CA (s/p  radiation and chemo in remission 1997), BPH with chronic retention and he get straight cathed   by wife 4x/day admitted with fecal impaction and stercoral colitis- disimpacted   Also with likely UTI ?rt pyelo on CT  Large left femoral artery pseudoaneurysm  Hyponatremia poss sec dehydration    Plan :   Cont Rocephin  Trend wbc  Asp precautions  Bowel regiment per GI    D/w Hospitalist    Continue with present regiment .  Approptiate use of antibiotics and adverse effects reviewed.      I have discussed the above plan of care with patient's family in detail. They expressed understanding of the treatment plan . Risks, benefits and alternatives discussed in detail. I have asked if they have any questions or concerns and appropriately addressed them to the best of my ability .      > 45 minutes spent in direct patient care reviewing  the notes, lab data/ imaging , discussion with multidisciplinary team. All questions were addressed and answered to the best of my capacity .    Thank you for allowing me to participate in the care of your patient .      Bradley Elliott MD  Infectious Disease  [phone]

## 2019-03-26 NOTE — DIETITIAN INITIAL EVALUATION ADULT. - PERTINENT MEDS FT
Humalog sliding scale, Vitamin C, Lipitor, Dulcolax, Vitamin D3, Colace, Lactobacillus acidophilus, Cozaar, Toprol XL, MVI, Miralax, Fleet enema, Senna

## 2019-03-26 NOTE — DIETITIAN INITIAL EVALUATION ADULT. - NS AS NUTRI INTERV ED CONTENT
Pt's wife made aware and encouraged small frequent meals with nutrient dense foods, take sips of Glucerna throughout the day, adequate protein intake. RD to remain available.

## 2019-03-27 ENCOUNTER — INPATIENT (INPATIENT)
Facility: HOSPITAL | Age: 74
LOS: 12 days | Discharge: NURSING, REHAB OR LONG-TERM CARE | End: 2019-04-09
Attending: SURGERY | Admitting: SURGERY
Payer: MEDICARE

## 2019-03-27 ENCOUNTER — TRANSCRIPTION ENCOUNTER (OUTPATIENT)
Age: 74
End: 2019-03-27

## 2019-03-27 VITALS
RESPIRATION RATE: 16 BRPM | OXYGEN SATURATION: 99 % | TEMPERATURE: 98 F | SYSTOLIC BLOOD PRESSURE: 150 MMHG | HEART RATE: 61 BPM | WEIGHT: 134.04 LBS | HEIGHT: 72 IN | DIASTOLIC BLOOD PRESSURE: 68 MMHG

## 2019-03-27 VITALS
SYSTOLIC BLOOD PRESSURE: 135 MMHG | HEART RATE: 60 BPM | TEMPERATURE: 97 F | RESPIRATION RATE: 14 BRPM | DIASTOLIC BLOOD PRESSURE: 58 MMHG | OXYGEN SATURATION: 98 %

## 2019-03-27 DIAGNOSIS — Z95.810 PRESENCE OF AUTOMATIC (IMPLANTABLE) CARDIAC DEFIBRILLATOR: Chronic | ICD-10-CM

## 2019-03-27 DIAGNOSIS — I72.9 ANEURYSM OF UNSPECIFIED SITE: ICD-10-CM

## 2019-03-27 DIAGNOSIS — L95.9 VASCULITIS LIMITED TO THE SKIN, UNSPECIFIED: ICD-10-CM

## 2019-03-27 DIAGNOSIS — N12 TUBULO-INTERSTITIAL NEPHRITIS, NOT SPECIFIED AS ACUTE OR CHRONIC: ICD-10-CM

## 2019-03-27 DIAGNOSIS — I65.21 OCCLUSION AND STENOSIS OF RIGHT CAROTID ARTERY: Chronic | ICD-10-CM

## 2019-03-27 DIAGNOSIS — D64.9 ANEMIA, UNSPECIFIED: ICD-10-CM

## 2019-03-27 DIAGNOSIS — I65.29 OCCLUSION AND STENOSIS OF UNSPECIFIED CAROTID ARTERY: Chronic | ICD-10-CM

## 2019-03-27 LAB
ANION GAP SERPL CALC-SCNC: 9 MMOL/L — SIGNIFICANT CHANGE UP (ref 5–17)
APTT BLD: 36.7 SEC — SIGNIFICANT CHANGE UP (ref 28.5–37)
BUN SERPL-MCNC: 19 MG/DL — SIGNIFICANT CHANGE UP (ref 7–23)
CALCIUM SERPL-MCNC: 8.5 MG/DL — SIGNIFICANT CHANGE UP (ref 8.5–10.1)
CHLORIDE SERPL-SCNC: 95 MMOL/L — LOW (ref 96–108)
CO2 SERPL-SCNC: 29 MMOL/L — SIGNIFICANT CHANGE UP (ref 22–31)
CREAT SERPL-MCNC: 0.74 MG/DL — SIGNIFICANT CHANGE UP (ref 0.5–1.3)
GLUCOSE SERPL-MCNC: 186 MG/DL — HIGH (ref 70–99)
HCT VFR BLD CALC: 28.7 % — LOW (ref 39–50)
HGB BLD-MCNC: 9.3 G/DL — LOW (ref 13–17)
INR BLD: 1.61 RATIO — HIGH (ref 0.88–1.16)
MCHC RBC-ENTMCNC: 27.5 PG — SIGNIFICANT CHANGE UP (ref 27–34)
MCHC RBC-ENTMCNC: 32.4 GM/DL — SIGNIFICANT CHANGE UP (ref 32–36)
MCV RBC AUTO: 84.9 FL — SIGNIFICANT CHANGE UP (ref 80–100)
NRBC # BLD: 0 /100 WBCS — SIGNIFICANT CHANGE UP (ref 0–0)
PLATELET # BLD AUTO: 202 K/UL — SIGNIFICANT CHANGE UP (ref 150–400)
POTASSIUM SERPL-MCNC: 3.5 MMOL/L — SIGNIFICANT CHANGE UP (ref 3.5–5.3)
POTASSIUM SERPL-SCNC: 3.5 MMOL/L — SIGNIFICANT CHANGE UP (ref 3.5–5.3)
PROTHROM AB SERPL-ACNC: 18.6 SEC — HIGH (ref 10–12.9)
RBC # BLD: 3.38 M/UL — LOW (ref 4.2–5.8)
RBC # FLD: 14.5 % — SIGNIFICANT CHANGE UP (ref 10.3–14.5)
SODIUM SERPL-SCNC: 133 MMOL/L — LOW (ref 135–145)
WBC # BLD: 6.19 K/UL — SIGNIFICANT CHANGE UP (ref 3.8–10.5)
WBC # FLD AUTO: 6.19 K/UL — SIGNIFICANT CHANGE UP (ref 3.8–10.5)

## 2019-03-27 PROCEDURE — 80162 ASSAY OF DIGOXIN TOTAL: CPT

## 2019-03-27 PROCEDURE — 85610 PROTHROMBIN TIME: CPT

## 2019-03-27 PROCEDURE — 87086 URINE CULTURE/COLONY COUNT: CPT

## 2019-03-27 PROCEDURE — 80048 BASIC METABOLIC PNL TOTAL CA: CPT

## 2019-03-27 PROCEDURE — 99285 EMERGENCY DEPT VISIT HI MDM: CPT | Mod: 25

## 2019-03-27 PROCEDURE — 87040 BLOOD CULTURE FOR BACTERIA: CPT

## 2019-03-27 PROCEDURE — 74177 CT ABD & PELVIS W/CONTRAST: CPT

## 2019-03-27 PROCEDURE — 82272 OCCULT BLD FECES 1-3 TESTS: CPT

## 2019-03-27 PROCEDURE — 74019 RADEX ABDOMEN 2 VIEWS: CPT

## 2019-03-27 PROCEDURE — 84145 PROCALCITONIN (PCT): CPT

## 2019-03-27 PROCEDURE — 85027 COMPLETE CBC AUTOMATED: CPT

## 2019-03-27 PROCEDURE — 97162 PT EVAL MOD COMPLEX 30 MIN: CPT

## 2019-03-27 PROCEDURE — 85730 THROMBOPLASTIN TIME PARTIAL: CPT

## 2019-03-27 PROCEDURE — 99239 HOSP IP/OBS DSCHRG MGMT >30: CPT

## 2019-03-27 PROCEDURE — 82962 GLUCOSE BLOOD TEST: CPT

## 2019-03-27 PROCEDURE — 83036 HEMOGLOBIN GLYCOSYLATED A1C: CPT

## 2019-03-27 PROCEDURE — 80053 COMPREHEN METABOLIC PANEL: CPT

## 2019-03-27 PROCEDURE — 86803 HEPATITIS C AB TEST: CPT

## 2019-03-27 PROCEDURE — 87186 SC STD MICRODIL/AGAR DIL: CPT

## 2019-03-27 PROCEDURE — 36415 COLL VENOUS BLD VENIPUNCTURE: CPT

## 2019-03-27 PROCEDURE — 81001 URINALYSIS AUTO W/SCOPE: CPT

## 2019-03-27 RX ORDER — CEFUROXIME AXETIL 250 MG
500 TABLET ORAL EVERY 12 HOURS
Qty: 0 | Refills: 0 | Status: DISCONTINUED | OUTPATIENT
Start: 2019-03-27 | End: 2019-03-27

## 2019-03-27 RX ORDER — MESALAMINE 400 MG
1000 TABLET, DELAYED RELEASE (ENTERIC COATED) ORAL AT BEDTIME
Qty: 0 | Refills: 0 | Status: DISCONTINUED | OUTPATIENT
Start: 2019-03-27 | End: 2019-03-27

## 2019-03-27 RX ORDER — POLYETHYLENE GLYCOL 3350 17 G/17G
17 POWDER, FOR SOLUTION ORAL
Qty: 0 | Refills: 0 | Status: DISCONTINUED | OUTPATIENT
Start: 2019-03-27 | End: 2019-03-27

## 2019-03-27 RX ORDER — MESALAMINE 400 MG
1 TABLET, DELAYED RELEASE (ENTERIC COATED) ORAL
Qty: 0 | Refills: 0 | COMMUNITY
Start: 2019-03-27

## 2019-03-27 RX ORDER — LACTULOSE 10 G/15ML
15 SOLUTION ORAL ONCE
Qty: 0 | Refills: 0 | Status: COMPLETED | OUTPATIENT
Start: 2019-03-27 | End: 2019-03-27

## 2019-03-27 RX ORDER — CEFUROXIME AXETIL 250 MG
1 TABLET ORAL
Qty: 0 | Refills: 0 | COMMUNITY
Start: 2019-03-27

## 2019-03-27 RX ADMIN — RIVAROXABAN 20 MILLIGRAM(S): KIT at 17:24

## 2019-03-27 RX ADMIN — Medication 0.12 MILLIGRAM(S): at 06:29

## 2019-03-27 RX ADMIN — LOSARTAN POTASSIUM 25 MILLIGRAM(S): 100 TABLET, FILM COATED ORAL at 06:30

## 2019-03-27 RX ADMIN — SIMETHICONE 80 MILLIGRAM(S): 80 TABLET, CHEWABLE ORAL at 17:25

## 2019-03-27 RX ADMIN — Medication 1 TABLET(S): at 12:33

## 2019-03-27 RX ADMIN — Medication 500 MILLIGRAM(S): at 12:33

## 2019-03-27 RX ADMIN — Medication 1 TABLET(S): at 12:28

## 2019-03-27 RX ADMIN — Medication 1000 UNIT(S): at 12:33

## 2019-03-27 RX ADMIN — Medication 150 MILLIGRAM(S): at 06:30

## 2019-03-27 RX ADMIN — POLYETHYLENE GLYCOL 3350 17 GRAM(S): 17 POWDER, FOR SOLUTION ORAL at 17:28

## 2019-03-27 RX ADMIN — Medication 2: at 12:27

## 2019-03-27 RX ADMIN — Medication 1 TABLET(S): at 17:24

## 2019-03-27 RX ADMIN — Medication 1 TABLET(S): at 08:38

## 2019-03-27 RX ADMIN — Medication 500 MILLIGRAM(S): at 17:24

## 2019-03-27 RX ADMIN — CLOPIDOGREL BISULFATE 75 MILLIGRAM(S): 75 TABLET, FILM COATED ORAL at 12:33

## 2019-03-27 RX ADMIN — Medication 2: at 17:24

## 2019-03-27 RX ADMIN — PREGABALIN 1000 MICROGRAM(S): 225 CAPSULE ORAL at 12:33

## 2019-03-27 RX ADMIN — Medication 1: at 08:27

## 2019-03-27 NOTE — PROGRESS NOTE ADULT - PROBLEM SELECTOR PLAN 3
Left Femoral artery pseudoaneurysm  Seen by Vascular Dr. Fisher. Suggested likely chronic finding , patient refusing any surgical  repair. Will f/u with his vascular Dr. Wright , who did LE bypass on patient. If patient decides to go for surgical intervention

## 2019-03-27 NOTE — PROGRESS NOTE ADULT - PROBLEM SELECTOR PLAN 6
LDISS  carb consistent diet  f/u A1C
c/w Digoxin  c/w Xarelto  c/w metoprolol with hold parameters
c/w Digoxin  c/w Xarelto  c/w metoprolol with hold parameters

## 2019-03-27 NOTE — CONSULT NOTE ADULT - ASSESSMENT
74 y.o. male with PAD and multiple comorbidities has a asymptomatic left femoral artery  pseudoaneurysm. The patient was advised to go back to Dr. Wright who is his primary vascular surgeon for surgical repair  however he is refusing surgical intervention. The patient was given informed consent and the case was discussed with Dr. Best.

## 2019-03-27 NOTE — PROGRESS NOTE ADULT - ASSESSMENT
75yo male with pmhx HTN, atrial fibrillation, AICD in place, diabetes mellitus type 2, CAD s/p angioplasty and stent, HLD, Lung CA (s/p radiation and chemo in remission 1997), presents with c/o abd and constipation x 1 week.
73yo male with pmhx HTN, atrial fibrillation, AICD in place, diabetes mellitus type 2, CAD s/p angioplasty and stent, HLD, Lung CA (s/p radiation and chemo in remission 1997), presents with c/o abd and constipation x 1 week.
75yo male with pmhx HTN, atrial fibrillation, diabetes mellitus type 2, CAD s/p angioplasty and stent, HLD, lung cancer ( s/p chemo, radiation years ago, now in remission)  presents with c/o abd and constipation x 1 week, admitted for constipation, failure to thrive
75yo male with pmhx HTN, atrial fibrillation, diabetes mellitus type 2, CAD s/p angioplasty and stent, HLD, presents with c/o abd and constipation x 1 week, admitted for constipation, failure to thrive , CT abdomen/ pelvis + for suspected Pyelonephritis and also shows Left Femoral Artery Pseudoaneurysm
73yo male with pmhx HTN, atrial fibrillation, diabetes mellitus type 2, CAD s/p angioplasty and stent, HLD, presents with c/o abd and constipation x 1 week, admitted for constipation, failure to thrive

## 2019-03-27 NOTE — PHARMACOTHERAPY INTERVENTION NOTE - COMMENTS
Medication: Rivaroxaban 20mg Q24h (with food)   Indication: Afib  Family members/Caregiver present: Yes. If yes, who? Patient's wife and daughter  Handout provided: Yes  Provided information regarding dose, indication, what to do if miss dose, and possible side effects.    Anticoagulation education provided and accepted.

## 2019-03-27 NOTE — PROGRESS NOTE ADULT - PROBLEM SELECTOR PLAN 1
resolving  ct showed large fecal load wo obstruction and possible proctocolitis  f/u am labs  cont bowel regimen- colace/senna/miralax/lactulose/glycerin suppos qhs  cont bacid tid, simethicone tid  add canasa suppos hs  diet as tolerated  monitor exam/stool output  denies hx of prior colonoscopy, currently defers further w/u  will follow resolving  ct showed large fecal load wo obstruction and possible proctocolitis  f/u am labs  increase miralax to bid, give lactulose x1 now  add canasa suppos hs  cont bowel regimen  cont bacid tid, simethicone tid  diet as tolerated  monitor exam/stool output  denies hx of prior colonoscopy, currently defers further w/u  will follow

## 2019-03-27 NOTE — PROGRESS NOTE ADULT - PROBLEM SELECTOR PROBLEM 3
GERD (gastroesophageal reflux disease)
Anemia
Pseudoaneurysm
UTI (urinary tract infection)
UTI (urinary tract infection)

## 2019-03-27 NOTE — PROGRESS NOTE ADULT - SUBJECTIVE AND OBJECTIVE BOX
INTERVAL HPI/OVERNIGHT EVENTS:  pt seen and examined  denies n/v, c/o mild suprapubic pain  having +bms  per overnight rn pt needed ot be straight cath'd, no s/s active gib overnight  afebrile overnight labs pending    MEDICATIONS  (STANDING):  ascorbic acid 500 milliGRAM(s) Oral daily  atorvastatin 20 milliGRAM(s) Oral at bedtime  cefTRIAXone   IVPB 1 Gram(s) IV Intermittent every 24 hours  cefTRIAXone   IVPB      cholecalciferol 1000 Unit(s) Oral daily  clopidogrel Tablet 75 milliGRAM(s) Oral daily  cyanocobalamin 1000 MICROGram(s) Oral daily  dextrose 5%. 1000 milliLiter(s) (50 mL/Hr) IV Continuous <Continuous>  dextrose 50% Injectable 12.5 Gram(s) IV Push once  dextrose 50% Injectable 25 Gram(s) IV Push once  dextrose 50% Injectable 25 Gram(s) IV Push once  digoxin     Tablet 0.125 milliGRAM(s) Oral daily  docusate sodium 100 milliGRAM(s) Oral three times a day  glycerin Suppository - Adult 1 Suppository(s) Rectal at bedtime  insulin lispro (HumaLOG) corrective regimen sliding scale   SubCutaneous three times a day before meals  insulin lispro (HumaLOG) corrective regimen sliding scale   SubCutaneous at bedtime  lactobacillus acidophilus 1 Tablet(s) Oral three times a day with meals  losartan 25 milliGRAM(s) Oral daily  metoprolol succinate  milliGRAM(s) Oral daily  multivitamin 1 Tablet(s) Oral daily  polyethylene glycol 3350 17 Gram(s) Oral daily  rivaroxaban 20 milliGRAM(s) Oral every 24 hours  senna 2 Tablet(s) Oral at bedtime  simethicone 80 milliGRAM(s) Chew three times a day    MEDICATIONS  (PRN):  acetaminophen   Tablet .. 650 milliGRAM(s) Oral every 6 hours PRN Temp greater or equal to 38C (100.4F), Mild Pain (1 - 3)  bisacodyl 5 milliGRAM(s) Oral every 12 hours PRN Constipation  dextrose 40% Gel 15 Gram(s) Oral once PRN Blood Glucose LESS THAN 70 milliGRAM(s)/deciliter  glucagon  Injectable 1 milliGRAM(s) IntraMuscular once PRN Glucose LESS THAN 70 milligrams/deciliter  saline laxative (FLEET) Rectal Enema 1 Enema Rectal once PRN constipation      Allergies    No Known Allergies    Intolerances        Review of Systems:    General:  No wt loss, fevers, chills, night sweats, fatigue   Eyes:  Good vision, no reported pain  ENT:  No sore throat, pain, runny nose, dysphagia  CV:  No pain, palpitations, hypo/hypertension  Resp:  No dyspnea, cough, tachypnea, wheezing  GI:  No pain, No nausea, No vomiting, No diarrhea, No constipation, No weight loss, No fever, No pruritis, No rectal bleeding, No melena, No dysphagia  :  suprapubic pain  Muscle:  No pain, weakness  Neuro:  No weakness, tingling, memory problems  Psych:  No fatigue, insomnia, mood problems, depression  Endocrine:  No polyuria, polydypsia, cold/heat intolerance  Heme:  No petechiae, ecchymosis, easy bruisability  Skin:  No rash, tattoos, scars, edema      Vital Signs Last 24 Hrs  T(C): 36.6 (27 Mar 2019 04:28), Max: 36.6 (26 Mar 2019 20:39)  T(F): 97.8 (27 Mar 2019 04:28), Max: 97.8 (26 Mar 2019 20:39)  HR: 62 (27 Mar 2019 04:28) (52 - 76)  BP: 144/76 (27 Mar 2019 04:28) (123/71 - 144/76)  BP(mean): --  RR: 16 (27 Mar 2019 04:28) (16 - 19)  SpO2: 95% (27 Mar 2019 04:28) (95% - 96%)    PHYSICAL EXAM:    General:  nad frail  HEENT:  NC/AT  Chest:  dec bs  Cardiovascular:  Regular rhythm, S1, S2  Abdomen:  Soft, nt mild dt  Extremities:  no edema  Skin:  No rash  Neuro/Psych:  Awake alert responds appropriately        LABS:                        9.4    9.55  )-----------( 218      ( 26 Mar 2019 09:04 )             29.0     03-26    134<L>  |  97  |  23  ----------------------------<  135<H>  3.5   |  30  |  0.73    Ca    8.9      26 Mar 2019 09:04            RADIOLOGY & ADDITIONAL TESTS:  < from: CT Abdomen and Pelvis w/ Oral Cont and w/ IV Cont (03.26.19 @ 14:29) >    EXAM:  CT ABDOMEN AND PELVIS OC IC                            PROCEDURE DATE:  03/26/2019          INTERPRETATION:  CT ABDOMEN AND PELVIS    CLINICAL INFORMATION:  Abdominal pain and severe constipation.    PROCEDURE:  Using multislice helical CT, oral contrast, and following the   intravenous administration of 95 cc Omnipaque 350 , 2.5 mm sections were   obtained from the domes of the diaphragm to the ischial tuberosities.    Multiplanar MPR's were performed.    COMPARISON: None available    FINDINGS:      Partially imaged branching nodular opacities right middle lobe, which   could reflect infectious/inflammatory airway disease.  There is probable trace right-sided pleural effusion and atelectatic   changes at the right lung base.    The liver and spleen appear unremarkable.    Cholelithiasis is noted.  No biliary ductal dilatation is noted.    The adrenal glands and pancreas are unremarkable in appearance.    There is bilateral perinephric stranding, more pronounced on the right.  There is patchy decreased enhancement at the interpolar kidney.  No hydronephrosis is noted.  Urinary bladder is distended, with small amount of air. Correlate for   recent instrumentation versus infection.    Above findings may be secondary to urinary tract infection with   pyelonephritis.    There is prominent atherosclerotic calcification of the abdominal aorta,   with aortobiiliac stent.  There is a large contrast-filled, lobular sac filling the left inguinal   canal, that is immediately adjacent to and appears to communicate with   the femoral artery. Finding is compatible with a pseudoaneurysm, and   measures approximately 8.7 cm in craniocaudad dimension.    There are bilateral inguinal helical scanning luminal surgical clips.    The evaluation of the stomach is limited without distention.  There is a large fecal load with distention of the transverse and right   colon.  No bowel obstruction is noted.  There is bowel wall thickening/submucosal edema involving the rectum and   distal sigmoid colon, finding which may be associated with a   proctocolitis.  No localized intra-abdominal fluid collection or pneumoperitoneum is   noted.    There are multilevel degenerative changes of lumbar spine.  There is a age indeterminate compression deformities of the L1 and L3   vertebral bodies.  There are chronic left-sided rib fracture deformities.    Impression:    Large left femoral artery pseudoaneurysm.  This finding was discussed with Dr. Best  by telephone at the time of   interpretation on 3/26/2019.    Bilateral perinephric stranding with patchy decreased enhancement right   kidney, and small amount of air within the urinary bladder, recommend   further clinical correlation for urinary tract infection and   pyelonephritis.  Differential diagnosis for peripheral decreased attenuation within the   kidney includes renal infarct.    Findings suggestive of proctocolitis as discussed; no bowel obstruction   noted.    Cholelithiasis.                                    ROSIE SIMS M.D., ATTENDING RADIOLOGIST  This document has been electronically signed. Mar 26 2019  3:21PM                < end of copied text >

## 2019-03-27 NOTE — PROGRESS NOTE ADULT - PROBLEM SELECTOR PLAN 2
see above, resolved  anti emetics prn
Continue IV Ceftriaxone  ID following
Patient reports worsening weakness x several weeks  Nutritional consult   Physical therapy consult   Social work consult pending PT eval
see above, resolved  anti emetics prn
Patient reports worsening weakness x several weeks  Physical therapy consult  Consider Social work consult pending PT eval

## 2019-03-27 NOTE — PROGRESS NOTE ADULT - SUBJECTIVE AND OBJECTIVE BOX
Patient is a 74y old  Male who presents with a chief complaint of Constipation, Abd pain (27 Mar 2019 08:42)      INTERVAL HPI/OVERNIGHT EVENTS: Seen and examined at bedside. Denies abdominal pain, or any other new symptoms, complaints.     MEDICATIONS  (STANDING):  ascorbic acid 500 milliGRAM(s) Oral daily  atorvastatin 20 milliGRAM(s) Oral at bedtime  cefTRIAXone   IVPB 1 Gram(s) IV Intermittent every 24 hours  cefTRIAXone   IVPB      cholecalciferol 1000 Unit(s) Oral daily  clopidogrel Tablet 75 milliGRAM(s) Oral daily  cyanocobalamin 1000 MICROGram(s) Oral daily  dextrose 5%. 1000 milliLiter(s) (50 mL/Hr) IV Continuous <Continuous>  dextrose 50% Injectable 12.5 Gram(s) IV Push once  dextrose 50% Injectable 25 Gram(s) IV Push once  dextrose 50% Injectable 25 Gram(s) IV Push once  digoxin     Tablet 0.125 milliGRAM(s) Oral daily  docusate sodium 100 milliGRAM(s) Oral three times a day  glycerin Suppository - Adult 1 Suppository(s) Rectal at bedtime  insulin lispro (HumaLOG) corrective regimen sliding scale   SubCutaneous three times a day before meals  insulin lispro (HumaLOG) corrective regimen sliding scale   SubCutaneous at bedtime  lactobacillus acidophilus 1 Tablet(s) Oral three times a day with meals  lactulose Syrup 15 Gram(s) Oral once  losartan 25 milliGRAM(s) Oral daily  mesalamine Suppository 1000 milliGRAM(s) Rectal at bedtime  metoprolol succinate  milliGRAM(s) Oral daily  multivitamin 1 Tablet(s) Oral daily  polyethylene glycol 3350 17 Gram(s) Oral two times a day  rivaroxaban 20 milliGRAM(s) Oral every 24 hours  senna 2 Tablet(s) Oral at bedtime  simethicone 80 milliGRAM(s) Chew three times a day    MEDICATIONS  (PRN):  acetaminophen   Tablet .. 650 milliGRAM(s) Oral every 6 hours PRN Temp greater or equal to 38C (100.4F), Mild Pain (1 - 3)  bisacodyl 5 milliGRAM(s) Oral every 12 hours PRN Constipation  dextrose 40% Gel 15 Gram(s) Oral once PRN Blood Glucose LESS THAN 70 milliGRAM(s)/deciliter  glucagon  Injectable 1 milliGRAM(s) IntraMuscular once PRN Glucose LESS THAN 70 milligrams/deciliter  saline laxative (FLEET) Rectal Enema 1 Enema Rectal once PRN constipation      Allergies    No Known Allergies    Intolerances        REVIEW OF SYSTEMS:  CONSTITUTIONAL: No fever,  or fatigue  EYES: No eye pain, visual disturbances, or discharge  ENMT:  No difficulty hearing, tinnitus, vertigo; No sinus or throat pain  NECK: No pain or stiffness  RESPIRATORY: No cough, wheezing, chills or hemoptysis; No shortness of breath  CARDIOVASCULAR: No chest pain, palpitations, dizziness, or leg swelling  GASTROINTESTINAL: No abdominal or epigastric pain. No nausea, vomiting, or hematemesis; No diarrhea or constipation. No melena or hematochezia.  GENITOURINARY: No dysuria, frequency, hematuria, or incontinence  NEUROLOGICAL: No headaches, loss of strength, numbness, or tremors  SKIN: No itching, burning, rashes, or lesions   LYMPH NODES: No enlarged glands  ENDOCRINE: No heat or cold intolerance; No hair loss; No polydipsia or polyuria  MUSCULOSKELETAL: No joint pain or swelling; No muscle, back, or extremity pain  HEME/LYMPH: No easy bruising, or bleeding gums  ALLERGY AND IMMUNOLOGIC: No hives or eczema    Vital Signs Last 24 Hrs  T(C): 36.6 (27 Mar 2019 04:28), Max: 36.6 (26 Mar 2019 20:39)  T(F): 97.8 (27 Mar 2019 04:28), Max: 97.8 (26 Mar 2019 20:39)  HR: 62 (27 Mar 2019 04:28) (52 - 76)  BP: 144/76 (27 Mar 2019 04:28) (123/71 - 144/76)  BP(mean): --  RR: 16 (27 Mar 2019 04:28) (16 - 19)  SpO2: 95% (27 Mar 2019 04:28) (95% - 96%)    PHYSICAL EXAM:  GENERAL: NAD, Awake, Alert   HEAD:  Atraumatic, Normocephalic  EYES: EOMI, PERRLA, conjunctiva and sclera clear  ENMT: No tonsillar erythema, exudates, or enlargement; Moist mucous membranes  NECK: Supple, No JVD, Normal thyroid  NERVOUS SYSTEM:  Alert & Awake,  Motor Strength 5/5 B/L upper and lower extremities  CHEST/LUNG: Clear to auscultation bilaterally; No rales, rhonchi, wheezing, or rubs  HEART: S1S2+, Regular rate and rhythm  ABDOMEN: Soft, Nontender, Nondistended; Bowel sounds present  EXTREMITIES:  2+ Peripheral Pulses, No clubbing, cyanosis, or edema  LYMPH: No lymphadenopathy noted  SKIN: No rashes or lesions    LABS:                        9.3    6.19  )-----------( 202      ( 27 Mar 2019 08:43 )             28.7     26 Mar 2019 09:04    134    |  97     |  23     ----------------------------<  135    3.5     |  30     |  0.73     Ca    8.9        26 Mar 2019 09:04      PT/INR - ( 27 Mar 2019 08:43 )   PT: 18.6 sec;   INR: 1.61 ratio         PTT - ( 27 Mar 2019 08:43 )  PTT:36.7 sec  CAPILLARY BLOOD GLUCOSE      POCT Blood Glucose.: 192 mg/dL (27 Mar 2019 07:44)  POCT Blood Glucose.: 231 mg/dL (26 Mar 2019 21:13)  POCT Blood Glucose.: 171 mg/dL (26 Mar 2019 16:42)  POCT Blood Glucose.: 177 mg/dL (26 Mar 2019 11:34)    BLOOD CULTURE  03-25 @ 09:12   No growth to date.  --  --  03-25 @ 00:34   >100,000 CFU/ml Klebsiella pneumoniae  --  Klebsiella pneumoniae    RADIOLOGY & ADDITIONAL TESTS:    Imaging Personally Reviewed:  [ ] YES     Consultant(s) Notes Reviewed:      Care Discussed with Consultants/Other Providers:

## 2019-03-27 NOTE — PROGRESS NOTE ADULT - PROBLEM SELECTOR PROBLEM 4
Failure to thrive in adult
GERD (gastroesophageal reflux disease)
HTN (hypertension)
HTN (hypertension)

## 2019-03-27 NOTE — PROGRESS NOTE ADULT - PROBLEM SELECTOR PLAN 4
Nutritional support, supplements  PT   Encourage oral intake
c/w home losartan and metoprolol with hold parameters
gerd prec  ppi qd
c/w home losartan and metoprolol with hold parameters

## 2019-03-27 NOTE — PROGRESS NOTE ADULT - SUBJECTIVE AND OBJECTIVE BOX
RC APPLE is a 74yMale , patient examined and chart reviewed.     INTERVAL HPI/ OVERNIGHT EVENTS:   Weak afebrile.  No events. Awake alert.    PAST MEDICAL & SURGICAL HISTORY:  AICD (automatic cardioverter/defibrillator) present  PVD (peripheral vascular disease): s/p angioplasty with stent ; pt states Left Carotid Artery occluded  Atrial fibrillation: since 2013  Hyperlipidemia  HTN (hypertension)  Lung cancer: small cell, stage 4, 1997, radiation &amp; chemo- on remission  PAD (peripheral artery disease)  GERD (gastroesophageal reflux disease)  Carotid artery occlusion: left  Neuropathy  Type 2 diabetes mellitus: glucose this am 116  Carotid artery occlusion: right with pseudoaneurysm s/p stent and mesh 4/2017  AICD (automatic cardioverter/defibrillator) present: 10/21/14  Carotid stenosis, right: Right CEA 2013  S/P femoral-popliteal bypass surgery: x 2, 2013, right s/p Revision  S/P angioplasty with stent: left leg  S/P cervical spinal fusion: 2002, 1 plate &amp; 4 rods    For details regarding the patient's social history, family history, and other miscellaneous elements, please refer the initial infectious diseases consultation and/or the admitting history and physical examination for this admission.    ROS:  CONSTITUTIONAL:  Negative fever or chills  EYES:  Negative  blurry vision or double vision  CARDIOVASCULAR:  Negative for chest pain or palpitations  RESPIRATORY:  Negative for cough, wheezing, or SOB   GASTROINTESTINAL:  Negative for nausea, vomiting, diarrhea, constipation, or abdominal pain  GENITOURINARY:  Negative frequency, urgency or dysuria  NEUROLOGIC:  No headache, confusion, dizziness, lightheadedness  All other systems were reviewed and are negative     Current inpatient medications :    ANTIBIOTICS/RELEVANT:  cefuroxime   Tablet 500 milliGRAM(s) Oral every 12 hours  lactobacillus acidophilus 1 Tablet(s) Oral three times a day with meals      acetaminophen   Tablet .. 650 milliGRAM(s) Oral every 6 hours PRN  ascorbic acid 500 milliGRAM(s) Oral daily  atorvastatin 20 milliGRAM(s) Oral at bedtime  bisacodyl 5 milliGRAM(s) Oral every 12 hours PRN  cholecalciferol 1000 Unit(s) Oral daily  clopidogrel Tablet 75 milliGRAM(s) Oral daily  cyanocobalamin 1000 MICROGram(s) Oral daily  dextrose 40% Gel 15 Gram(s) Oral once PRN  dextrose 5%. 1000 milliLiter(s) IV Continuous <Continuous>  dextrose 50% Injectable 12.5 Gram(s) IV Push once  dextrose 50% Injectable 25 Gram(s) IV Push once  dextrose 50% Injectable 25 Gram(s) IV Push once  digoxin     Tablet 0.125 milliGRAM(s) Oral daily  docusate sodium 100 milliGRAM(s) Oral three times a day  glucagon  Injectable 1 milliGRAM(s) IntraMuscular once PRN  glycerin Suppository - Adult 1 Suppository(s) Rectal at bedtime  insulin lispro (HumaLOG) corrective regimen sliding scale   SubCutaneous three times a day before meals  insulin lispro (HumaLOG) corrective regimen sliding scale   SubCutaneous at bedtime  lactulose Syrup 15 Gram(s) Oral once  losartan 25 milliGRAM(s) Oral daily  mesalamine Suppository 1000 milliGRAM(s) Rectal at bedtime  metoprolol succinate  milliGRAM(s) Oral daily  multivitamin 1 Tablet(s) Oral daily  polyethylene glycol 3350 17 Gram(s) Oral two times a day  rivaroxaban 20 milliGRAM(s) Oral every 24 hours  saline laxative (FLEET) Rectal Enema 1 Enema Rectal once PRN  senna 2 Tablet(s) Oral at bedtime  simethicone 80 milliGRAM(s) Chew three times a day      Objective:    03-26 @ 07:01  -  03-27 @ 07:00  --------------------------------------------------------  IN: 240 mL / OUT: 1395 mL / NET: -1155 mL      T(C): 36.6 (03-27-19 @ 04:28), Max: 36.6 (03-26-19 @ 20:39)  HR: 62 (03-27-19 @ 04:28) (52 - 76)  BP: 144/76 (03-27-19 @ 04:28) (123/71 - 144/76)  RR: 16 (03-27-19 @ 04:28) (16 - 19)  SpO2: 95% (03-27-19 @ 04:28) (95% - 96%)  Wt(kg): --      Physical Exam:  General: in no acute distress weak frail  Eyes: sclera anicteric, pupils equal and reactive to light  ENMT: buccal mucosa moist, pharynx not injected  Neck: supple, trachea midline  Lungs: clear, no wheeze/rhonchi  Cardiovascular: regular rate and rhythm, S1 S2  Abdomen: soft, nontender, no organomegaly present, bowel sounds normal  Neurological: alert and oriented x3, Cranial Nerves II-XII grossly intact  Skin: no increased ecchymosis/petechiae/purpura  Lymph Nodes: no palpable cervical/supraclavicular lymph nodes enlargements  Extremities: no cyanosis/clubbing/edema      LABS:                          9.3    6.19  )-----------( 202      ( 27 Mar 2019 08:43 )             28.7       03-27    133<L>  |  95<L>  |  19  ----------------------------<  186<H>  3.5   |  29  |  0.74    Ca    8.5      27 Mar 2019 08:43        PT/INR - ( 27 Mar 2019 08:43 )   PT: 18.6 sec;   INR: 1.61 ratio         PTT - ( 27 Mar 2019 08:43 )  PTT:36.7 sec    MICROBIOLOGY:    Culture - Blood (collected 25 Mar 2019 09:12)  Source: .Blood Blood-Peripheral  Preliminary Report (26 Mar 2019 10:01):    No growth to date.    Culture - Blood (collected 25 Mar 2019 09:12)  Source: .Blood Blood-Peripheral  Preliminary Report (26 Mar 2019 10:01):    No growth to date.    Culture - Urine (collected 25 Mar 2019 00:34)  Source: .Urine Clean Catch (Midstream)  Final Report (26 Mar 2019 20:13):    >100,000 CFU/ml Klebsiella pneumoniae  Organism: Klebsiella pneumoniae (26 Mar 2019 20:13)  Organism: Klebsiella pneumoniae (26 Mar 2019 20:13)      -  Amikacin: S <=16      -  Ampicillin: R >16 These ampicillin results predict results for amoxicillin      -  Ampicillin/Sulbactam: S <=8/4      -  Aztreonam: S <=4      -  Cefazolin: S <=8 For uncomplicated UTI with K. pneumoniae, E. coli, or P. mirablis: NISHI <=16 is sensitive and NISHI >=32 is resistant. This also predicts results for oral agents cefaclor, cefdinir, cefpodoxime, cefprozil, cefuroxime axetil, cephalexin and locarbef for uncomplicated UTI. Note that some isolates may be susceptible to these agents while testing resistant to cefazolin.      -  Cefepime: S <=4      -  Cefoxitin: S <=8      -  Ceftriaxone: S <=1 Enterobacter, Citrobacter, and Serratia may develop resistance during prolonged therapy      -  Ciprofloxacin: S <=1      -  Ertapenem: S <=1      -  Gentamicin: S <=4      -  Imipenem: S <=1      -  Levofloxacin: S <=2      -  Meropenem: S <=1      -  Nitrofurantoin: R >64 Should not be used to treat pyelonephritis      -  Piperacillin/Tazobactam: S <=16      -  Tigecycline: S <=2      -  Tobramycin: S <=4      -  Trimethoprim/Sulfamethoxazole: S <=2/38      Method Type: NISHI    RADIOLOGY & ADDITIONAL STUDIES:  EXAM:  CT ABDOMEN AND PELVIS OC IC                            PROCEDURE DATE:  03/26/2019          INTERPRETATION:  CT ABDOMEN AND PELVIS    CLINICAL INFORMATION:  Abdominal pain and severe constipation.    PROCEDURE:  Using multislice helical CT, oral contrast, and following the   intravenous administration of 95 cc Omnipaque 350 , 2.5 mm sections were   obtained from the domes of the diaphragm to the ischial tuberosities.    Multiplanar MPR's were performed.    COMPARISON: None available    FINDINGS:      Partially imaged branching nodular opacities right middle lobe, which   could reflect infectious/inflammatory airway disease.  There is probable trace right-sided pleural effusion and atelectatic   changes at the right lung base.    The liver and spleen appear unremarkable.    Cholelithiasis is noted.  No biliary ductal dilatation is noted.    The adrenal glands and pancreas are unremarkable in appearance.    There is bilateral perinephric stranding, more pronounced on the right.  There is patchy decreased enhancement at the interpolar kidney.  No hydronephrosis is noted.  Urinary bladder is distended, with small amount of air. Correlate for   recent instrumentation versus infection.    Above findings may be secondary to urinary tract infection with   pyelonephritis.    There is prominent atherosclerotic calcification of the abdominal aorta,   with aortobiiliac stent.  There is a large contrast-filled, lobular sac filling the left inguinal   canal, that is immediately adjacent to and appears to communicate with   the femoral artery. Finding is compatible with a pseudoaneurysm, and   measures approximately 8.7 cm in craniocaudad dimension.    There are bilateral inguinal helical scanning luminal surgical clips.    The evaluation of the stomach is limited without distention.  There is a large fecal load with distention of the transverse and right   colon.  No bowel obstruction is noted.  There is bowel wall thickening/submucosal edema involving the rectum and   distal sigmoid colon, finding which may be associated with a   proctocolitis.  No localized intra-abdominal fluid collection or pneumoperitoneum is   noted.    There are multilevel degenerative changes of lumbar spine.  There is a age indeterminate compression deformities of the L1 and L3   vertebral bodies.  There are chronic left-sided rib fracture deformities.    Impression:    Large left femoral artery pseudoaneurysm.  This finding was discussed with Dr. Best  by telephone at the time of   interpretation on 3/26/2019.    Bilateral perinephric stranding with patchy decreased enhancement right   kidney, and small amount of air within the urinary bladder, recommend   further clinical correlation for urinary tract infection and   pyelonephritis.  Differential diagnosis for peripheral decreased attenuation within the   kidney includes renal infarct.    Findings suggestive of proctocolitis as discussed; no bowel obstruction   noted.    Cholelithiasis.    Assessment :   73yo male with pmhx HTN, atrial fibrillation, AICD, DM 2, CAD s/p PCI, HLD, Lung CA (s/p  radiation and chemo in remission 1997), BPH with chronic retention and he get straight cathed   by wife 4x/day admitted with fecal impaction and stercoral colitis- disimpacted   Also with likely UTI ?rt pyelo on CT  Large left femoral artery pseudoaneurysm -vascular eval noted  Hyponatremia poss sec dehydration    Plan :   Off Rocephin  Ceftin po x 7 days  Asp precautions  Bowel regiment per GI    D/w Hospitalist      Continue with present regiment.  Appropriate use of antibiotics and adverse effects reviewed.      I have discussed the above plan of care with patient/ family in detail. They expressed understanding of the  treatment plan . Risks, benefits and alternatives discussed in detail. I have asked if they have any questions or concerns and appropriately addressed them to the best of my ability .    > 35 minutes were spent in direct patient care reviewing notes, medications ,labs data/ imaging , discussion with multidisciplinary team.    Thank you for allowing me to participate in care of your patient .    Bradley Elliott MD  Infectious Disease  [phone]

## 2019-03-27 NOTE — PROGRESS NOTE ADULT - PROBLEM SELECTOR PLAN 5
LDISS  carb consistent diet  f/u A1C
c/w home losartan and metoprolol with hold parameters
LDISS  carb consistent diet  f/u A1C

## 2019-03-27 NOTE — PROGRESS NOTE ADULT - REASON FOR ADMISSION
Constipation, Abd pain

## 2019-03-27 NOTE — DISCHARGE NOTE NURSING/CASE MANAGEMENT/SOCIAL WORK - NSDCDPATPORTLINK_GEN_ALL_CORE
You can access the [facility] Patient Portal, offered by [facility], by registering with the following website: [email or web address]

## 2019-03-27 NOTE — PROGRESS NOTE ADULT - PROBLEM SELECTOR PLAN 10
Patient on Xarelto, Plavix  SCDs for DVT ppx  Carb consistent diet  Ambulate with assistance, walker    IMPROVE VTE Individual Risk Assessment          RISK                                                          Points    [  ] Previous VTE                                                3  [  ] Thrombophilia                                             2  [  ] Lower limb paralysis                                   2        (unable to hold up >15 seconds)    [  ] Current Cancer                                            2         (within 6 months)  [  ] Immobilization > 24 hrs                              1  [  ] ICU/CCU stay > 24 hours                            1  [  ] Age > 60                                                    1    IMPROVE VTE Score _____1____
Patient on Xarelto, Plavix  SCDs for DVT ppx  Carb consistent diet  Ambulate with assistance, walker    IMPROVE VTE Individual Risk Assessment          RISK                                                          Points    [  ] Previous VTE                                                3  [  ] Thrombophilia                                             2  [  ] Lower limb paralysis                                   2        (unable to hold up >15 seconds)    [  ] Current Cancer                                            2         (within 6 months)  [  ] Immobilization > 24 hrs                              1  [  ] ICU/CCU stay > 24 hours                            1  [  ] Age > 60                                                    1    IMPROVE VTE Score _____1____
c/w home statin

## 2019-03-27 NOTE — PROGRESS NOTE ADULT - PROBLEM SELECTOR PLAN 7
Patient self catheterizes at home TID  c/w urinary cath TID
c/w Digoxin  c/w Xarelto  c/w metoprolol with hold parameters
Patient self catheterizes at home TID  c/w urinary cath TID

## 2019-03-27 NOTE — CONSULT NOTE ADULT - SUBJECTIVE AND OBJECTIVE BOX
History of Present Illness:  74y.o. Male with a history of diabetes , CAD, and PAD was admitted with constipation . In his evaluation with a CAT scan he was found to have a incidental pseudoaneurysm of the left  femoral artery. He denies ischemic pain in either LE. He has had 2 prior RLE bypasses and left iliac stents with a old left groin scar ( Dr. Wright) .  I was  requested to evaluate this asymptomatic aneurysm.    PAST MEDICAL & SURGICAL HISTORY:  AICD (automatic cardioverter/defibrillator) present  PVD (peripheral vascular disease): s/p angioplasty with stent ; pt states Left Carotid Artery occluded  Atrial fibrillation: since 2013  Hyperlipidemia  HTN (hypertension)  Lung cancer: small cell, stage 4, 1997, radiation &amp; chemo- on remission  PAD (peripheral artery disease)  GERD (gastroesophageal reflux disease)  Carotid artery occlusion: left  Neuropathy  Type 2 diabetes mellitus: glucose this am 116  Carotid artery occlusion: right with pseudoaneurysm s/p stent and mesh 4/2017  AICD (automatic cardioverter/defibrillator) present: 10/21/14  Carotid stenosis, right: Right CEA 2013  S/P femoral-popliteal bypass surgery: x 2, 2013, right s/p Revision  S/P angioplasty with stent: left leg  S/P cervical spinal fusion: 2002, 1 plate &amp; 4 rods      Allergies    No Known Allergies    Intolerances        MEDICATIONS  (STANDING):  ascorbic acid 500 milliGRAM(s) Oral daily  atorvastatin 20 milliGRAM(s) Oral at bedtime  cefTRIAXone   IVPB 1 Gram(s) IV Intermittent every 24 hours  cefTRIAXone   IVPB      cholecalciferol 1000 Unit(s) Oral daily  clopidogrel Tablet 75 milliGRAM(s) Oral daily  cyanocobalamin 1000 MICROGram(s) Oral daily  dextrose 5%. 1000 milliLiter(s) (50 mL/Hr) IV Continuous <Continuous>  dextrose 50% Injectable 12.5 Gram(s) IV Push once  dextrose 50% Injectable 25 Gram(s) IV Push once  dextrose 50% Injectable 25 Gram(s) IV Push once  digoxin     Tablet 0.125 milliGRAM(s) Oral daily  docusate sodium 100 milliGRAM(s) Oral three times a day  glycerin Suppository - Adult 1 Suppository(s) Rectal at bedtime  insulin lispro (HumaLOG) corrective regimen sliding scale   SubCutaneous three times a day before meals  insulin lispro (HumaLOG) corrective regimen sliding scale   SubCutaneous at bedtime  lactobacillus acidophilus 1 Tablet(s) Oral three times a day with meals  losartan 25 milliGRAM(s) Oral daily  metoprolol succinate  milliGRAM(s) Oral daily  multivitamin 1 Tablet(s) Oral daily  polyethylene glycol 3350 17 Gram(s) Oral daily  rivaroxaban 20 milliGRAM(s) Oral every 24 hours  senna 2 Tablet(s) Oral at bedtime  simethicone 80 milliGRAM(s) Chew three times a day    MEDICATIONS  (PRN):  acetaminophen   Tablet .. 650 milliGRAM(s) Oral every 6 hours PRN Temp greater or equal to 38C (100.4F), Mild Pain (1 - 3)  bisacodyl 5 milliGRAM(s) Oral every 12 hours PRN Constipation  dextrose 40% Gel 15 Gram(s) Oral once PRN Blood Glucose LESS THAN 70 milliGRAM(s)/deciliter  glucagon  Injectable 1 milliGRAM(s) IntraMuscular once PRN Glucose LESS THAN 70 milligrams/deciliter  saline laxative (FLEET) Rectal Enema 1 Enema Rectal once PRN constipation      Social History:  Smoking History: denies  Alcohol Use: denies    REVIEW OF SYSTEMS:  CONSTITUTIONAL: ++ generalized  weakness. No fevers or chills  EYES/ENT: No visual changes;  No vertigo or throat pain   NECK: No pain or stiffness  RESPIRATORY: No cough, wheezing, hemoptysis; No shortness of breath  CARDIOVASCULAR: No chest pain or palpitations  GASTROINTESTINAL: No abdominal or epigastric pain. No nausea, vomiting, or hematemesis; No diarrhea. No melena or hematochezia. ++ constipation  GENITOURINARY: No dysuria, frequency or hematuria  NEUROLOGICAL: No numbness or weakness  SKIN: No itching, burning, rashes, or lesions   Vascular:  No lower extremity claudication, pedal rest pain or digital ulcers  All other review of systems is negative unless indicated above.    PHYSICAL EXAM:  General:  On exam, the patient is alert nontoxic appearing Male in no acute distress.  Vital Signs Last 24 Hrs  T(C): 36.6 (27 Mar 2019 04:28), Max: 36.6 (26 Mar 2019 20:39)  T(F): 97.8 (27 Mar 2019 04:28), Max: 97.8 (26 Mar 2019 20:39)  HR: 62 (27 Mar 2019 04:28) (52 - 76)  BP: 144/76 (27 Mar 2019 04:28) (123/71 - 144/76)  BP(mean): --  RR: 16 (27 Mar 2019 04:28) (16 - 19)  SpO2: 95% (27 Mar 2019 04:28) (95% - 96%)    Neck:  4+/4+ bilateral carotid pulses; no carotid bruit, no palpable cervical masses.  Heart:  Regular, no murmurs, rubs or gallops.    Lungs:  Clear to auscultation.    Chest:  No chest wall deformities  Symmetrical chest expansion.   Abdomen: Soft and nontender.   No rebound, guarding or rigidity. ++ left inguinal nontender pulsatile mass c/w a pseudoaneurysm ( 8 x6 cm)  Extremities:  Feet are warm, pink with normal capillary refill times.  There are no digital ulcers or heel decubiti.  The calf and thigh muscles are soft and nontender.  There are no palpable cords or limb cellulitis.  Ana Laura's sign  is negative bilaterally.  There is no lower extremity edema, cyanosis, or rubor.  On examination of the peripheral pulses:  Left leg femoral pulse is  4/4  , popliteal pulse is 0  ,PT Pulse is  0 , DP Pulse is 0  Right leg femoral pulse is 4/4   ,popliteal pulse is 4/4   , PT Pulse is  0, DP Pulse is 2/4   Neurological:  There are no motor or sensory deficits in either lower extremity.                          9.4    9.55  )-----------( 218      ( 26 Mar 2019 09:04 )             29.0     03-26    134<L>  |  97  |  23  ----------------------------<  135<H>  3.5   |  30  |  0.73    Ca    8.9      26 Mar 2019 09:04              Radiology:

## 2019-03-27 NOTE — PROGRESS NOTE ADULT - PROBLEM SELECTOR PLAN 3
am labs pending  hgb trend noted, likely multifactorial  no evidence of active gib  no rpb on ct  monitor cbc, transfuse prn  cont ppi  consider iron studies  if w overt gib hold plavix/xarelto  defers colonoscopy  will follow am labs pending  hgb trend noted, likely multifactorial  no evidence of active gib  prior ob neg  no rpb on ct  monitor cbc, transfuse prn  cont ppi  consider iron studies  if w overt gib hold plavix/xarelto  defers colonoscopy  will follow

## 2019-03-28 LAB
ANION GAP SERPL CALC-SCNC: 8 MMO/L — SIGNIFICANT CHANGE UP (ref 7–14)
ANION GAP SERPL CALC-SCNC: 9 MMO/L — SIGNIFICANT CHANGE UP (ref 7–14)
APPEARANCE UR: CLEAR — SIGNIFICANT CHANGE UP
APTT BLD: 37.7 SEC — HIGH (ref 27.5–36.3)
APTT BLD: 87.1 SEC — HIGH (ref 27.5–36.3)
BACTERIA # UR AUTO: NEGATIVE — SIGNIFICANT CHANGE UP
BILIRUB UR-MCNC: NEGATIVE — SIGNIFICANT CHANGE UP
BLD GP AB SCN SERPL QL: NEGATIVE — SIGNIFICANT CHANGE UP
BLOOD UR QL VISUAL: NEGATIVE — SIGNIFICANT CHANGE UP
BUN SERPL-MCNC: 16 MG/DL — SIGNIFICANT CHANGE UP (ref 7–23)
BUN SERPL-MCNC: 20 MG/DL — SIGNIFICANT CHANGE UP (ref 7–23)
CALCIUM SERPL-MCNC: 8.7 MG/DL — SIGNIFICANT CHANGE UP (ref 8.4–10.5)
CALCIUM SERPL-MCNC: 9 MG/DL — SIGNIFICANT CHANGE UP (ref 8.4–10.5)
CHLORIDE SERPL-SCNC: 93 MMOL/L — LOW (ref 98–107)
CHLORIDE SERPL-SCNC: 94 MMOL/L — LOW (ref 98–107)
CO2 SERPL-SCNC: 26 MMOL/L — SIGNIFICANT CHANGE UP (ref 22–31)
CO2 SERPL-SCNC: 28 MMOL/L — SIGNIFICANT CHANGE UP (ref 22–31)
COLOR SPEC: YELLOW — SIGNIFICANT CHANGE UP
CREAT SERPL-MCNC: 0.66 MG/DL — SIGNIFICANT CHANGE UP (ref 0.5–1.3)
CREAT SERPL-MCNC: 0.7 MG/DL — SIGNIFICANT CHANGE UP (ref 0.5–1.3)
GLUCOSE SERPL-MCNC: 135 MG/DL — HIGH (ref 70–99)
GLUCOSE SERPL-MCNC: 169 MG/DL — HIGH (ref 70–99)
GLUCOSE UR-MCNC: NEGATIVE — SIGNIFICANT CHANGE UP
HCT VFR BLD CALC: 28.1 % — LOW (ref 39–50)
HCT VFR BLD CALC: 31.2 % — LOW (ref 39–50)
HGB BLD-MCNC: 9 G/DL — LOW (ref 13–17)
HGB BLD-MCNC: 9.8 G/DL — LOW (ref 13–17)
HYALINE CASTS # UR AUTO: NEGATIVE — SIGNIFICANT CHANGE UP
INR BLD: 2.03 — HIGH (ref 0.88–1.17)
KETONES UR-MCNC: NEGATIVE — SIGNIFICANT CHANGE UP
LEUKOCYTE ESTERASE UR-ACNC: SIGNIFICANT CHANGE UP
MAGNESIUM SERPL-MCNC: 1.5 MG/DL — LOW (ref 1.6–2.6)
MAGNESIUM SERPL-MCNC: 1.8 MG/DL — SIGNIFICANT CHANGE UP (ref 1.6–2.6)
MCHC RBC-ENTMCNC: 27 PG — SIGNIFICANT CHANGE UP (ref 27–34)
MCHC RBC-ENTMCNC: 27.1 PG — SIGNIFICANT CHANGE UP (ref 27–34)
MCHC RBC-ENTMCNC: 31.4 % — LOW (ref 32–36)
MCHC RBC-ENTMCNC: 32 % — SIGNIFICANT CHANGE UP (ref 32–36)
MCV RBC AUTO: 84.4 FL — SIGNIFICANT CHANGE UP (ref 80–100)
MCV RBC AUTO: 86.4 FL — SIGNIFICANT CHANGE UP (ref 80–100)
NITRITE UR-MCNC: NEGATIVE — SIGNIFICANT CHANGE UP
NRBC # FLD: 0 K/UL — SIGNIFICANT CHANGE UP (ref 0–0)
NRBC # FLD: 0 K/UL — SIGNIFICANT CHANGE UP (ref 0–0)
PH UR: 6 — SIGNIFICANT CHANGE UP (ref 5–8)
PHOSPHATE SERPL-MCNC: 2.2 MG/DL — LOW (ref 2.5–4.5)
PHOSPHATE SERPL-MCNC: 2.9 MG/DL — SIGNIFICANT CHANGE UP (ref 2.5–4.5)
PLATELET # BLD AUTO: 204 K/UL — SIGNIFICANT CHANGE UP (ref 150–400)
PLATELET # BLD AUTO: 220 K/UL — SIGNIFICANT CHANGE UP (ref 150–400)
PMV BLD: 10.5 FL — SIGNIFICANT CHANGE UP (ref 7–13)
PMV BLD: 10.6 FL — SIGNIFICANT CHANGE UP (ref 7–13)
POTASSIUM SERPL-MCNC: 3.6 MMOL/L — SIGNIFICANT CHANGE UP (ref 3.5–5.3)
POTASSIUM SERPL-MCNC: 4.1 MMOL/L — SIGNIFICANT CHANGE UP (ref 3.5–5.3)
POTASSIUM SERPL-SCNC: 3.6 MMOL/L — SIGNIFICANT CHANGE UP (ref 3.5–5.3)
POTASSIUM SERPL-SCNC: 4.1 MMOL/L — SIGNIFICANT CHANGE UP (ref 3.5–5.3)
PROT UR-MCNC: 50 — SIGNIFICANT CHANGE UP
PROTHROM AB SERPL-ACNC: 23.7 SEC — HIGH (ref 9.8–13.1)
RBC # BLD: 3.33 M/UL — LOW (ref 4.2–5.8)
RBC # BLD: 3.61 M/UL — LOW (ref 4.2–5.8)
RBC # FLD: 14.4 % — SIGNIFICANT CHANGE UP (ref 10.3–14.5)
RBC # FLD: 14.4 % — SIGNIFICANT CHANGE UP (ref 10.3–14.5)
RBC CASTS # UR COMP ASSIST: SIGNIFICANT CHANGE UP (ref 0–?)
RH IG SCN BLD-IMP: POSITIVE — SIGNIFICANT CHANGE UP
SODIUM SERPL-SCNC: 129 MMOL/L — LOW (ref 135–145)
SODIUM SERPL-SCNC: 129 MMOL/L — LOW (ref 135–145)
SP GR SPEC: 1.02 — SIGNIFICANT CHANGE UP (ref 1–1.04)
SQUAMOUS # UR AUTO: SIGNIFICANT CHANGE UP
UROBILINOGEN FLD QL: NORMAL — SIGNIFICANT CHANGE UP
VIT D25+D1,25 OH+D1,25 PNL SERPL-MCNC: 28.7 PG/ML — SIGNIFICANT CHANGE UP (ref 19.9–79.3)
WBC # BLD: 5.88 K/UL — SIGNIFICANT CHANGE UP (ref 3.8–10.5)
WBC # BLD: 6.43 K/UL — SIGNIFICANT CHANGE UP (ref 3.8–10.5)
WBC # FLD AUTO: 5.88 K/UL — SIGNIFICANT CHANGE UP (ref 3.8–10.5)
WBC # FLD AUTO: 6.43 K/UL — SIGNIFICANT CHANGE UP (ref 3.8–10.5)
WBC UR QL: >50 — HIGH (ref 0–?)

## 2019-03-28 PROCEDURE — 99222 1ST HOSP IP/OBS MODERATE 55: CPT

## 2019-03-28 PROCEDURE — 93306 TTE W/DOPPLER COMPLETE: CPT | Mod: 26

## 2019-03-28 PROCEDURE — 93010 ELECTROCARDIOGRAM REPORT: CPT

## 2019-03-28 PROCEDURE — 99223 1ST HOSP IP/OBS HIGH 75: CPT

## 2019-03-28 RX ORDER — DEXTROSE 50 % IN WATER 50 %
25 SYRINGE (ML) INTRAVENOUS ONCE
Qty: 0 | Refills: 0 | Status: DISCONTINUED | OUTPATIENT
Start: 2019-03-28 | End: 2019-04-09

## 2019-03-28 RX ORDER — PREGABALIN 225 MG/1
2000 CAPSULE ORAL DAILY
Qty: 0 | Refills: 0 | Status: DISCONTINUED | OUTPATIENT
Start: 2019-03-28 | End: 2019-04-09

## 2019-03-28 RX ORDER — ATORVASTATIN CALCIUM 80 MG/1
20 TABLET, FILM COATED ORAL AT BEDTIME
Qty: 0 | Refills: 0 | Status: DISCONTINUED | OUTPATIENT
Start: 2019-03-28 | End: 2019-04-09

## 2019-03-28 RX ORDER — METOPROLOL TARTRATE 50 MG
150 TABLET ORAL DAILY
Qty: 0 | Refills: 0 | Status: DISCONTINUED | OUTPATIENT
Start: 2019-03-28 | End: 2019-04-09

## 2019-03-28 RX ORDER — GLYCERIN ADULT
1 SUPPOSITORY, RECTAL RECTAL AT BEDTIME
Qty: 0 | Refills: 0 | Status: DISCONTINUED | OUTPATIENT
Start: 2019-03-28 | End: 2019-04-09

## 2019-03-28 RX ORDER — GLUCAGON INJECTION, SOLUTION 0.5 MG/.1ML
1 INJECTION, SOLUTION SUBCUTANEOUS ONCE
Qty: 0 | Refills: 0 | Status: DISCONTINUED | OUTPATIENT
Start: 2019-03-28 | End: 2019-04-09

## 2019-03-28 RX ORDER — RIVAROXABAN 15 MG-20MG
20 KIT ORAL EVERY 24 HOURS
Qty: 0 | Refills: 0 | Status: DISCONTINUED | OUTPATIENT
Start: 2019-03-28 | End: 2019-03-28

## 2019-03-28 RX ORDER — DOCUSATE SODIUM 100 MG
100 CAPSULE ORAL THREE TIMES A DAY
Qty: 0 | Refills: 0 | Status: DISCONTINUED | OUTPATIENT
Start: 2019-03-28 | End: 2019-04-09

## 2019-03-28 RX ORDER — DIGOXIN 250 MCG
0.25 TABLET ORAL DAILY
Qty: 0 | Refills: 0 | Status: DISCONTINUED | OUTPATIENT
Start: 2019-03-28 | End: 2019-04-09

## 2019-03-28 RX ORDER — MAGNESIUM SULFATE 500 MG/ML
2 VIAL (ML) INJECTION ONCE
Qty: 0 | Refills: 0 | Status: COMPLETED | OUTPATIENT
Start: 2019-03-28 | End: 2019-03-28

## 2019-03-28 RX ORDER — HEPARIN SODIUM 5000 [USP'U]/ML
INJECTION INTRAVENOUS; SUBCUTANEOUS
Qty: 25000 | Refills: 0 | Status: DISCONTINUED | OUTPATIENT
Start: 2019-03-28 | End: 2019-04-03

## 2019-03-28 RX ORDER — VANCOMYCIN HCL 1 G
1000 VIAL (EA) INTRAVENOUS EVERY 12 HOURS
Qty: 0 | Refills: 0 | Status: DISCONTINUED | OUTPATIENT
Start: 2019-03-28 | End: 2019-03-28

## 2019-03-28 RX ORDER — INSULIN LISPRO 100/ML
VIAL (ML) SUBCUTANEOUS AT BEDTIME
Qty: 0 | Refills: 0 | Status: DISCONTINUED | OUTPATIENT
Start: 2019-03-28 | End: 2019-04-09

## 2019-03-28 RX ORDER — CEFTRIAXONE 500 MG/1
1 INJECTION, POWDER, FOR SOLUTION INTRAMUSCULAR; INTRAVENOUS EVERY 24 HOURS
Qty: 0 | Refills: 0 | Status: DISCONTINUED | OUTPATIENT
Start: 2019-03-28 | End: 2019-04-01

## 2019-03-28 RX ORDER — PREGABALIN 225 MG/1
3000 CAPSULE ORAL DAILY
Qty: 0 | Refills: 0 | Status: DISCONTINUED | OUTPATIENT
Start: 2019-03-28 | End: 2019-03-28

## 2019-03-28 RX ORDER — PIPERACILLIN AND TAZOBACTAM 4; .5 G/20ML; G/20ML
3.38 INJECTION, POWDER, LYOPHILIZED, FOR SOLUTION INTRAVENOUS EVERY 8 HOURS
Qty: 0 | Refills: 0 | Status: DISCONTINUED | OUTPATIENT
Start: 2019-03-28 | End: 2019-03-28

## 2019-03-28 RX ORDER — CHOLECALCIFEROL (VITAMIN D3) 125 MCG
2000 CAPSULE ORAL DAILY
Qty: 0 | Refills: 0 | Status: DISCONTINUED | OUTPATIENT
Start: 2019-03-28 | End: 2019-04-09

## 2019-03-28 RX ORDER — SODIUM CHLORIDE 9 MG/ML
1000 INJECTION, SOLUTION INTRAVENOUS
Qty: 0 | Refills: 0 | Status: DISCONTINUED | OUTPATIENT
Start: 2019-03-28 | End: 2019-04-09

## 2019-03-28 RX ORDER — ASPIRIN/CALCIUM CARB/MAGNESIUM 324 MG
325 TABLET ORAL DAILY
Qty: 0 | Refills: 0 | Status: DISCONTINUED | OUTPATIENT
Start: 2019-03-28 | End: 2019-04-05

## 2019-03-28 RX ORDER — SENNA PLUS 8.6 MG/1
2 TABLET ORAL AT BEDTIME
Qty: 0 | Refills: 0 | Status: DISCONTINUED | OUTPATIENT
Start: 2019-03-28 | End: 2019-04-09

## 2019-03-28 RX ORDER — POLYETHYLENE GLYCOL 3350 17 G/17G
17 POWDER, FOR SOLUTION ORAL DAILY
Qty: 0 | Refills: 0 | Status: DISCONTINUED | OUTPATIENT
Start: 2019-03-28 | End: 2019-04-09

## 2019-03-28 RX ORDER — LOSARTAN POTASSIUM 100 MG/1
25 TABLET, FILM COATED ORAL DAILY
Qty: 0 | Refills: 0 | Status: DISCONTINUED | OUTPATIENT
Start: 2019-03-28 | End: 2019-04-09

## 2019-03-28 RX ORDER — INSULIN LISPRO 100/ML
VIAL (ML) SUBCUTANEOUS
Qty: 0 | Refills: 0 | Status: DISCONTINUED | OUTPATIENT
Start: 2019-03-28 | End: 2019-04-04

## 2019-03-28 RX ORDER — DEXTROSE 50 % IN WATER 50 %
12.5 SYRINGE (ML) INTRAVENOUS ONCE
Qty: 0 | Refills: 0 | Status: DISCONTINUED | OUTPATIENT
Start: 2019-03-28 | End: 2019-04-09

## 2019-03-28 RX ORDER — DEXTROSE 50 % IN WATER 50 %
15 SYRINGE (ML) INTRAVENOUS ONCE
Qty: 0 | Refills: 0 | Status: DISCONTINUED | OUTPATIENT
Start: 2019-03-28 | End: 2019-04-09

## 2019-03-28 RX ORDER — CLOPIDOGREL BISULFATE 75 MG/1
75 TABLET, FILM COATED ORAL DAILY
Qty: 0 | Refills: 0 | Status: DISCONTINUED | OUTPATIENT
Start: 2019-03-28 | End: 2019-03-28

## 2019-03-28 RX ORDER — MAGNESIUM SULFATE 500 MG/ML
1 VIAL (ML) INJECTION ONCE
Qty: 0 | Refills: 0 | Status: COMPLETED | OUTPATIENT
Start: 2019-03-28 | End: 2019-03-28

## 2019-03-28 RX ADMIN — Medication 100 GRAM(S): at 22:28

## 2019-03-28 RX ADMIN — Medication 1: at 09:44

## 2019-03-28 RX ADMIN — ATORVASTATIN CALCIUM 20 MILLIGRAM(S): 80 TABLET, FILM COATED ORAL at 22:21

## 2019-03-28 RX ADMIN — Medication 63.75 MILLIMOLE(S): at 05:49

## 2019-03-28 RX ADMIN — HEPARIN SODIUM 1100 UNIT(S)/HR: 5000 INJECTION INTRAVENOUS; SUBCUTANEOUS at 19:24

## 2019-03-28 RX ADMIN — Medication 0.25 MILLIGRAM(S): at 05:49

## 2019-03-28 RX ADMIN — Medication 1: at 18:29

## 2019-03-28 RX ADMIN — Medication 2000 UNIT(S): at 13:13

## 2019-03-28 RX ADMIN — HEPARIN SODIUM 1100 UNIT(S)/HR: 5000 INJECTION INTRAVENOUS; SUBCUTANEOUS at 11:22

## 2019-03-28 RX ADMIN — CEFTRIAXONE 100 GRAM(S): 500 INJECTION, POWDER, FOR SOLUTION INTRAMUSCULAR; INTRAVENOUS at 13:13

## 2019-03-28 RX ADMIN — PREGABALIN 2000 MICROGRAM(S): 225 CAPSULE ORAL at 13:13

## 2019-03-28 RX ADMIN — Medication 250 MILLIGRAM(S): at 01:22

## 2019-03-28 RX ADMIN — Medication 325 MILLIGRAM(S): at 13:22

## 2019-03-28 RX ADMIN — LOSARTAN POTASSIUM 25 MILLIGRAM(S): 100 TABLET, FILM COATED ORAL at 05:49

## 2019-03-28 RX ADMIN — Medication 1: at 13:13

## 2019-03-28 RX ADMIN — Medication 50 GRAM(S): at 04:22

## 2019-03-28 RX ADMIN — Medication 63.75 MILLIMOLE(S): at 22:27

## 2019-03-28 RX ADMIN — PIPERACILLIN AND TAZOBACTAM 25 GRAM(S): 4; .5 INJECTION, POWDER, LYOPHILIZED, FOR SOLUTION INTRAVENOUS at 03:25

## 2019-03-28 NOTE — CONSULT NOTE ADULT - SUBJECTIVE AND OBJECTIVE BOX
Patient seen and evaluated @   Chief Complaint:     HPI:  74 year old male history of atrial fibrillation AICD, diabetes type 2, hyperlipidemia, lung cancer ( status post radiation and chemo in remission  ) presents as transfer from [facility]. Patient originally presented to [facility] with two weeks of constipation fatigue general malaise. He is a poor historian but his wife tells us that he has been weaker and weaker over the last two weeks and stopped ambulating. During his workup at [place] he was found to have a positive UA with a urine culture positive for klebsiella for which he was treated with rocephin. He also had a CT of the abdomen and pelvis with IV contrast that revealed a large left femoral artery pseudoaneurysm, bilateral perinephric stranding with patchy decreased enhancement of the right kidney and small amount of air within the urinary bladder. There were also signs of proctocolitis no bowel obstruction noted.  He has a prior surgical history of a left CEA  (which subsequently became blocked). He had a right CEA in  complicated by a pseudoaneurysm which was repaired with the insertion of carotid stents x4. In  he had angioplasty of a bypass done previously in the right femoral to peroneal artery. He also previously had an infection of the right groin requiring a second procedure with a muscle flap (prior to Angioplasty). He also has a remote history of a left femoral endarterectomy. (28 Mar 2019 00:25)      ----------------------------  75yo M w/ PMH of atrial fibrillation AICD (10/2014) , CAD s/p angioplasty, stent, DM2, HLD, lung cancer ( status post radiation and chemo in remission  ), PVD and PSH carotid endarterectomies, fem-pop bypass,  presents as transfer from [facility] with weakness and abdominal pain found to have UTI, L femoral pseudoaneurysm.   Pt is a poor historian. Denies any chest pain, SOB, palpitations, or leg edema. Reports sleeping flat at night.  Denies any falls/trauma. Has been unable to walk for 2 weeks from weakness and discomfort in leg.  Follow with cardiologist Dr. Burrell at [facility].      PMH:   AICD (automatic cardioverter/defibrillator) present  PVD (peripheral vascular disease)  Atrial fibrillation  Hyperlipidemia  HTN (hypertension)  Lung cancer  PAD (peripheral artery disease)  GERD (gastroesophageal reflux disease)  Carotid artery occlusion  Neuropathy  Type 2 diabetes mellitus    PSH:   Carotid artery occlusion  AICD (automatic cardioverter/defibrillator) present  Carotid stenosis, right  S/P femoral-popliteal bypass surgery  S/P angioplasty with stent  S/P cervical spinal fusion    Medications:   aspirin enteric coated 325 milliGRAM(s) Oral daily  atorvastatin 20 milliGRAM(s) Oral at bedtime  cefTRIAXone   IVPB 1 Gram(s) IV Intermittent every 24 hours  cholecalciferol 2000 Unit(s) Oral daily  cyanocobalamin 2000 MICROGram(s) Oral daily  dextrose 40% Gel 15 Gram(s) Oral once PRN  dextrose 5%. 1000 milliLiter(s) IV Continuous <Continuous>  dextrose 50% Injectable 12.5 Gram(s) IV Push once  dextrose 50% Injectable 25 Gram(s) IV Push once  dextrose 50% Injectable 25 Gram(s) IV Push once  digoxin     Tablet 0.25 milliGRAM(s) Oral daily  docusate sodium 100 milliGRAM(s) Oral three times a day  glucagon  Injectable 1 milliGRAM(s) IntraMuscular once PRN  glycerin Suppository - Adult 1 Suppository(s) Rectal at bedtime  heparin  Infusion.  Unit(s)/Hr IV Continuous <Continuous>  insulin lispro (HumaLOG) corrective regimen sliding scale   SubCutaneous three times a day before meals  insulin lispro (HumaLOG) corrective regimen sliding scale   SubCutaneous at bedtime  losartan 25 milliGRAM(s) Oral daily  magnesium sulfate  IVPB 1 Gram(s) IV Intermittent once  metoprolol succinate  milliGRAM(s) Oral daily  polyethylene glycol 3350 17 Gram(s) Oral daily  senna 2 Tablet(s) Oral at bedtime  sodium phosphate IVPB 15 milliMole(s) IV Intermittent once    Allergies:  No Known Allergies    FAMILY HISTORY:  No pertinent family history in first degree relatives    Social History:  Smokin pack year history, quit 25 years ago  Alcohol:  Drugs:    Review of Systems:  REVIEW OF SYSTEMS:    CONSTITUTIONAL: + weakness, no fevers or chills, +weight loss of 50lb over past 6mo-1yr  EYES/ENT: No visual changes;  No dysphagia  NECK: No pain or stiffness  RESPIRATORY: No cough, wheezing, hemoptysis; No shortness of breath  CARDIOVASCULAR: No chest pain or palpitations; No lower extremity edema,   GASTROINTESTINAL: No abdominal or epigastric pain. No nausea, vomiting, or hematemesis; No diarrhea or constipation. No melena or hematochezia.  BACK: No back pain  GENITOURINARY: No dysuria, frequency or hematuria  NEUROLOGICAL: No numbness or weakness  SKIN: No itching, burning, rashes, or lesions   All other review of systems is negative unless indicated above.  [ ] 10 point review of systems is otherwise negative except as mentioned above            [ ]Unable to obtain    Physical Exam:  T(C): 36.5 (19 @ 05:46), Max: 36.8 (19 @ 21:02)  HR: 59 (19 @ 05:46) (59 - 61)  BP: 129/58 (19 @ 05:46) (129/58 - 150/68)  RR: 15 (19 @ 05:46) (14 - 16)  SpO2: 100% (19 @ 05:46) (98% - 100%)  Wt(kg): --  GENERAL: No acute distress, +cachectic  HEAD:  Atraumatic, Normocephalic  ENT: EOMI, PERRLA, conjunctiva and sclera clear, Neck supple, No JVD, moist mucosa  CHEST/LUNG: Clear to auscultation bilaterally; No wheeze, equal breath sounds bilaterally,   BACK: No spinal tenderness  HEART: +irregular rhythm, No murmurs, rubs, or gallops, + AICD seen on L upper chest  ABDOMEN: Soft, Nontender, Nondistended; Bowel sounds present  EXTREMITIES:  No clubbing, cyanosis, or edema, +dorsalis pedis pulses bilaterally  PSYCH: Nl behavior, nl affects  SKIN: Normal color, No rashes or lesions, +scattered bruises, on skull and legs, +palpable scar on R femoral skin  MSK: +weakness grossly in all extrmities       @ 07:01  -   @ 07:00  --------------------------------------------------------  IN: 0 mL / OUT: 700 mL / NET: -700 mL      Daily Height in cm: 182.88 (27 Mar 2019 21:02)    Daily     Cardiovascular Diagnostic Testing:  ECG:    Echo:    Stress Testing:    Cath:    Interpretation of Telemetry:    Imaging:    Labs:                        9.0    6.43  )-----------( 204      ( 28 Mar 2019 00:40 )             28.1         129<L>  |  93<L>  |  16  ----------------------------<  169<H>  3.6   |  28  |  0.66    Ca    8.7      28 Mar 2019 11:15  Phos  2.9       Mg     1.8           PT/INR - ( 28 Mar 2019 00:40 )   PT: 23.7 SEC;   INR: 2.03          PTT - ( 28 Mar 2019 00:40 )  PTT:37.7 SEC          Hemoglobin A1C, Whole Blood: 5.9 % ( @ 09:34) Patient seen and evaluated @ [facility]   Chief Complaint: Malaise    HPI:  74 year old male history of atrial fibrillation AICD, diabetes type 2, hyperlipidemia, lung cancer ( status post radiation and chemo in remission  ) presents as transfer from [facility]. Patient originally presented to [facility] with two weeks of constipation fatigue general malaise. He is a poor historian but his wife tells us that he has been weaker and weaker over the last two weeks and stopped ambulating. During his workup at [place] he was found to have a positive UA with a urine culture positive for klebsiella for which he was treated with rocephin. He also had a CT of the abdomen and pelvis with IV contrast that revealed a large left femoral artery pseudoaneurysm, bilateral perinephric stranding with patchy decreased enhancement of the right kidney and small amount of air within the urinary bladder. There were also signs of proctocolitis no bowel obstruction noted.  He has a prior surgical history of a left CEA  (which subsequently became blocked). He had a right CEA in  complicated by a pseudoaneurysm which was repaired with the insertion of carotid stents x4. In  he had angioplasty of a bypass done previously in the right femoral to peroneal artery. He also previously had an infection of the right groin requiring a second procedure with a muscle flap (prior to Angioplasty). He also has a remote history of a left femoral endarterectomy. (28 Mar 2019 00:25)      ----------------------------  75yo M w/ PMH of atrial fibrillation AICD (10/2014) , CAD s/p angioplasty, stent, DM2, HLD, lung cancer ( status post radiation and chemo in remission  ), PVD and PSH carotid endarterectomies, fem-pop bypass,  presents as transfer from [facility] with weakness and abdominal pain found to have UTI, L femoral pseudoaneurysm.   Pt is a poor historian. Denies any chest pain, SOB, palpitations, or leg edema. Reports sleeping flat at night.  Denies any falls/trauma. Has been unable to walk for 2 weeks from weakness and discomfort in leg.  Follow with cardiologist Dr. Burrell at [facility].      PMH:   AICD (automatic cardioverter/defibrillator) present  PVD (peripheral vascular disease)  Atrial fibrillation  Hyperlipidemia  HTN (hypertension)  Lung cancer  PAD (peripheral artery disease)  GERD (gastroesophageal reflux disease)  Carotid artery occlusion  Neuropathy  Type 2 diabetes mellitus    PSH:   Carotid artery occlusion  AICD (automatic cardioverter/defibrillator) present  Carotid stenosis, right  S/P femoral-popliteal bypass surgery  S/P angioplasty with stent  S/P cervical spinal fusion    Medications:   aspirin enteric coated 325 milliGRAM(s) Oral daily  atorvastatin 20 milliGRAM(s) Oral at bedtime  cefTRIAXone   IVPB 1 Gram(s) IV Intermittent every 24 hours  cholecalciferol 2000 Unit(s) Oral daily  cyanocobalamin 2000 MICROGram(s) Oral daily  dextrose 40% Gel 15 Gram(s) Oral once PRN  dextrose 5%. 1000 milliLiter(s) IV Continuous <Continuous>  dextrose 50% Injectable 12.5 Gram(s) IV Push once  dextrose 50% Injectable 25 Gram(s) IV Push once  dextrose 50% Injectable 25 Gram(s) IV Push once  digoxin     Tablet 0.25 milliGRAM(s) Oral daily  docusate sodium 100 milliGRAM(s) Oral three times a day  glucagon  Injectable 1 milliGRAM(s) IntraMuscular once PRN  glycerin Suppository - Adult 1 Suppository(s) Rectal at bedtime  heparin  Infusion.  Unit(s)/Hr IV Continuous <Continuous>  insulin lispro (HumaLOG) corrective regimen sliding scale   SubCutaneous three times a day before meals  insulin lispro (HumaLOG) corrective regimen sliding scale   SubCutaneous at bedtime  losartan 25 milliGRAM(s) Oral daily  magnesium sulfate  IVPB 1 Gram(s) IV Intermittent once  metoprolol succinate  milliGRAM(s) Oral daily  polyethylene glycol 3350 17 Gram(s) Oral daily  senna 2 Tablet(s) Oral at bedtime  sodium phosphate IVPB 15 milliMole(s) IV Intermittent once    Allergies:  No Known Allergies    FAMILY HISTORY:  No pertinent family history in first degree relatives    Social History:  Smokin pack year history, quit 25 years ago  Alcohol:  Drugs:    Review of Systems:  REVIEW OF SYSTEMS:    CONSTITUTIONAL: + weakness, no fevers or chills, +weight loss of 50lb over past 6mo-1yr  EYES/ENT: No visual changes;  No dysphagia  NECK: No pain or stiffness  RESPIRATORY: No cough, wheezing, hemoptysis; No shortness of breath  CARDIOVASCULAR: No chest pain or palpitations; No lower extremity edema,   GASTROINTESTINAL: No abdominal or epigastric pain. No nausea, vomiting, or hematemesis; No diarrhea or constipation. No melena or hematochezia.  BACK: No back pain  GENITOURINARY: No dysuria, frequency or hematuria  NEUROLOGICAL: No numbness or weakness  SKIN: No itching, burning, rashes, or lesions   All other review of systems is negative unless indicated above.  [ ] 10 point review of systems is otherwise negative except as mentioned above            [ ]Unable to obtain    Physical Exam:  T(C): 36.5 (19 @ 05:46), Max: 36.8 (19 @ 21:02)  HR: 59 (19 @ 05:46) (59 - 61)  BP: 129/58 (19 @ 05:46) (129/58 - 150/68)  RR: 15 (19 @ 05:46) (14 - 16)  SpO2: 100% (19 @ 05:46) (98% - 100%)  Wt(kg): --  GENERAL: No acute distress, +cachectic  HEAD:  Atraumatic, Normocephalic  ENT: EOMI, PERRLA, conjunctiva and sclera clear, Neck supple, No JVD, moist mucosa  CHEST/LUNG: Clear to auscultation bilaterally; No wheeze, equal breath sounds bilaterally,   BACK: No spinal tenderness  HEART: +irregular rhythm, No murmurs, rubs, or gallops, + AICD seen on L upper chest  ABDOMEN: Soft, Nontender, Nondistended; Bowel sounds present  EXTREMITIES:  No clubbing, cyanosis, or edema, +dorsalis pedis pulses bilaterally  PSYCH: Nl behavior, nl affects  SKIN: Normal color, No rashes or lesions, +scattered bruises, on skull and legs, +palpable scar on R femoral skin  MSK: +weakness grossly in all extrmities       @ 07:01  -   @ 07:00  --------------------------------------------------------  IN: 0 mL / OUT: 700 mL / NET: -700 mL      Daily Height in cm: 182.88 (27 Mar 2019 21:02)    Daily     Cardiovascular Diagnostic Testing:  ECG:    Echo:    Stress Testing:    Cath:    Interpretation of Telemetry:    Imaging:    Labs:                        9.0    6.43  )-----------( 204      ( 28 Mar 2019 00:40 )             28.1     -    129<L>  |  93<L>  |  16  ----------------------------<  169<H>  3.6   |  28  |  0.66    Ca    8.7      28 Mar 2019 11:15  Phos  2.9       Mg     1.8           PT/INR - ( 28 Mar 2019 00:40 )   PT: 23.7 SEC;   INR: 2.03          PTT - ( 28 Mar 2019 00:40 )  PTT:37.7 SEC          Hemoglobin A1C, Whole Blood: 5.9 % ( @ 09:34)

## 2019-03-28 NOTE — CONSULT NOTE ADULT - ASSESSMENT
74 year old male history of atrial fibrillation AICD, diabetes type 2, hyperlipidemia, lung cancer ( status post radiation and chemo in remission 1997 ) presents as transfer from [facility] for eval for femoral artery pseudoaneurysm  No fever, prior had leukocytosis which has resolved  CT with bilateral ana maria-nephric stranding; L femoral artery pseudoaneurysm  UA positive, UCX with Klebsiella S CTX  BCX negative  No other systemic signs infection presently, lower suspicion for infected aneurysm  Overall, UTI, pyelonephritis, leukocytosis, femoral aneurysm  - Ceftriaxone 1g q 24  - DC Vanco/Zosyn  - Ordered ESR/CRP, repeat BCXs for the AM  - Monitor for any signs systemic infection  - Care for pseudoaneurysm per primary team    Jose D Arce MD  Pager [phone]  After 5pm and on weekends call [phone] 74 year old male history of atrial fibrillation AICD, diabetes type 2, hyperlipidemia, lung cancer ( status post radiation and chemo in remission 1997 ) presents as transfer from [facility] for eval for femoral artery pseudoaneurysm  No fever, prior had leukocytosis which has resolved  CT with bilateral ana maria-nephric stranding; L femoral artery pseudoaneurysm  UA positive, UCX with Klebsiella S CTX  BCX negative  No other systemic signs infection presently, lower suspicion for infected aneurysm  Overall, UTI, pyelonephritis, leukocytosis, femoral aneurysm  - Ceftriaxone 1g q 24  - DC Vanco/Zosyn  - Ordered ESR/CRP, repeat BCXs for the AM  - Monitor for any signs systemic infection  - Care for pseudoaneurysm per primary team  - Discussed case with vascular team    Jose D Arce MD  Pager [phone]  After 5pm and on weekends call [phone]

## 2019-03-28 NOTE — H&P ADULT - ATTENDING COMMENTS
Full H&P as above; discussed with surgery resident and vascular fellow.   He is well known to me and has extensive history with CAD and diffuse PAD. He was admitted to [facility] with fatigue malaise and a UTI. A CT of the abdomen noted a large left femoral pseudoaneurysm with no active bleeding.  He was transferred for management of the pseudoaneurysm. He will get a cardiac evaluation. If he is medically up to surgery and his family wish to get it done we will repair the pseudoaneurysm soon.

## 2019-03-28 NOTE — CONSULT NOTE ADULT - SUBJECTIVE AND OBJECTIVE BOX
"HPI: 74 year old male history of atrial fibrillation AICD, diabetes type 2, hyperlipidemia, lung cancer ( status post radiation and chemo in remission  ) presents as transfer from [facility]. Patient originally presented to [facility] with two weeks of constipation fatigue general malaise. He is a poor historian but his wife tells us that he has been weaker and weaker over the last two weeks and stopped ambulating. During his workup at [place] he was found to have a positive UA with a urine culture positive for klebsiella for which he was treated with rocephin. He also had a CT of the abdomen and pelvis with IV contrast that revealed a large left femoral artery pseudoaneurysm, bilateral perinephric stranding with patchy decreased enhancement of the right kidney and small amount of air within the urinary bladder. There were also signs of proctocolitis no bowel obstruction noted.  He has a prior surgical history of a left CEA  (which subsequently became blocked). He had a right CEA in  complicated by a pseudoaneurysm which was repaired with the insertion of carotid stents x4. In  he had angioplasty of a bypass done previously in the right femoral to peroneal artery. He also previously had an infection of the right groin requiring a second procedure with a muscle flap (prior to Angioplasty). He also has a remote history of a left femoral endarterectomy. (28 Mar 2019 00:25)"    Above reviewed. Saw/spoke to patient. Patient presented with constipated, fatigue. No fevers, no chills. Overall failure to thrive symptoms. Patient straight caths at home 4x per day. Patient was found to have suspected pyelonephritis and started on ceftriaxone. On eval was found to have a L femoral pseudoaneurysm, so transferred to [facility] for further eval. Today, feels well. Does not feel weak anymore. No N/V. Constipated at times. No dysuria, no flank pain. Has bolivar in place. No other new complaints. ID called for further eval.    PAST MEDICAL & SURGICAL HISTORY:  AICD (automatic cardioverter/defibrillator) present  PVD (peripheral vascular disease): s/p angioplasty with stent ; pt states Left Carotid Artery occluded  Atrial fibrillation: since   Hyperlipidemia  HTN (hypertension)  Lung cancer: small cell, stage 4, , radiation &amp; chemo- on remission  PAD (peripheral artery disease)  GERD (gastroesophageal reflux disease)  Carotid artery occlusion: left  Neuropathy  Type 2 diabetes mellitus: glucose this am 116  Carotid artery occlusion: right with pseudoaneurysm s/p stent and mesh 2017  AICD (automatic cardioverter/defibrillator) present: 10/21/14  Carotid stenosis, right: Right CEA   S/P femoral-popliteal bypass surgery: x 2, , right s/p Revision  S/P angioplasty with stent: left leg  S/P cervical spinal fusion: 2002, 1 plate &amp; 4 rods    Allergies    No Known Allergies    ANTIMICROBIALS:  cefTRIAXone   IVPB 1 every 24 hours  vancomycin  IVPB 1000 every 12 hours    OTHER MEDS:  aspirin enteric coated 325 milliGRAM(s) Oral daily  atorvastatin 20 milliGRAM(s) Oral at bedtime  cholecalciferol 2000 Unit(s) Oral daily  cyanocobalamin 2000 MICROGram(s) Oral daily  dextrose 40% Gel 15 Gram(s) Oral once PRN  dextrose 5%. 1000 milliLiter(s) IV Continuous <Continuous>  dextrose 50% Injectable 12.5 Gram(s) IV Push once  dextrose 50% Injectable 25 Gram(s) IV Push once  dextrose 50% Injectable 25 Gram(s) IV Push once  digoxin     Tablet 0.25 milliGRAM(s) Oral daily  docusate sodium 100 milliGRAM(s) Oral three times a day  glucagon  Injectable 1 milliGRAM(s) IntraMuscular once PRN  glycerin Suppository - Adult 1 Suppository(s) Rectal at bedtime  heparin  Infusion.  Unit(s)/Hr IV Continuous <Continuous>  insulin lispro (HumaLOG) corrective regimen sliding scale   SubCutaneous three times a day before meals  insulin lispro (HumaLOG) corrective regimen sliding scale   SubCutaneous at bedtime  losartan 25 milliGRAM(s) Oral daily  magnesium sulfate  IVPB 1 Gram(s) IV Intermittent once  metoprolol succinate  milliGRAM(s) Oral daily  polyethylene glycol 3350 17 Gram(s) Oral daily  senna 2 Tablet(s) Oral at bedtime  sodium phosphate IVPB 15 milliMole(s) IV Intermittent once    SOCIAL HISTORY: No tobacco, no alcohol, no illicit drugs    FAMILY HISTORY:  No pertinent family history in first degree relatives relating to CC UTI    Drug Dosing Weight  Height (cm): 182.88 (27 Mar 2019 21:02)  Weight (kg): 60.8 (27 Mar 2019 21:02)  BMI (kg/m2): 18.2 (27 Mar 2019 21:02)  BSA (m2): 1.8 (27 Mar 2019 21:02)    PE:    Vital Signs Last 24 Hrs  T(C): 36.5 (28 Mar 2019 05:46), Max: 36.8 (27 Mar 2019 21:02)  T(F): 97.7 (28 Mar 2019 05:46), Max: 98.3 (27 Mar 2019 21:02)  HR: 59 (28 Mar 2019 05:46) (59 - 82)  BP: 129/58 (28 Mar 2019 05:46) (129/58 - 151/86)  RR: 15 (28 Mar 2019 05:46) (14 - 16)  SpO2: 100% (28 Mar 2019 05:46) (95% - 100%)    Gen: AOx3, NAD, non-toxic, pleasant  CV: S1+S2 normal, nontachycardic, ICD site--no wound  Resp: Clear bilat, no resp distress, no crackles/wheezes  Abd: Soft, nontender, +BS  Ext: No LE edema, no wounds  : Bolivar with clear yellow urine  IV/Skin: No thrombophlebitis, no rash  Msk: No low back pain, no arthralgias, no joint swelling  Neuro: No sensory deficits, no motor deficits    LABS:                        9.0    6.43  )-----------( 204      ( 28 Mar 2019 00:40 )             28.1         129<L>  |  94<L>  |  20  ----------------------------<  135<H>  4.1   |  26  |  0.70    Ca    9.0      28 Mar 2019 00:40  Phos  2.2       Mg     1.5         Urinalysis Basic - ( 28 Mar 2019 01:20 )    Color: YELLOW / Appearance: CLEAR / S.023 / pH: 6.0  Gluc: NEGATIVE / Ketone: NEGATIVE  / Bili: NEGATIVE / Urobili: NORMAL   Blood: NEGATIVE / Protein: 50 / Nitrite: NEGATIVE   Leuk Esterase: LARGE / RBC: 0-2 / WBC >50   Sq Epi: MODERATE / Non Sq Epi: x / Bacteria: NEGATIVE    MICROBIOLOGY:    .Blood Blood-Peripheral  19   No growth to date.    .Urine Clean Catch (Midstream)  19   >100,000 CFU/ml Klebsiella pneumoniae  --  Klebsiella pneumoniae S CTX    RADIOLOGY:    3/26 CT:    FINDINGS:      Partially imaged branching nodular opacities right middle lobe, which   could reflect infectious/inflammatory airway disease.  There is probable trace right-sided pleural effusion and atelectatic   changes at the right lung base.    The liver and spleen appear unremarkable.    Cholelithiasis is noted.  No biliary ductal dilatation is noted.    The adrenal glands and pancreas are unremarkable in appearance.    There is bilateral perinephric stranding, more pronounced on the right.  There is patchy decreased enhancement at the interpolar kidney.  No hydronephrosis is noted.  Urinary bladder is distended, with small amount of air. Correlate for   recent instrumentation versus infection.    Above findings may be secondary to urinary tract infection with   pyelonephritis.    There is prominent atherosclerotic calcification of the abdominal aorta,   with aortobiiliac stent.  There is a large contrast-filled, lobular sac filling the left inguinal   canal, that is immediately adjacent to and appears to communicate with   the femoral artery. Finding is compatible with a pseudoaneurysm, and   measures approximately 8.7 cm in craniocaudad dimension.    There are bilateral inguinal helical scanning luminal surgical clips.    The evaluation of the stomach is limited without distention.  There is a large fecal load with distention of the transverse and right   colon.  No bowel obstruction is noted.  There is bowel wall thickening/submucosal edema involving the rectum and   distal sigmoid colon, finding which may be associated with a   proctocolitis.  No localized intra-abdominal fluid collection or pneumoperitoneum is   noted.    There are multilevel degenerative changes of lumbar spine.  There is a age indeterminate compression deformities of the L1 and L3   vertebral bodies.  There are chronic left-sided rib fracture deformities.    Impression:    Large left femoral artery pseudoaneurysm.  This finding was discussed with Dr. Best  by telephone at the time of   interpretation on 3/26/2019.    Bilateral perinephric stranding with patchy decreased enhancement right   kidney, and small amount of air within the urinary bladder, recommend   further clinical correlation for urinary tract infection and   pyelonephritis.  Differential diagnosis for peripheral decreased attenuation within the   kidney includes renal infarct.    Findings suggestive of proctocolitis as discussed; no bowel obstruction   noted.    Cholelithiasis.

## 2019-03-28 NOTE — H&P ADULT - NSHPLABSRESULTS_GEN_ALL_CORE
CBC ( @ 08:43)                          9.3<L>                   6.19    )--------------(  202        --    % Neuts, --    % Lymphs, ANC: --                              28.7<L>    BMP ( @ 08:43)       133<L>  |  95<L>   |  19    			Ca++ --      Ca 8.5          ---------------------------------( 186<H>		Mg --           3.5     |  29      |  0.74  			Ph --          Coags ( @ 08:43)  aPTT 36.7 / INR 1.61<H> / PT 18.6<H>          Urinalysis ( @ 22:00):     Color: Yellow / Appearance: Slightly Turbid<!> / S.020 / pH: 5.0 / Gluc: Negative / Ketones: Small<!> / Bili: Negative / Urobili: Negative / Protein :150<!> / Nitrites: Negative / Leuk.Est: Moderate<!> / RBC: 11-25<!> / WBC: 3-5 / Sq Epi:  / Non Sq Epi: Few / Bacteria Moderate<!>       -> .Blood Blood-Peripheral Culture ( @ 09:12)     NG    NG    No growth to date.    -> .Urine Clean Catch (Midstream) Culture ( @ 00:34)     NG    Klebsiella pneumoniae    >100,000 CFU/ml Klebsiella pneumoniae

## 2019-03-28 NOTE — H&P ADULT - NSHPPHYSICALEXAM_GEN_ALL_CORE
General: No acute distress, appears nontoxic  Neurologic: No focal deficits  Psychiatric: Appropriate affect  Cardiovascular: irreg irreg  Pulmonary: Unlabored breathing  Abdominal: Nontender, soft, nondistended  Extremities: No edema, moves all without limitations  Skin: Warm, no rashes, cap refill good in feet  B/l neck incisions from previous ECAs. R neck bulge with bruit and thrill, L neck no pulse, no bruit  b/l femoral pulse 2+  RLE: long medial incision from previous bypass and takeback. Palpable pulse along scar.   R foot: Dopplerable PT/DP, nonpalp. L foot: no dopplerable pulses but cap refill under 2 seconds, warm, popliteal doppler signal present

## 2019-03-28 NOTE — CONSULT NOTE ADULT - ASSESSMENT
73yo M w/ PMH of atrial fibrillation AICD (10/2014) , CAD s/p angioplasty, stent, DM2, HLD, lung cancer ( status post radiation and chemo in remission 1997 ), PVD and PSH carotid endarterectomies, fem-pop bypass,  presents as transfer from [facility] with UTI, L femoral pseudoaneurysm.    #L femoral pseudoaneurysm 75yo M w/ PMH of atrial fibrillation, AICD (10/2014) , CAD s/p PCI, DM2, HLD, lung cancer ( status post radiation and chemo in remission 1997 ), PVD and PSH carotid endarterectomies, fem-pop bypass,  presents as transfer from [facility] with UTI, L femoral pseudoaneurysm. Consult called for pre-op cardiac clearance    #Pre-op risk stratification for L femoral pseudoaneurysm - patient w/o signs of congestive heart failure, stable rhythm no symptoms of ischemia going for an infra-inguinal procedure.   - he appears optimized and can proceed from a cardiac perspective    ESTRELLA Rojas MD  Cardiology Fellow  [id number] 73yo M w/ PMH of atrial fibrillation, AICD (10/2014) for unclear reason, CAD s/p PCI, DM2, HLD, lung cancer ( status post radiation and chemo in remission  ), PVD, , fem-pop bypass presents as transfer from [facility] with UTI, L femoral pseudoaneurysm. Consult called for pre-op cardiac clearance surgical repair of L femoral PSA w/ course complicated by UTI (currently on abx)    #Pre-op risk stratification for L femoral pseudoaneurysm - patient w/o signs of congestive heart failure, stable rhythm no symptoms of ischemia going for an infra-inguinal procedure.   - he appears optimized and can proceed from a cardiac perspective    ESTRELLA Rojas MD  Cardiology Fellow  [id number] 73yo M w/ PMH of atrial fibrillation, AICD (10/2014) for unclear reason, CAD s/p PCI, DM2, HLD, lung cancer ( status post radiation and chemo in remission  ), PVD, , fem-pop bypass presents as transfer from [facility] with UTI, L femoral pseudoaneurysm. Consult called for pre-op cardiac clearance surgical repair of L femoral PSA w/ course complicated by UTI (currently on abx)    #Pre-op risk stratification for L femoral pseudoaneurysm - patient w/o signs of congestive heart failure, stable rhythm no symptoms of ischemia going for an infra-inguinal procedure.   - he appears optimized and can proceed from a cardiac perspective. However, please obtain EKG and I have asked the wife to obtain information about the AICD.    ESTRELLA Rojas MD  Cardiology Fellow  [phone]

## 2019-03-28 NOTE — H&P ADULT - HISTORY OF PRESENT ILLNESS
74 year old male history of atrial fibrillation AICD, diabetes type 2, hyperlipidemia, lung cancer ( status post radiation and chemo in remission 1997 ) presents as transfer from [facility]. Patient originally presented to [facility] with two weeks of constipation fatigue general malaise. He is a poor historian but his wife tells us that he has been weaker and weaker over the last two weeks and stopped ambulating. During his workup at [place] he was found to have a positive UA with a urine culture positive for klebsiella for which he was treated with rocephin. He also had a CT of the abdomen and pelvis with IV contrast that revealed a large left femoral artery pseudoaneurysm, bilateral perinephric stranding with patchy decreased enhancement of the right kidney and small amount of air within the urinary bladder. There were also signs of proctocolitis no bowel obstruction noted.  He has a prior surgical history of a left CEA 2004 (which subsequently became blocked). He had a right CEA in 2013 complicated by a pseudoaneurysm which was repaired with the insertion of carotid stents x4. In 2017 he had angioplasty of a bypass done previously in the right femoral to peroneal artery. He also previously had an infection of the right groin requiring a second procedure with a muscle flap (prior to Angioplasty). He also has a remote history of a left femoral endarterectomy.

## 2019-03-28 NOTE — H&P ADULT - ASSESSMENT
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

## 2019-03-29 LAB
ANION GAP SERPL CALC-SCNC: 10 MMO/L — SIGNIFICANT CHANGE UP (ref 7–14)
APTT BLD: 93.4 SEC — HIGH (ref 27.5–36.3)
BUN SERPL-MCNC: 13 MG/DL — SIGNIFICANT CHANGE UP (ref 7–23)
CALCIUM SERPL-MCNC: 8.7 MG/DL — SIGNIFICANT CHANGE UP (ref 8.4–10.5)
CHLORIDE SERPL-SCNC: 91 MMOL/L — LOW (ref 98–107)
CO2 SERPL-SCNC: 27 MMOL/L — SIGNIFICANT CHANGE UP (ref 22–31)
CREAT SERPL-MCNC: 0.7 MG/DL — SIGNIFICANT CHANGE UP (ref 0.5–1.3)
CRP SERPL-MCNC: 31.8 MG/L — HIGH
ERYTHROCYTE [SEDIMENTATION RATE] IN BLOOD: 38 MM/HR — HIGH (ref 1–15)
GLUCOSE SERPL-MCNC: 137 MG/DL — HIGH (ref 70–99)
HCT VFR BLD CALC: 27.1 % — LOW (ref 39–50)
HGB BLD-MCNC: 8.7 G/DL — LOW (ref 13–17)
MAGNESIUM SERPL-MCNC: 1.8 MG/DL — SIGNIFICANT CHANGE UP (ref 1.6–2.6)
MCHC RBC-ENTMCNC: 26.9 PG — LOW (ref 27–34)
MCHC RBC-ENTMCNC: 32.1 % — SIGNIFICANT CHANGE UP (ref 32–36)
MCV RBC AUTO: 83.9 FL — SIGNIFICANT CHANGE UP (ref 80–100)
NRBC # FLD: 0 K/UL — SIGNIFICANT CHANGE UP (ref 0–0)
PHOSPHATE SERPL-MCNC: 2.6 MG/DL — SIGNIFICANT CHANGE UP (ref 2.5–4.5)
PLATELET # BLD AUTO: 198 K/UL — SIGNIFICANT CHANGE UP (ref 150–400)
PMV BLD: 10.6 FL — SIGNIFICANT CHANGE UP (ref 7–13)
POTASSIUM SERPL-MCNC: 3.7 MMOL/L — SIGNIFICANT CHANGE UP (ref 3.5–5.3)
POTASSIUM SERPL-SCNC: 3.7 MMOL/L — SIGNIFICANT CHANGE UP (ref 3.5–5.3)
RBC # BLD: 3.23 M/UL — LOW (ref 4.2–5.8)
RBC # FLD: 14.4 % — SIGNIFICANT CHANGE UP (ref 10.3–14.5)
SODIUM SERPL-SCNC: 128 MMOL/L — LOW (ref 135–145)
SPECIMEN SOURCE: SIGNIFICANT CHANGE UP
SPECIMEN SOURCE: SIGNIFICANT CHANGE UP
WBC # BLD: 5.86 K/UL — SIGNIFICANT CHANGE UP (ref 3.8–10.5)
WBC # FLD AUTO: 5.86 K/UL — SIGNIFICANT CHANGE UP (ref 3.8–10.5)

## 2019-03-29 PROCEDURE — 99232 SBSQ HOSP IP/OBS MODERATE 35: CPT

## 2019-03-29 RX ORDER — MAGNESIUM SULFATE 500 MG/ML
2 VIAL (ML) INJECTION ONCE
Qty: 0 | Refills: 0 | Status: COMPLETED | OUTPATIENT
Start: 2019-03-29 | End: 2019-03-29

## 2019-03-29 RX ORDER — SODIUM CHLORIDE 9 MG/ML
1 INJECTION INTRAMUSCULAR; INTRAVENOUS; SUBCUTANEOUS
Qty: 0 | Refills: 0 | Status: DISCONTINUED | OUTPATIENT
Start: 2019-03-29 | End: 2019-04-07

## 2019-03-29 RX ADMIN — Medication 100 MILLIGRAM(S): at 21:52

## 2019-03-29 RX ADMIN — SODIUM CHLORIDE 1 GRAM(S): 9 INJECTION INTRAMUSCULAR; INTRAVENOUS; SUBCUTANEOUS at 18:59

## 2019-03-29 RX ADMIN — Medication 2: at 13:02

## 2019-03-29 RX ADMIN — LOSARTAN POTASSIUM 25 MILLIGRAM(S): 100 TABLET, FILM COATED ORAL at 05:38

## 2019-03-29 RX ADMIN — Medication 150 MILLIGRAM(S): at 05:38

## 2019-03-29 RX ADMIN — PREGABALIN 2000 MICROGRAM(S): 225 CAPSULE ORAL at 12:59

## 2019-03-29 RX ADMIN — ATORVASTATIN CALCIUM 20 MILLIGRAM(S): 80 TABLET, FILM COATED ORAL at 21:52

## 2019-03-29 RX ADMIN — Medication 2000 UNIT(S): at 12:55

## 2019-03-29 RX ADMIN — Medication 325 MILLIGRAM(S): at 12:54

## 2019-03-29 RX ADMIN — Medication 0.25 MILLIGRAM(S): at 05:38

## 2019-03-29 RX ADMIN — Medication 50 GRAM(S): at 15:09

## 2019-03-29 RX ADMIN — Medication 1: at 09:27

## 2019-03-29 RX ADMIN — CEFTRIAXONE 100 GRAM(S): 500 INJECTION, POWDER, FOR SOLUTION INTRAMUSCULAR; INTRAVENOUS at 12:58

## 2019-03-29 RX ADMIN — HEPARIN SODIUM 1100 UNIT(S)/HR: 5000 INJECTION INTRAVENOUS; SUBCUTANEOUS at 03:56

## 2019-03-29 RX ADMIN — SENNA PLUS 2 TABLET(S): 8.6 TABLET ORAL at 21:52

## 2019-03-29 NOTE — PROGRESS NOTE ADULT - ASSESSMENT
74 year old male history of atrial fibrillation AICD, diabetes type 2, hyperlipidemia, lung cancer ( status post radiation and chemo in remission 1997 ) presents as transfer from [facility] for eval for femoral artery pseudoaneurysm  No fever, prior had leukocytosis which has resolved  CT with bilateral ana maria-nephric stranding; L femoral artery pseudoaneurysm  UA positive, UCX with Klebsiella S CTX  BCX negative  Complete treatment course for UTI/Pyelo  Overall, UTI, pyelonephritis, leukocytosis, femoral aneurysm  - Ceftriaxone 1g q 24 through 4/3/19  - F/U pending BCXs  - Monitor for any signs systemic infection  - Care for pseudoaneurysm per primary team  - Will sign off. Please call with further questions or change in status.    Jose D Arce MD  Pager [phone]  After 5pm and on weekends call [phone]

## 2019-03-29 NOTE — PROGRESS NOTE ADULT - ASSESSMENT
73 y/o M from [facility] found to have L femoral pseudoaneurysm:     PLAN:     - Continue Abx of UTI   - Interrogate AICD   - Heparin gtt  - Regular diet   - DVT pxx  - FU am labs     Vascular Surgery   [id number]

## 2019-03-29 NOTE — PROGRESS NOTE ADULT - SUBJECTIVE AND OBJECTIVE BOX
CC: F/U for UTI    Saw/spoke to patient. No fevers, no chills. Overall well. No new complaints.    Allergies  No Known Allergies    ANTIMICROBIALS:  cefTRIAXone   IVPB 1 every 24 hours    PE:    Vital Signs Last 24 Hrs  T(C): 36.6 (29 Mar 2019 05:35), Max: 36.6 (28 Mar 2019 17:02)  T(F): 97.8 (29 Mar 2019 05:35), Max: 97.9 (28 Mar 2019 17:02)  HR: 82 (29 Mar 2019 05:35) (75 - 83)  BP: 152/81 (29 Mar 2019 05:35) (138/63 - 152/81)  RR: 18 (29 Mar 2019 05:35) (18 - 18)  SpO2: 99% (29 Mar 2019 05:35) (97% - 99%)    Gen: AOx3, NAD, non-toxic, pleasant  CV: S1+S2 normal, nontachycardic  Resp: Clear bilat, no resp distress, no crackles/wheezes  Abd: Soft, nontender, +BS  Ext: No LE edema, no wounds    LABS:                        8.7    5.86  )-----------( 198      ( 29 Mar 2019 07:02 )             27.1         128<L>  |  91<L>  |  13  ----------------------------<  137<H>  3.7   |  27  |  0.70    Ca    8.7      29 Mar 2019 07:02  Phos  2.6       Mg     1.8         Urinalysis Basic - ( 28 Mar 2019 01:20 )    Color: YELLOW / Appearance: CLEAR / S.023 / pH: 6.0  Gluc: NEGATIVE / Ketone: NEGATIVE  / Bili: NEGATIVE / Urobili: NORMAL   Blood: NEGATIVE / Protein: 50 / Nitrite: NEGATIVE   Leuk Esterase: LARGE / RBC: 0-2 / WBC >50   Sq Epi: MODERATE / Non Sq Epi: x / Bacteria: NEGATIVE    MICROBIOLOGY:    .Blood Blood-Peripheral  19   No growth to date.     .Urine Clean Catch (Midstream)  19   >100,000 CFU/ml Klebsiella pneumoniae  --  Klebsiella pneumoniae    (otherwise reviewed)    RADIOLOGY:    3/26 CT:    Impression:    Large left femoral artery pseudoaneurysm.  This finding was discussed with Dr. Best  by telephone at the time of   interpretation on 3/26/2019.    Bilateral perinephric stranding with patchy decreased enhancement right   kidney, and small amount of air within the urinary bladder, recommend   further clinical correlation for urinary tract infection and   pyelonephritis.  Differential diagnosis for peripheral decreased attenuation within the   kidney includes renal infarct.    Findings suggestive of proctocolitis as discussed; no bowel obstruction   noted.    Cholelithiasis.

## 2019-03-29 NOTE — PROGRESS NOTE ADULT - SUBJECTIVE AND OBJECTIVE BOX
SUBJECTIVE:    Patient seen and examined, comfortable, no complaints.   Patient on Abx for UTI, afebrile.     OBJECTIVE:     ** VITAL SIGNS / I&O's **    T(C): 36.6 (03-29-19 @ 05:35), Max: 36.6 (03-28-19 @ 17:02)  T(F): 97.8 (03-29-19 @ 05:35), Max: 97.9 (03-28-19 @ 17:02)  HR: 82 (03-29-19 @ 05:35) (75 - 83)  BP: 152/81 (03-29-19 @ 05:35) (138/63 - 152/81)  RR: 18 (03-29-19 @ 05:35) (18 - 18)  SpO2: 99% (03-29-19 @ 05:35) (97% - 99%)      28 Mar 2019 07:01  -  29 Mar 2019 07:00  --------------------------------------------------------  IN:  Total IN: 0 mL    OUT:    Indwelling Catheter - Urethral: 1200 mL  Total OUT: 1200 mL    Total NET: -1200 mL      29 Mar 2019 07:01  -  29 Mar 2019 11:51  --------------------------------------------------------  IN:  Total IN: 0 mL    OUT:    Indwelling Catheter - Urethral: 150 mL  Total OUT: 150 mL    Total NET: -150 mL          ** PHYSICAL EXAM **    >> General: No acute distress.  >> Neck: Supple.  >> Cardiovascular: RRR  >> Lungs: No distress  >> Abdomen: Soft. Non-tender. Non-distended.  >> Extremities: Palpable L femoral pulse    ** LABS **                 8.7    5.86   )----------(  198       ( 29 Mar 2019 07:02 )               27.1      128    |  91     |  13     ----------------------------<  137        ( 29 Mar 2019 07:02 )  3.7     |  27     |  0.70     Ca    8.7        ( 29 Mar 2019 07:02 )  Phos  2.6       ( 29 Mar 2019 07:02 )  Mg     1.8       ( 29 Mar 2019 07:02 )        PTT -  93.4 SEC   ( 29 Mar 2019 01:20 )  CAPILLARY BLOOD GLUCOSE

## 2019-03-30 LAB
-  AMPICILLIN: SIGNIFICANT CHANGE UP
-  CIPROFLOXACIN: SIGNIFICANT CHANGE UP
-  NITROFURANTOIN: SIGNIFICANT CHANGE UP
-  TETRACYCLINE: SIGNIFICANT CHANGE UP
-  VANCOMYCIN: SIGNIFICANT CHANGE UP
ALBUMIN SERPL ELPH-MCNC: 3.1 G/DL — LOW (ref 3.3–5)
ALP SERPL-CCNC: 64 U/L — SIGNIFICANT CHANGE UP (ref 40–120)
ALT FLD-CCNC: 7 U/L — SIGNIFICANT CHANGE UP (ref 4–41)
ANION GAP SERPL CALC-SCNC: 11 MMO/L — SIGNIFICANT CHANGE UP (ref 7–14)
APTT BLD: 133.1 SEC — CRITICAL HIGH (ref 27.5–36.3)
APTT BLD: 58.1 SEC — HIGH (ref 27.5–36.3)
AST SERPL-CCNC: 14 U/L — SIGNIFICANT CHANGE UP (ref 4–40)
BACTERIA UR CULT: SIGNIFICANT CHANGE UP
BILIRUB DIRECT SERPL-MCNC: < 0.2 MG/DL — SIGNIFICANT CHANGE UP (ref 0.1–0.2)
BILIRUB SERPL-MCNC: 0.3 MG/DL — SIGNIFICANT CHANGE UP (ref 0.2–1.2)
BUN SERPL-MCNC: 11 MG/DL — SIGNIFICANT CHANGE UP (ref 7–23)
CALCIUM SERPL-MCNC: 8.7 MG/DL — SIGNIFICANT CHANGE UP (ref 8.4–10.5)
CHLORIDE SERPL-SCNC: 94 MMOL/L — LOW (ref 98–107)
CO2 SERPL-SCNC: 25 MMOL/L — SIGNIFICANT CHANGE UP (ref 22–31)
CREAT SERPL-MCNC: 0.61 MG/DL — SIGNIFICANT CHANGE UP (ref 0.5–1.3)
CULTURE RESULTS: SIGNIFICANT CHANGE UP
CULTURE RESULTS: SIGNIFICANT CHANGE UP
GLUCOSE SERPL-MCNC: 115 MG/DL — HIGH (ref 70–99)
HCT VFR BLD CALC: 27.9 % — LOW (ref 39–50)
HCT VFR BLD CALC: 28.8 % — LOW (ref 39–50)
HGB BLD-MCNC: 8.8 G/DL — LOW (ref 13–17)
HGB BLD-MCNC: 8.9 G/DL — LOW (ref 13–17)
HIV COMBO RESULT: SIGNIFICANT CHANGE UP
HIV1+2 AB SPEC QL: SIGNIFICANT CHANGE UP
MAGNESIUM SERPL-MCNC: 1.9 MG/DL — SIGNIFICANT CHANGE UP (ref 1.6–2.6)
MCHC RBC-ENTMCNC: 27.2 PG — SIGNIFICANT CHANGE UP (ref 27–34)
MCHC RBC-ENTMCNC: 28.3 PG — SIGNIFICANT CHANGE UP (ref 27–34)
MCHC RBC-ENTMCNC: 30.9 % — LOW (ref 32–36)
MCHC RBC-ENTMCNC: 31.5 % — LOW (ref 32–36)
MCV RBC AUTO: 86.1 FL — SIGNIFICANT CHANGE UP (ref 80–100)
MCV RBC AUTO: 91.4 FL — SIGNIFICANT CHANGE UP (ref 80–100)
METHOD TYPE: SIGNIFICANT CHANGE UP
NRBC # FLD: 0 K/UL — SIGNIFICANT CHANGE UP (ref 0–0)
NRBC # FLD: 0 K/UL — SIGNIFICANT CHANGE UP (ref 0–0)
ORGANISM # SPEC MICROSCOPIC CNT: SIGNIFICANT CHANGE UP
ORGANISM # SPEC MICROSCOPIC CNT: SIGNIFICANT CHANGE UP
PHOSPHATE SERPL-MCNC: 2.4 MG/DL — LOW (ref 2.5–4.5)
PLATELET # BLD AUTO: 192 K/UL — SIGNIFICANT CHANGE UP (ref 150–400)
PLATELET # BLD AUTO: 204 K/UL — SIGNIFICANT CHANGE UP (ref 150–400)
PMV BLD: 11.1 FL — SIGNIFICANT CHANGE UP (ref 7–13)
PMV BLD: 11.7 FL — SIGNIFICANT CHANGE UP (ref 7–13)
POTASSIUM SERPL-MCNC: 3.7 MMOL/L — SIGNIFICANT CHANGE UP (ref 3.5–5.3)
POTASSIUM SERPL-SCNC: 3.7 MMOL/L — SIGNIFICANT CHANGE UP (ref 3.5–5.3)
PROT SERPL-MCNC: 5.9 G/DL — LOW (ref 6–8.3)
RBC # BLD: 3.15 M/UL — LOW (ref 4.2–5.8)
RBC # BLD: 3.24 M/UL — LOW (ref 4.2–5.8)
RBC # FLD: 14.3 % — SIGNIFICANT CHANGE UP (ref 10.3–14.5)
RBC # FLD: 14.7 % — HIGH (ref 10.3–14.5)
SODIUM SERPL-SCNC: 130 MMOL/L — LOW (ref 135–145)
SPECIMEN SOURCE: SIGNIFICANT CHANGE UP
WBC # BLD: 4.65 K/UL — SIGNIFICANT CHANGE UP (ref 3.8–10.5)
WBC # BLD: 4.71 K/UL — SIGNIFICANT CHANGE UP (ref 3.8–10.5)
WBC # FLD AUTO: 4.65 K/UL — SIGNIFICANT CHANGE UP (ref 3.8–10.5)
WBC # FLD AUTO: 4.71 K/UL — SIGNIFICANT CHANGE UP (ref 3.8–10.5)

## 2019-03-30 RX ADMIN — PREGABALIN 2000 MICROGRAM(S): 225 CAPSULE ORAL at 14:00

## 2019-03-30 RX ADMIN — LOSARTAN POTASSIUM 25 MILLIGRAM(S): 100 TABLET, FILM COATED ORAL at 06:24

## 2019-03-30 RX ADMIN — CEFTRIAXONE 100 GRAM(S): 500 INJECTION, POWDER, FOR SOLUTION INTRAMUSCULAR; INTRAVENOUS at 13:58

## 2019-03-30 RX ADMIN — SODIUM CHLORIDE 1 GRAM(S): 9 INJECTION INTRAMUSCULAR; INTRAVENOUS; SUBCUTANEOUS at 06:24

## 2019-03-30 RX ADMIN — Medication 0.25 MILLIGRAM(S): at 06:24

## 2019-03-30 RX ADMIN — HEPARIN SODIUM 0 UNIT(S)/HR: 5000 INJECTION INTRAVENOUS; SUBCUTANEOUS at 08:52

## 2019-03-30 RX ADMIN — Medication 100 MILLIGRAM(S): at 14:00

## 2019-03-30 RX ADMIN — ATORVASTATIN CALCIUM 20 MILLIGRAM(S): 80 TABLET, FILM COATED ORAL at 23:22

## 2019-03-30 RX ADMIN — HEPARIN SODIUM 900 UNIT(S)/HR: 5000 INJECTION INTRAVENOUS; SUBCUTANEOUS at 08:53

## 2019-03-30 RX ADMIN — Medication 150 MILLIGRAM(S): at 08:41

## 2019-03-30 RX ADMIN — Medication 2000 UNIT(S): at 14:00

## 2019-03-30 RX ADMIN — HEPARIN SODIUM 900 UNIT(S)/HR: 5000 INJECTION INTRAVENOUS; SUBCUTANEOUS at 16:54

## 2019-03-30 RX ADMIN — Medication 325 MILLIGRAM(S): at 14:00

## 2019-03-30 RX ADMIN — Medication 2: at 12:55

## 2019-03-30 RX ADMIN — SODIUM CHLORIDE 1 GRAM(S): 9 INJECTION INTRAMUSCULAR; INTRAVENOUS; SUBCUTANEOUS at 18:56

## 2019-03-30 NOTE — PROVIDER CONTACT NOTE (CRITICAL VALUE NOTIFICATION) - BACKGROUND
Patient admitted for cutaneous vasculitis. CT + large fem art pseudoaneurysm. Heparin gtt infusing @ 11 cc/hr.

## 2019-03-30 NOTE — PROGRESS NOTE ADULT - SUBJECTIVE AND OBJECTIVE BOX
SUBJECTIVE:    Patient seen and examined, doing well, no events overnight.   Patient remains on Hep gtt, patient and wife signed MOLST form yesterday, in chart.     OBJECTIVE:     ** VITAL SIGNS / I&O's **    T(C): 36.6 (03-30-19 @ 01:33), Max: 37.2 (03-29-19 @ 14:40)  T(F): 97.9 (03-30-19 @ 01:33), Max: 98.9 (03-29-19 @ 14:40)  HR: 60 (03-30-19 @ 05:46) (56 - 70)  BP: 161/57 (03-30-19 @ 05:46) (101/51 - 161/57)  RR: 18 (03-30-19 @ 05:46) (16 - 18)  SpO2: 100% (03-30-19 @ 05:46) (97% - 100%)      29 Mar 2019 07:01  -  30 Mar 2019 07:00  --------------------------------------------------------  IN:    heparin  Infusion.: 66 mL  Total IN: 66 mL    OUT:    Indwelling Catheter - Urethral: 1000 mL  Total OUT: 1000 mL    Total NET: -934 mL          ** PHYSICAL EXAM **    >> General: No acute distress.  >> Neck: Supple.  >> Cardiovascular: RRR  >> Lungs: No distress  >> Abdomen: Soft. Non-tender. Non-distended.  >> Extremities: Palpable L femoral pulse with thrill    ** LABS **                 8.8    4.65   )----------(  204       ( 30 Mar 2019 06:15 )               27.9      130    |  94     |  11     ----------------------------<  115        ( 30 Mar 2019 06:15 )  3.7     |  25     |  0.61     Ca    8.7        ( 30 Mar 2019 06:15 )  Phos  2.4       ( 30 Mar 2019 06:15 )  Mg     1.9       ( 30 Mar 2019 06:15 )        PTT -  133.1 SEC   ( 30 Mar 2019 06:15 )  CAPILLARY BLOOD GLUCOSE

## 2019-03-30 NOTE — PROVIDER CONTACT NOTE (CRITICAL VALUE NOTIFICATION) - ACTION/TREATMENT ORDERED:
MD Hernandez made aware. Heparin gtt on hold for one hour, rate lowered to 9 cc/hr as per nomogram. No new orders given. Will continue to monitor.

## 2019-03-31 LAB
ANION GAP SERPL CALC-SCNC: 11 MMO/L — SIGNIFICANT CHANGE UP (ref 7–14)
APTT BLD: 87.7 SEC — HIGH (ref 27.5–36.3)
BUN SERPL-MCNC: 11 MG/DL — SIGNIFICANT CHANGE UP (ref 7–23)
CALCIUM SERPL-MCNC: 8.6 MG/DL — SIGNIFICANT CHANGE UP (ref 8.4–10.5)
CHLORIDE SERPL-SCNC: 95 MMOL/L — LOW (ref 98–107)
CO2 SERPL-SCNC: 25 MMOL/L — SIGNIFICANT CHANGE UP (ref 22–31)
CREAT SERPL-MCNC: 0.6 MG/DL — SIGNIFICANT CHANGE UP (ref 0.5–1.3)
GLUCOSE SERPL-MCNC: 111 MG/DL — HIGH (ref 70–99)
HBV CORE AB SER-ACNC: NONREACTIVE — SIGNIFICANT CHANGE UP
HBV SURFACE AB SER-ACNC: SIGNIFICANT CHANGE UP
HBV SURFACE AG SER-ACNC: NONREACTIVE — SIGNIFICANT CHANGE UP
HCT VFR BLD CALC: 26.2 % — LOW (ref 39–50)
HCV AB S/CO SERPL IA: 0.66 S/CO — SIGNIFICANT CHANGE UP (ref 0–0.79)
HCV AB SERPL-IMP: SIGNIFICANT CHANGE UP
HGB BLD-MCNC: 8.5 G/DL — LOW (ref 13–17)
MAGNESIUM SERPL-MCNC: 1.6 MG/DL — SIGNIFICANT CHANGE UP (ref 1.6–2.6)
MCHC RBC-ENTMCNC: 27.3 PG — SIGNIFICANT CHANGE UP (ref 27–34)
MCHC RBC-ENTMCNC: 32.4 % — SIGNIFICANT CHANGE UP (ref 32–36)
MCV RBC AUTO: 84.2 FL — SIGNIFICANT CHANGE UP (ref 80–100)
NRBC # FLD: 0 K/UL — SIGNIFICANT CHANGE UP (ref 0–0)
PHOSPHATE SERPL-MCNC: 2.2 MG/DL — LOW (ref 2.5–4.5)
PLATELET # BLD AUTO: 183 K/UL — SIGNIFICANT CHANGE UP (ref 150–400)
PMV BLD: 10.8 FL — SIGNIFICANT CHANGE UP (ref 7–13)
POTASSIUM SERPL-MCNC: 3.8 MMOL/L — SIGNIFICANT CHANGE UP (ref 3.5–5.3)
POTASSIUM SERPL-SCNC: 3.8 MMOL/L — SIGNIFICANT CHANGE UP (ref 3.5–5.3)
RBC # BLD: 3.11 M/UL — LOW (ref 4.2–5.8)
RBC # FLD: 14.6 % — HIGH (ref 10.3–14.5)
SODIUM SERPL-SCNC: 131 MMOL/L — LOW (ref 135–145)
WBC # BLD: 4.72 K/UL — SIGNIFICANT CHANGE UP (ref 3.8–10.5)
WBC # FLD AUTO: 4.72 K/UL — SIGNIFICANT CHANGE UP (ref 3.8–10.5)

## 2019-03-31 RX ORDER — SODIUM,POTASSIUM PHOSPHATES 278-250MG
2 POWDER IN PACKET (EA) ORAL ONCE
Qty: 0 | Refills: 0 | Status: COMPLETED | OUTPATIENT
Start: 2019-03-31 | End: 2019-03-31

## 2019-03-31 RX ADMIN — CEFTRIAXONE 100 GRAM(S): 500 INJECTION, POWDER, FOR SOLUTION INTRAMUSCULAR; INTRAVENOUS at 14:31

## 2019-03-31 RX ADMIN — LOSARTAN POTASSIUM 25 MILLIGRAM(S): 100 TABLET, FILM COATED ORAL at 05:33

## 2019-03-31 RX ADMIN — HEPARIN SODIUM 900 UNIT(S)/HR: 5000 INJECTION INTRAVENOUS; SUBCUTANEOUS at 01:21

## 2019-03-31 RX ADMIN — Medication 2 PACKET(S): at 18:29

## 2019-03-31 RX ADMIN — Medication 100 MILLIGRAM(S): at 14:30

## 2019-03-31 RX ADMIN — SODIUM CHLORIDE 1 GRAM(S): 9 INJECTION INTRAMUSCULAR; INTRAVENOUS; SUBCUTANEOUS at 18:28

## 2019-03-31 RX ADMIN — Medication 0.25 MILLIGRAM(S): at 05:33

## 2019-03-31 RX ADMIN — Medication 150 MILLIGRAM(S): at 05:34

## 2019-03-31 RX ADMIN — PREGABALIN 2000 MICROGRAM(S): 225 CAPSULE ORAL at 14:30

## 2019-03-31 RX ADMIN — Medication 1: at 18:29

## 2019-03-31 RX ADMIN — Medication 325 MILLIGRAM(S): at 14:30

## 2019-03-31 RX ADMIN — SODIUM CHLORIDE 1 GRAM(S): 9 INJECTION INTRAMUSCULAR; INTRAVENOUS; SUBCUTANEOUS at 05:33

## 2019-03-31 RX ADMIN — Medication 2000 UNIT(S): at 14:30

## 2019-03-31 RX ADMIN — ATORVASTATIN CALCIUM 20 MILLIGRAM(S): 80 TABLET, FILM COATED ORAL at 22:48

## 2019-03-31 NOTE — PROVIDER CONTACT NOTE (OTHER) - ASSESSMENT
Pt. A&Ox4, VS stable, no complaints of pain or discomfort. Blood in bolivar bag, stool is dark brown and loose.

## 2019-03-31 NOTE — PROGRESS NOTE ADULT - SUBJECTIVE AND OBJECTIVE BOX
SUBJECTIVE:    Patient doing well, no acute events overnight.   CRD contacted yesterday regarding AICD, awaiting further evaluation.   patient tolerating PO, pain controlled.     OBJECTIVE:     ** VITAL SIGNS / I&O's **    T(C): 36.8 (03-31-19 @ 05:30), Max: 36.9 (03-31-19 @ 01:04)  T(F): 98.2 (03-31-19 @ 05:30), Max: 98.5 (03-31-19 @ 01:04)  HR: 69 (03-31-19 @ 05:30) (53 - 78)  BP: 154/64 (03-31-19 @ 05:30) (106/54 - 154/64)  RR: 16 (03-31-19 @ 05:30) (16 - 18)  SpO2: 99% (03-31-19 @ 05:30) (96% - 100%)      30 Mar 2019 07:01  -  31 Mar 2019 07:00  --------------------------------------------------------  IN:    heparin  Infusion.: 103 mL    IV PiggyBack: 50 mL    Oral Fluid: 500 mL  Total IN: 653 mL    OUT:    Intermittent Catheterization - Urethral: 750 mL    Voided: 300 mL  Total OUT: 1050 mL    Total NET: -397 mL    Physical Exam:   >> General: No acute distress.  >> Neck: Supple.  >> Cardiovascular: RRR  >> Lungs: No distress  >> Abdomen: Soft. Non-tender. Non-distended.  >> Extremities: Palpable L femoral pulse with thrill      ** LABS **                 8.5    4.72   )----------(  183       ( 31 Mar 2019 06:11 )               26.2      131    |  95     |  11     ----------------------------<  111        ( 31 Mar 2019 06:11 )  3.8     |  25     |  0.60     Ca    8.6        ( 31 Mar 2019 06:11 )  Phos  2.2       ( 31 Mar 2019 06:11 )  Mg     1.6       ( 31 Mar 2019 06:11 )        PTT -  87.7 SEC   ( 31 Mar 2019 00:37 )  CAPILLARY BLOOD GLUCOSE

## 2019-03-31 NOTE — PROGRESS NOTE ADULT - ASSESSMENT
75 y/o M from [facility] found to have L femoral pseudoaneurysm, being treated for UTI with IV Abx:     PLAN:     - Continue Abx of UTI on 4/3/19, appreciate ID recs   - FU EP for Interrogation of AICD prior to OR Wednesday   - Continue Heparin gtt  - Regular diet   - DVT pxx        Vascular Surgery   [phone]

## 2019-04-01 DIAGNOSIS — I10 ESSENTIAL (PRIMARY) HYPERTENSION: ICD-10-CM

## 2019-04-01 DIAGNOSIS — I48.91 UNSPECIFIED ATRIAL FIBRILLATION: ICD-10-CM

## 2019-04-01 DIAGNOSIS — Z29.9 ENCOUNTER FOR PROPHYLACTIC MEASURES, UNSPECIFIED: ICD-10-CM

## 2019-04-01 DIAGNOSIS — E43 UNSPECIFIED SEVERE PROTEIN-CALORIE MALNUTRITION: ICD-10-CM

## 2019-04-01 DIAGNOSIS — N31.9 NEUROMUSCULAR DYSFUNCTION OF BLADDER, UNSPECIFIED: ICD-10-CM

## 2019-04-01 DIAGNOSIS — E11.9 TYPE 2 DIABETES MELLITUS WITHOUT COMPLICATIONS: ICD-10-CM

## 2019-04-01 DIAGNOSIS — E87.1 HYPO-OSMOLALITY AND HYPONATREMIA: ICD-10-CM

## 2019-04-01 DIAGNOSIS — K21.9 GASTRO-ESOPHAGEAL REFLUX DISEASE WITHOUT ESOPHAGITIS: ICD-10-CM

## 2019-04-01 DIAGNOSIS — I73.9 PERIPHERAL VASCULAR DISEASE, UNSPECIFIED: ICD-10-CM

## 2019-04-01 LAB
ANION GAP SERPL CALC-SCNC: 9 MMO/L — SIGNIFICANT CHANGE UP (ref 7–14)
APPEARANCE UR: CLEAR — SIGNIFICANT CHANGE UP
APTT BLD: 72.9 SEC — HIGH (ref 27.5–36.3)
BACTERIA # UR AUTO: SIGNIFICANT CHANGE UP
BILIRUB UR-MCNC: NEGATIVE — SIGNIFICANT CHANGE UP
BLOOD UR QL VISUAL: HIGH
BUN SERPL-MCNC: 11 MG/DL — SIGNIFICANT CHANGE UP (ref 7–23)
CALCIUM SERPL-MCNC: 9.1 MG/DL — SIGNIFICANT CHANGE UP (ref 8.4–10.5)
CHLORIDE SERPL-SCNC: 97 MMOL/L — LOW (ref 98–107)
CO2 SERPL-SCNC: 26 MMOL/L — SIGNIFICANT CHANGE UP (ref 22–31)
COLOR SPEC: YELLOW — SIGNIFICANT CHANGE UP
CREAT SERPL-MCNC: 0.6 MG/DL — SIGNIFICANT CHANGE UP (ref 0.5–1.3)
EPI CELLS # UR: SIGNIFICANT CHANGE UP
GLUCOSE SERPL-MCNC: 99 MG/DL — SIGNIFICANT CHANGE UP (ref 70–99)
GLUCOSE UR-MCNC: NEGATIVE — SIGNIFICANT CHANGE UP
HCT VFR BLD CALC: 29.1 % — LOW (ref 39–50)
HCV RNA SERPL NAA DL=5-ACNC: NOT DETECTED IU/ML — SIGNIFICANT CHANGE UP
HCV RNA SPEC NAA+PROBE-LOG IU: SIGNIFICANT CHANGE UP LOGIU/ML
HGB BLD-MCNC: 9.2 G/DL — LOW (ref 13–17)
KETONES UR-MCNC: NEGATIVE — SIGNIFICANT CHANGE UP
LEUKOCYTE ESTERASE UR-ACNC: SIGNIFICANT CHANGE UP
MAGNESIUM SERPL-MCNC: 1.6 MG/DL — SIGNIFICANT CHANGE UP (ref 1.6–2.6)
MCHC RBC-ENTMCNC: 27.5 PG — SIGNIFICANT CHANGE UP (ref 27–34)
MCHC RBC-ENTMCNC: 31.6 % — LOW (ref 32–36)
MCV RBC AUTO: 86.9 FL — SIGNIFICANT CHANGE UP (ref 80–100)
MUCOUS THREADS # UR AUTO: SIGNIFICANT CHANGE UP
NITRITE UR-MCNC: NEGATIVE — SIGNIFICANT CHANGE UP
NRBC # FLD: 0 K/UL — SIGNIFICANT CHANGE UP (ref 0–0)
OSMOLALITY SERPL: 280 MOSMO/KG — SIGNIFICANT CHANGE UP (ref 275–295)
PH UR: 7 — SIGNIFICANT CHANGE UP (ref 5–8)
PHOSPHATE SERPL-MCNC: 2.4 MG/DL — LOW (ref 2.5–4.5)
PLATELET # BLD AUTO: 191 K/UL — SIGNIFICANT CHANGE UP (ref 150–400)
PMV BLD: 11.1 FL — SIGNIFICANT CHANGE UP (ref 7–13)
POTASSIUM SERPL-MCNC: 4.9 MMOL/L — SIGNIFICANT CHANGE UP (ref 3.5–5.3)
POTASSIUM SERPL-SCNC: 4.9 MMOL/L — SIGNIFICANT CHANGE UP (ref 3.5–5.3)
PROT UR-MCNC: 50 — SIGNIFICANT CHANGE UP
RBC # BLD: 3.35 M/UL — LOW (ref 4.2–5.8)
RBC # FLD: 14.4 % — SIGNIFICANT CHANGE UP (ref 10.3–14.5)
RBC CASTS # UR COMP ASSIST: HIGH (ref 0–?)
SODIUM SERPL-SCNC: 132 MMOL/L — LOW (ref 135–145)
SP GR SPEC: 1.01 — SIGNIFICANT CHANGE UP (ref 1–1.04)
UROBILINOGEN FLD QL: NORMAL — SIGNIFICANT CHANGE UP
WBC # BLD: 4.17 K/UL — SIGNIFICANT CHANGE UP (ref 3.8–10.5)
WBC # FLD AUTO: 4.17 K/UL — SIGNIFICANT CHANGE UP (ref 3.8–10.5)
WBC UR QL: HIGH (ref 0–?)

## 2019-04-01 PROCEDURE — 99223 1ST HOSP IP/OBS HIGH 75: CPT

## 2019-04-01 RX ORDER — AMPICILLIN SODIUM AND SULBACTAM SODIUM 250; 125 MG/ML; MG/ML
INJECTION, POWDER, FOR SUSPENSION INTRAMUSCULAR; INTRAVENOUS
Qty: 0 | Refills: 0 | Status: DISCONTINUED | OUTPATIENT
Start: 2019-04-01 | End: 2019-04-09

## 2019-04-01 RX ORDER — AMPICILLIN SODIUM AND SULBACTAM SODIUM 250; 125 MG/ML; MG/ML
3 INJECTION, POWDER, FOR SUSPENSION INTRAMUSCULAR; INTRAVENOUS EVERY 6 HOURS
Qty: 0 | Refills: 0 | Status: DISCONTINUED | OUTPATIENT
Start: 2019-04-01 | End: 2019-04-09

## 2019-04-01 RX ORDER — AMPICILLIN SODIUM AND SULBACTAM SODIUM 250; 125 MG/ML; MG/ML
3 INJECTION, POWDER, FOR SUSPENSION INTRAMUSCULAR; INTRAVENOUS ONCE
Qty: 0 | Refills: 0 | Status: COMPLETED | OUTPATIENT
Start: 2019-04-01 | End: 2019-04-01

## 2019-04-01 RX ORDER — MAGNESIUM SULFATE 500 MG/ML
1 VIAL (ML) INJECTION ONCE
Qty: 0 | Refills: 0 | Status: COMPLETED | OUTPATIENT
Start: 2019-04-01 | End: 2019-04-01

## 2019-04-01 RX ADMIN — PREGABALIN 2000 MICROGRAM(S): 225 CAPSULE ORAL at 11:48

## 2019-04-01 RX ADMIN — Medication 2000 UNIT(S): at 11:48

## 2019-04-01 RX ADMIN — HEPARIN SODIUM 900 UNIT(S)/HR: 5000 INJECTION INTRAVENOUS; SUBCUTANEOUS at 07:07

## 2019-04-01 RX ADMIN — Medication 0.25 MILLIGRAM(S): at 05:31

## 2019-04-01 RX ADMIN — Medication 63.75 MILLIMOLE(S): at 10:26

## 2019-04-01 RX ADMIN — AMPICILLIN SODIUM AND SULBACTAM SODIUM 200 GRAM(S): 250; 125 INJECTION, POWDER, FOR SUSPENSION INTRAMUSCULAR; INTRAVENOUS at 19:12

## 2019-04-01 RX ADMIN — Medication 100 GRAM(S): at 10:25

## 2019-04-01 RX ADMIN — LOSARTAN POTASSIUM 25 MILLIGRAM(S): 100 TABLET, FILM COATED ORAL at 05:32

## 2019-04-01 RX ADMIN — Medication 325 MILLIGRAM(S): at 11:48

## 2019-04-01 RX ADMIN — CEFTRIAXONE 100 GRAM(S): 500 INJECTION, POWDER, FOR SOLUTION INTRAMUSCULAR; INTRAVENOUS at 11:48

## 2019-04-01 RX ADMIN — SODIUM CHLORIDE 1 GRAM(S): 9 INJECTION INTRAMUSCULAR; INTRAVENOUS; SUBCUTANEOUS at 05:32

## 2019-04-01 RX ADMIN — Medication 2: at 19:11

## 2019-04-01 RX ADMIN — ATORVASTATIN CALCIUM 20 MILLIGRAM(S): 80 TABLET, FILM COATED ORAL at 22:22

## 2019-04-01 RX ADMIN — AMPICILLIN SODIUM AND SULBACTAM SODIUM 200 GRAM(S): 250; 125 INJECTION, POWDER, FOR SUSPENSION INTRAMUSCULAR; INTRAVENOUS at 14:36

## 2019-04-01 RX ADMIN — SODIUM CHLORIDE 1 GRAM(S): 9 INJECTION INTRAMUSCULAR; INTRAVENOUS; SUBCUTANEOUS at 19:11

## 2019-04-01 RX ADMIN — Medication 2: at 13:32

## 2019-04-01 NOTE — PROGRESS NOTE ADULT - ASSESSMENT
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

## 2019-04-01 NOTE — PROGRESS NOTE ADULT - PROBLEM SELECTOR PLAN 4
S/o AICD. Rate well controlled.  -Continue Dig, Metoprolol and a/c  -f/u Cardiology rec asymptomatic. Small cell lung ca in remission. (+) large protein gap. r/o pseudohyponatremia.  -Check serum osmo  -Consider free water restriction if osmo is low.  -monitor BMP daily

## 2019-04-01 NOTE — PROGRESS NOTE ADULT - PROBLEM SELECTOR PLAN 8
PPI Nutrition consult.  Glucerna supplement 1 can 3X day On Lyle.  Resume straight cath after surgery  -F/U with IR re: adjusting abx

## 2019-04-01 NOTE — PROGRESS NOTE ADULT - SUBJECTIVE AND OBJECTIVE BOX
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. Patient originally presented to [facility] with two weeks of constipation fatigue general malaise. He is a poor historian but his wife tells us that he has been weaker and weaker over the last two weeks and stopped ambulating. During his workup at [place] he was found to have a positive UA with a urine culture positive for klebsiella for which he was treated with rocephin. He also had a CT of the abdomen and pelvis with IV contrast that revealed a large left femoral artery pseudoaneurysm, bilateral perinephric stranding with patchy decreased enhancement of the right kidney and small amount of air within the urinary bladder. There were also signs of proctocolitis no bowel obstruction noted.  He has a prior surgical history of a left CEA  (which subsequently became blocked). He had a right CEA in  complicated by a pseudoaneurysm which was repaired with the insertion of carotid stents x4. In  he had angioplasty of a bypass done previously in the right femoral to peroneal artery. He also previously had an infection of the right groin requiring a second procedure with a muscle flap (prior to Angioplasty). He also has a remote history of a left femoral endarterectomy.  Pt also has 5 years h/o neurogenic bladder, on intermittent catheterization 4X/day by wife and frequent UTI.  Alos has chronic constipation. Currently c/o diarrhea, 2x/day, no abd pain. denies any CP or SOB        History limited due to: [ ] Dementia  [ ] Delirium  [ ] Condition    Pain Symptoms if applicable:             	                         none	    Pain:	                            0	     Location:	  Modifying factors:	  Associated symptoms:	    Function: [ ] Independent  [x ] Assistance  [ ] Total care  [ ] Non-ambulatory    Allergies    No Known Allergies    Intolerances        HOME MEDICATIONS: [x ] Reviewed    MEDICATIONS  (STANDING):  aspirin enteric coated 325 milliGRAM(s) Oral daily  atorvastatin 20 milliGRAM(s) Oral at bedtime  cefTRIAXone   IVPB 1 Gram(s) IV Intermittent every 24 hours  cholecalciferol 2000 Unit(s) Oral daily  cyanocobalamin 2000 MICROGram(s) Oral daily  dextrose 5%. 1000 milliLiter(s) (50 mL/Hr) IV Continuous <Continuous>  dextrose 50% Injectable 12.5 Gram(s) IV Push once  dextrose 50% Injectable 25 Gram(s) IV Push once  dextrose 50% Injectable 25 Gram(s) IV Push once  digoxin     Tablet 0.25 milliGRAM(s) Oral daily  docusate sodium 100 milliGRAM(s) Oral three times a day  glycerin Suppository - Adult 1 Suppository(s) Rectal at bedtime  heparin  Infusion.  Unit(s)/Hr (11 mL/Hr) IV Continuous <Continuous>  insulin lispro (HumaLOG) corrective regimen sliding scale   SubCutaneous three times a day before meals  insulin lispro (HumaLOG) corrective regimen sliding scale   SubCutaneous at bedtime  losartan 25 milliGRAM(s) Oral daily  metoprolol succinate  milliGRAM(s) Oral daily  polyethylene glycol 3350 17 Gram(s) Oral daily  senna 2 Tablet(s) Oral at bedtime  sodium chloride 1 Gram(s) Oral two times a day    MEDICATIONS  (PRN):  dextrose 40% Gel 15 Gram(s) Oral once PRN Blood Glucose LESS THAN 70 milliGRAM(s)/deciliter  glucagon  Injectable 1 milliGRAM(s) IntraMuscular once PRN Glucose LESS THAN 70 milligrams/deciliter      PAST MEDICAL & SURGICAL HISTORY:  AICD (automatic cardioverter/defibrillator) present  PVD (peripheral vascular disease): s/p angioplasty with stent ; pt states Left Carotid Artery occluded  Atrial fibrillation: since   Hyperlipidemia  HTN (hypertension)  Lung cancer: small cell, stage 4, 1997, radiation &amp; chemo- on remission  PAD (peripheral artery disease)  GERD (gastroesophageal reflux disease)  Carotid artery occlusion: left  Neuropathy  Type 2 diabetes mellitus: glucose this am 116  Carotid artery occlusion: right with pseudoaneurysm s/p stent and mesh 2017  AICD (automatic cardioverter/defibrillator) present: 10/21/14  Carotid stenosis, right: Right CEA   S/P femoral-popliteal bypass surgery: x 2, , right s/p Revision  S/P angioplasty with stent: left leg  S/P cervical spinal fusion: 2002, 1 plate &amp; 4 rods  [x ] Reviewed     SOCIAL HISTORY:  Residence: [ ] [facility]  [ ] SNF  [x ] Community  [ ] Substance abuse: denies  [ ] Tobacco: (+) h/o tobacco use, quit 20 years ago  [ ] Alcohol use: Socially alcohol use, 1 beer daily, no h/o withdrawal    FAMILY HISTORY:  No pertinent family history in first degree relatives  [ ] No pertinent family history in first degree relatives     REVIEW OF SYSTEMS:    CONSTITUTIONAL: No fever, (+) weight loss, (+) fatigue  EYES: No eye pain, visual disturbances, or discharge  ENMT:  No difficulty hearing, tinnitus, vertigo; No sinus or throat pain  NECK: No pain or stiffness  BREASTS: No pain, masses, or nipple discharge  RESPIRATORY: No cough, wheezing, chills or hemoptysis; No shortness of breath  CARDIOVASCULAR: No chest pain, palpitations, dizziness, or leg swelling  GASTROINTESTINAL: No abdominal or epigastric pain. No nausea, vomiting, or hematemesis; No diarrhea or constipation. No melena or hematochezia.  GENITOURINARY: (+) Neurogenic bladder on intermittent catheterization 4X/day  NEUROLOGICAL: No headaches, memory loss, loss of strength, numbness, or tremors  SKIN: No itching, burning, rashes, or lesions   LYMPH NODES: No enlarged glands  ENDOCRINE: No heat or cold intolerance; No hair loss  MUSCULOSKELETAL: No muscle or back pain  PSYCHIATRIC: No depression, anxiety, mood swings, or difficulty sleeping  HEME/LYMPH: No easy bruising, or bleeding gums  ALLERGY AND IMMUNOLOGIC: No hives or eczema    [  ] All other ROS negative  [  ] Unable to obtain due to poor mental status    Vital Signs Last 24 Hrs  T(C): 36.4 (2019 10:23), Max: 36.7 (31 Mar 2019 14:00)  T(F): 97.6 (2019 10:23), Max: 98.1 (31 Mar 2019 14:00)  HR: 67 (2019 10:23) (50 - 67)  BP: 153/57 (2019 10:23) (123/55 - 166/63)  BP(mean): --  RR: 17 (2019 10:23) (17 - 18)  SpO2: 100% (2019 10:23) (97% - 100%)    PHYSICAL EXAM:    GENERAL: NAD, well-developed, thin   HEAD:  Atraumatic, Normocephalic  EYES: EOMI, PERRLA, conjunctiva and sclera clear  ENMT: Moist mucous membranes  NECK: Supple, No JVD  RESPIRATORY: Clear to auscultation bilaterally; No rales, rhonchi, wheezing, or rubs  CARDIOVASCULAR: Regular rate and rhythm; No murmurs, rubs, or gallops  GASTROINTESTINAL: Soft, Nontender, Nondistended; Bowel sounds present  GENITOURINARY: (+) bolivar  EXTREMITIES:  2+ Peripheral Pulses, No clubbing, cyanosis, or edema  NERVOUS SYSTEM:  Alert & Oriented X3; Moving all 4 extremities; No gross sensory deficits  HEME/LYMPH: No lymphadenopathy noted  SKIN: No rashes or lesions; Incisions C/D/I    LABS:                        9.2    4.17  )-----------( 191      ( 2019 05:40 )             29.1     04-01    132<L>  |  97<L>  |  11  ----------------------------<  99  4.9   |  26  |  0.60    Ca    9.1      2019 05:40  Phos  2.4     04-01  Hemoglobin A1C, Whole Blood: 5.9: Method: Immunoassay       Reference Range                4.0-5.6%       High risk (prediabetic)        5.7-6.4%       Diabetic, diagnostic             >=6.5%       ADA diabetic treatment goal       <7.0%  The Hemoglobin A1c testing is NGSP-certified.Reference ranges are based  upon the 2010 recommendations of  the American Diabetes Association.  Interpretation may vary for children  and adolescents. % (19 @ 09:34)    Mg     1.6     04-      PTT - ( 2019 05:40 )  PTT:72.9 SEC  Urinalysis Basic - ( 31 Mar 2019 23:45 )    Color: YELLOW / Appearance: CLEAR / S.013 / pH: 7.0  Gluc: NEGATIVE / Ketone: NEGATIVE  / Bili: NEGATIVE / Urobili: NORMAL   Blood: MODERATE / Protein: 50 / Nitrite: NEGATIVE   Leuk Esterase: MODERATE / RBC: 11-25 / WBC 6-10   Sq Epi: x / Non Sq Epi: FEW / Bacteria: FEW    Culture - Urine (19 @ 01:51)    -  Ciprofloxacin: R >2 NISHI    -  Vancomycin: S 2 NISHI    Culture - Urine:   COLONY COUNT: > = 100,000 CFU/ML    -  Nitrofurantoin: S <=32 NISHI    -  Tetra/Doxy: R >8 NSIHI    -  Ampicillin: S <=2 NISHI    Specimen Source: URINE CATHETER    Organism Identification: Enterococcus faecalis    Organism: Enterococcus faecalis    Method Type: POSITIVE NISHI 29      CAPILLARY BLOOD GLUCOSE      POCT Blood Glucose.: 122 mg/dL (2019 08:53)      RADIOLOGY & ADDITIONAL STUDIES:    EKG:   Personally Reviewed:  [x ] YES     Imaging:   Personally Reviewed:  [ ] YES               Consultant(s) notes reviewed:    Care Discussed with Consultant(s)/Other Providers:Cardiology    Advanced Directives: [x ] DNR  [ ] No feeding tube  [ ] MOLST in chart  [ ] MOLST completed today  [ ] Unknown

## 2019-04-01 NOTE — PROGRESS NOTE ADULT - SUBJECTIVE AND OBJECTIVE BOX
C-Team Surgery Progress Note     Subjective/24hour Events: No acute events overnight. Pain controlled. Tolerating consistent carb diet. No N/V. No CP or SOB.    Vital Signs:  Vital Signs Last 24 Hrs  T(C): 36.3 (01 Apr 2019 01:45), Max: 36.8 (31 Mar 2019 05:30)  T(F): 97.3 (01 Apr 2019 01:45), Max: 98.2 (31 Mar 2019 05:30)  HR: 51 (01 Apr 2019 01:45) (50 - 69)  BP: 166/63 (01 Apr 2019 01:45) (117/64 - 166/63)  BP(mean): --  RR: 18 (01 Apr 2019 01:45) (16 - 18)  SpO2: 100% (01 Apr 2019 01:45) (99% - 100%)    CAPILLARY BLOOD GLUCOSE      POCT Blood Glucose.: 153 mg/dL (31 Mar 2019 21:39)  POCT Blood Glucose.: 156 mg/dL (31 Mar 2019 17:43)  POCT Blood Glucose.: 141 mg/dL (31 Mar 2019 12:50)  POCT Blood Glucose.: 138 mg/dL (31 Mar 2019 08:45)      I&O's Detail    30 Mar 2019 07:01  -  31 Mar 2019 07:00  --------------------------------------------------------  IN:    heparin  Infusion.: 103 mL    IV PiggyBack: 50 mL    Oral Fluid: 500 mL  Total IN: 653 mL    OUT:    Intermittent Catheterization - Urethral: 750 mL    Voided: 300 mL  Total OUT: 1050 mL    Total NET: -397 mL      31 Mar 2019 07:01  -  01 Apr 2019 04:45  --------------------------------------------------------  IN:    heparin  Infusion.: 108 mL    IV PiggyBack: 50 mL    Oral Fluid: 400 mL  Total IN: 558 mL    OUT:    Indwelling Catheter - Urethral: 1950 mL  Total OUT: 1950 mL    Total NET: -1392 mL            MEDICATIONS  (STANDING):  aspirin enteric coated 325 milliGRAM(s) Oral daily  atorvastatin 20 milliGRAM(s) Oral at bedtime  cefTRIAXone   IVPB 1 Gram(s) IV Intermittent every 24 hours  cholecalciferol 2000 Unit(s) Oral daily  cyanocobalamin 2000 MICROGram(s) Oral daily  dextrose 5%. 1000 milliLiter(s) (50 mL/Hr) IV Continuous <Continuous>  dextrose 50% Injectable 12.5 Gram(s) IV Push once  dextrose 50% Injectable 25 Gram(s) IV Push once  dextrose 50% Injectable 25 Gram(s) IV Push once  digoxin     Tablet 0.25 milliGRAM(s) Oral daily  docusate sodium 100 milliGRAM(s) Oral three times a day  glycerin Suppository - Adult 1 Suppository(s) Rectal at bedtime  heparin  Infusion.  Unit(s)/Hr (11 mL/Hr) IV Continuous <Continuous>  insulin lispro (HumaLOG) corrective regimen sliding scale   SubCutaneous three times a day before meals  insulin lispro (HumaLOG) corrective regimen sliding scale   SubCutaneous at bedtime  losartan 25 milliGRAM(s) Oral daily  metoprolol succinate  milliGRAM(s) Oral daily  polyethylene glycol 3350 17 Gram(s) Oral daily  senna 2 Tablet(s) Oral at bedtime  sodium chloride 1 Gram(s) Oral two times a day    MEDICATIONS  (PRN):  dextrose 40% Gel 15 Gram(s) Oral once PRN Blood Glucose LESS THAN 70 milliGRAM(s)/deciliter  glucagon  Injectable 1 milliGRAM(s) IntraMuscular once PRN Glucose LESS THAN 70 milligrams/deciliter        Physical Exam:  Gen: NAD  LS: nml respiratory effort  Card: pulse regularly present  GI: abd soft, nontender  Ext: Palpable L femoral pseudoaneurism with thrill      Labs:    03-31    131<L>  |  95<L>  |  11  ----------------------------<  111<H>  3.8   |  25  |  0.60    Ca    8.6      31 Mar 2019 06:11  Phos  2.2     03-31  Mg     1.6     03-31    TPro  5.9<L>  /  Alb  3.1<L>  /  TBili  0.3  /  DBili  < 0.2  /  AST  14  /  ALT  7   /  AlkPhos  64  03-30    LIVER FUNCTIONS - ( 30 Mar 2019 06:20 )  Alb: 3.1 g/dL / Pro: 5.9 g/dL / ALK PHOS: 64 u/L / ALT: 7 u/L / AST: 14 u/L / GGT: x                                 8.5    4.72  )-----------( 183      ( 31 Mar 2019 06:11 )             26.2     PTT - ( 31 Mar 2019 00:37 )  PTT:87.7 SEC

## 2019-04-01 NOTE — PROGRESS NOTE ADULT - SUBJECTIVE AND OBJECTIVE BOX
Interval History: Denies any chest pain, palpitations, dyspnea overnight.     Review Of Systems:  Constitutional: [ ] Fever [ ] Chills [ ] Fatigue [ ] Weight change   HEENT: [ ] Blurred vision [ ] Eye Pain [ ] Headache [ ] Runny nose [ ] Sore Throat   Respiratory: [ ] Cough [ ] Wheezing [ ] Shortness of breath  Cardiovascular: [ ] Chest Pain [ ] Palpitations [ ] OLIVARES [ ] PND [ ] Orthopnea  Gastrointestinal: [ ] Abdominal Pain [ ] Diarrhea [ ] Constipation [ ] Hemorrhoids [ ] Nausea [ ] Vomiting  Genitourinary: [ ] Nocturia [ ] Dysuria [ ] Incontinence  Extremities: [ ] Swelling [ ] Joint Pain  Neurologic: [ ] Focal deficit [ ] Paresthesias [ ] Syncope  Lymphatic: [ ] Swelling [ ] Lymphadenopathy   Skin: [ ] Rash [ ] Ecchymoses [ ] Wounds [ ] Lesions  Psychiatry: [ ] Depression [ ] Suicidal/Homicidal Ideation [ ] Anxiety [ ] Sleep Disturbances  [ x] 10 point review of systems is otherwise negative except as mentioned above            [ ]Unable to obtain    Medications:  aspirin enteric coated 325 milliGRAM(s) Oral daily  atorvastatin 20 milliGRAM(s) Oral at bedtime  cefTRIAXone   IVPB 1 Gram(s) IV Intermittent every 24 hours  cholecalciferol 2000 Unit(s) Oral daily  cyanocobalamin 2000 MICROGram(s) Oral daily  dextrose 40% Gel 15 Gram(s) Oral once PRN  dextrose 5%. 1000 milliLiter(s) IV Continuous <Continuous>  dextrose 50% Injectable 12.5 Gram(s) IV Push once  dextrose 50% Injectable 25 Gram(s) IV Push once  dextrose 50% Injectable 25 Gram(s) IV Push once  digoxin     Tablet 0.25 milliGRAM(s) Oral daily  docusate sodium 100 milliGRAM(s) Oral three times a day  glucagon  Injectable 1 milliGRAM(s) IntraMuscular once PRN  glycerin Suppository - Adult 1 Suppository(s) Rectal at bedtime  heparin  Infusion.  Unit(s)/Hr IV Continuous <Continuous>  insulin lispro (HumaLOG) corrective regimen sliding scale   SubCutaneous three times a day before meals  insulin lispro (HumaLOG) corrective regimen sliding scale   SubCutaneous at bedtime  losartan 25 milliGRAM(s) Oral daily  metoprolol succinate  milliGRAM(s) Oral daily  polyethylene glycol 3350 17 Gram(s) Oral daily  senna 2 Tablet(s) Oral at bedtime  sodium chloride 1 Gram(s) Oral two times a day      Vitals:  ICU Vital Signs Last 24 Hrs  T(C): 36.3 (01 Apr 2019 05:26), Max: 36.7 (31 Mar 2019 14:00)  T(F): 97.4 (01 Apr 2019 05:26), Max: 98.1 (31 Mar 2019 14:00)  HR: 52 (01 Apr 2019 05:26) (50 - 62)  BP: 156/65 (01 Apr 2019 05:26) (117/64 - 166/63)  BP(mean): --  ABP: --  ABP(mean): --  RR: 18 (01 Apr 2019 05:26) (18 - 18)  SpO2: 97% (01 Apr 2019 05:26) (97% - 100%)    Daily     Daily   I&O's Summary    31 Mar 2019 07:01  -  01 Apr 2019 07:00  --------------------------------------------------------  IN: 558 mL / OUT: 1950 mL / NET: -1392 mL    01 Apr 2019 07:01  -  01 Apr 2019 08:09  --------------------------------------------------------  IN: 0 mL / OUT: 50 mL / NET: -50 mL        Physical Exam:  Appearance:  NAD [  ] Intubated [  ] Sedated  HENT: NC/AT  Cardiovascular: S1, S2, [  ] LE Edema Present, [  ] JVD Present  Respiratory: Clear to auscultation bilaterally,  [   ] Transmitted Breath Sounds From Vent/Trach  Gastrointestinal: Soft, Non-tender, Non-distended, BS+  Psychiatry: [ x ] AAOx2  [ x  ] Follows Commands  [   ] Unable to assess  Skin: Intact,  [  ] Line Sites CDI, [  ] Wound sites CDI    Labs:                        9.2    4.17  )-----------( 191      ( 01 Apr 2019 05:40 )             29.1     04-01    132<L>  |  97<L>  |  11  ----------------------------<  99  4.9   |  26  |  0.60    Ca    9.1      01 Apr 2019 05:40  Phos  2.4     04-01  Mg     1.6     04-01      PTT - ( 01 Apr 2019 05:40 )  PTT:72.9 SEC                Culture - Blood (collected 03-29-19 @ 01:57)  Source: BLOOD  Preliminary Report (04-01-19 @ 01:54):    NO ORGANISMS ISOLATED    NO ORGANISMS ISOLATED AT 72 HRS.    Culture - Blood (collected 03-28-19 @ 18:21)  Source: BLOOD VENOUS  Preliminary Report (03-31-19 @ 18:18):    NO ORGANISMS ISOLATED    NO ORGANISMS ISOLATED AT 72 HRS.    Culture - Urine (collected 03-28-19 @ 01:51)  Source: URINE CATHETER  Final Report (03-30-19 @ 15:02):    COLONY COUNT: > = 100,000 CFU/ML  Organism: Enterococcus faecalis (03-30-19 @ 15:02)  Organism: Enterococcus faecalis (03-30-19 @ 15:02)      -  Ampicillin: S <=2 NISHI      -  Ciprofloxacin: R >2 NISHI      -  Nitrofurantoin: S <=32 NISHI      -  Tetra/Doxy: R >8 NISHI      -  Vancomycin: S 2 NISHI      Method Type: POSITIVE NISHI 29    tele: Off tele

## 2019-04-01 NOTE — PROGRESS NOTE ADULT - PROBLEM SELECTOR PLAN 6
On Lyle.  Resume straight cath after surgery  -F/U with IR re: adjusting abx BP well controlled.  -Cont. current meds  -Monitor BP Likely due to laxatives, nio signs of infection at this time  -hold laxatives and observe for signs of infection

## 2019-04-01 NOTE — PROGRESS NOTE ADULT - PROBLEM SELECTOR PLAN 7
Nutrition consult.  Glucerna supplement 1 can 3X day On Lyle.  Resume straight cath after surgery  -F/U with IR re: adjusting abx BP well controlled.  -Cont. current meds  -Monitor BP

## 2019-04-01 NOTE — PROGRESS NOTE ADULT - PROBLEM SELECTOR PLAN 3
asymptomatic. Small cell lung ca in remission. (+) large protein gap. r/o pseudohyponatremia.  -Check serum osmo  -Consider free water restriction if osmo is low.  -monitor BMP daily A1C 5.9  -Consistent carbohydrate diet  -FSSS with Insulin coverage  -Monitor FS

## 2019-04-01 NOTE — PROGRESS NOTE ADULT - ASSESSMENT
75 y/o M from [facility] found to have L femoral pseudoaneurysm, being treated for UTI with IV Abx:     Plan  - Pain control PRN  - Consistent carb diet  - Continue Ceftriaxone till 4/3/19 for UTI   - F/u EP for Interrogation of AICD prior to OR Wednesday   - C/w heparin gtt 75 y/o M from [facility] found to have L femoral pseudoaneurysm, being treated for UTI with IV Abx:     Plan  - Pain control PRN  - Consistent carb diet  - Continue Ceftriaxone till 4/3/19 for UTI   - F/u EP for Interrogation of AICD prior to OR on 4/3  - C/w heparin gtt

## 2019-04-01 NOTE — PROGRESS NOTE ADULT - PROBLEM SELECTOR PLAN 2
A1C 5.9  -Consistent carbohydrate diet  -FSSS with Insulin coverage  -Monitor FS Likely catheter related.  Ucx (+) for Enterococcus faecalis, sensitive to Ampicilin  -Contact ID re: adjust abx

## 2019-04-01 NOTE — PROGRESS NOTE ADULT - PROBLEM SELECTOR PLAN 5
BP well controlled.  -Cont. current meds  -Monitor BP S/o AICD. Rate well controlled.  -Continue Dig, Metoprolol and a/c  -f/u Cardiology rec

## 2019-04-01 NOTE — PROGRESS NOTE ADULT - ASSESSMENT
73yo M w/ PMH of atrial fibrillation, AICD (10/2014) for unclear reason, CAD s/p PCI, DM2, HLD, lung cancer ( status post radiation and chemo in remission  ), PVD, , fem-pop bypass presents as transfer from [facility] with UTI, L femoral pseudoaneurysm. Consult called for pre-op cardiac clearance surgical repair of L femoral PSA w/ course complicated by UTI (currently on abx)    #Pre-op risk stratification for L femoral pseudoaneurysm - patient w/o signs of congestive heart failure, stable rhythm no symptoms of ischemia going for an infra-inguinal procedure.   - patient appears in Afib w/ demand pacing on EKG  - please identify the type of AICD/PPM he has  - he appears optimized and can proceed from a cardiac perspective.     ESTRELLA Rojas MD  Cardiology Fellow  [phone]

## 2019-04-02 ENCOUNTER — TRANSCRIPTION ENCOUNTER (OUTPATIENT)
Age: 74
End: 2019-04-02

## 2019-04-02 ENCOUNTER — RESULT REVIEW (OUTPATIENT)
Age: 74
End: 2019-04-02

## 2019-04-02 LAB
ALBUMIN SERPL ELPH-MCNC: 3.1 G/DL — LOW (ref 3.3–5)
ALP SERPL-CCNC: 67 U/L — SIGNIFICANT CHANGE UP (ref 40–120)
ALT FLD-CCNC: 11 U/L — SIGNIFICANT CHANGE UP (ref 4–41)
ANION GAP SERPL CALC-SCNC: 8 MMO/L — SIGNIFICANT CHANGE UP (ref 7–14)
APTT BLD: 66.3 SEC — HIGH (ref 27.5–36.3)
AST SERPL-CCNC: 13 U/L — SIGNIFICANT CHANGE UP (ref 4–40)
BACTERIA BLD CULT: SIGNIFICANT CHANGE UP
BILIRUB SERPL-MCNC: 0.2 MG/DL — SIGNIFICANT CHANGE UP (ref 0.2–1.2)
BLD GP AB SCN SERPL QL: NEGATIVE — SIGNIFICANT CHANGE UP
BUN SERPL-MCNC: 12 MG/DL — SIGNIFICANT CHANGE UP (ref 7–23)
CALCIUM SERPL-MCNC: 8.9 MG/DL — SIGNIFICANT CHANGE UP (ref 8.4–10.5)
CHLORIDE SERPL-SCNC: 97 MMOL/L — LOW (ref 98–107)
CO2 SERPL-SCNC: 26 MMOL/L — SIGNIFICANT CHANGE UP (ref 22–31)
CREAT SERPL-MCNC: 0.67 MG/DL — SIGNIFICANT CHANGE UP (ref 0.5–1.3)
GLUCOSE SERPL-MCNC: 116 MG/DL — HIGH (ref 70–99)
HCT VFR BLD CALC: 30.5 % — LOW (ref 39–50)
HGB BLD-MCNC: 9.4 G/DL — LOW (ref 13–17)
MAGNESIUM SERPL-MCNC: 1.6 MG/DL — SIGNIFICANT CHANGE UP (ref 1.6–2.6)
MCHC RBC-ENTMCNC: 26.8 PG — LOW (ref 27–34)
MCHC RBC-ENTMCNC: 30.8 % — LOW (ref 32–36)
MCV RBC AUTO: 86.9 FL — SIGNIFICANT CHANGE UP (ref 80–100)
NRBC # FLD: 0 K/UL — SIGNIFICANT CHANGE UP (ref 0–0)
PHOSPHATE SERPL-MCNC: 2.4 MG/DL — LOW (ref 2.5–4.5)
PLATELET # BLD AUTO: 187 K/UL — SIGNIFICANT CHANGE UP (ref 150–400)
PMV BLD: 10.8 FL — SIGNIFICANT CHANGE UP (ref 7–13)
POTASSIUM SERPL-MCNC: 4.7 MMOL/L — SIGNIFICANT CHANGE UP (ref 3.5–5.3)
POTASSIUM SERPL-SCNC: 4.7 MMOL/L — SIGNIFICANT CHANGE UP (ref 3.5–5.3)
PROT SERPL-MCNC: 6.4 G/DL — SIGNIFICANT CHANGE UP (ref 6–8.3)
RBC # BLD: 3.51 M/UL — LOW (ref 4.2–5.8)
RBC # FLD: 14.4 % — SIGNIFICANT CHANGE UP (ref 10.3–14.5)
RH IG SCN BLD-IMP: POSITIVE — SIGNIFICANT CHANGE UP
SODIUM SERPL-SCNC: 131 MMOL/L — LOW (ref 135–145)
WBC # BLD: 4.11 K/UL — SIGNIFICANT CHANGE UP (ref 3.8–10.5)
WBC # FLD AUTO: 4.11 K/UL — SIGNIFICANT CHANGE UP (ref 3.8–10.5)

## 2019-04-02 PROCEDURE — 88304 TISSUE EXAM BY PATHOLOGIST: CPT | Mod: 26

## 2019-04-02 PROCEDURE — 88311 DECALCIFY TISSUE: CPT | Mod: 26

## 2019-04-02 PROCEDURE — 71045 X-RAY EXAM CHEST 1 VIEW: CPT | Mod: 26

## 2019-04-02 PROCEDURE — 99233 SBSQ HOSP IP/OBS HIGH 50: CPT

## 2019-04-02 RX ORDER — MAGNESIUM SULFATE 500 MG/ML
1 VIAL (ML) INJECTION ONCE
Qty: 0 | Refills: 0 | Status: COMPLETED | OUTPATIENT
Start: 2019-04-02 | End: 2019-04-02

## 2019-04-02 RX ORDER — SODIUM CHLORIDE 9 MG/ML
1000 INJECTION INTRAMUSCULAR; INTRAVENOUS; SUBCUTANEOUS
Qty: 0 | Refills: 0 | Status: DISCONTINUED | OUTPATIENT
Start: 2019-04-02 | End: 2019-04-04

## 2019-04-02 RX ADMIN — Medication 100 MILLIGRAM(S): at 14:15

## 2019-04-02 RX ADMIN — HEPARIN SODIUM 900 UNIT(S)/HR: 5000 INJECTION INTRAVENOUS; SUBCUTANEOUS at 08:46

## 2019-04-02 RX ADMIN — AMPICILLIN SODIUM AND SULBACTAM SODIUM 200 GRAM(S): 250; 125 INJECTION, POWDER, FOR SUSPENSION INTRAMUSCULAR; INTRAVENOUS at 06:13

## 2019-04-02 RX ADMIN — PREGABALIN 2000 MICROGRAM(S): 225 CAPSULE ORAL at 14:15

## 2019-04-02 RX ADMIN — Medication 100 GRAM(S): at 11:02

## 2019-04-02 RX ADMIN — AMPICILLIN SODIUM AND SULBACTAM SODIUM 200 GRAM(S): 250; 125 INJECTION, POWDER, FOR SUSPENSION INTRAMUSCULAR; INTRAVENOUS at 01:22

## 2019-04-02 RX ADMIN — LOSARTAN POTASSIUM 25 MILLIGRAM(S): 100 TABLET, FILM COATED ORAL at 06:12

## 2019-04-02 RX ADMIN — Medication 3: at 13:16

## 2019-04-02 RX ADMIN — Medication 0.25 MILLIGRAM(S): at 06:12

## 2019-04-02 RX ADMIN — SODIUM CHLORIDE 1 GRAM(S): 9 INJECTION INTRAMUSCULAR; INTRAVENOUS; SUBCUTANEOUS at 18:34

## 2019-04-02 RX ADMIN — SODIUM CHLORIDE 1 GRAM(S): 9 INJECTION INTRAMUSCULAR; INTRAVENOUS; SUBCUTANEOUS at 06:12

## 2019-04-02 RX ADMIN — ATORVASTATIN CALCIUM 20 MILLIGRAM(S): 80 TABLET, FILM COATED ORAL at 21:51

## 2019-04-02 RX ADMIN — Medication 2000 UNIT(S): at 14:15

## 2019-04-02 RX ADMIN — Medication 63.75 MILLIMOLE(S): at 12:54

## 2019-04-02 RX ADMIN — Medication 150 MILLIGRAM(S): at 06:12

## 2019-04-02 RX ADMIN — Medication 325 MILLIGRAM(S): at 14:16

## 2019-04-02 RX ADMIN — AMPICILLIN SODIUM AND SULBACTAM SODIUM 200 GRAM(S): 250; 125 INJECTION, POWDER, FOR SUSPENSION INTRAMUSCULAR; INTRAVENOUS at 18:34

## 2019-04-02 RX ADMIN — AMPICILLIN SODIUM AND SULBACTAM SODIUM 200 GRAM(S): 250; 125 INJECTION, POWDER, FOR SUSPENSION INTRAMUSCULAR; INTRAVENOUS at 12:13

## 2019-04-02 NOTE — PROGRESS NOTE ADULT - ASSESSMENT
75 y/o M from [facility] found to have L femoral pseudoaneurysm, being treated for UTI with IV Abx:     Plan  - Pain control PRN  - Consistent carb diet  - Continue Ceftriaxone till 4/3/19 for UTI   - No need to interrogate AICD prior to OR per CRDs  - C/w heparin gtt  - NPO/IVF at midnight for OR tomorrow     Vascular Surgery   [id number]

## 2019-04-02 NOTE — PROGRESS NOTE ADULT - SUBJECTIVE AND OBJECTIVE BOX
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 Improving PO intake. (+) BM X1 this morning    MEDICATIONS  (STANDING):  ampicillin/sulbactam  IVPB      ampicillin/sulbactam  IVPB 3 Gram(s) IV Intermittent every 6 hours  aspirin enteric coated 325 milliGRAM(s) Oral daily  atorvastatin 20 milliGRAM(s) Oral at bedtime  cholecalciferol 2000 Unit(s) Oral daily  cyanocobalamin 2000 MICROGram(s) Oral daily  dextrose 5%. 1000 milliLiter(s) (50 mL/Hr) IV Continuous <Continuous>  dextrose 50% Injectable 12.5 Gram(s) IV Push once  dextrose 50% Injectable 25 Gram(s) IV Push once  dextrose 50% Injectable 25 Gram(s) IV Push once  digoxin     Tablet 0.25 milliGRAM(s) Oral daily  docusate sodium 100 milliGRAM(s) Oral three times a day  glycerin Suppository - Adult 1 Suppository(s) Rectal at bedtime  heparin  Infusion.  Unit(s)/Hr (11 mL/Hr) IV Continuous <Continuous>  insulin lispro (HumaLOG) corrective regimen sliding scale   SubCutaneous three times a day before meals  insulin lispro (HumaLOG) corrective regimen sliding scale   SubCutaneous at bedtime  losartan 25 milliGRAM(s) Oral daily  metoprolol succinate  milliGRAM(s) Oral daily  polyethylene glycol 3350 17 Gram(s) Oral daily  senna 2 Tablet(s) Oral at bedtime  sodium chloride 1 Gram(s) Oral two times a day    MEDICATIONS  (PRN):  dextrose 40% Gel 15 Gram(s) Oral once PRN Blood Glucose LESS THAN 70 milliGRAM(s)/deciliter  glucagon  Injectable 1 milliGRAM(s) IntraMuscular once PRN Glucose LESS THAN 70 milligrams/deciliter      Vital Signs Last 24 Hrs  T(C): 36.4 (2019 06:08), Max: 36.5 (2019 17:29)  T(F): 97.5 (2019 06:08), Max: 97.7 (2019 17:29)  HR: 77 (2019 06:08) (58 - 77)  BP: 153/71 (2019 06:08) (125/56 - 156/64)  BP(mean): --  RR: 18 (2019 06:08) (16 - 18)  SpO2: 98% (2019 06:08) (98% - 100%)  CAPILLARY BLOOD GLUCOSE      POCT Blood Glucose.: 126 mg/dL (2019 08:51)  POCT Blood Glucose.: 156 mg/dL (2019 21:33)  POCT Blood Glucose.: 221 mg/dL (2019 19:10)  POCT Blood Glucose.: 242 mg/dL (2019 17:43)  POCT Blood Glucose.: 243 mg/dL (2019 13:05)    I&O's Summary    2019 07:01  -  2019 07:00  --------------------------------------------------------  IN: 828 mL / OUT: 2225 mL / NET: -1397 mL        PHYSICAL EXAM:  GENERAL: NAD, well-developed, thin  HEAD:  Atraumatic, Normocephalic  EYES: EOMI, PERRLA, conjunctiva and sclera clear  NECK: Supple, No JVD  CHEST/LUNG: Clear to auscultation bilaterally; No wheeze  HEART: Irregular at 60's; No murmurs, rubs, or gallops  ABDOMEN: Soft, Nontender, Nondistended; Bowel sounds present  : (+) bolivar  EXTREMITIES:  2+ Peripheral Pulses, No clubbing, cyanosis, or edema  PSYCH: AAOx3  NEUROLOGY: non-focal  SKIN: No rashes or lesions    LABS:                        9.4    4.11  )-----------( 187      ( 2019 06:16 )             30.5     04-02    131<L>  |  97<L>  |  12  ----------------------------<  116<H>  4.7   |  26  |  0.67    Ca    8.9      2019 06:16  Phos  2.4     04-02  Mg     1.6     04-02    TPro  6.4  /  Alb  3.1<L>  /  TBili  0.2  /  DBili  x   /  AST  13  /  ALT  11  /  AlkPhos  67  04-02    PTT - ( 2019 06:16 )  PTT:66.3 SEC      Urinalysis Basic - ( 31 Mar 2019 23:45 )    Color: YELLOW / Appearance: CLEAR / S.013 / pH: 7.0  Gluc: NEGATIVE / Ketone: NEGATIVE  / Bili: NEGATIVE / Urobili: NORMAL   Blood: MODERATE / Protein: 50 / Nitrite: NEGATIVE   Leuk Esterase: MODERATE / RBC: 11-25 / WBC 6-10   Sq Epi: x / Non Sq Epi: FEW / Bacteria: FEW        RADIOLOGY & ADDITIONAL TESTS:    Imaging Personally Reviewed:    Consultant(s) Notes Reviewed:      Care Discussed with Consultants/Other Providers:

## 2019-04-02 NOTE — PROGRESS NOTE ADULT - PROBLEM SELECTOR PLAN 2
Likely catheter related.  Ucx (+) for Enterococcus faecalis, sensitive to Ampicilin  -Continue Unasy X 5 days total

## 2019-04-02 NOTE — PROGRESS NOTE ADULT - ASSESSMENT
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

## 2019-04-02 NOTE — PROGRESS NOTE ADULT - PROBLEM SELECTOR PLAN 4
asymptomatic. Small cell lung ca in remission. (+) large protein gap. r/o pseudohyponatremia.  - serum osmo 280  -monitor BMP daily

## 2019-04-02 NOTE — PROGRESS NOTE ADULT - PROBLEM SELECTOR PLAN 6
Likely due to laxatives, nio signs of infection at this time  -hold laxatives and observe for signs of infection

## 2019-04-02 NOTE — PROGRESS NOTE ADULT - SUBJECTIVE AND OBJECTIVE BOX
SUBJECTIVE:    Patient seen and examined, no acute events overnight.   Tolerating PO, voiding/BM appropriate. No complaints.     OBJECTIVE:     ** VITAL SIGNS / I&O's **    T(C): 36.4 (04-02-19 @ 06:08), Max: 36.5 (04-01-19 @ 17:29)  T(F): 97.5 (04-02-19 @ 06:08), Max: 97.7 (04-01-19 @ 17:29)  HR: 77 (04-02-19 @ 06:08) (58 - 77)  BP: 153/71 (04-02-19 @ 06:08) (125/56 - 156/64)  RR: 18 (04-02-19 @ 06:08) (16 - 18)  SpO2: 98% (04-02-19 @ 06:08) (98% - 100%)      01 Apr 2019 07:01  -  02 Apr 2019 07:00  --------------------------------------------------------  IN:    heparin  Infusion.: 108 mL    IV PiggyBack: 200 mL    Oral Fluid: 520 mL  Total IN: 828 mL    OUT:    Indwelling Catheter - Urethral: 2225 mL  Total OUT: 2225 mL    Total NET: -1397 mL          ** PHYSICAL EXAM **    Gen: NAD  LS: nml respiratory effort  Card: pulse regularly present  GI: abd soft, nontender  Ext: Palpable L femoral pseudoaneurism with thrill    ** LABS **                 9.4    4.11   )----------(  187       ( 02 Apr 2019 06:16 )               30.5      131    |  97     |  12     ----------------------------<  116        ( 02 Apr 2019 06:16 )  4.7     |  26     |  0.67     Ca    8.9        ( 02 Apr 2019 06:16 )  Phos  2.4       ( 02 Apr 2019 06:16 )  Mg     1.6       ( 02 Apr 2019 06:16 )    TPro  6.4    /  Alb  3.1    /  TBili  0.2    /  DBili  x      /  AST  13     /  ALT  11     /  AlkPhos  67     ( 02 Apr 2019 06:16 )      PTT -  66.3 SEC   ( 02 Apr 2019 06:16 )  CAPILLARY BLOOD GLUCOSE

## 2019-04-03 LAB
ANION GAP SERPL CALC-SCNC: 8 MMO/L — SIGNIFICANT CHANGE UP (ref 7–14)
ANION GAP SERPL CALC-SCNC: 9 MMO/L — SIGNIFICANT CHANGE UP (ref 7–14)
APTT BLD: 36.9 SEC — HIGH (ref 27.5–36.3)
APTT BLD: 68.7 SEC — HIGH (ref 27.5–36.3)
BACTERIA BLD CULT: SIGNIFICANT CHANGE UP
BASE EXCESS BLDA CALC-SCNC: 1.7 MMOL/L — SIGNIFICANT CHANGE UP
BASE EXCESS BLDA CALC-SCNC: 2.1 MMOL/L — SIGNIFICANT CHANGE UP
BUN SERPL-MCNC: 11 MG/DL — SIGNIFICANT CHANGE UP (ref 7–23)
BUN SERPL-MCNC: 13 MG/DL — SIGNIFICANT CHANGE UP (ref 7–23)
CA-I BLDA-SCNC: 1.18 MMOL/L — SIGNIFICANT CHANGE UP (ref 1.15–1.29)
CA-I BLDA-SCNC: 1.19 MMOL/L — SIGNIFICANT CHANGE UP (ref 1.15–1.29)
CALCIUM SERPL-MCNC: 8.4 MG/DL — SIGNIFICANT CHANGE UP (ref 8.4–10.5)
CALCIUM SERPL-MCNC: 8.7 MG/DL — SIGNIFICANT CHANGE UP (ref 8.4–10.5)
CHLORIDE SERPL-SCNC: 100 MMOL/L — SIGNIFICANT CHANGE UP (ref 98–107)
CHLORIDE SERPL-SCNC: 96 MMOL/L — LOW (ref 98–107)
CO2 SERPL-SCNC: 24 MMOL/L — SIGNIFICANT CHANGE UP (ref 22–31)
CO2 SERPL-SCNC: 26 MMOL/L — SIGNIFICANT CHANGE UP (ref 22–31)
CREAT SERPL-MCNC: 0.63 MG/DL — SIGNIFICANT CHANGE UP (ref 0.5–1.3)
CREAT SERPL-MCNC: 0.63 MG/DL — SIGNIFICANT CHANGE UP (ref 0.5–1.3)
GLUCOSE BLDA-MCNC: 121 MG/DL — HIGH (ref 70–99)
GLUCOSE BLDA-MCNC: 137 MG/DL — HIGH (ref 70–99)
GLUCOSE SERPL-MCNC: 113 MG/DL — HIGH (ref 70–99)
GLUCOSE SERPL-MCNC: 146 MG/DL — HIGH (ref 70–99)
GRAM STN WND: SIGNIFICANT CHANGE UP
GRAM STN WND: SIGNIFICANT CHANGE UP
HCO3 BLDA-SCNC: 26 MMOL/L — SIGNIFICANT CHANGE UP (ref 22–26)
HCO3 BLDA-SCNC: 26 MMOL/L — SIGNIFICANT CHANGE UP (ref 22–26)
HCT VFR BLD CALC: 27.3 % — LOW (ref 39–50)
HCT VFR BLD CALC: 29.5 % — LOW (ref 39–50)
HCT VFR BLDA CALC: 25.8 % — LOW (ref 39–51)
HCT VFR BLDA CALC: 31.3 % — LOW (ref 39–51)
HGB BLD-MCNC: 8.5 G/DL — LOW (ref 13–17)
HGB BLD-MCNC: 9.7 G/DL — LOW (ref 13–17)
HGB BLDA-MCNC: 10.1 G/DL — LOW (ref 13–17)
HGB BLDA-MCNC: 8.3 G/DL — LOW (ref 13–17)
INR BLD: 1.2 — HIGH (ref 0.88–1.17)
INR BLD: 1.21 — HIGH (ref 0.88–1.17)
MAGNESIUM SERPL-MCNC: 1.5 MG/DL — LOW (ref 1.6–2.6)
MAGNESIUM SERPL-MCNC: 1.7 MG/DL — SIGNIFICANT CHANGE UP (ref 1.6–2.6)
MCHC RBC-ENTMCNC: 26.7 PG — LOW (ref 27–34)
MCHC RBC-ENTMCNC: 28.4 PG — SIGNIFICANT CHANGE UP (ref 27–34)
MCHC RBC-ENTMCNC: 31.1 % — LOW (ref 32–36)
MCHC RBC-ENTMCNC: 32.9 % — SIGNIFICANT CHANGE UP (ref 32–36)
MCV RBC AUTO: 85.8 FL — SIGNIFICANT CHANGE UP (ref 80–100)
MCV RBC AUTO: 86.5 FL — SIGNIFICANT CHANGE UP (ref 80–100)
NRBC # FLD: 0 K/UL — SIGNIFICANT CHANGE UP (ref 0–0)
NRBC # FLD: 0 K/UL — SIGNIFICANT CHANGE UP (ref 0–0)
PCO2 BLDA: 40 MMHG — SIGNIFICANT CHANGE UP (ref 35–48)
PCO2 BLDA: 41 MMHG — SIGNIFICANT CHANGE UP (ref 35–48)
PH BLDA: 7.41 PH — SIGNIFICANT CHANGE UP (ref 7.35–7.45)
PH BLDA: 7.43 PH — SIGNIFICANT CHANGE UP (ref 7.35–7.45)
PHOSPHATE SERPL-MCNC: 2.7 MG/DL — SIGNIFICANT CHANGE UP (ref 2.5–4.5)
PHOSPHATE SERPL-MCNC: 2.8 MG/DL — SIGNIFICANT CHANGE UP (ref 2.5–4.5)
PLATELET # BLD AUTO: 139 K/UL — LOW (ref 150–400)
PLATELET # BLD AUTO: 170 K/UL — SIGNIFICANT CHANGE UP (ref 150–400)
PMV BLD: 10.5 FL — SIGNIFICANT CHANGE UP (ref 7–13)
PMV BLD: 10.5 FL — SIGNIFICANT CHANGE UP (ref 7–13)
PO2 BLDA: 282 MMHG — HIGH (ref 83–108)
PO2 BLDA: 298 MMHG — HIGH (ref 83–108)
POTASSIUM BLDA-SCNC: 4 MMOL/L — SIGNIFICANT CHANGE UP (ref 3.4–4.5)
POTASSIUM BLDA-SCNC: 4.5 MMOL/L — SIGNIFICANT CHANGE UP (ref 3.4–4.5)
POTASSIUM SERPL-MCNC: 4.5 MMOL/L — SIGNIFICANT CHANGE UP (ref 3.5–5.3)
POTASSIUM SERPL-MCNC: 4.7 MMOL/L — SIGNIFICANT CHANGE UP (ref 3.5–5.3)
POTASSIUM SERPL-SCNC: 4.5 MMOL/L — SIGNIFICANT CHANGE UP (ref 3.5–5.3)
POTASSIUM SERPL-SCNC: 4.7 MMOL/L — SIGNIFICANT CHANGE UP (ref 3.5–5.3)
PROTHROM AB SERPL-ACNC: 13.8 SEC — HIGH (ref 9.8–13.1)
PROTHROM AB SERPL-ACNC: 13.9 SEC — HIGH (ref 9.8–13.1)
RBC # BLD: 3.18 M/UL — LOW (ref 4.2–5.8)
RBC # BLD: 3.41 M/UL — LOW (ref 4.2–5.8)
RBC # FLD: 14.4 % — SIGNIFICANT CHANGE UP (ref 10.3–14.5)
RBC # FLD: 14.5 % — SIGNIFICANT CHANGE UP (ref 10.3–14.5)
SAO2 % BLDA: 100 % — HIGH (ref 95–99)
SAO2 % BLDA: 99.9 % — HIGH (ref 95–99)
SODIUM BLDA-SCNC: 129 MMOL/L — LOW (ref 136–146)
SODIUM BLDA-SCNC: 130 MMOL/L — LOW (ref 136–146)
SODIUM SERPL-SCNC: 131 MMOL/L — LOW (ref 135–145)
SODIUM SERPL-SCNC: 132 MMOL/L — LOW (ref 135–145)
SPECIMEN SOURCE: SIGNIFICANT CHANGE UP
SPECIMEN SOURCE: SIGNIFICANT CHANGE UP
WBC # BLD: 4.01 K/UL — SIGNIFICANT CHANGE UP (ref 3.8–10.5)
WBC # BLD: 9.34 K/UL — SIGNIFICANT CHANGE UP (ref 3.8–10.5)
WBC # FLD AUTO: 4.01 K/UL — SIGNIFICANT CHANGE UP (ref 3.8–10.5)
WBC # FLD AUTO: 9.34 K/UL — SIGNIFICANT CHANGE UP (ref 3.8–10.5)

## 2019-04-03 PROCEDURE — 99233 SBSQ HOSP IP/OBS HIGH 50: CPT

## 2019-04-03 PROCEDURE — 93010 ELECTROCARDIOGRAM REPORT: CPT | Mod: 76

## 2019-04-03 RX ORDER — ACETAMINOPHEN 500 MG
650 TABLET ORAL EVERY 6 HOURS
Qty: 0 | Refills: 0 | Status: DISCONTINUED | OUTPATIENT
Start: 2019-04-03 | End: 2019-04-09

## 2019-04-03 RX ORDER — SODIUM CHLORIDE 9 MG/ML
3 INJECTION INTRAMUSCULAR; INTRAVENOUS; SUBCUTANEOUS EVERY 8 HOURS
Qty: 0 | Refills: 0 | Status: DISCONTINUED | OUTPATIENT
Start: 2019-04-03 | End: 2019-04-09

## 2019-04-03 RX ORDER — HEPARIN SODIUM 5000 [USP'U]/ML
1100 INJECTION INTRAVENOUS; SUBCUTANEOUS
Qty: 25000 | Refills: 0 | Status: DISCONTINUED | OUTPATIENT
Start: 2019-04-03 | End: 2019-04-03

## 2019-04-03 RX ORDER — HEPARIN SODIUM 5000 [USP'U]/ML
5000 INJECTION INTRAVENOUS; SUBCUTANEOUS EVERY 8 HOURS
Qty: 0 | Refills: 0 | Status: DISCONTINUED | OUTPATIENT
Start: 2019-04-03 | End: 2019-04-05

## 2019-04-03 RX ORDER — MAGNESIUM SULFATE 500 MG/ML
2 VIAL (ML) INJECTION ONCE
Qty: 0 | Refills: 0 | Status: COMPLETED | OUTPATIENT
Start: 2019-04-03 | End: 2019-04-03

## 2019-04-03 RX ADMIN — SODIUM CHLORIDE 3 MILLILITER(S): 9 INJECTION INTRAMUSCULAR; INTRAVENOUS; SUBCUTANEOUS at 22:04

## 2019-04-03 RX ADMIN — Medication 100 MILLIGRAM(S): at 22:03

## 2019-04-03 RX ADMIN — Medication 0.25 MILLIGRAM(S): at 05:49

## 2019-04-03 RX ADMIN — AMPICILLIN SODIUM AND SULBACTAM SODIUM 200 GRAM(S): 250; 125 INJECTION, POWDER, FOR SUSPENSION INTRAMUSCULAR; INTRAVENOUS at 22:02

## 2019-04-03 RX ADMIN — SODIUM CHLORIDE 1 GRAM(S): 9 INJECTION INTRAMUSCULAR; INTRAVENOUS; SUBCUTANEOUS at 05:49

## 2019-04-03 RX ADMIN — HEPARIN SODIUM 11 UNIT(S)/HR: 5000 INJECTION INTRAVENOUS; SUBCUTANEOUS at 09:24

## 2019-04-03 RX ADMIN — AMPICILLIN SODIUM AND SULBACTAM SODIUM 200 GRAM(S): 250; 125 INJECTION, POWDER, FOR SUSPENSION INTRAMUSCULAR; INTRAVENOUS at 00:00

## 2019-04-03 RX ADMIN — AMPICILLIN SODIUM AND SULBACTAM SODIUM 200 GRAM(S): 250; 125 INJECTION, POWDER, FOR SUSPENSION INTRAMUSCULAR; INTRAVENOUS at 05:49

## 2019-04-03 RX ADMIN — SODIUM CHLORIDE 75 MILLILITER(S): 9 INJECTION INTRAMUSCULAR; INTRAVENOUS; SUBCUTANEOUS at 00:00

## 2019-04-03 RX ADMIN — ATORVASTATIN CALCIUM 20 MILLIGRAM(S): 80 TABLET, FILM COATED ORAL at 22:03

## 2019-04-03 RX ADMIN — HEPARIN SODIUM 5000 UNIT(S): 5000 INJECTION INTRAVENOUS; SUBCUTANEOUS at 22:02

## 2019-04-03 RX ADMIN — LOSARTAN POTASSIUM 25 MILLIGRAM(S): 100 TABLET, FILM COATED ORAL at 05:50

## 2019-04-03 RX ADMIN — Medication 50 GRAM(S): at 09:23

## 2019-04-03 RX ADMIN — SENNA PLUS 2 TABLET(S): 8.6 TABLET ORAL at 22:03

## 2019-04-03 NOTE — CHART NOTE - NSCHARTNOTEFT_GEN_A_CORE
SUBJECTIVE:    Patient seen and examined in PACU s/p L PSA repair; doing well.   pain is controlled, requesting PO. VSS.     OBJECTIVE:     ** VITAL SIGNS / I&O's **    T(C): 36.2 (04-03-19 @ 21:00), Max: 36.7 (04-03-19 @ 11:01)  T(F): 97.2 (04-03-19 @ 21:00), Max: 98.1 (04-03-19 @ 11:01)  HR: 62 (04-03-19 @ 22:00) (51 - 85)  BP: 132/62 (04-03-19 @ 22:00) (105/62 - 167/61)  RR: 13 (04-03-19 @ 22:00) (10 - 18)  SpO2: 100% (04-03-19 @ 22:00) (97% - 100%)      02 Apr 2019 07:01  -  03 Apr 2019 07:00  --------------------------------------------------------  IN:    heparin  Infusion.: 180 mL    IV PiggyBack: 450 mL    Oral Fluid: 650 mL    sodium chloride 0.9%.: 375 mL  Total IN: 1655 mL    OUT:    Indwelling Catheter - Urethral: 2725 mL  Total OUT: 2725 mL    Total NET: -1070 mL      03 Apr 2019 07:01  -  03 Apr 2019 23:04  --------------------------------------------------------  IN:    Oral Fluid: 100 mL    sodium chloride 0.9%.: 300 mL  Total IN: 400 mL    OUT:    Indwelling Catheter - Urethral: 110 mL  Total OUT: 110 mL    Total NET: 290 mL        ** PHYSICAL EXAM **    >> General: No acute distress.  >> Neck: Supple.  >> Cardiovascular: RRR.   >> Lungs: Bilateral breath sounds.   >> Abdomen: Soft. Non-tender. Non-distended.  >> Extremities: Dressing to L groin CDI, no collections. Prevena vac in place holding suction, SS output. + AT signal LLE    ** LABS **                 9.7    9.34   )----------(  139       ( 03 Apr 2019 19:30 )               29.5      132    |  100    |  11     ----------------------------<  146        ( 03 Apr 2019 19:30 )  4.7     |  24     |  0.63     Ca    8.4        ( 03 Apr 2019 19:30 )  Phos  2.7       ( 03 Apr 2019 19:30 )  Mg     1.7       ( 03 Apr 2019 19:30 )      73 y/o M s/p L PSA repair POD 0, Doing well:     - Regular diet   - Continue to lay flat 8 hours following procedure   - Pain control  - Monitor pulse  - Dressing care   - IV Abx  - Hold heparin gtt  - DVT ppx/ASA  - Okay to transfer to floor after 8 hours in PACU       Vascular Surgery   [id number]

## 2019-04-03 NOTE — PROGRESS NOTE ADULT - SUBJECTIVE AND OBJECTIVE BOX
Patient is a 74y old  Male who presents with a chief complaint of Possible mycotic aneurysm (02 Apr 2019 10:25)      SUBJECTIVE / OVERNIGHT EVENTS: No complaints, denies any CP or SOB    MEDICATIONS  (STANDING):  ampicillin/sulbactam  IVPB      ampicillin/sulbactam  IVPB 3 Gram(s) IV Intermittent every 6 hours  aspirin enteric coated 325 milliGRAM(s) Oral daily  atorvastatin 20 milliGRAM(s) Oral at bedtime  cholecalciferol 2000 Unit(s) Oral daily  cyanocobalamin 2000 MICROGram(s) Oral daily  dextrose 5%. 1000 milliLiter(s) (50 mL/Hr) IV Continuous <Continuous>  dextrose 50% Injectable 12.5 Gram(s) IV Push once  dextrose 50% Injectable 25 Gram(s) IV Push once  dextrose 50% Injectable 25 Gram(s) IV Push once  digoxin     Tablet 0.25 milliGRAM(s) Oral daily  docusate sodium 100 milliGRAM(s) Oral three times a day  glycerin Suppository - Adult 1 Suppository(s) Rectal at bedtime  heparin  Infusion 1100 Unit(s)/Hr (11 mL/Hr) IV Continuous <Continuous>  insulin lispro (HumaLOG) corrective regimen sliding scale   SubCutaneous three times a day before meals  insulin lispro (HumaLOG) corrective regimen sliding scale   SubCutaneous at bedtime  losartan 25 milliGRAM(s) Oral daily  metoprolol succinate  milliGRAM(s) Oral daily  polyethylene glycol 3350 17 Gram(s) Oral daily  senna 2 Tablet(s) Oral at bedtime  sodium chloride 1 Gram(s) Oral two times a day  sodium chloride 0.9%. 1000 milliLiter(s) (75 mL/Hr) IV Continuous <Continuous>    MEDICATIONS  (PRN):  dextrose 40% Gel 15 Gram(s) Oral once PRN Blood Glucose LESS THAN 70 milliGRAM(s)/deciliter  glucagon  Injectable 1 milliGRAM(s) IntraMuscular once PRN Glucose LESS THAN 70 milligrams/deciliter      Vital Signs Last 24 Hrs  T(C): 36.3 (03 Apr 2019 05:46), Max: 36.5 (02 Apr 2019 10:50)  T(F): 97.4 (03 Apr 2019 05:46), Max: 97.7 (02 Apr 2019 10:50)  HR: 57 (03 Apr 2019 05:46) (52 - 58)  BP: 148/64 (03 Apr 2019 05:46) (148/64 - 156/69)  BP(mean): --  RR: 18 (03 Apr 2019 05:46) (17 - 18)  SpO2: 100% (03 Apr 2019 05:46) (98% - 100%)  CAPILLARY BLOOD GLUCOSE      POCT Blood Glucose.: 115 mg/dL (03 Apr 2019 08:58)  POCT Blood Glucose.: 147 mg/dL (02 Apr 2019 21:30)  POCT Blood Glucose.: 132 mg/dL (02 Apr 2019 18:10)  POCT Blood Glucose.: 260 mg/dL (02 Apr 2019 13:00)  POCT Blood Glucose.: 285 mg/dL (02 Apr 2019 12:26)  POCT Blood Glucose.: 406 mg/dL (02 Apr 2019 12:22)    I&O's Summary    02 Apr 2019 07:01  -  03 Apr 2019 07:00  --------------------------------------------------------  IN: 1655 mL / OUT: 2725 mL / NET: -1070 mL        PHYSICAL EXAM:  GENERAL: NAD, well-developed, thin  HEAD:  Atraumatic, Normocephalic  EYES: EOMI, PERRLA, conjunctiva and sclera clear  NECK: Supple, No JVD  CHEST/LUNG: Clear to auscultation bilaterally; No wheeze  HEART: irregular at 60's ; No murmurs, rubs, or gallops  ABDOMEN: Soft, Nontender, Nondistended; Bowel sounds present  EXTREMITIES:  2+ Peripheral Pulses, No clubbing, cyanosis, or edema  PSYCH: AAOx3  NEUROLOGY: non-focal  SKIN: No rashes or lesions    LABS:                        8.5    4.01  )-----------( 170      ( 03 Apr 2019 05:30 )             27.3     04-03    131<L>  |  96<L>  |  13  ----------------------------<  113<H>  4.5   |  26  |  0.63    Ca    8.7      03 Apr 2019 05:30  Phos  2.8     04-03  Mg     1.5     04-03    TPro  6.4  /  Alb  3.1<L>  /  TBili  0.2  /  DBili  x   /  AST  13  /  ALT  11  /  AlkPhos  67  04-02    PT/INR - ( 03 Apr 2019 05:30 )   PT: 13.9 SEC;   INR: 1.21          PTT - ( 03 Apr 2019 05:30 )  PTT:68.7 SEC          RADIOLOGY & ADDITIONAL TESTS:    Imaging Personally Reviewed:    Consultant(s) Notes Reviewed:      Care Discussed with Consultants/Other Providers:

## 2019-04-03 NOTE — PROGRESS NOTE ADULT - SUBJECTIVE AND OBJECTIVE BOX
SUBJECTIVE:    Patient seen and examined, no acute issues overnight.   Remains NPO/IVF for OR today. No complaints.     OBJECTIVE:     ** VITAL SIGNS / I&O's **    T(C): 36.7 (04-03-19 @ 11:01), Max: 36.7 (04-03-19 @ 11:01)  T(F): 98.1 (04-03-19 @ 11:01), Max: 98.1 (04-03-19 @ 11:01)  HR: 53 (04-03-19 @ 11:01) (51 - 57)  BP: 167/61 (04-03-19 @ 11:01) (148/64 - 167/61)  RR: 16 (04-03-19 @ 11:01) (16 - 18)  SpO2: 99% (04-03-19 @ 11:01) (98% - 100%)      02 Apr 2019 07:01  -  03 Apr 2019 07:00  --------------------------------------------------------  IN:    heparin  Infusion.: 180 mL    IV PiggyBack: 450 mL    Oral Fluid: 650 mL    sodium chloride 0.9%.: 375 mL  Total IN: 1655 mL    OUT:    Indwelling Catheter - Urethral: 2725 mL  Total OUT: 2725 mL    Total NET: -1070 mL          ** PHYSICAL EXAM **    Gen: NAD  LS: nml respiratory effort  Card: pulse regularly present  GI: abd soft, nontender  Ext: Palpable L femoral pseudoaneurysm with thrill    ** LABS **                 8.5    4.01   )----------(  170       ( 03 Apr 2019 05:30 )               27.3      131    |  96     |  13     ----------------------------<  113        ( 03 Apr 2019 05:30 )  4.5     |  26     |  0.63     Ca    8.7        ( 03 Apr 2019 05:30 )  Phos  2.8       ( 03 Apr 2019 05:30 )  Mg     1.5       ( 03 Apr 2019 05:30 )      PT/INR -  13.9 SEC / 1.21    ( 03 Apr 2019 05:30 )       PTT -  68.7 SEC   ( 03 Apr 2019 05:30 )  CAPILLARY BLOOD GLUCOSE

## 2019-04-03 NOTE — PROGRESS NOTE ADULT - ASSESSMENT
75 y/o M from [facility] found to have L femoral pseudoaneurysm, being treated for UTI with IV Abx:     Plan  - Pain control PRN  - Consistent carb diet  - Continue Ceftriaxone through today for UTI   - No need to interrogate AICD prior to OR per CRDs  - C/w heparin gtt  - NPO/IVF until OR today for excision of L femoral pseudoaneurysm    - FU labs, consent in chart     Vascular Surgery   [id number]

## 2019-04-03 NOTE — PROGRESS NOTE ADULT - PROBLEM SELECTOR PLAN 1
(+) left femoral pseudoaneurysm.  management as per vascular surgery  -for OR today. Cleared by Cardiology

## 2019-04-03 NOTE — BRIEF OPERATIVE NOTE - OPERATION/FINDINGS
left leg angiogram via proximal SFA retrograde access, large pseudoaneurysm seen off of previous femoral patch, left groin cutdown, excision of dacron patch evacuation of hematoma, excision of common femoral artery, harvest left GSV, end to end anastomosis from external iliac artery to distal common femoral artery, sartorius muscle flap

## 2019-04-04 LAB
ANION GAP SERPL CALC-SCNC: 12 MMO/L — SIGNIFICANT CHANGE UP (ref 7–14)
BUN SERPL-MCNC: 16 MG/DL — SIGNIFICANT CHANGE UP (ref 7–23)
CALCIUM SERPL-MCNC: 8.8 MG/DL — SIGNIFICANT CHANGE UP (ref 8.4–10.5)
CHLORIDE SERPL-SCNC: 98 MMOL/L — SIGNIFICANT CHANGE UP (ref 98–107)
CO2 SERPL-SCNC: 24 MMOL/L — SIGNIFICANT CHANGE UP (ref 22–31)
CREAT SERPL-MCNC: 0.72 MG/DL — SIGNIFICANT CHANGE UP (ref 0.5–1.3)
CULTURE - ACID FAST SMEAR CONCENTRATED: SIGNIFICANT CHANGE UP
CULTURE - ACID FAST SMEAR CONCENTRATED: SIGNIFICANT CHANGE UP
GLUCOSE SERPL-MCNC: 263 MG/DL — HIGH (ref 70–99)
HCT VFR BLD CALC: 33.1 % — LOW (ref 39–50)
HGB BLD-MCNC: 10.4 G/DL — LOW (ref 13–17)
MAGNESIUM SERPL-MCNC: 1.8 MG/DL — SIGNIFICANT CHANGE UP (ref 1.6–2.6)
MCHC RBC-ENTMCNC: 27.7 PG — SIGNIFICANT CHANGE UP (ref 27–34)
MCHC RBC-ENTMCNC: 31.4 % — LOW (ref 32–36)
MCV RBC AUTO: 88 FL — SIGNIFICANT CHANGE UP (ref 80–100)
NRBC # FLD: 0 K/UL — SIGNIFICANT CHANGE UP (ref 0–0)
PHOSPHATE SERPL-MCNC: 2.6 MG/DL — SIGNIFICANT CHANGE UP (ref 2.5–4.5)
PLATELET # BLD AUTO: 154 K/UL — SIGNIFICANT CHANGE UP (ref 150–400)
PMV BLD: 10.8 FL — SIGNIFICANT CHANGE UP (ref 7–13)
POTASSIUM SERPL-MCNC: 5 MMOL/L — SIGNIFICANT CHANGE UP (ref 3.5–5.3)
POTASSIUM SERPL-SCNC: 5 MMOL/L — SIGNIFICANT CHANGE UP (ref 3.5–5.3)
RBC # BLD: 3.76 M/UL — LOW (ref 4.2–5.8)
RBC # FLD: 14.6 % — HIGH (ref 10.3–14.5)
SODIUM SERPL-SCNC: 134 MMOL/L — LOW (ref 135–145)
SPECIMEN SOURCE: SIGNIFICANT CHANGE UP
SPECIMEN SOURCE: SIGNIFICANT CHANGE UP
WBC # BLD: 18.81 K/UL — HIGH (ref 3.8–10.5)
WBC # FLD AUTO: 18.81 K/UL — HIGH (ref 3.8–10.5)

## 2019-04-04 PROCEDURE — 99233 SBSQ HOSP IP/OBS HIGH 50: CPT

## 2019-04-04 PROCEDURE — 99232 SBSQ HOSP IP/OBS MODERATE 35: CPT

## 2019-04-04 RX ORDER — INSULIN LISPRO 100/ML
VIAL (ML) SUBCUTANEOUS
Qty: 0 | Refills: 0 | Status: DISCONTINUED | OUTPATIENT
Start: 2019-04-04 | End: 2019-04-09

## 2019-04-04 RX ORDER — MAGNESIUM SULFATE 500 MG/ML
2 VIAL (ML) INJECTION ONCE
Qty: 0 | Refills: 0 | Status: COMPLETED | OUTPATIENT
Start: 2019-04-04 | End: 2019-04-04

## 2019-04-04 RX ADMIN — Medication 2: at 10:01

## 2019-04-04 RX ADMIN — Medication 2: at 18:29

## 2019-04-04 RX ADMIN — Medication 2000 UNIT(S): at 11:14

## 2019-04-04 RX ADMIN — SODIUM CHLORIDE 1 GRAM(S): 9 INJECTION INTRAMUSCULAR; INTRAVENOUS; SUBCUTANEOUS at 05:25

## 2019-04-04 RX ADMIN — Medication 325 MILLIGRAM(S): at 11:13

## 2019-04-04 RX ADMIN — SODIUM CHLORIDE 1 GRAM(S): 9 INJECTION INTRAMUSCULAR; INTRAVENOUS; SUBCUTANEOUS at 17:01

## 2019-04-04 RX ADMIN — AMPICILLIN SODIUM AND SULBACTAM SODIUM 200 GRAM(S): 250; 125 INJECTION, POWDER, FOR SUSPENSION INTRAMUSCULAR; INTRAVENOUS at 10:00

## 2019-04-04 RX ADMIN — PREGABALIN 2000 MICROGRAM(S): 225 CAPSULE ORAL at 17:29

## 2019-04-04 RX ADMIN — SODIUM CHLORIDE 1 GRAM(S): 9 INJECTION INTRAMUSCULAR; INTRAVENOUS; SUBCUTANEOUS at 17:30

## 2019-04-04 RX ADMIN — SODIUM CHLORIDE 3 MILLILITER(S): 9 INJECTION INTRAMUSCULAR; INTRAVENOUS; SUBCUTANEOUS at 11:26

## 2019-04-04 RX ADMIN — ATORVASTATIN CALCIUM 20 MILLIGRAM(S): 80 TABLET, FILM COATED ORAL at 21:52

## 2019-04-04 RX ADMIN — SODIUM CHLORIDE 3 MILLILITER(S): 9 INJECTION INTRAMUSCULAR; INTRAVENOUS; SUBCUTANEOUS at 21:47

## 2019-04-04 RX ADMIN — SODIUM CHLORIDE 3 MILLILITER(S): 9 INJECTION INTRAMUSCULAR; INTRAVENOUS; SUBCUTANEOUS at 05:23

## 2019-04-04 RX ADMIN — Medication 50 GRAM(S): at 14:51

## 2019-04-04 RX ADMIN — Medication 150 MILLIGRAM(S): at 05:24

## 2019-04-04 RX ADMIN — Medication 0.25 MILLIGRAM(S): at 05:25

## 2019-04-04 RX ADMIN — AMPICILLIN SODIUM AND SULBACTAM SODIUM 200 GRAM(S): 250; 125 INJECTION, POWDER, FOR SUSPENSION INTRAMUSCULAR; INTRAVENOUS at 05:24

## 2019-04-04 RX ADMIN — AMPICILLIN SODIUM AND SULBACTAM SODIUM 200 GRAM(S): 250; 125 INJECTION, POWDER, FOR SUSPENSION INTRAMUSCULAR; INTRAVENOUS at 17:30

## 2019-04-04 RX ADMIN — POLYETHYLENE GLYCOL 3350 17 GRAM(S): 17 POWDER, FOR SOLUTION ORAL at 10:00

## 2019-04-04 RX ADMIN — LOSARTAN POTASSIUM 25 MILLIGRAM(S): 100 TABLET, FILM COATED ORAL at 05:25

## 2019-04-04 RX ADMIN — HEPARIN SODIUM 5000 UNIT(S): 5000 INJECTION INTRAVENOUS; SUBCUTANEOUS at 17:30

## 2019-04-04 RX ADMIN — HEPARIN SODIUM 5000 UNIT(S): 5000 INJECTION INTRAVENOUS; SUBCUTANEOUS at 09:59

## 2019-04-04 NOTE — PROGRESS NOTE ADULT - PROBLEM SELECTOR PLAN 1
(+) left femoral pseudoaneurysm. S/P OR for resection of pseudoaneurysm, POD#1  -management as per vascular surgery  -pain control  -DVt prophylaxis  -OOB and PT eval

## 2019-04-04 NOTE — PROGRESS NOTE ADULT - ASSESSMENT
74 year old male history of atrial fibrillation AICD, diabetes type 2, hyperlipidemia, lung cancer ( status post radiation and chemo in remission 1997 ) presents as transfer from [facility] for eval for femoral artery pseudoaneurysm    CT with bilateral ana maria-nephric stranding; L femoral artery pseudoaneurysm  UA positive, UCX with Klebsiella S CTX  BCX negative  Completed treatment course for UTI/Pyelo with ceftriaxone 4/3/19    s/p OR for excision of patch evacuation of hematoma, GSV graft, sartorius muscle flap 4/3/19    On unasyn for enterococcus UTI  Remains with bolivar  Afebrile  Leukocytosis suspect reactive to procedure    Recommend:  -Complete unasyn tomorrow for UTI  -Trend WBC  -F/U OR cultures - so far no growth

## 2019-04-04 NOTE — PROGRESS NOTE ADULT - PROBLEM SELECTOR PLAN 3
A1C 5.9, FS elevated post op.  -Consistent carbohydrate diet  -FSSS with Insulin coverage  -Monitor FS  -consider basal insulin if FS persistently elevated.

## 2019-04-04 NOTE — PROGRESS NOTE ADULT - SUBJECTIVE AND OBJECTIVE BOX
C-Team Surgery Progress Note     Subjective/24hour Events: Underwent excision of L fem art & patch w/ interposition vein graft & sartorius flap yesterday. Pain controlled. Tolerating consistent carb diet. No N/V. No CP or SOB.    Vital Signs:  Vital Signs Last 24 Hrs  T(C): 36.2 (04 Apr 2019 00:00), Max: 36.7 (03 Apr 2019 11:01)  T(F): 97.2 (04 Apr 2019 00:00), Max: 98.1 (03 Apr 2019 11:01)  HR: 67 (04 Apr 2019 01:00) (51 - 85)  BP: 103/60 (04 Apr 2019 01:00) (100/52 - 167/61)  BP(mean): 66 (04 Apr 2019 01:00) (63 - 81)  RR: 15 (04 Apr 2019 01:00) (10 - 18)  SpO2: 100% (04 Apr 2019 01:00) (97% - 100%)    CAPILLARY BLOOD GLUCOSE      POCT Blood Glucose.: 242 mg/dL (04 Apr 2019 00:57)  POCT Blood Glucose.: 115 mg/dL (03 Apr 2019 08:58)      I&O's Detail    02 Apr 2019 07:01  -  03 Apr 2019 07:00  --------------------------------------------------------  IN:    heparin  Infusion.: 180 mL    IV PiggyBack: 450 mL    Oral Fluid: 650 mL    sodium chloride 0.9%.: 375 mL  Total IN: 1655 mL    OUT:    Indwelling Catheter - Urethral: 2725 mL  Total OUT: 2725 mL    Total NET: -1070 mL      03 Apr 2019 07:01  -  04 Apr 2019 05:01  --------------------------------------------------------  IN:    IV PiggyBack: 100 mL    Oral Fluid: 400 mL    sodium chloride 0.9%.: 525 mL  Total IN: 1025 mL    OUT:    Bulb: 45 mL    Indwelling Catheter - Urethral: 280 mL  Total OUT: 325 mL    Total NET: 700 mL            MEDICATIONS  (STANDING):  ampicillin/sulbactam  IVPB      ampicillin/sulbactam  IVPB 3 Gram(s) IV Intermittent every 6 hours  aspirin enteric coated 325 milliGRAM(s) Oral daily  atorvastatin 20 milliGRAM(s) Oral at bedtime  cholecalciferol 2000 Unit(s) Oral daily  cyanocobalamin 2000 MICROGram(s) Oral daily  dextrose 5%. 1000 milliLiter(s) (50 mL/Hr) IV Continuous <Continuous>  dextrose 50% Injectable 12.5 Gram(s) IV Push once  dextrose 50% Injectable 25 Gram(s) IV Push once  dextrose 50% Injectable 25 Gram(s) IV Push once  digoxin     Tablet 0.25 milliGRAM(s) Oral daily  docusate sodium 100 milliGRAM(s) Oral three times a day  glycerin Suppository - Adult 1 Suppository(s) Rectal at bedtime  heparin  Injectable 5000 Unit(s) SubCutaneous every 8 hours  insulin lispro (HumaLOG) corrective regimen sliding scale   SubCutaneous three times a day before meals  insulin lispro (HumaLOG) corrective regimen sliding scale   SubCutaneous at bedtime  losartan 25 milliGRAM(s) Oral daily  metoprolol succinate  milliGRAM(s) Oral daily  polyethylene glycol 3350 17 Gram(s) Oral daily  senna 2 Tablet(s) Oral at bedtime  sodium chloride 1 Gram(s) Oral two times a day  sodium chloride 0.9% lock flush 3 milliLiter(s) IV Push every 8 hours  sodium chloride 0.9%. 1000 milliLiter(s) (75 mL/Hr) IV Continuous <Continuous>    MEDICATIONS  (PRN):  acetaminophen   Tablet .. 650 milliGRAM(s) Oral every 6 hours PRN Mild Pain (1 - 3)  dextrose 40% Gel 15 Gram(s) Oral once PRN Blood Glucose LESS THAN 70 milliGRAM(s)/deciliter  glucagon  Injectable 1 milliGRAM(s) IntraMuscular once PRN Glucose LESS THAN 70 milligrams/deciliter        Physical Exam:  Gen: NAD  LS: nml respiratory effort  Card: pulse regularly present  GI: abd soft, nontender  Ext: warm      Labs:    04-03    132<L>  |  100  |  11  ----------------------------<  146<H>  4.7   |  24  |  0.63    Ca    8.4      03 Apr 2019 19:30  Phos  2.7     04-03  Mg     1.7     04-03    TPro  6.4  /  Alb  3.1<L>  /  TBili  0.2  /  DBili  x   /  AST  13  /  ALT  11  /  AlkPhos  67  04-02    LIVER FUNCTIONS - ( 02 Apr 2019 06:16 )  Alb: 3.1 g/dL / Pro: 6.4 g/dL / ALK PHOS: 67 u/L / ALT: 11 u/L / AST: 13 u/L / GGT: x                                 9.7    9.34  )-----------( 139      ( 03 Apr 2019 19:30 )             29.5     PT/INR - ( 03 Apr 2019 23:00 )   PT: 13.8 SEC;   INR: 1.20          PTT - ( 03 Apr 2019 23:00 )  PTT:36.9 SEC C-Team Surgery Progress Note     Subjective/24hour Events: Underwent excision of L fem art & patch w/ interposition vein graft & sartorius flap yesterday. Pain controlled. Tolerating consistent carb diet. No N/V. No CP or SOB.    Vital Signs:  Vital Signs Last 24 Hrs  T(C): 36.2 (04 Apr 2019 00:00), Max: 36.7 (03 Apr 2019 11:01)  T(F): 97.2 (04 Apr 2019 00:00), Max: 98.1 (03 Apr 2019 11:01)  HR: 67 (04 Apr 2019 01:00) (51 - 85)  BP: 103/60 (04 Apr 2019 01:00) (100/52 - 167/61)  BP(mean): 66 (04 Apr 2019 01:00) (63 - 81)  RR: 15 (04 Apr 2019 01:00) (10 - 18)  SpO2: 100% (04 Apr 2019 01:00) (97% - 100%)    CAPILLARY BLOOD GLUCOSE      POCT Blood Glucose.: 242 mg/dL (04 Apr 2019 00:57)  POCT Blood Glucose.: 115 mg/dL (03 Apr 2019 08:58)      I&O's Detail    02 Apr 2019 07:01  -  03 Apr 2019 07:00  --------------------------------------------------------  IN:    heparin  Infusion.: 180 mL    IV PiggyBack: 450 mL    Oral Fluid: 650 mL    sodium chloride 0.9%.: 375 mL  Total IN: 1655 mL    OUT:    Indwelling Catheter - Urethral: 2725 mL  Total OUT: 2725 mL    Total NET: -1070 mL      03 Apr 2019 07:01  -  04 Apr 2019 05:01  --------------------------------------------------------  IN:    IV PiggyBack: 100 mL    Oral Fluid: 400 mL    sodium chloride 0.9%.: 525 mL  Total IN: 1025 mL    OUT:    Bulb: 45 mL    Indwelling Catheter - Urethral: 280 mL  Total OUT: 325 mL    Total NET: 700 mL            MEDICATIONS  (STANDING):  ampicillin/sulbactam  IVPB      ampicillin/sulbactam  IVPB 3 Gram(s) IV Intermittent every 6 hours  aspirin enteric coated 325 milliGRAM(s) Oral daily  atorvastatin 20 milliGRAM(s) Oral at bedtime  cholecalciferol 2000 Unit(s) Oral daily  cyanocobalamin 2000 MICROGram(s) Oral daily  dextrose 5%. 1000 milliLiter(s) (50 mL/Hr) IV Continuous <Continuous>  dextrose 50% Injectable 12.5 Gram(s) IV Push once  dextrose 50% Injectable 25 Gram(s) IV Push once  dextrose 50% Injectable 25 Gram(s) IV Push once  digoxin     Tablet 0.25 milliGRAM(s) Oral daily  docusate sodium 100 milliGRAM(s) Oral three times a day  glycerin Suppository - Adult 1 Suppository(s) Rectal at bedtime  heparin  Injectable 5000 Unit(s) SubCutaneous every 8 hours  insulin lispro (HumaLOG) corrective regimen sliding scale   SubCutaneous three times a day before meals  insulin lispro (HumaLOG) corrective regimen sliding scale   SubCutaneous at bedtime  losartan 25 milliGRAM(s) Oral daily  metoprolol succinate  milliGRAM(s) Oral daily  polyethylene glycol 3350 17 Gram(s) Oral daily  senna 2 Tablet(s) Oral at bedtime  sodium chloride 1 Gram(s) Oral two times a day  sodium chloride 0.9% lock flush 3 milliLiter(s) IV Push every 8 hours  sodium chloride 0.9%. 1000 milliLiter(s) (75 mL/Hr) IV Continuous <Continuous>    MEDICATIONS  (PRN):  acetaminophen   Tablet .. 650 milliGRAM(s) Oral every 6 hours PRN Mild Pain (1 - 3)  dextrose 40% Gel 15 Gram(s) Oral once PRN Blood Glucose LESS THAN 70 milliGRAM(s)/deciliter  glucagon  Injectable 1 milliGRAM(s) IntraMuscular once PRN Glucose LESS THAN 70 milligrams/deciliter        Physical Exam:  Gen: NAD  LS: nml respiratory effort  Card: pulse regularly present  GI: abd soft, nontender  Groin: left groin w/ prevena VAC in place, DAGO serosang  Ext: warm      Labs:    04-03    132<L>  |  100  |  11  ----------------------------<  146<H>  4.7   |  24  |  0.63    Ca    8.4      03 Apr 2019 19:30  Phos  2.7     04-03  Mg     1.7     04-03    TPro  6.4  /  Alb  3.1<L>  /  TBili  0.2  /  DBili  x   /  AST  13  /  ALT  11  /  AlkPhos  67  04-02    LIVER FUNCTIONS - ( 02 Apr 2019 06:16 )  Alb: 3.1 g/dL / Pro: 6.4 g/dL / ALK PHOS: 67 u/L / ALT: 11 u/L / AST: 13 u/L / GGT: x                                 9.7    9.34  )-----------( 139      ( 03 Apr 2019 19:30 )             29.5     PT/INR - ( 03 Apr 2019 23:00 )   PT: 13.8 SEC;   INR: 1.20          PTT - ( 03 Apr 2019 23:00 )  PTT:36.9 SEC

## 2019-04-04 NOTE — PROGRESS NOTE ADULT - SUBJECTIVE AND OBJECTIVE BOX
Patient is a 74y old  Male who presents with a chief complaint of Possible mycotic aneurysm (04 Apr 2019 05:00)      SUBJECTIVE / OVERNIGHT EVENTS: No complaints. Appetite improved.     MEDICATIONS  (STANDING):  ampicillin/sulbactam  IVPB      ampicillin/sulbactam  IVPB 3 Gram(s) IV Intermittent every 6 hours  aspirin enteric coated 325 milliGRAM(s) Oral daily  atorvastatin 20 milliGRAM(s) Oral at bedtime  cholecalciferol 2000 Unit(s) Oral daily  cyanocobalamin 2000 MICROGram(s) Oral daily  dextrose 5%. 1000 milliLiter(s) (50 mL/Hr) IV Continuous <Continuous>  dextrose 50% Injectable 12.5 Gram(s) IV Push once  dextrose 50% Injectable 25 Gram(s) IV Push once  dextrose 50% Injectable 25 Gram(s) IV Push once  digoxin     Tablet 0.25 milliGRAM(s) Oral daily  docusate sodium 100 milliGRAM(s) Oral three times a day  glycerin Suppository - Adult 1 Suppository(s) Rectal at bedtime  heparin  Injectable 5000 Unit(s) SubCutaneous every 8 hours  insulin lispro (HumaLOG) corrective regimen sliding scale   SubCutaneous three times a day before meals  insulin lispro (HumaLOG) corrective regimen sliding scale   SubCutaneous at bedtime  losartan 25 milliGRAM(s) Oral daily  metoprolol succinate  milliGRAM(s) Oral daily  polyethylene glycol 3350 17 Gram(s) Oral daily  senna 2 Tablet(s) Oral at bedtime  sodium chloride 1 Gram(s) Oral two times a day  sodium chloride 0.9% lock flush 3 milliLiter(s) IV Push every 8 hours  sodium chloride 0.9%. 1000 milliLiter(s) (75 mL/Hr) IV Continuous <Continuous>    MEDICATIONS  (PRN):  acetaminophen   Tablet .. 650 milliGRAM(s) Oral every 6 hours PRN Mild Pain (1 - 3)  dextrose 40% Gel 15 Gram(s) Oral once PRN Blood Glucose LESS THAN 70 milliGRAM(s)/deciliter  glucagon  Injectable 1 milliGRAM(s) IntraMuscular once PRN Glucose LESS THAN 70 milligrams/deciliter      Vital Signs Last 24 Hrs  T(C): 36.4 (04 Apr 2019 05:20), Max: 36.7 (03 Apr 2019 11:01)  T(F): 97.5 (04 Apr 2019 05:20), Max: 98.1 (03 Apr 2019 11:01)  HR: 73 (04 Apr 2019 05:20) (51 - 85)  BP: 141/58 (04 Apr 2019 05:20) (100/52 - 167/61)  BP(mean): 66 (04 Apr 2019 01:00) (63 - 81)  RR: 16 (04 Apr 2019 05:20) (10 - 16)  SpO2: 100% (04 Apr 2019 05:20) (97% - 100%)  CAPILLARY BLOOD GLUCOSE      POCT Blood Glucose.: 241 mg/dL (04 Apr 2019 09:28)  POCT Blood Glucose.: 242 mg/dL (04 Apr 2019 00:57)    I&O's Summary    03 Apr 2019 07:01  -  04 Apr 2019 07:00  --------------------------------------------------------  IN: 1025 mL / OUT: 440 mL / NET: 585 mL        PHYSICAL EXAM:  GENERAL: NAD, well-developed  HEAD:  Atraumatic, Normocephalic  EYES: EOMI, PERRLA, conjunctiva and sclera clear  NECK: Supple, No JVD  CHEST/LUNG: Clear to auscultation bilaterally; No wheeze  HEART: Regular rate and rhythm; No murmurs, rubs, or gallops  ABDOMEN: Soft, Nontender, Nondistended; Bowel sounds present  EXTREMITIES:  2+ Peripheral Pulses, No clubbing, cyanosis, or edema Surgical site looks clean and dry. No erythema  PSYCH: AAOx3  NEUROLOGY: non-focal  SKIN: No rashes or lesions    LABS:                        9.7    9.34  )-----------( 139      ( 03 Apr 2019 19:30 )             29.5     04-03    132<L>  |  100  |  11  ----------------------------<  146<H>  4.7   |  24  |  0.63    Ca    8.4      03 Apr 2019 19:30  Phos  2.7     04-03  Mg     1.7     04-03      PT/INR - ( 03 Apr 2019 23:00 )   PT: 13.8 SEC;   INR: 1.20          PTT - ( 03 Apr 2019 23:00 )  PTT:36.9 SEC          RADIOLOGY & ADDITIONAL TESTS:    Imaging Personally Reviewed:    Consultant(s) Notes Reviewed:      Care Discussed with Consultants/Other Providers:

## 2019-04-04 NOTE — PROGRESS NOTE ADULT - ASSESSMENT
73yo M presented 3/27 from [facility]. w 2wk malaise, weakness, & stopped ambulating. Found to have + klebsiella UA (on rocephin). 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 Now s/p excision of L fem art & patch w/ interposition vein graft & sartorius flap on 4/3.    Plan  - Pain control: Tylenol 650mg PO q6h PRN  - Consistent carb diet  - C/w Unasyn abx   - DVT ppx: subQ heparin 75yo M presented 3/27 from [facility]. w 2wk malaise, weakness, & stopped ambulating. Found to have + klebsiella UA (on rocephin). 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 Now s/p excision of L fem art & patch w/ interposition vein graft & sartorius flap on 4/3.    Plan  - Pain control: Tylenol 650mg PO q6h PRN  - Consistent carb diet  - C/w Unasyn abx       - ID consult for abx plan  - Patient to lay flat for 48hr from 4/3 at 19:00  - DVT ppx: subQ heparin

## 2019-04-04 NOTE — DIETITIAN INITIAL EVALUATION ADULT. - NS AS NUTRI INTERV MEDICAL AND FOOD SUPPLEMENTS
Glucerna Therapeutic Nutrition 8oz. 1x daily (will provide additional ~220kcals, ~10g protein);/Commercial beverage

## 2019-04-04 NOTE — DIETITIAN INITIAL EVALUATION ADULT. - NS AS NUTRI INTERV MEALS SNACK
Other (specify)/Diets modified for specific foods and ingredients/1. Suggest: PO diet rx: Consistent Carbohydrate (no snacks); PO supplement: Glucerna Therapeutic Nutrition 8oz. 1x daily (will provide additional ~220kcals, ~10g protein); Fluid restriction per MD discretion;           2. Encourage & assist Pt with meals; Monitor PO diet tolerance; Honor food preferences;                3. Monitor labs, weekly weights, hydration status;

## 2019-04-04 NOTE — PROGRESS NOTE ADULT - SUBJECTIVE AND OBJECTIVE BOX
Interval History: Denies any chest pain, palpitations, dyspnea overnight. s/p OR yesterday. Feels well now.    Review Of Systems:  Constitutional: [ ] Fever [ ] Chills [ ] Fatigue [ ] Weight change   HEENT: [ ] Blurred vision [ ] Eye Pain [ ] Headache [ ] Runny nose [ ] Sore Throat   Respiratory: [ ] Cough [ ] Wheezing [ ] Shortness of breath  Cardiovascular: [ ] Chest Pain [ ] Palpitations [ ] OLIVARES [ ] PND [ ] Orthopnea  Gastrointestinal: [ ] Abdominal Pain [ ] Diarrhea [ ] Constipation [ ] Hemorrhoids [ ] Nausea [ ] Vomiting  Genitourinary: [ ] Nocturia [ ] Dysuria [ ] Incontinence  Extremities: [ ] Swelling [ ] Joint Pain  Neurologic: [ ] Focal deficit [ ] Paresthesias [ ] Syncope  Lymphatic: [ ] Swelling [ ] Lymphadenopathy   Skin: [ ] Rash [ ] Ecchymoses [ ] Wounds [ ] Lesions  Psychiatry: [ ] Depression [ ] Suicidal/Homicidal Ideation [ ] Anxiety [ ] Sleep Disturbances  [x ] 10 point review of systems is otherwise negative except as mentioned above            [ ]Unable to obtain    Medications:  acetaminophen   Tablet .. 650 milliGRAM(s) Oral every 6 hours PRN  ampicillin/sulbactam  IVPB      ampicillin/sulbactam  IVPB 3 Gram(s) IV Intermittent every 6 hours  aspirin enteric coated 325 milliGRAM(s) Oral daily  atorvastatin 20 milliGRAM(s) Oral at bedtime  cholecalciferol 2000 Unit(s) Oral daily  cyanocobalamin 2000 MICROGram(s) Oral daily  dextrose 40% Gel 15 Gram(s) Oral once PRN  dextrose 5%. 1000 milliLiter(s) IV Continuous <Continuous>  dextrose 50% Injectable 12.5 Gram(s) IV Push once  dextrose 50% Injectable 25 Gram(s) IV Push once  dextrose 50% Injectable 25 Gram(s) IV Push once  digoxin     Tablet 0.25 milliGRAM(s) Oral daily  docusate sodium 100 milliGRAM(s) Oral three times a day  glucagon  Injectable 1 milliGRAM(s) IntraMuscular once PRN  glycerin Suppository - Adult 1 Suppository(s) Rectal at bedtime  heparin  Injectable 5000 Unit(s) SubCutaneous every 8 hours  insulin lispro (HumaLOG) corrective regimen sliding scale   SubCutaneous three times a day before meals  insulin lispro (HumaLOG) corrective regimen sliding scale   SubCutaneous at bedtime  losartan 25 milliGRAM(s) Oral daily  metoprolol succinate  milliGRAM(s) Oral daily  polyethylene glycol 3350 17 Gram(s) Oral daily  senna 2 Tablet(s) Oral at bedtime  sodium chloride 1 Gram(s) Oral two times a day  sodium chloride 0.9% lock flush 3 milliLiter(s) IV Push every 8 hours  sodium chloride 0.9%. 1000 milliLiter(s) IV Continuous <Continuous>      Vitals:  ICU Vital Signs Last 24 Hrs  T(C): 36.4 (04 Apr 2019 05:20), Max: 36.7 (03 Apr 2019 11:01)  T(F): 97.5 (04 Apr 2019 05:20), Max: 98.1 (03 Apr 2019 11:01)  HR: 73 (04 Apr 2019 05:20) (53 - 85)  BP: 141/58 (04 Apr 2019 05:20) (100/52 - 167/61)  BP(mean): 66 (04 Apr 2019 01:00) (63 - 81)  ABP: 117/40 (04 Apr 2019 01:00) (111/36 - 144/57)  ABP(mean): 66 (04 Apr 2019 01:00) (60 - 85)  RR: 16 (04 Apr 2019 05:20) (10 - 16)  SpO2: 100% (04 Apr 2019 05:20) (97% - 100%)    Daily     Daily   I&O's Summary    03 Apr 2019 07:01  -  04 Apr 2019 07:00  --------------------------------------------------------  IN: 1025 mL / OUT: 440 mL / NET: 585 mL        Physical Exam:  Appearance:  NAD [  ] Intubated [  ] Sedated  HENT: NC/AT  Cardiovascular: S1, S2, [  ] LE Edema Present, [  ] JVD Present  Respiratory: basilar crackles,  [   ] Transmitted Breath Sounds From Vent/Trach  Gastrointestinal: Soft, Non-tender, Non-distended, BS+  Psychiatry: [ x ] AAOx3  [ x] Follows Commands  [   ] Unable to assess  Skin: Intact,  [  ] Line Sites CDI, [ x ] Wound sites CDI    Labs:                        9.7    9.34  )-----------( 139      ( 03 Apr 2019 19:30 )             29.5     04-03    132<L>  |  100  |  11  ----------------------------<  146<H>  4.7   |  24  |  0.63    Ca    8.4      03 Apr 2019 19:30  Phos  2.7     04-03  Mg     1.7     04-03      PT/INR - ( 03 Apr 2019 23:00 )   PT: 13.8 SEC;   INR: 1.20          PTT - ( 03 Apr 2019 23:00 )  PTT:36.9 SEC                Culture - Surg Site Aerob/Anaer w/Gm St (collected 04-03-19 @ 16:15)  Source: OTHER    Culture - Surg Site Aerob/Anaer w/Gm St (collected 04-03-19 @ 16:12)  Source: OTHER        ECG:    Echo:    Stress Testing:     Cath:    Imaging:    Interpretation of Telemetry:

## 2019-04-04 NOTE — PROGRESS NOTE ADULT - SUBJECTIVE AND OBJECTIVE BOX
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 Now with wound vac. Remains with bolivar. Has been on unasyn for enterococcus found in urine culture. No fevers. Leukocytosis. Overall, patient states he feels well.    Rest of ROS negative.    Allergies  No Known Allergies    ANTIMICROBIALS:  ampicillin/sulbactam  IVPB    ampicillin/sulbactam  IVPB 3 every 6 hours    OTHER MEDS:  acetaminophen   Tablet .. 650 milliGRAM(s) Oral every 6 hours PRN  aspirin enteric coated 325 milliGRAM(s) Oral daily  atorvastatin 20 milliGRAM(s) Oral at bedtime  cholecalciferol 2000 Unit(s) Oral daily  cyanocobalamin 2000 MICROGram(s) Oral daily  dextrose 40% Gel 15 Gram(s) Oral once PRN  dextrose 5%. 1000 milliLiter(s) IV Continuous <Continuous>  dextrose 50% Injectable 12.5 Gram(s) IV Push once  dextrose 50% Injectable 25 Gram(s) IV Push once  dextrose 50% Injectable 25 Gram(s) IV Push once  digoxin     Tablet 0.25 milliGRAM(s) Oral daily  docusate sodium 100 milliGRAM(s) Oral three times a day  glucagon  Injectable 1 milliGRAM(s) IntraMuscular once PRN  glycerin Suppository - Adult 1 Suppository(s) Rectal at bedtime  heparin  Injectable 5000 Unit(s) SubCutaneous every 8 hours  insulin lispro (HumaLOG) corrective regimen sliding scale   SubCutaneous three times a day before meals  insulin lispro (HumaLOG) corrective regimen sliding scale   SubCutaneous at bedtime  losartan 25 milliGRAM(s) Oral daily  metoprolol succinate  milliGRAM(s) Oral daily  polyethylene glycol 3350 17 Gram(s) Oral daily  senna 2 Tablet(s) Oral at bedtime  sodium chloride 1 Gram(s) Oral two times a day  sodium chloride 0.9% lock flush 3 milliLiter(s) IV Push every 8 hours    PE:    Vital Signs Last 24 Hrs  T(C): 36.7 (04 Apr 2019 10:20), Max: 36.7 (04 Apr 2019 10:20)  T(F): 98 (04 Apr 2019 10:20), Max: 98 (04 Apr 2019 10:20)  HR: 75 (04 Apr 2019 10:20) (60 - 85)  BP: 147/61 (04 Apr 2019 10:20) (100/52 - 147/61)  BP(mean): 66 (04 Apr 2019 01:00) (63 - 81)  RR: 18 (04 Apr 2019 10:20) (10 - 18)  SpO2: 96% (04 Apr 2019 10:20) (96% - 100%)    Gen: AOx3, NAD, non-toxic, pleasant  CV: S1+S2 normal, no murmurs,  Resp: Clear bilat, no resp distress, no crackles/wheezes  Abd: Soft, nontender, +BS  Ext: No LE edema, no wounds  : No Bolivar  IV/Skin: No thrombophlebitis  Neuro: no focal deficits    LABS:                          10.4   18.81 )-----------( 154      ( 04 Apr 2019 10:20 )             33.1       04-04    134<L>  |  98  |  16  ----------------------------<  263<H>  5.0   |  24  |  0.72    Ca    8.8      04 Apr 2019 10:20  Phos  2.6     04-04  Mg     1.8     04-04            MICROBIOLOGY:  v  OTHER  04-03-19 --  --  --      OTHER  04-03-19 --  --  --      BLOOD  03-29-19 --  --  --      BLOOD VENOUS  03-28-19 --  --  --      URINE CATHETER  03-28-19 --  --  Enterococcus faecalis      .Blood Blood-Peripheral  03-25-19   No growth at 5 days.  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 Now with wound vac. Remains with bolivar. Has been on unasyn for enterococcus found in urine culture. No fevers. Leukocytosis. Overall, patient states he feels well.    Rest of ROS negative.    Allergies  No Known Allergies    ANTIMICROBIALS:  ampicillin/sulbactam  IVPB    ampicillin/sulbactam  IVPB 3 every 6 hours    OTHER MEDS:  acetaminophen   Tablet .. 650 milliGRAM(s) Oral every 6 hours PRN  aspirin enteric coated 325 milliGRAM(s) Oral daily  atorvastatin 20 milliGRAM(s) Oral at bedtime  cholecalciferol 2000 Unit(s) Oral daily  cyanocobalamin 2000 MICROGram(s) Oral daily  dextrose 40% Gel 15 Gram(s) Oral once PRN  dextrose 5%. 1000 milliLiter(s) IV Continuous <Continuous>  dextrose 50% Injectable 12.5 Gram(s) IV Push once  dextrose 50% Injectable 25 Gram(s) IV Push once  dextrose 50% Injectable 25 Gram(s) IV Push once  digoxin     Tablet 0.25 milliGRAM(s) Oral daily  docusate sodium 100 milliGRAM(s) Oral three times a day  glucagon  Injectable 1 milliGRAM(s) IntraMuscular once PRN  glycerin Suppository - Adult 1 Suppository(s) Rectal at bedtime  heparin  Injectable 5000 Unit(s) SubCutaneous every 8 hours  insulin lispro (HumaLOG) corrective regimen sliding scale   SubCutaneous three times a day before meals  insulin lispro (HumaLOG) corrective regimen sliding scale   SubCutaneous at bedtime  losartan 25 milliGRAM(s) Oral daily  metoprolol succinate  milliGRAM(s) Oral daily  polyethylene glycol 3350 17 Gram(s) Oral daily  senna 2 Tablet(s) Oral at bedtime  sodium chloride 1 Gram(s) Oral two times a day  sodium chloride 0.9% lock flush 3 milliLiter(s) IV Push every 8 hours    PE:    Vital Signs Last 24 Hrs  T(C): 36.7 (04 Apr 2019 10:20), Max: 36.7 (04 Apr 2019 10:20)  T(F): 98 (04 Apr 2019 10:20), Max: 98 (04 Apr 2019 10:20)  HR: 75 (04 Apr 2019 10:20) (60 - 85)  BP: 147/61 (04 Apr 2019 10:20) (100/52 - 147/61)  BP(mean): 66 (04 Apr 2019 01:00) (63 - 81)  RR: 18 (04 Apr 2019 10:20) (10 - 18)  SpO2: 96% (04 Apr 2019 10:20) (96% - 100%)    Gen: Awake, alert, NAD, non-toxic, pleasant  CV: S1+S2 normal, no murmurs,  Resp: Clear bilat, no resp distress  Abd: Soft, nontender, +BS  Ext: Left groin with wound vac, DAGO drain with bloody drainage  : +bolivar  IV/Skin: No thrombophlebitis  Neuro: no focal deficits    LABS:                          10.4   18.81 )-----------( 154      ( 04 Apr 2019 10:20 )             33.1       04-04    134<L>  |  98  |  16  ----------------------------<  263<H>  5.0   |  24  |  0.72    Ca    8.8      04 Apr 2019 10:20  Phos  2.6     04-04  Mg     1.8     04-04            MICROBIOLOGY:  v  OTHER  04-03-19 --  --  --      OTHER  04-03-19 --  --  --      BLOOD  03-29-19 --  --  --      BLOOD VENOUS  03-28-19 --  --  --      URINE CATHETER  03-28-19 --  --  Enterococcus faecalis      .Blood Blood-Peripheral  03-25-19   No growth at 5 days.  --  --      .Urine Clean Catch (Midstream)  03-25-19   >100,000 CFU/ml Klebsiella pneumoniae  --  Klebsiella pneumoniae    RADIOLOGY:    < from: Xray Chest 1 View- PORTABLE-Urgent (04.02.19 @ 11:25) >  INTERPRETATION:     The heart is not enlarged. The thoracic aorta is calcified. There is a   left chest wall ICD.  There is right apical opacity which isof indeterminate nature.  There are increased reticular opacities at the right base.  No pleural effusion or pneumothorax is seen.  There is osteoarthritic degenerative change of the spine.    < end of copied text >

## 2019-04-04 NOTE — PROGRESS NOTE ADULT - PROBLEM SELECTOR PLAN 2
Likely catheter related.  Ucx (+) for Enterococcus faecalis, sensitive to Ampicilin  -Continue Unasyn X 5 days total

## 2019-04-04 NOTE — DIETITIAN INITIAL EVALUATION ADULT. - OTHER INFO
Nutrition Consult X Assessment, Education. Pt 73 yo male from [facility] appears alert, oriented. But Pt showed not much interest to speak to RDN. Spouse at bed side answered most questions during interview. Per spouse Pt's appetite was not well for "a while". Spouse also stated Pt lost weight ~20# in past 1 year, but since admission Pt's appetite has gotten better. Food preferences discussed with spouse. Better food choices discussed with spouse as well. No report of chewing/swallowing difficulty; no report of nausea, vomiting or diarrhea @ this time. 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 RDN remains available, spouse made aware.

## 2019-04-04 NOTE — PROGRESS NOTE ADULT - PROBLEM SELECTOR PLAN 5
S/p AICD. Rate well controlled.  -Continue Dig, Metoprolol   -f/u Cardiology rec  -Resume Xarelto as soon as OK with vascular team

## 2019-04-04 NOTE — PROGRESS NOTE ADULT - ASSESSMENT
75yo M w/ PMH of atrial fibrillation, AICD (10/2014) for unclear reason, CAD s/p PCI, DM2, HLD, lung cancer ( status post radiation and chemo in remission  ), PVD, , fem-pop bypass presents as transfer from [facility] with UTI, L femoral pseudoaneurysm. Consult called for pre-op cardiac clearance surgical repair of L femoral PSA w/ course complicated by UTI (currently on abx). Now s/p excision of L fem art & patch w/ interposition vein graft & sartorius flap on 4/3.    #L femoral pseudoaneurysm s/p excision of L fem art & patch w/ interposition vein graft & sartorius flap on 4/3 patient - patient appears well. Exam notable for basilar crackles, suspect atelectasis. No other signs of fluid overload. Patient endorses good appetite.   - if not required, would DC IV fluids  - incentive mike    Signing off for now. Please call with questions.    ESTRELLA Rojas MD  Cardiology Fellow  [phone]

## 2019-04-05 LAB
ANION GAP SERPL CALC-SCNC: 9 MMO/L — SIGNIFICANT CHANGE UP (ref 7–14)
APTT BLD: 33.3 SEC — SIGNIFICANT CHANGE UP (ref 27.5–36.3)
BUN SERPL-MCNC: 13 MG/DL — SIGNIFICANT CHANGE UP (ref 7–23)
CALCIUM SERPL-MCNC: 9 MG/DL — SIGNIFICANT CHANGE UP (ref 8.4–10.5)
CHLORIDE SERPL-SCNC: 96 MMOL/L — LOW (ref 98–107)
CO2 SERPL-SCNC: 24 MMOL/L — SIGNIFICANT CHANGE UP (ref 22–31)
CREAT SERPL-MCNC: 0.71 MG/DL — SIGNIFICANT CHANGE UP (ref 0.5–1.3)
GLUCOSE SERPL-MCNC: 155 MG/DL — HIGH (ref 70–99)
HCT VFR BLD CALC: 27.9 % — LOW (ref 39–50)
HGB BLD-MCNC: 8.9 G/DL — LOW (ref 13–17)
INR BLD: 1.14 — SIGNIFICANT CHANGE UP (ref 0.88–1.17)
MAGNESIUM SERPL-MCNC: 1.6 MG/DL — SIGNIFICANT CHANGE UP (ref 1.6–2.6)
MCHC RBC-ENTMCNC: 28.2 PG — SIGNIFICANT CHANGE UP (ref 27–34)
MCHC RBC-ENTMCNC: 31.9 % — LOW (ref 32–36)
MCV RBC AUTO: 88.3 FL — SIGNIFICANT CHANGE UP (ref 80–100)
NRBC # FLD: 0 K/UL — SIGNIFICANT CHANGE UP (ref 0–0)
OSMOLALITY SERPL: 271 MOSMO/KG — LOW (ref 275–295)
PHOSPHATE SERPL-MCNC: 1.6 MG/DL — LOW (ref 2.5–4.5)
PLATELET # BLD AUTO: 131 K/UL — LOW (ref 150–400)
PMV BLD: 10.6 FL — SIGNIFICANT CHANGE UP (ref 7–13)
POTASSIUM SERPL-MCNC: 4.6 MMOL/L — SIGNIFICANT CHANGE UP (ref 3.5–5.3)
POTASSIUM SERPL-SCNC: 4.6 MMOL/L — SIGNIFICANT CHANGE UP (ref 3.5–5.3)
PROTHROM AB SERPL-ACNC: 13 SEC — SIGNIFICANT CHANGE UP (ref 9.8–13.1)
RBC # BLD: 3.16 M/UL — LOW (ref 4.2–5.8)
RBC # FLD: 14.8 % — HIGH (ref 10.3–14.5)
SODIUM SERPL-SCNC: 129 MMOL/L — LOW (ref 135–145)
WBC # BLD: 12.69 K/UL — HIGH (ref 3.8–10.5)
WBC # FLD AUTO: 12.69 K/UL — HIGH (ref 3.8–10.5)

## 2019-04-05 PROCEDURE — 99233 SBSQ HOSP IP/OBS HIGH 50: CPT

## 2019-04-05 PROCEDURE — 99232 SBSQ HOSP IP/OBS MODERATE 35: CPT

## 2019-04-05 RX ORDER — VANCOMYCIN HCL 1 G
750 VIAL (EA) INTRAVENOUS EVERY 12 HOURS
Qty: 0 | Refills: 0 | Status: DISCONTINUED | OUTPATIENT
Start: 2019-04-05 | End: 2019-04-07

## 2019-04-05 RX ORDER — CLOPIDOGREL BISULFATE 75 MG/1
75 TABLET, FILM COATED ORAL DAILY
Qty: 0 | Refills: 0 | Status: DISCONTINUED | OUTPATIENT
Start: 2019-04-05 | End: 2019-04-09

## 2019-04-05 RX ORDER — MAGNESIUM SULFATE 500 MG/ML
2 VIAL (ML) INJECTION ONCE
Qty: 0 | Refills: 0 | Status: COMPLETED | OUTPATIENT
Start: 2019-04-05 | End: 2019-04-05

## 2019-04-05 RX ORDER — RIVAROXABAN 15 MG-20MG
20 KIT ORAL EVERY 24 HOURS
Qty: 0 | Refills: 0 | Status: DISCONTINUED | OUTPATIENT
Start: 2019-04-05 | End: 2019-04-09

## 2019-04-05 RX ADMIN — Medication 0.25 MILLIGRAM(S): at 06:31

## 2019-04-05 RX ADMIN — Medication 150 MILLIGRAM(S): at 06:31

## 2019-04-05 RX ADMIN — Medication 2000 UNIT(S): at 14:14

## 2019-04-05 RX ADMIN — AMPICILLIN SODIUM AND SULBACTAM SODIUM 200 GRAM(S): 250; 125 INJECTION, POWDER, FOR SUSPENSION INTRAMUSCULAR; INTRAVENOUS at 06:30

## 2019-04-05 RX ADMIN — Medication 50 GRAM(S): at 09:54

## 2019-04-05 RX ADMIN — Medication 2: at 18:54

## 2019-04-05 RX ADMIN — SODIUM CHLORIDE 1 GRAM(S): 9 INJECTION INTRAMUSCULAR; INTRAVENOUS; SUBCUTANEOUS at 18:48

## 2019-04-05 RX ADMIN — Medication 85 MILLIMOLE(S): at 14:15

## 2019-04-05 RX ADMIN — CLOPIDOGREL BISULFATE 75 MILLIGRAM(S): 75 TABLET, FILM COATED ORAL at 14:12

## 2019-04-05 RX ADMIN — HEPARIN SODIUM 5000 UNIT(S): 5000 INJECTION INTRAVENOUS; SUBCUTANEOUS at 00:38

## 2019-04-05 RX ADMIN — AMPICILLIN SODIUM AND SULBACTAM SODIUM 200 GRAM(S): 250; 125 INJECTION, POWDER, FOR SUSPENSION INTRAMUSCULAR; INTRAVENOUS at 00:38

## 2019-04-05 RX ADMIN — SODIUM CHLORIDE 3 MILLILITER(S): 9 INJECTION INTRAMUSCULAR; INTRAVENOUS; SUBCUTANEOUS at 21:38

## 2019-04-05 RX ADMIN — RIVAROXABAN 20 MILLIGRAM(S): KIT at 19:46

## 2019-04-05 RX ADMIN — SODIUM CHLORIDE 3 MILLILITER(S): 9 INJECTION INTRAMUSCULAR; INTRAVENOUS; SUBCUTANEOUS at 14:50

## 2019-04-05 RX ADMIN — ATORVASTATIN CALCIUM 20 MILLIGRAM(S): 80 TABLET, FILM COATED ORAL at 21:40

## 2019-04-05 RX ADMIN — AMPICILLIN SODIUM AND SULBACTAM SODIUM 200 GRAM(S): 250; 125 INJECTION, POWDER, FOR SUSPENSION INTRAMUSCULAR; INTRAVENOUS at 14:14

## 2019-04-05 RX ADMIN — Medication 2: at 09:54

## 2019-04-05 RX ADMIN — Medication 2: at 14:10

## 2019-04-05 RX ADMIN — HEPARIN SODIUM 5000 UNIT(S): 5000 INJECTION INTRAVENOUS; SUBCUTANEOUS at 07:22

## 2019-04-05 RX ADMIN — SODIUM CHLORIDE 3 MILLILITER(S): 9 INJECTION INTRAMUSCULAR; INTRAVENOUS; SUBCUTANEOUS at 06:31

## 2019-04-05 RX ADMIN — PREGABALIN 2000 MICROGRAM(S): 225 CAPSULE ORAL at 14:13

## 2019-04-05 RX ADMIN — LOSARTAN POTASSIUM 25 MILLIGRAM(S): 100 TABLET, FILM COATED ORAL at 06:31

## 2019-04-05 RX ADMIN — SODIUM CHLORIDE 1 GRAM(S): 9 INJECTION INTRAMUSCULAR; INTRAVENOUS; SUBCUTANEOUS at 06:31

## 2019-04-05 RX ADMIN — AMPICILLIN SODIUM AND SULBACTAM SODIUM 200 GRAM(S): 250; 125 INJECTION, POWDER, FOR SUSPENSION INTRAMUSCULAR; INTRAVENOUS at 18:47

## 2019-04-05 RX ADMIN — Medication 250 MILLIGRAM(S): at 18:48

## 2019-04-05 NOTE — PHYSICAL THERAPY INITIAL EVALUATION ADULT - DIAGNOSIS, PT EVAL
deconditioning , s/p OR for excision of patch evacuation of hematoma, GSV graft, sartorius muscle flap 4/3/19

## 2019-04-05 NOTE — PROGRESS NOTE ADULT - ASSESSMENT
73yo M presented 3/27 from [facility]. w 2wk malaise, weakness, & stopped ambulating. Found to have + klebsiella UA (on rocephin). 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 Now s/p excision of L fem art & patch w/ interposition vein graft & sartorius flap on 4/3.    Plan  - Pain control: Tylenol 650mg PO q6h PRN  - Consistent carb diet  - C/w Unasyn abx   - F/u ID recs  - Patient to lay flat for 48hr from 4/3 at 19:00  - DVT ppx: subQ heparin 75yo M presented 3/27 from [facility]. w 2wk malaise, weakness, & stopped ambulating. Found to have + klebsiella UA (on rocephin). 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 Now s/p excision of L fem art & patch w/ interposition vein graft & sartorius flap on 4/3.    Plan  - Pain control: Tylenol 650mg PO q6h PRN  - Consistent carb diet  - C/w Unasyn abx   - F/u ID recs  - Patient to lay flat for 48hr from 4/3      - OOB today  - DVT ppx: subQ heparin

## 2019-04-05 NOTE — PROGRESS NOTE ADULT - PROBLEM SELECTOR PLAN 1
(+) left femoral pseudoaneurysm. S/P OR for resection of pseudoaneurysm, POD#2  -management as per vascular surgery  -pain control  -DVt prophylaxis  -OOB and PT eval  -abx as per vascular and ID

## 2019-04-05 NOTE — PROGRESS NOTE ADULT - PROBLEM SELECTOR PLAN 3
A1C 5.9, FS elevated post op.  -Consistent carbohydrate diet  -FSSS with Insulin coverage   -Monitor FS  -consider basal insulin if FS persistently elevated.  -Resume metformin at discharge Replete phos

## 2019-04-05 NOTE — PROGRESS NOTE ADULT - ASSESSMENT
74 year old male history of atrial fibrillation AICD, diabetes type 2, hyperlipidemia, lung cancer ( status post radiation and chemo in remission 1997 ) presents as transfer from [facility] for eval for femoral artery pseudoaneurysm    CT with bilateral ana maria-nephric stranding; L femoral artery pseudoaneurysm  UA positive, UCX with Klebsiella S CTX  BCX negative  Completed treatment course for UTI/Pyelo with ceftriaxone 4/3/19    s/p OR for excision of patch evacuation of hematoma, GSV graft, sartorius muscle flap 4/3/19    On unasyn for enterococcus UTI  Remains with bolivar  Afebrile  Leukocytosis suspect reactive to procedure improving    Recommend:  -Continue unasyn pending OR cultures - so far no growth  -Trend WBC    ID service available on the weekend. For questions, please call [phone].

## 2019-04-05 NOTE — PHYSICAL THERAPY INITIAL EVALUATION ADULT - PATIENT PROFILE REVIEW, REHAB EVAL
yes/activity order- out of bed with assistance , ambulate with assistance, OK to perform PT evaluation as per CLARA Rg

## 2019-04-05 NOTE — PHYSICAL THERAPY INITIAL EVALUATION ADULT - ACTIVE RANGE OF MOTION EXAMINATION, REHAB EVAL
active assisted ROM performed except R hip flexion 0-30 degrees s/p Sx/bilateral upper extremity Active ROM was WFL (within functional limits)/bilateral  lower extremity Active ROM was WFL (within functional limits)

## 2019-04-05 NOTE — PROGRESS NOTE ADULT - SUBJECTIVE AND OBJECTIVE BOX
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     MEDICATIONS  (STANDING):  ampicillin/sulbactam  IVPB      ampicillin/sulbactam  IVPB 3 Gram(s) IV Intermittent every 6 hours  atorvastatin 20 milliGRAM(s) Oral at bedtime  cholecalciferol 2000 Unit(s) Oral daily  clopidogrel Tablet 75 milliGRAM(s) Oral daily  cyanocobalamin 2000 MICROGram(s) Oral daily  dextrose 5%. 1000 milliLiter(s) (50 mL/Hr) IV Continuous <Continuous>  dextrose 50% Injectable 12.5 Gram(s) IV Push once  dextrose 50% Injectable 25 Gram(s) IV Push once  dextrose 50% Injectable 25 Gram(s) IV Push once  digoxin     Tablet 0.25 milliGRAM(s) Oral daily  docusate sodium 100 milliGRAM(s) Oral three times a day  glycerin Suppository - Adult 1 Suppository(s) Rectal at bedtime  insulin lispro (HumaLOG) corrective regimen sliding scale   SubCutaneous three times a day before meals  insulin lispro (HumaLOG) corrective regimen sliding scale   SubCutaneous at bedtime  losartan 25 milliGRAM(s) Oral daily  metoprolol succinate  milliGRAM(s) Oral daily  polyethylene glycol 3350 17 Gram(s) Oral daily  rivaroxaban 20 milliGRAM(s) Oral every 24 hours  senna 2 Tablet(s) Oral at bedtime  sodium chloride 1 Gram(s) Oral two times a day  sodium chloride 0.9% lock flush 3 milliLiter(s) IV Push every 8 hours  sodium phosphate IVPB 30 milliMole(s) IV Intermittent once    MEDICATIONS  (PRN):  acetaminophen   Tablet .. 650 milliGRAM(s) Oral every 6 hours PRN Mild Pain (1 - 3)  dextrose 40% Gel 15 Gram(s) Oral once PRN Blood Glucose LESS THAN 70 milliGRAM(s)/deciliter  glucagon  Injectable 1 milliGRAM(s) IntraMuscular once PRN Glucose LESS THAN 70 milligrams/deciliter      Vital Signs Last 24 Hrs  T(C): 36.7 (05 Apr 2019 06:28), Max: 36.7 (05 Apr 2019 06:28)  T(F): 98 (05 Apr 2019 06:28), Max: 98 (05 Apr 2019 06:28)  HR: 72 (05 Apr 2019 06:28) (62 - 72)  BP: 154/59 (05 Apr 2019 06:28) (130/69 - 154/59)  BP(mean): --  RR: 18 (05 Apr 2019 06:28) (17 - 18)  SpO2: 98% (05 Apr 2019 06:28) (98% - 100%)  CAPILLARY BLOOD GLUCOSE      POCT Blood Glucose.: 155 mg/dL (05 Apr 2019 08:42)  POCT Blood Glucose.: 189 mg/dL (04 Apr 2019 22:01)  POCT Blood Glucose.: 185 mg/dL (04 Apr 2019 18:07)  POCT Blood Glucose.: 266 mg/dL (04 Apr 2019 12:41)    I&O's Summary    04 Apr 2019 07:01  -  05 Apr 2019 07:00  --------------------------------------------------------  IN: 0 mL / OUT: 2164.5 mL / NET: -2164.5 mL        PHYSICAL EXAM:  GENERAL: NAD, well-developed, thin  HEAD:  Atraumatic, Normocephalic  EYES: EOMI, PERRLA, conjunctiva and sclera clear  NECK: Supple, No JVD  CHEST/LUNG: Clear to auscultation bilaterally; No wheeze  HEART: Irregular at 60's; No murmurs, rubs, or gallops  ABDOMEN: Soft, Nontender, Nondistended; Bowel sounds present  EXTREMITIES:  2+ Peripheral Pulses, No clubbing, cyanosis, or edema, wound with dressing, no erythema   PSYCH: AAOx3  NEUROLOGY: non-focal  SKIN: No rashes or lesions    LABS:                        8.9    12.69 )-----------( 131      ( 05 Apr 2019 05:30 )             27.9     04-05    129<L>  |  96<L>  |  13  ----------------------------<  155<H>  4.6   |  24  |  0.71    Ca    9.0      05 Apr 2019 05:30  Phos  1.6     04-05  Mg     1.6     04-05      PT/INR - ( 05 Apr 2019 05:30 )   PT: 13.0 SEC;   INR: 1.14          PTT - ( 05 Apr 2019 05:30 )  PTT:33.3 SEC          RADIOLOGY & ADDITIONAL TESTS:    Imaging Personally Reviewed:    Consultant(s) Notes Reviewed:      Care Discussed with Consultants/Other Providers: Vascular, ID

## 2019-04-05 NOTE — PHYSICAL THERAPY INITIAL EVALUATION ADULT - PERTINENT HX OF CURRENT PROBLEM, REHAB EVAL
This is a 74y M s/p OR for excision of patch evacuation of hematoma, GSV graft, sartorius muscle flap on 4/3/19

## 2019-04-05 NOTE — PROGRESS NOTE ADULT - PROBLEM SELECTOR PLAN 5
S/p AICD. Rate well controlled.  -Continue Dig, Metoprolol   -f/u Cardiology rec  -Resume Xarelto as soon as OK with vascular team asymptomatic. Small cell lung ca in remission. (+) large protein gap. r/o pseudohyponatremia.  - Check serum osmo  -continue free water restriction  -monitor BMP daily

## 2019-04-05 NOTE — PROGRESS NOTE ADULT - SUBJECTIVE AND OBJECTIVE BOX
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 Some soreness around left leg surgical site, remains with wound vac. No fevers, no chills.    Rest of ROS negative.    Allergies  No Known Allergies    ANTIMICROBIALS:  ampicillin/sulbactam  IVPB    ampicillin/sulbactam  IVPB 3 every 6 hours  vancomycin  IVPB 750 every 12 hours    OTHER MEDS:  acetaminophen   Tablet .. 650 milliGRAM(s) Oral every 6 hours PRN  atorvastatin 20 milliGRAM(s) Oral at bedtime  cholecalciferol 2000 Unit(s) Oral daily  clopidogrel Tablet 75 milliGRAM(s) Oral daily  cyanocobalamin 2000 MICROGram(s) Oral daily  dextrose 40% Gel 15 Gram(s) Oral once PRN  dextrose 5%. 1000 milliLiter(s) IV Continuous <Continuous>  dextrose 50% Injectable 12.5 Gram(s) IV Push once  dextrose 50% Injectable 25 Gram(s) IV Push once  dextrose 50% Injectable 25 Gram(s) IV Push once  digoxin     Tablet 0.25 milliGRAM(s) Oral daily  docusate sodium 100 milliGRAM(s) Oral three times a day  glucagon  Injectable 1 milliGRAM(s) IntraMuscular once PRN  glycerin Suppository - Adult 1 Suppository(s) Rectal at bedtime  insulin lispro (HumaLOG) corrective regimen sliding scale   SubCutaneous three times a day before meals  insulin lispro (HumaLOG) corrective regimen sliding scale   SubCutaneous at bedtime  losartan 25 milliGRAM(s) Oral daily  metoprolol succinate  milliGRAM(s) Oral daily  polyethylene glycol 3350 17 Gram(s) Oral daily  rivaroxaban 20 milliGRAM(s) Oral every 24 hours  senna 2 Tablet(s) Oral at bedtime  sodium chloride 1 Gram(s) Oral two times a day  sodium chloride 0.9% lock flush 3 milliLiter(s) IV Push every 8 hours  sodium phosphate IVPB 30 milliMole(s) IV Intermittent once    PE:    Vital Signs Last 24 Hrs  T(C): 36.6 (05 Apr 2019 10:00), Max: 36.7 (05 Apr 2019 06:28)  T(F): 97.8 (05 Apr 2019 10:00), Max: 98 (05 Apr 2019 06:28)  HR: 59 (05 Apr 2019 10:00) (59 - 72)  BP: 145/65 (05 Apr 2019 10:00) (130/69 - 154/59)  BP(mean): --  RR: 14 (05 Apr 2019 10:00) (14 - 18)  SpO2: 99% (05 Apr 2019 10:00) (98% - 100%)    Gen: Awake, alert, NAD, non-toxic, pleasant  CV: S1+S2 normal, no murmurs,  Resp: Clear bilat, no resp distress  Abd: Soft, nontender, +BS  Ext: Left groin with wound vac, DAGO drain with bloody drainage  : +bolivar  IV/Skin: No thrombophlebitis  Neuro: no focal deficits    LABS:                          8.9    12.69 )-----------( 131      ( 05 Apr 2019 05:30 )             27.9       04-05    129<L>  |  96<L>  |  13  ----------------------------<  155<H>  4.6   |  24  |  0.71    Ca    9.0      05 Apr 2019 05:30  Phos  1.6     04-05  Mg     1.6     04-05    MICROBIOLOGY:  v  OTHER  04-03-19 --  --  --      OTHER  04-03-19 --  --  --      BLOOD  03-29-19 --  --  --      BLOOD VENOUS  03-28-19 --  --  --      URINE CATHETER  03-28-19 --  --  Enterococcus faecalis      .Blood Blood-Peripheral  03-25-19   No growth at 5 days.  --  --      .Urine Clean Catch (Midstream)  03-25-19   >100,000 CFU/ml Klebsiella pneumoniae  --  Klebsiella pneumoniae    RADIOLOGY:    < from: Xray Chest 1 View- PORTABLE-Urgent (04.02.19 @ 11:25) >  IMPRESSION:  Right apical opacity, of indeterminate nature. An apical   mass, pleural thickening, and loculated pleural fluid are in the   differential diagnosis. Correlation with a CT scan of the chest could be   performed for further evaluation, if felt to be clinically indicated.    Nonspecific increased reticular opacities in the right lower lung.    < end of copied text >

## 2019-04-05 NOTE — PHYSICAL THERAPY INITIAL EVALUATION ADULT - ADDITIONAL COMMENTS
As per patient,his wife used to assist him as needed  with ADL's and ambulation with a rolling walker, patient did not ambulate for atleast 1 month  prior to hospitalization

## 2019-04-05 NOTE — PROGRESS NOTE ADULT - PROBLEM SELECTOR PLAN 6
Likely due to laxatives, nio signs of infection at this time, resolved.  -Cont. to monitor S/p AICD. Rate well controlled.  -Continue Dig, Metoprolol   -f/u Cardiology rec  -Resume Xarelto as soon as OK with vascular team

## 2019-04-05 NOTE — PROGRESS NOTE ADULT - PROBLEM SELECTOR PLAN 2
Likely catheter related.  Ucx (+) for Enterococcus faecalis, sensitive to Ampicilin  -Continue Unasyn X 5 days total, however, vascular wants to keep pt on abx for a few more days for post op coverage.

## 2019-04-05 NOTE — PROGRESS NOTE ADULT - PROBLEM SELECTOR PLAN 4
asymptomatic. Small cell lung ca in remission. (+) large protein gap. r/o pseudohyponatremia.  - Check serum osmo  -continue free water restriction  -monitor BMP daily A1C 5.9, FS elevated post op.  -Consistent carbohydrate diet  -FSSS with Insulin coverage   -Monitor FS  -consider basal insulin if FS persistently elevated.  -Resume metformin at discharge

## 2019-04-05 NOTE — PROGRESS NOTE ADULT - SUBJECTIVE AND OBJECTIVE BOX
C-Team Surgery Progress Note     Subjective/24hour Events: No acute events overnight. Pain controlled. Tolerating consistent carb diet. No N/V. No CP or SOB.    Vital Signs:  Vital Signs Last 24 Hrs  T(C): 36.6 (05 Apr 2019 01:06), Max: 36.7 (04 Apr 2019 10:20)  T(F): 97.8 (05 Apr 2019 01:06), Max: 98 (04 Apr 2019 10:20)  HR: 62 (05 Apr 2019 01:06) (62 - 75)  BP: 145/47 (05 Apr 2019 01:06) (130/69 - 147/61)  BP(mean): --  RR: 17 (05 Apr 2019 01:06) (16 - 18)  SpO2: 100% (05 Apr 2019 01:06) (96% - 100%)    CAPILLARY BLOOD GLUCOSE      POCT Blood Glucose.: 189 mg/dL (04 Apr 2019 22:01)  POCT Blood Glucose.: 185 mg/dL (04 Apr 2019 18:07)  POCT Blood Glucose.: 266 mg/dL (04 Apr 2019 12:41)  POCT Blood Glucose.: 241 mg/dL (04 Apr 2019 09:28)      I&O's Detail    03 Apr 2019 07:01  -  04 Apr 2019 07:00  --------------------------------------------------------  IN:    IV PiggyBack: 100 mL    Oral Fluid: 400 mL    sodium chloride 0.9%: 525 mL  Total IN: 1025 mL    OUT:    Bulb: 60 mL    Indwelling Catheter - Urethral: 380 mL  Total OUT: 440 mL    Total NET: 585 mL      04 Apr 2019 07:01  -  05 Apr 2019 04:54  --------------------------------------------------------  IN:  Total IN: 0 mL    OUT:    Bulb: 24.5 mL    Indwelling Catheter - Urethral: 1400 mL  Total OUT: 1424.5 mL    Total NET: -1424.5 mL            MEDICATIONS  (STANDING):  ampicillin/sulbactam  IVPB      ampicillin/sulbactam  IVPB 3 Gram(s) IV Intermittent every 6 hours  aspirin enteric coated 325 milliGRAM(s) Oral daily  atorvastatin 20 milliGRAM(s) Oral at bedtime  cholecalciferol 2000 Unit(s) Oral daily  cyanocobalamin 2000 MICROGram(s) Oral daily  dextrose 5%. 1000 milliLiter(s) (50 mL/Hr) IV Continuous <Continuous>  dextrose 50% Injectable 12.5 Gram(s) IV Push once  dextrose 50% Injectable 25 Gram(s) IV Push once  dextrose 50% Injectable 25 Gram(s) IV Push once  digoxin     Tablet 0.25 milliGRAM(s) Oral daily  docusate sodium 100 milliGRAM(s) Oral three times a day  glycerin Suppository - Adult 1 Suppository(s) Rectal at bedtime  heparin  Injectable 5000 Unit(s) SubCutaneous every 8 hours  insulin lispro (HumaLOG) corrective regimen sliding scale   SubCutaneous three times a day before meals  insulin lispro (HumaLOG) corrective regimen sliding scale   SubCutaneous at bedtime  losartan 25 milliGRAM(s) Oral daily  metoprolol succinate  milliGRAM(s) Oral daily  polyethylene glycol 3350 17 Gram(s) Oral daily  senna 2 Tablet(s) Oral at bedtime  sodium chloride 1 Gram(s) Oral two times a day  sodium chloride 0.9% lock flush 3 milliLiter(s) IV Push every 8 hours    MEDICATIONS  (PRN):  acetaminophen   Tablet .. 650 milliGRAM(s) Oral every 6 hours PRN Mild Pain (1 - 3)  dextrose 40% Gel 15 Gram(s) Oral once PRN Blood Glucose LESS THAN 70 milliGRAM(s)/deciliter  glucagon  Injectable 1 milliGRAM(s) IntraMuscular once PRN Glucose LESS THAN 70 milligrams/deciliter        Physical Exam:  Gen: NAD  LS: nml respiratory effort  Card: pulse regularly present  GI: abd soft, nontender  Ext: warm      Labs:    04-04    134<L>  |  98  |  16  ----------------------------<  263<H>  5.0   |  24  |  0.72    Ca    8.8      04 Apr 2019 10:20  Phos  2.6     04-04  Mg     1.8     04-04                              10.4   18.81 )-----------( 154      ( 04 Apr 2019 10:20 )             33.1     PT/INR - ( 03 Apr 2019 23:00 )   PT: 13.8 SEC;   INR: 1.20          PTT - ( 03 Apr 2019 23:00 )  PTT:36.9 SEC C-Team Surgery Progress Note     Subjective/24hour Events: No acute events overnight. Pain controlled. Tolerating consistent carb diet. No N/V. No CP or SOB.    Vital Signs:  Vital Signs Last 24 Hrs  T(C): 36.6 (05 Apr 2019 01:06), Max: 36.7 (04 Apr 2019 10:20)  T(F): 97.8 (05 Apr 2019 01:06), Max: 98 (04 Apr 2019 10:20)  HR: 62 (05 Apr 2019 01:06) (62 - 75)  BP: 145/47 (05 Apr 2019 01:06) (130/69 - 147/61)  BP(mean): --  RR: 17 (05 Apr 2019 01:06) (16 - 18)  SpO2: 100% (05 Apr 2019 01:06) (96% - 100%)    CAPILLARY BLOOD GLUCOSE      POCT Blood Glucose.: 189 mg/dL (04 Apr 2019 22:01)  POCT Blood Glucose.: 185 mg/dL (04 Apr 2019 18:07)  POCT Blood Glucose.: 266 mg/dL (04 Apr 2019 12:41)  POCT Blood Glucose.: 241 mg/dL (04 Apr 2019 09:28)      I&O's Detail    03 Apr 2019 07:01  -  04 Apr 2019 07:00  --------------------------------------------------------  IN:    IV PiggyBack: 100 mL    Oral Fluid: 400 mL    sodium chloride 0.9%: 525 mL  Total IN: 1025 mL    OUT:    Bulb: 60 mL    Indwelling Catheter - Urethral: 380 mL  Total OUT: 440 mL    Total NET: 585 mL      04 Apr 2019 07:01  -  05 Apr 2019 04:54  --------------------------------------------------------  IN:  Total IN: 0 mL    OUT:    Bulb: 24.5 mL    Indwelling Catheter - Urethral: 1400 mL  Total OUT: 1424.5 mL    Total NET: -1424.5 mL            MEDICATIONS  (STANDING):  ampicillin/sulbactam  IVPB      ampicillin/sulbactam  IVPB 3 Gram(s) IV Intermittent every 6 hours  aspirin enteric coated 325 milliGRAM(s) Oral daily  atorvastatin 20 milliGRAM(s) Oral at bedtime  cholecalciferol 2000 Unit(s) Oral daily  cyanocobalamin 2000 MICROGram(s) Oral daily  dextrose 5%. 1000 milliLiter(s) (50 mL/Hr) IV Continuous <Continuous>  dextrose 50% Injectable 12.5 Gram(s) IV Push once  dextrose 50% Injectable 25 Gram(s) IV Push once  dextrose 50% Injectable 25 Gram(s) IV Push once  digoxin     Tablet 0.25 milliGRAM(s) Oral daily  docusate sodium 100 milliGRAM(s) Oral three times a day  glycerin Suppository - Adult 1 Suppository(s) Rectal at bedtime  heparin  Injectable 5000 Unit(s) SubCutaneous every 8 hours  insulin lispro (HumaLOG) corrective regimen sliding scale   SubCutaneous three times a day before meals  insulin lispro (HumaLOG) corrective regimen sliding scale   SubCutaneous at bedtime  losartan 25 milliGRAM(s) Oral daily  metoprolol succinate  milliGRAM(s) Oral daily  polyethylene glycol 3350 17 Gram(s) Oral daily  senna 2 Tablet(s) Oral at bedtime  sodium chloride 1 Gram(s) Oral two times a day  sodium chloride 0.9% lock flush 3 milliLiter(s) IV Push every 8 hours    MEDICATIONS  (PRN):  acetaminophen   Tablet .. 650 milliGRAM(s) Oral every 6 hours PRN Mild Pain (1 - 3)  dextrose 40% Gel 15 Gram(s) Oral once PRN Blood Glucose LESS THAN 70 milliGRAM(s)/deciliter  glucagon  Injectable 1 milliGRAM(s) IntraMuscular once PRN Glucose LESS THAN 70 milligrams/deciliter        Physical Exam:  Gen: NAD  LS: nml respiratory effort  Card: pulse regularly present  GI: abd soft, nontender  Groin: left groin w/ prevena VAC and DAGO serosang   Ext: warm, left AT signal      Labs:    04-04    134<L>  |  98  |  16  ----------------------------<  263<H>  5.0   |  24  |  0.72    Ca    8.8      04 Apr 2019 10:20  Phos  2.6     04-04  Mg     1.8     04-04                              10.4   18.81 )-----------( 154      ( 04 Apr 2019 10:20 )             33.1     PT/INR - ( 03 Apr 2019 23:00 )   PT: 13.8 SEC;   INR: 1.20          PTT - ( 03 Apr 2019 23:00 )  PTT:36.9 SEC

## 2019-04-05 NOTE — PHYSICAL THERAPY INITIAL EVALUATION ADULT - GENERAL OBSERVATIONS, REHAB EVAL
Patient received semi supine in bed, (+) IV, (+) wound vac, (+) DAGO drain , patient in no apparent distress.

## 2019-04-06 LAB
ANION GAP SERPL CALC-SCNC: 10 MMO/L — SIGNIFICANT CHANGE UP (ref 7–14)
APPEARANCE UR: SIGNIFICANT CHANGE UP
BACTERIA # UR AUTO: NEGATIVE — SIGNIFICANT CHANGE UP
BILIRUB UR-MCNC: NEGATIVE — SIGNIFICANT CHANGE UP
BLOOD UR QL VISUAL: HIGH
BUN SERPL-MCNC: 14 MG/DL — SIGNIFICANT CHANGE UP (ref 7–23)
CALCIUM SERPL-MCNC: 8.8 MG/DL — SIGNIFICANT CHANGE UP (ref 8.4–10.5)
CHLORIDE SERPL-SCNC: 93 MMOL/L — LOW (ref 98–107)
CO2 SERPL-SCNC: 24 MMOL/L — SIGNIFICANT CHANGE UP (ref 22–31)
COLOR SPEC: SIGNIFICANT CHANGE UP
CREAT ?TM UR-MCNC: 23.4 MG/DL — SIGNIFICANT CHANGE UP
CREAT SERPL-MCNC: 0.62 MG/DL — SIGNIFICANT CHANGE UP (ref 0.5–1.3)
GLUCOSE SERPL-MCNC: 148 MG/DL — HIGH (ref 70–99)
GLUCOSE UR-MCNC: 50 — SIGNIFICANT CHANGE UP
HCT VFR BLD CALC: 28.7 % — LOW (ref 39–50)
HGB BLD-MCNC: 9.3 G/DL — LOW (ref 13–17)
HYALINE CASTS # UR AUTO: NEGATIVE — SIGNIFICANT CHANGE UP
KETONES UR-MCNC: NEGATIVE — SIGNIFICANT CHANGE UP
LEUKOCYTE ESTERASE UR-ACNC: SIGNIFICANT CHANGE UP
MAGNESIUM SERPL-MCNC: 1.5 MG/DL — LOW (ref 1.6–2.6)
MCHC RBC-ENTMCNC: 28.2 PG — SIGNIFICANT CHANGE UP (ref 27–34)
MCHC RBC-ENTMCNC: 32.4 % — SIGNIFICANT CHANGE UP (ref 32–36)
MCV RBC AUTO: 87 FL — SIGNIFICANT CHANGE UP (ref 80–100)
NITRITE UR-MCNC: NEGATIVE — SIGNIFICANT CHANGE UP
NRBC # FLD: 0 K/UL — SIGNIFICANT CHANGE UP (ref 0–0)
OSMOLALITY UR: 404 MOSMO/KG — SIGNIFICANT CHANGE UP (ref 50–1200)
PH UR: 7 — SIGNIFICANT CHANGE UP (ref 5–8)
PHOSPHATE SERPL-MCNC: 2.4 MG/DL — LOW (ref 2.5–4.5)
PLATELET # BLD AUTO: 142 K/UL — LOW (ref 150–400)
PMV BLD: 11.1 FL — SIGNIFICANT CHANGE UP (ref 7–13)
POTASSIUM SERPL-MCNC: 4.4 MMOL/L — SIGNIFICANT CHANGE UP (ref 3.5–5.3)
POTASSIUM SERPL-SCNC: 4.4 MMOL/L — SIGNIFICANT CHANGE UP (ref 3.5–5.3)
PROT UR-MCNC: 50 — SIGNIFICANT CHANGE UP
RBC # BLD: 3.3 M/UL — LOW (ref 4.2–5.8)
RBC # FLD: 14.8 % — HIGH (ref 10.3–14.5)
RBC CASTS # UR COMP ASSIST: >50 — HIGH (ref 0–?)
SODIUM SERPL-SCNC: 127 MMOL/L — LOW (ref 135–145)
SODIUM UR-SCNC: 123 MMOL/L — SIGNIFICANT CHANGE UP
SP GR SPEC: 1.01 — SIGNIFICANT CHANGE UP (ref 1–1.04)
SPECIMEN SOURCE: SIGNIFICANT CHANGE UP
SPECIMEN SOURCE: SIGNIFICANT CHANGE UP
SQUAMOUS # UR AUTO: SIGNIFICANT CHANGE UP
UROBILINOGEN FLD QL: NORMAL — SIGNIFICANT CHANGE UP
WBC # BLD: 11.71 K/UL — HIGH (ref 3.8–10.5)
WBC # FLD AUTO: 11.71 K/UL — HIGH (ref 3.8–10.5)
WBC UR QL: HIGH (ref 0–?)

## 2019-04-06 PROCEDURE — 99233 SBSQ HOSP IP/OBS HIGH 50: CPT

## 2019-04-06 RX ORDER — MAGNESIUM SULFATE 500 MG/ML
1 VIAL (ML) INJECTION ONCE
Qty: 0 | Refills: 0 | Status: COMPLETED | OUTPATIENT
Start: 2019-04-06 | End: 2019-04-06

## 2019-04-06 RX ORDER — SODIUM,POTASSIUM PHOSPHATES 278-250MG
1 POWDER IN PACKET (EA) ORAL ONCE
Qty: 0 | Refills: 0 | Status: COMPLETED | OUTPATIENT
Start: 2019-04-06 | End: 2019-04-06

## 2019-04-06 RX ADMIN — ATORVASTATIN CALCIUM 20 MILLIGRAM(S): 80 TABLET, FILM COATED ORAL at 23:02

## 2019-04-06 RX ADMIN — Medication 250 MILLIGRAM(S): at 19:52

## 2019-04-06 RX ADMIN — Medication 2000 UNIT(S): at 12:07

## 2019-04-06 RX ADMIN — Medication 4: at 18:04

## 2019-04-06 RX ADMIN — SODIUM CHLORIDE 3 MILLILITER(S): 9 INJECTION INTRAMUSCULAR; INTRAVENOUS; SUBCUTANEOUS at 06:33

## 2019-04-06 RX ADMIN — Medication 4: at 09:25

## 2019-04-06 RX ADMIN — PREGABALIN 2000 MICROGRAM(S): 225 CAPSULE ORAL at 12:09

## 2019-04-06 RX ADMIN — SODIUM CHLORIDE 3 MILLILITER(S): 9 INJECTION INTRAMUSCULAR; INTRAVENOUS; SUBCUTANEOUS at 22:59

## 2019-04-06 RX ADMIN — RIVAROXABAN 20 MILLIGRAM(S): KIT at 18:03

## 2019-04-06 RX ADMIN — Medication 0.25 MILLIGRAM(S): at 06:41

## 2019-04-06 RX ADMIN — AMPICILLIN SODIUM AND SULBACTAM SODIUM 200 GRAM(S): 250; 125 INJECTION, POWDER, FOR SUSPENSION INTRAMUSCULAR; INTRAVENOUS at 06:41

## 2019-04-06 RX ADMIN — SODIUM CHLORIDE 1 GRAM(S): 9 INJECTION INTRAMUSCULAR; INTRAVENOUS; SUBCUTANEOUS at 18:04

## 2019-04-06 RX ADMIN — SODIUM CHLORIDE 3 MILLILITER(S): 9 INJECTION INTRAMUSCULAR; INTRAVENOUS; SUBCUTANEOUS at 14:47

## 2019-04-06 RX ADMIN — CLOPIDOGREL BISULFATE 75 MILLIGRAM(S): 75 TABLET, FILM COATED ORAL at 12:06

## 2019-04-06 RX ADMIN — LOSARTAN POTASSIUM 25 MILLIGRAM(S): 100 TABLET, FILM COATED ORAL at 06:41

## 2019-04-06 RX ADMIN — Medication 250 MILLIGRAM(S): at 08:13

## 2019-04-06 RX ADMIN — Medication 150 MILLIGRAM(S): at 06:41

## 2019-04-06 RX ADMIN — Medication 100 GRAM(S): at 14:47

## 2019-04-06 RX ADMIN — AMPICILLIN SODIUM AND SULBACTAM SODIUM 200 GRAM(S): 250; 125 INJECTION, POWDER, FOR SUSPENSION INTRAMUSCULAR; INTRAVENOUS at 23:02

## 2019-04-06 RX ADMIN — AMPICILLIN SODIUM AND SULBACTAM SODIUM 200 GRAM(S): 250; 125 INJECTION, POWDER, FOR SUSPENSION INTRAMUSCULAR; INTRAVENOUS at 18:03

## 2019-04-06 RX ADMIN — Medication 1 PACKET(S): at 15:52

## 2019-04-06 RX ADMIN — AMPICILLIN SODIUM AND SULBACTAM SODIUM 200 GRAM(S): 250; 125 INJECTION, POWDER, FOR SUSPENSION INTRAMUSCULAR; INTRAVENOUS at 00:59

## 2019-04-06 RX ADMIN — SODIUM CHLORIDE 1 GRAM(S): 9 INJECTION INTRAMUSCULAR; INTRAVENOUS; SUBCUTANEOUS at 06:41

## 2019-04-06 RX ADMIN — AMPICILLIN SODIUM AND SULBACTAM SODIUM 200 GRAM(S): 250; 125 INJECTION, POWDER, FOR SUSPENSION INTRAMUSCULAR; INTRAVENOUS at 12:05

## 2019-04-06 NOTE — PROGRESS NOTE ADULT - PROBLEM SELECTOR PLAN 4
A1C 5.9, FS elevated post op.  -Consistent carbohydrate diet  -FSSS with Insulin coverage   -Monitor FS  -consider basal insulin if FS persistently elevated.  -Resume metformin at discharge

## 2019-04-06 NOTE — PROGRESS NOTE ADULT - ASSESSMENT
75yo M presented 3/27 from [facility]. w 2wk malaise, weakness, & stopped ambulating. Found to have + klebsiella UA (on rocephin). 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 Now s/p excision of L fem art & patch w/ interposition vein graft & sartorius flap on 4/3.    Plan  - Pain control: Tylenol 650mg PO q6h PRN  - Consistent carb diet  - C/w Unasyn abx   - F/u ID recs  - Encourage OOB   - DVT ppx: subQ heparin  - Dispo planning to rehab

## 2019-04-06 NOTE — CHART NOTE - NSCHARTNOTEFT_GEN_A_CORE
Source: Patient [x] Medical record reviewed. Dietitian Initial Evaluation for a nutrition consult for assessment was completed on 4/05 - please see note for details. Patient visited today for nutrition consult for assessment/education. Patient tolerating diet at this time. Reports consuming scrambled eggs and fruit cup at breakfast today. Drinking Glucerna Shake supplement. No GI distress (nausea/vomiting/diarrhea/constipation) noted at this time. Encouraged PO intake.     Diet : Consistent Carbohydrate with evening snack, 1000mL Fluid Restriction, No Carb Prosource (1pkg = 15 gm protein) 1x daily and Glucerna Therapeutic Nutrition Shake 240mls 3x daily (660kcals, 30g protein)    PO intake:  < 50% [ ] 50-75% [x]   % [ ]  other :    Current Weight: 60.8kg (4/03), 60.8kg (3/27)    Pertinent Medications: MEDICATIONS  (STANDING):  ampicillin/sulbactam  IVPB      ampicillin/sulbactam  IVPB 3 Gram(s) IV Intermittent every 6 hours  atorvastatin 20 milliGRAM(s) Oral at bedtime  cholecalciferol 2000 Unit(s) Oral daily  clopidogrel Tablet 75 milliGRAM(s) Oral daily  cyanocobalamin 2000 MICROGram(s) Oral daily  dextrose 5%. 1000 milliLiter(s) (50 mL/Hr) IV Continuous <Continuous>  dextrose 50% Injectable 12.5 Gram(s) IV Push once  dextrose 50% Injectable 25 Gram(s) IV Push once  dextrose 50% Injectable 25 Gram(s) IV Push once  digoxin     Tablet 0.25 milliGRAM(s) Oral daily  docusate sodium 100 milliGRAM(s) Oral three times a day  glycerin Suppository - Adult 1 Suppository(s) Rectal at bedtime  insulin lispro (HumaLOG) corrective regimen sliding scale   SubCutaneous three times a day before meals  insulin lispro (HumaLOG) corrective regimen sliding scale   SubCutaneous at bedtime  losartan 25 milliGRAM(s) Oral daily  metoprolol succinate  milliGRAM(s) Oral daily  polyethylene glycol 3350 17 Gram(s) Oral daily  rivaroxaban 20 milliGRAM(s) Oral every 24 hours  senna 2 Tablet(s) Oral at bedtime  sodium chloride 1 Gram(s) Oral two times a day  sodium chloride 0.9% lock flush 3 milliLiter(s) IV Push every 8 hours  vancomycin  IVPB 750 milliGRAM(s) IV Intermittent every 12 hours    MEDICATIONS  (PRN):  acetaminophen   Tablet .. 650 milliGRAM(s) Oral every 6 hours PRN Mild Pain (1 - 3)  dextrose 40% Gel 15 Gram(s) Oral once PRN Blood Glucose LESS THAN 70 milliGRAM(s)/deciliter  glucagon  Injectable 1 milliGRAM(s) IntraMuscular once PRN Glucose LESS THAN 70 milligrams/deciliter    Pertinent Labs:  04-06 Na127 mmol/L<L> Glu 148 mg/dL<H> K+ 4.4 mmol/L Cr  0.62 mg/dL BUN 14 mg/dL 04-06 Phos 2.4 mg/dL<L> 04-02 Alb 3.1 g/dL<L> 03-25 [id number] 5.9 %<H>  CAPILLARY BLOOD GLUCOSE  POCT Blood Glucose.: 202 mg/dL (06 Apr 2019 08:53)  POCT Blood Glucose.: 227 mg/dL (05 Apr 2019 22:50)  POCT Blood Glucose.: 171 mg/dL (05 Apr 2019 18:19)  POCT Blood Glucose.: 175 mg/dL (05 Apr 2019 12:47)    Skin: stage 1 pressure injury to right buttocks, stage 2 pressure injury to thoracic spine per nursing documentation    Estimated Needs:   [x] no change since previous assessment  [ ] recalculated:     Previous Nutrition Diagnosis:   [x] Malnutrition, moderate  Nutrition Diagnosis is [x] ongoing  [ ] resolved [ ] not applicable     Interventions:   1. Continue Consistent Carbohydrate with evening snack diet.   2. Continue No Carb Prosource (1pkg = 15 gm protein) 1x daily and Glucerna Therapeutic Nutrition Shake 240mls 3x daily (660kcals, 30g protein)   3. Fluid Restriction per medical team discretion.   4. Please Encourage po intake, assist with meals and menu selections, provide alternatives PRN     Monitoring and Evaluation:   1. Monitor weights, labs, BM's, skin integrity, p.o. intake.   2. Patient to meet > 75% estimated pro/kcal needs.   RD to remain available, Lourdes Bañuelos RD, CDN Pager #[phone]

## 2019-04-06 NOTE — PROGRESS NOTE ADULT - SUBJECTIVE AND OBJECTIVE BOX
SUBJECTIVE:    Patient seen and examined on AM rounds, no acute changes.   Patient pain is controlled, OOB yesterday, tolerating PO.       OBJECTIVE:     ** VITAL SIGNS / I&O's **    T(C): 36.4 (04-06-19 @ 10:09), Max: 37.3 (04-06-19 @ 06:35)  T(F): 97.5 (04-06-19 @ 10:09), Max: 99.1 (04-06-19 @ 06:35)  HR: 59 (04-06-19 @ 10:09) (59 - 82)  BP: 161/58 (04-06-19 @ 10:09) (138/56 - 161/58)  RR: 17 (04-06-19 @ 10:09) (16 - 18)  SpO2: 98% (04-06-19 @ 10:09) (98% - 100%)      05 Apr 2019 07:01  -  06 Apr 2019 07:00  --------------------------------------------------------  IN:  Total IN: 0 mL    OUT:    Bulb: 102 mL    Indwelling Catheter - Urethral: 2075 mL  Total OUT: 2177 mL    Total NET: -2177 mL          ** PHYSICAL EXAM **    Physical Exam:  Gen: NAD  LS: nml respiratory effort  Card: pulse regularly present  GI: abd soft, nontender  Groin: left groin w/ prevena VAC and DAGO serosang   Ext: warm, left AT signal      ** LABS **                 9.3    11.71  )----------(  142       ( 06 Apr 2019 06:30 )               28.7      127    |  93     |  14     ----------------------------<  148        ( 06 Apr 2019 06:30 )  4.4     |  24     |  0.62     Ca    8.8        ( 06 Apr 2019 06:30 )  Phos  2.4       ( 06 Apr 2019 06:30 )  Mg     1.5       ( 06 Apr 2019 06:30 )          CAPILLARY BLOOD GLUCOSE

## 2019-04-07 LAB
ANION GAP SERPL CALC-SCNC: 9 MMO/L — SIGNIFICANT CHANGE UP (ref 7–14)
BUN SERPL-MCNC: 12 MG/DL — SIGNIFICANT CHANGE UP (ref 7–23)
CALCIUM SERPL-MCNC: 8.7 MG/DL — SIGNIFICANT CHANGE UP (ref 8.4–10.5)
CHLORIDE SERPL-SCNC: 91 MMOL/L — LOW (ref 98–107)
CHLORIDE UR-SCNC: 57 MMOL/L — SIGNIFICANT CHANGE UP
CO2 SERPL-SCNC: 24 MMOL/L — SIGNIFICANT CHANGE UP (ref 22–31)
CREAT ?TM UR-MCNC: 22 MG/DL — SIGNIFICANT CHANGE UP
CREAT SERPL-MCNC: 0.61 MG/DL — SIGNIFICANT CHANGE UP (ref 0.5–1.3)
GLUCOSE SERPL-MCNC: 133 MG/DL — HIGH (ref 70–99)
HCT VFR BLD CALC: 26.9 % — LOW (ref 39–50)
HGB BLD-MCNC: 8.7 G/DL — LOW (ref 13–17)
MAGNESIUM SERPL-MCNC: 1.4 MG/DL — LOW (ref 1.6–2.6)
MCHC RBC-ENTMCNC: 28 PG — SIGNIFICANT CHANGE UP (ref 27–34)
MCHC RBC-ENTMCNC: 32.3 % — SIGNIFICANT CHANGE UP (ref 32–36)
MCV RBC AUTO: 86.5 FL — SIGNIFICANT CHANGE UP (ref 80–100)
NRBC # FLD: 0 K/UL — SIGNIFICANT CHANGE UP (ref 0–0)
OSMOLALITY SERPL: 262 MOSMO/KG — LOW (ref 275–295)
OSMOLALITY UR: 273 MOSMO/KG — SIGNIFICANT CHANGE UP (ref 50–1200)
PHOSPHATE SERPL-MCNC: 2.2 MG/DL — LOW (ref 2.5–4.5)
PLATELET # BLD AUTO: 145 K/UL — LOW (ref 150–400)
PMV BLD: 11.1 FL — SIGNIFICANT CHANGE UP (ref 7–13)
POTASSIUM SERPL-MCNC: 4.3 MMOL/L — SIGNIFICANT CHANGE UP (ref 3.5–5.3)
POTASSIUM SERPL-SCNC: 4.3 MMOL/L — SIGNIFICANT CHANGE UP (ref 3.5–5.3)
POTASSIUM UR-SCNC: 15.3 MMOL/L — SIGNIFICANT CHANGE UP
RBC # BLD: 3.11 M/UL — LOW (ref 4.2–5.8)
RBC # FLD: 14.6 % — HIGH (ref 10.3–14.5)
SODIUM SERPL-SCNC: 124 MMOL/L — LOW (ref 135–145)
SODIUM UR-SCNC: 71 MMOL/L — SIGNIFICANT CHANGE UP
VANCOMYCIN TROUGH SERPL-MCNC: 3.7 UG/ML — LOW (ref 10–20)
WBC # BLD: 9.67 K/UL — SIGNIFICANT CHANGE UP (ref 3.8–10.5)
WBC # FLD AUTO: 9.67 K/UL — SIGNIFICANT CHANGE UP (ref 3.8–10.5)

## 2019-04-07 PROCEDURE — 99233 SBSQ HOSP IP/OBS HIGH 50: CPT

## 2019-04-07 RX ORDER — VANCOMYCIN HCL 1 G
1000 VIAL (EA) INTRAVENOUS EVERY 12 HOURS
Qty: 0 | Refills: 0 | Status: DISCONTINUED | OUTPATIENT
Start: 2019-04-07 | End: 2019-04-08

## 2019-04-07 RX ORDER — VANCOMYCIN HCL 1 G
750 VIAL (EA) INTRAVENOUS ONCE
Qty: 0 | Refills: 0 | Status: COMPLETED | OUTPATIENT
Start: 2019-04-07 | End: 2019-04-07

## 2019-04-07 RX ORDER — VANCOMYCIN HCL 1 G
1000 VIAL (EA) INTRAVENOUS EVERY 12 HOURS
Qty: 0 | Refills: 0 | Status: DISCONTINUED | OUTPATIENT
Start: 2019-04-07 | End: 2019-04-07

## 2019-04-07 RX ORDER — SODIUM CHLORIDE 9 MG/ML
2 INJECTION INTRAMUSCULAR; INTRAVENOUS; SUBCUTANEOUS
Qty: 0 | Refills: 0 | Status: DISCONTINUED | OUTPATIENT
Start: 2019-04-07 | End: 2019-04-08

## 2019-04-07 RX ORDER — MAGNESIUM SULFATE 500 MG/ML
2 VIAL (ML) INJECTION ONCE
Qty: 0 | Refills: 0 | Status: COMPLETED | OUTPATIENT
Start: 2019-04-07 | End: 2019-04-07

## 2019-04-07 RX ADMIN — Medication 2: at 09:34

## 2019-04-07 RX ADMIN — Medication 100 MILLIGRAM(S): at 13:30

## 2019-04-07 RX ADMIN — CLOPIDOGREL BISULFATE 75 MILLIGRAM(S): 75 TABLET, FILM COATED ORAL at 13:31

## 2019-04-07 RX ADMIN — LOSARTAN POTASSIUM 25 MILLIGRAM(S): 100 TABLET, FILM COATED ORAL at 05:44

## 2019-04-07 RX ADMIN — PREGABALIN 2000 MICROGRAM(S): 225 CAPSULE ORAL at 13:29

## 2019-04-07 RX ADMIN — Medication 2000 UNIT(S): at 13:28

## 2019-04-07 RX ADMIN — Medication 250 MILLIGRAM(S): at 11:50

## 2019-04-07 RX ADMIN — Medication 50 GRAM(S): at 11:06

## 2019-04-07 RX ADMIN — SODIUM CHLORIDE 3 MILLILITER(S): 9 INJECTION INTRAMUSCULAR; INTRAVENOUS; SUBCUTANEOUS at 08:43

## 2019-04-07 RX ADMIN — SODIUM CHLORIDE 3 MILLILITER(S): 9 INJECTION INTRAMUSCULAR; INTRAVENOUS; SUBCUTANEOUS at 22:44

## 2019-04-07 RX ADMIN — RIVAROXABAN 20 MILLIGRAM(S): KIT at 17:30

## 2019-04-07 RX ADMIN — SODIUM CHLORIDE 2 GRAM(S): 9 INJECTION INTRAMUSCULAR; INTRAVENOUS; SUBCUTANEOUS at 17:30

## 2019-04-07 RX ADMIN — SODIUM CHLORIDE 1 GRAM(S): 9 INJECTION INTRAMUSCULAR; INTRAVENOUS; SUBCUTANEOUS at 05:44

## 2019-04-07 RX ADMIN — Medication 250 MILLIGRAM(S): at 22:46

## 2019-04-07 RX ADMIN — Medication 85 MILLIMOLE(S): at 14:28

## 2019-04-07 RX ADMIN — Medication 2: at 13:32

## 2019-04-07 RX ADMIN — ATORVASTATIN CALCIUM 20 MILLIGRAM(S): 80 TABLET, FILM COATED ORAL at 22:46

## 2019-04-07 RX ADMIN — SODIUM CHLORIDE 3 MILLILITER(S): 9 INJECTION INTRAMUSCULAR; INTRAVENOUS; SUBCUTANEOUS at 13:43

## 2019-04-07 RX ADMIN — AMPICILLIN SODIUM AND SULBACTAM SODIUM 200 GRAM(S): 250; 125 INJECTION, POWDER, FOR SUSPENSION INTRAMUSCULAR; INTRAVENOUS at 13:27

## 2019-04-07 RX ADMIN — AMPICILLIN SODIUM AND SULBACTAM SODIUM 200 GRAM(S): 250; 125 INJECTION, POWDER, FOR SUSPENSION INTRAMUSCULAR; INTRAVENOUS at 05:43

## 2019-04-07 RX ADMIN — Medication 0.25 MILLIGRAM(S): at 05:44

## 2019-04-07 RX ADMIN — Medication 150 MILLIGRAM(S): at 05:44

## 2019-04-07 RX ADMIN — AMPICILLIN SODIUM AND SULBACTAM SODIUM 200 GRAM(S): 250; 125 INJECTION, POWDER, FOR SUSPENSION INTRAMUSCULAR; INTRAVENOUS at 17:31

## 2019-04-07 NOTE — CONSULT NOTE ADULT - PROBLEM SELECTOR RECOMMENDATION 9
Pt. with acute on chronic hypotonic hyponatremia in the setting of increase D5W with IV meds, ? adherence to fluid restriction. On lab reviewed of [facility] serum sodium decreased since 2013. serum sodium decrease at 131 on 7/26/17. serum sodium stable at 132 on admission (3/24/19) and has been fluctuating from 129-134, however serum sodium decrease at 124 today (4/7/19). 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 Recommend to increase salt tabs to 2 g BID, give lasix 20 mg IV once. Liberalize > 2 g salt diet, continue fluid restriction. Monitor serum sodium.     Case discussed with nephrology (Dr. Mallory)

## 2019-04-07 NOTE — CONSULT NOTE ADULT - SUBJECTIVE AND OBJECTIVE BOX
[facility] DIVISION OF KIDNEY DISEASES AND HYPERTENSION -- [phone]  -- INITIAL CONSULT NOTE  --------------------------------------------------------------------------------  HPI: 74-year-old male with medical history of DM, CAD, A. 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 Nephrology consulted for hyponatremia. Pt. was seen and examined with wife at bedside. Pt. initially admitted at [facility] for constipation, found to have incidental left femoral aneurysm and transfer to [facility] for further management. Pt. underwent left excision of left femoral and path vein graft with sartorius flap on 4/3/19. As per wife pt. with known history of hyponatremia diagnosed > 10 years ago was prescribed salt tab as outpatient however pt. discontinue due to LE edema. On chart review, pt. receiving IV medications on D5W since admission. On lab reviewed of [facility] serum sodium decreased since 2013. serum sodium decrease at 131 on 7/26/17. serum sodium stable at 132 on admission (3/24/19) and has been fluctuating from 129-134, however serum sodium decrease at 124 today (4/7/19). Pt. reports feeling tired and weak, otherwise well, wife states is hard to keep fluid restriction. Denies pain, nausea, vomiting.     PAST HISTORY  --------------------------------------------------------------------------------  PAST MEDICAL & SURGICAL HISTORY:  AICD (automatic cardioverter/defibrillator) present  PVD (peripheral vascular disease): s/p angioplasty with stent ; pt states Left Carotid Artery occluded  Atrial fibrillation: since 2013  Hyperlipidemia  HTN (hypertension)  Lung cancer: small cell, stage 4, 1997, radiation &amp; chemo- on remission  PAD (peripheral artery disease)  GERD (gastroesophageal reflux disease)  Carotid artery occlusion: left  Neuropathy  Type 2 diabetes mellitus: glucose this am 116  Carotid artery occlusion: right with pseudoaneurysm s/p stent and mesh 4/2017  AICD (automatic cardioverter/defibrillator) present: 10/21/14  Carotid stenosis, right: Right CEA 2013  S/P femoral-popliteal bypass surgery: x 2, 2013, right s/p Revision  S/P angioplasty with stent: left leg  S/P cervical spinal fusion: 2002, 1 plate &amp; 4 rods    FAMILY HISTORY:  No pertinent family history in first degree relatives    PAST SOCIAL HISTORY:  No ETOH    ALLERGIES & MEDICATIONS  --------------------------------------------------------------------------------  Allergies    No Known Allergies    Intolerances      Standing Inpatient Medications  ampicillin/sulbactam  IVPB      ampicillin/sulbactam  IVPB 3 Gram(s) IV Intermittent every 6 hours  atorvastatin 20 milliGRAM(s) Oral at bedtime  cholecalciferol 2000 Unit(s) Oral daily  clopidogrel Tablet 75 milliGRAM(s) Oral daily  cyanocobalamin 2000 MICROGram(s) Oral daily  dextrose 5%. 1000 milliLiter(s) IV Continuous <Continuous>  dextrose 50% Injectable 12.5 Gram(s) IV Push once  dextrose 50% Injectable 25 Gram(s) IV Push once  dextrose 50% Injectable 25 Gram(s) IV Push once  digoxin     Tablet 0.25 milliGRAM(s) Oral daily  docusate sodium 100 milliGRAM(s) Oral three times a day  glycerin Suppository - Adult 1 Suppository(s) Rectal at bedtime  insulin lispro (HumaLOG) corrective regimen sliding scale   SubCutaneous three times a day before meals  insulin lispro (HumaLOG) corrective regimen sliding scale   SubCutaneous at bedtime  losartan 25 milliGRAM(s) Oral daily  metoprolol succinate  milliGRAM(s) Oral daily  polyethylene glycol 3350 17 Gram(s) Oral daily  rivaroxaban 20 milliGRAM(s) Oral every 24 hours  senna 2 Tablet(s) Oral at bedtime  sodium chloride 1 Gram(s) Oral two times a day  sodium chloride 0.9% lock flush 3 milliLiter(s) IV Push every 8 hours  sodium phosphate IVPB 30 milliMole(s) IV Intermittent once  vancomycin  IVPB 1000 milliGRAM(s) IV Intermittent every 12 hours    REVIEW OF SYSTEMS  --------------------------------------------------------------------------------  Gen: No fevers/chills  Skin: No rashes  Head/Eyes/Ears: Normal hearing,   Respiratory: No dyspnea, cough  CV: No chest pain  GI: No abdominal pain, diarrhea  : No dysuria, hematuria  MSK: No  edema    All other systems were reviewed and are negative, except as noted.    VITALS/PHYSICAL EXAM  --------------------------------------------------------------------------------  T(C): 36.8 (04-07-19 @ 11:00), Max: 37.1 (04-06-19 @ 17:40)  HR: 60 (04-07-19 @ 11:00) (60 - 92)  BP: 138/70 (04-07-19 @ 11:00) (122/60 - 173/72)  RR: 17 (04-07-19 @ 11:00) (16 - 18)  SpO2: 99% (04-07-19 @ 11:00) (96% - 100%)  Wt(kg): --    04-06-19 @ 07:01  -  04-07-19 @ 07:00  --------------------------------------------------------  IN: 0 mL / OUT: 2052.5 mL / NET: -2052.5 mL    04-07-19 @ 07:01  -  04-07-19 @ 13:26  --------------------------------------------------------  IN: 0 mL / OUT: 1315 mL / NET: -1315 mL    Physical Exam:  	Gen: resting, elderly  	HEENT: MMM  	Pulm: CTA B/L  	CV: S1S2  	Abd: Soft, +BS  	Ext: No LE edema B/L  	Neuro: Awake  	Skin: Warm and dry    LABS/STUDIES  --------------------------------------------------------------------------------              8.7    9.67  >-----------<  145      [04-07-19 @ 07:15]              26.9     124  |  91  |  12  ----------------------------<  133      [04-07-19 @ 07:15]  4.3   |  24  |  0.61        Ca     8.7     [04-07-19 @ 07:15]      Mg     1.4     [04-07-19 @ 07:15]      Phos  2.2     [04-07-19 @ 07:15]          Serum Osmolality 262      [04-07-19 @ 07:15]    Creatinine Trend:  SCr 0.61 [04-07 @ 07:15]  SCr 0.62 [04-06 @ 06:30]  SCr 0.71 [04-05 @ 05:30]  SCr 0.72 [04-04 @ 10:20]  SCr 0.63 [04-03 @ 19:30]    Urinalysis - [04-06-19 @ 14:50]      Color BROWN / Appearance Lt TURBID / SG 1.015 / pH 7.0      Gluc 50 / Ketone NEGATIVE  / Bili NEGATIVE / Urobili NORMAL       Blood LARGE / Protein 50 / Leuk Est TRACE / Nitrite NEGATIVE      RBC >50 / WBC 11-25 / Hyaline NEGATIVE / Gran  / Sq Epi OCC / Non Sq Epi  / Bacteria NEGATIVE    Urine Creatinine 22.00      [04-07-19 @ 11:10]  Urine Sodium 71      [04-07-19 @ 11:10]  Urine Potassium 15.3      [04-07-19 @ 11:10]  Urine Chloride 57      [04-07-19 @ 11:10]  Urine Osmolality 273      [04-07-19 @ 11:10]    HbA1c 5.9      [03-25-19 @ 09:34]    HBsAb Nonreactive Resulted by Discern      [03-30-19 @ 06:20]  HBsAg Nonreactive      [03-30-19 @ 06:20]  HBcAb Nonreactive      [03-30-19 @ 06:20]  HCV 0.66, Nonreactive Hepatitis C AB  S/CO Ratio                        Interpretation  < 1.00                                   Non-Reactive  1.00 - 4.99                         Weakly-Reactive  > 5.00                                Reactive  Non-Reactive: A person with a non-reactive HCV antibody  result is considered uninfected.  No further action is  needed unless recent infection is suspected.  In these  cases, consider repeat testing later to detect  seroconversion..  Weakly-Reactive: HCV antibody test is abnormal, HCV RNA  Qualitative test will follow.  Reactive: HCV antibody test is abnormal, HCV RNA  Qualitative test will follow.  Note: HCV antibody testing is performed on the [employer] system.      [03-30-19 @ 06:20] [facility] DIVISION OF KIDNEY DISEASES AND HYPERTENSION -- [phone]  -- INITIAL CONSULT NOTE  --------------------------------------------------------------------------------  HPI: 74-year-old male with medical history of DM, CAD, A. 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 Nephrology consulted for hyponatremia. Pt. was seen and examined with wife at bedside. Pt. initially admitted at [facility] for constipation, found to have incidental left femoral aneurysm and transfer to [facility] for further management. Pt. underwent left excision of left femoral and path vein graft with sartorius flap on 4/3/19. As per wife pt. with known history of hyponatremia diagnosed > 10 years ago was prescribed salt tab as outpatient however pt. discontinue due to LE edema. On chart review, pt. receiving IV medications on D5W since admission. On lab reviewed of [facility] serum sodium decreased since 2013. serum sodium decrease at 131 on 7/26/17. serum sodium stable at 132 on admission (3/24/19) and has been fluctuating from 129-134, however serum sodium decrease at 124 today (4/7/19). Pt. reports feeling tired and weak, otherwise well, wife states is hard to keep fluid restriction. Denies pain, nausea, vomiting.     PAST HISTORY  --------------------------------------------------------------------------------  PAST MEDICAL & SURGICAL HISTORY:  AICD (automatic cardioverter/defibrillator) present  PVD (peripheral vascular disease): s/p angioplasty with stent ; pt states Left Carotid Artery occluded  Atrial fibrillation: since 2013  Hyperlipidemia  HTN (hypertension)  Lung cancer: small cell, stage 4, 1997, radiation &amp; chemo- on remission  PAD (peripheral artery disease)  GERD (gastroesophageal reflux disease)  Carotid artery occlusion: left  Neuropathy  Type 2 diabetes mellitus: glucose this am 116  Carotid artery occlusion: right with pseudoaneurysm s/p stent and mesh 4/2017  AICD (automatic cardioverter/defibrillator) present: 10/21/14  Carotid stenosis, right: Right CEA 2013  S/P femoral-popliteal bypass surgery: x 2, 2013, right s/p Revision  S/P angioplasty with stent: left leg  S/P cervical spinal fusion: 2002, 1 plate &amp; 4 rods    FAMILY HISTORY:  No pertinent family history in first degree relatives    PAST SOCIAL HISTORY:  No ETOH    ALLERGIES & MEDICATIONS  --------------------------------------------------------------------------------  Allergies    No Known Allergies    Intolerances      Standing Inpatient Medications  ampicillin/sulbactam  IVPB      ampicillin/sulbactam  IVPB 3 Gram(s) IV Intermittent every 6 hours  atorvastatin 20 milliGRAM(s) Oral at bedtime  cholecalciferol 2000 Unit(s) Oral daily  clopidogrel Tablet 75 milliGRAM(s) Oral daily  cyanocobalamin 2000 MICROGram(s) Oral daily  dextrose 5%. 1000 milliLiter(s) IV Continuous <Continuous>  dextrose 50% Injectable 12.5 Gram(s) IV Push once  dextrose 50% Injectable 25 Gram(s) IV Push once  dextrose 50% Injectable 25 Gram(s) IV Push once  digoxin     Tablet 0.25 milliGRAM(s) Oral daily  docusate sodium 100 milliGRAM(s) Oral three times a day  glycerin Suppository - Adult 1 Suppository(s) Rectal at bedtime  insulin lispro (HumaLOG) corrective regimen sliding scale   SubCutaneous three times a day before meals  insulin lispro (HumaLOG) corrective regimen sliding scale   SubCutaneous at bedtime  losartan 25 milliGRAM(s) Oral daily  metoprolol succinate  milliGRAM(s) Oral daily  polyethylene glycol 3350 17 Gram(s) Oral daily  rivaroxaban 20 milliGRAM(s) Oral every 24 hours  senna 2 Tablet(s) Oral at bedtime  sodium chloride 1 Gram(s) Oral two times a day  sodium chloride 0.9% lock flush 3 milliLiter(s) IV Push every 8 hours  sodium phosphate IVPB 30 milliMole(s) IV Intermittent once  vancomycin  IVPB 1000 milliGRAM(s) IV Intermittent every 12 hours    REVIEW OF SYSTEMS  --------------------------------------------------------------------------------  Gen: No fevers/chills  Skin: No rashes  Head/Eyes/Ears: Normal hearing,   Respiratory: No dyspnea, cough  CV: No chest pain  GI: No abdominal pain, diarrhea  : No dysuria, hematuria  MSK: No  edema    All other systems were reviewed and are negative, except as noted.    VITALS/PHYSICAL EXAM  --------------------------------------------------------------------------------  T(C): 36.8 (04-07-19 @ 11:00), Max: 37.1 (04-06-19 @ 17:40)  HR: 60 (04-07-19 @ 11:00) (60 - 92)  BP: 138/70 (04-07-19 @ 11:00) (122/60 - 173/72)  RR: 17 (04-07-19 @ 11:00) (16 - 18)  SpO2: 99% (04-07-19 @ 11:00) (96% - 100%)  Wt(kg): --    04-06-19 @ 07:01  -  04-07-19 @ 07:00  --------------------------------------------------------  IN: 0 mL / OUT: 2052.5 mL / NET: -2052.5 mL    04-07-19 @ 07:01  -  04-07-19 @ 13:26  --------------------------------------------------------  IN: 0 mL / OUT: 1315 mL / NET: -1315 mL    Physical Exam:    	Gen: resting, elderly  	HEENT: MMM  	Pulm: CTA B/L  	CV: S1S2  	Abd: Soft, +BS  	Ext: No LE edema B/L  	Neuro: Awake  	Skin: Warm and dry    LABS/STUDIES  --------------------------------------------------------------------------------              8.7    9.67  >-----------<  145      [04-07-19 @ 07:15]              26.9     124  |  91  |  12  ----------------------------<  133      [04-07-19 @ 07:15]  4.3   |  24  |  0.61        Ca     8.7     [04-07-19 @ 07:15]      Mg     1.4     [04-07-19 @ 07:15]      Phos  2.2     [04-07-19 @ 07:15]      04-08    125<L>  |  92<L>  |  13  ----------------------------<  129<H>  4.2   |  23  |  0.60    Ca    8.4      08 Apr 2019 05:49  Phos  3.0     04-08  Mg     1.6     04-08        Serum Osmolality 262      [04-07-19 @ 07:15]    Creatinine Trend:  SCr 0.61 [04-07 @ 07:15]  SCr 0.62 [04-06 @ 06:30]  SCr 0.71 [04-05 @ 05:30]  SCr 0.72 [04-04 @ 10:20]  SCr 0.63 [04-03 @ 19:30]    Urinalysis - [04-06-19 @ 14:50]      Color BROWN / Appearance Lt TURBID / SG 1.015 / pH 7.0      Gluc 50 / Ketone NEGATIVE  / Bili NEGATIVE / Urobili NORMAL       Blood LARGE / Protein 50 / Leuk Est TRACE / Nitrite NEGATIVE      RBC >50 / WBC 11-25 / Hyaline NEGATIVE / Gran  / Sq Epi OCC / Non Sq Epi  / Bacteria NEGATIVE    Urine Creatinine 22.00      [04-07-19 @ 11:10]  Urine Sodium 71      [04-07-19 @ 11:10]  Urine Potassium 15.3      [04-07-19 @ 11:10]  Urine Chloride 57      [04-07-19 @ 11:10]  Urine Osmolality 273      [04-07-19 @ 11:10]    HbA1c 5.9      [03-25-19 @ 09:34]    HBsAb Nonreactive Resulted by Discern      [03-30-19 @ 06:20]  HBsAg Nonreactive      [03-30-19 @ 06:20]  HBcAb Nonreactive      [03-30-19 @ 06:20]  HCV 0.66, Nonreactive Hepatitis C AB  S/CO Ratio                        Interpretation  < 1.00                                   Non-Reactive  1.00 - 4.99                         Weakly-Reactive  > 5.00                                Reactive  Non-Reactive: A person with a non-reactive HCV antibody  result is considered uninfected.  No further action is  needed unless recent infection is suspected.  In these  cases, consider repeat testing later to detect  seroconversion..  Weakly-Reactive: HCV antibody test is abnormal, HCV RNA  Qualitative test will follow.  Reactive: HCV antibody test is abnormal, HCV RNA  Qualitative test will follow.  Note: HCV antibody testing is performed on the [employer]   system.      [03-30-19 @ 06:20]

## 2019-04-07 NOTE — PROGRESS NOTE ADULT - SUBJECTIVE AND OBJECTIVE BOX
CHIEF COMPLAINT: Patient is a 74y old  male who presents with a chief complaint of Possible mycotic aneurysm (2019 05:48)      SUBJECTIVE / OVERNIGHT EVENTS:    No complaints.    MEDICATIONS  (STANDING):  ampicillin/sulbactam  IVPB      ampicillin/sulbactam  IVPB 3 Gram(s) IV Intermittent every 6 hours  atorvastatin 20 milliGRAM(s) Oral at bedtime  cholecalciferol 2000 Unit(s) Oral daily  clopidogrel Tablet 75 milliGRAM(s) Oral daily  cyanocobalamin 2000 MICROGram(s) Oral daily  dextrose 5%. 1000 milliLiter(s) (50 mL/Hr) IV Continuous <Continuous>  dextrose 50% Injectable 12.5 Gram(s) IV Push once  dextrose 50% Injectable 25 Gram(s) IV Push once  dextrose 50% Injectable 25 Gram(s) IV Push once  digoxin     Tablet 0.25 milliGRAM(s) Oral daily  docusate sodium 100 milliGRAM(s) Oral three times a day  glycerin Suppository - Adult 1 Suppository(s) Rectal at bedtime  insulin lispro (HumaLOG) corrective regimen sliding scale   SubCutaneous three times a day before meals  insulin lispro (HumaLOG) corrective regimen sliding scale   SubCutaneous at bedtime  losartan 25 milliGRAM(s) Oral daily  metoprolol succinate  milliGRAM(s) Oral daily  polyethylene glycol 3350 17 Gram(s) Oral daily  rivaroxaban 20 milliGRAM(s) Oral every 24 hours  senna 2 Tablet(s) Oral at bedtime  sodium chloride 1 Gram(s) Oral two times a day  sodium chloride 0.9% lock flush 3 milliLiter(s) IV Push every 8 hours  sodium phosphate IVPB 30 milliMole(s) IV Intermittent once  vancomycin  IVPB 1000 milliGRAM(s) IV Intermittent every 12 hours    MEDICATIONS  (PRN):  acetaminophen   Tablet .. 650 milliGRAM(s) Oral every 6 hours PRN Mild Pain (1 - 3)  dextrose 40% Gel 15 Gram(s) Oral once PRN Blood Glucose LESS THAN 70 milliGRAM(s)/deciliter  glucagon  Injectable 1 milliGRAM(s) IntraMuscular once PRN Glucose LESS THAN 70 milligrams/deciliter      VITALS:  T(F): 98.3 (19 @ 11:00), Max: 98.7 (19 @ 17:40)  HR: 60 (19 @ 11:00) (60 - 92)  BP: 138/70 (19 @ 11:00) (122/60 - 173/72)  RR: 17 (19 @ 11:00) (16 - 18)  SpO2: 99% (19 @ 11:00)      CAPILLARY BLOOD GLUCOSE    Output     I&O's Summary  T(F): 98.3 (19 @ 11:00), Max: 98.7 (19 @ 17:40)  HR: 60 (19 @ 11:00) (60 - 92)  BP: 138/70 (19 @ 11:00) (122/60 - 173/72)  RR: 17 (19 @ 11:00) (16 - 18)  SpO2: 99% (19 @ 11:00)    PHYSICAL EXAM:  GENERAL: NAD, well-developed  HEAD:  Atraumatic, Normocephalic  EYES: EOMI  NECK: Supple, No JVD  CHEST/LUNG: nonlabored breathing  HEART: nl S1/S2  ABDOMEN: nondistended, soft  EXTREMITIES:  no LE edema  PSYCH: alert, answering questions appropriately  NEUROLOGY: non-focal  SKIN: No rashes noted    LABS:              8.7                  124  | 24   | 12           9.67  >-----------< 145     ------------------------< 133                   26.9                 4.3  | 91   | 0.61                                         Ca 8.7   Mg 1.4   Ph 2.2                Urinalysis Basic - ( 2019 14:50 )    Color: BROWN / Appearance: Lt TURBID / S.015 / pH: 7.0  Gluc: 50 / Ketone: NEGATIVE  / Bili: NEGATIVE / Urobili: NORMAL   Blood: LARGE / Protein: 50 / Nitrite: NEGATIVE   Leuk Esterase: TRACE / RBC: >50 / WBC 11-25   Sq Epi: OCC / Non Sq Epi: x / Bacteria: NEGATIVE            MICROBIOLOGY:        RADIOLOGY & ADDITIONAL TESTS:    Imaging Personally Reviewed:    < from: CT Abdomen and Pelvis w/ Oral Cont and w/ IV Cont (19 @ 14:29) >      < end of copied text >        [x] Care Discussed with Consultants/Other Providers: vascular surgery regarding hyponatremia, renal consult      Nasen J. Hong, M.D.  Hospitalist  Pager [phone]

## 2019-04-07 NOTE — PROGRESS NOTE ADULT - SUBJECTIVE AND OBJECTIVE BOX
C-Team Surgery Progress Note     Subjective/24hour Events: No acute events overnight. Pain controlled. Tolerating consistent carb diet. No N/V. No CP or SOB.    Vital Signs:  Vital Signs Last 24 Hrs  T(C): 36.6 (07 Apr 2019 05:39), Max: 37.3 (06 Apr 2019 06:35)  T(F): 97.9 (07 Apr 2019 05:39), Max: 99.1 (06 Apr 2019 06:35)  HR: 71 (07 Apr 2019 05:39) (59 - 92)  BP: 122/60 (07 Apr 2019 05:39) (122/60 - 173/72)  BP(mean): --  RR: 16 (07 Apr 2019 05:39) (16 - 18)  SpO2: 99% (07 Apr 2019 05:39) (96% - 100%)    CAPILLARY BLOOD GLUCOSE      POCT Blood Glucose.: 211 mg/dL (06 Apr 2019 21:08)  POCT Blood Glucose.: 237 mg/dL (06 Apr 2019 17:50)  POCT Blood Glucose.: 150 mg/dL (06 Apr 2019 13:43)  POCT Blood Glucose.: 202 mg/dL (06 Apr 2019 08:53)      I&O's Detail    05 Apr 2019 07:01  -  06 Apr 2019 07:00  --------------------------------------------------------  IN:  Total IN: 0 mL    OUT:    Bulb: 102 mL    Indwelling Catheter - Urethral: 2075 mL  Total OUT: 2177 mL    Total NET: -2177 mL      06 Apr 2019 07:01  -  07 Apr 2019 05:49  --------------------------------------------------------  IN:  Total IN: 0 mL    OUT:    Bulb: 32.5 mL    Indwelling Catheter - Urethral: 700 mL    Intermittent Catheterization - Urethral: 200 mL    Voided: 1120 mL  Total OUT: 2052.5 mL    Total NET: -2052.5 mL            MEDICATIONS  (STANDING):  ampicillin/sulbactam  IVPB      ampicillin/sulbactam  IVPB 3 Gram(s) IV Intermittent every 6 hours  atorvastatin 20 milliGRAM(s) Oral at bedtime  cholecalciferol 2000 Unit(s) Oral daily  clopidogrel Tablet 75 milliGRAM(s) Oral daily  cyanocobalamin 2000 MICROGram(s) Oral daily  dextrose 5%. 1000 milliLiter(s) (50 mL/Hr) IV Continuous <Continuous>  dextrose 50% Injectable 12.5 Gram(s) IV Push once  dextrose 50% Injectable 25 Gram(s) IV Push once  dextrose 50% Injectable 25 Gram(s) IV Push once  digoxin     Tablet 0.25 milliGRAM(s) Oral daily  docusate sodium 100 milliGRAM(s) Oral three times a day  glycerin Suppository - Adult 1 Suppository(s) Rectal at bedtime  insulin lispro (HumaLOG) corrective regimen sliding scale   SubCutaneous three times a day before meals  insulin lispro (HumaLOG) corrective regimen sliding scale   SubCutaneous at bedtime  losartan 25 milliGRAM(s) Oral daily  metoprolol succinate  milliGRAM(s) Oral daily  polyethylene glycol 3350 17 Gram(s) Oral daily  rivaroxaban 20 milliGRAM(s) Oral every 24 hours  senna 2 Tablet(s) Oral at bedtime  sodium chloride 1 Gram(s) Oral two times a day  sodium chloride 0.9% lock flush 3 milliLiter(s) IV Push every 8 hours  vancomycin  IVPB 750 milliGRAM(s) IV Intermittent every 12 hours    MEDICATIONS  (PRN):  acetaminophen   Tablet .. 650 milliGRAM(s) Oral every 6 hours PRN Mild Pain (1 - 3)  dextrose 40% Gel 15 Gram(s) Oral once PRN Blood Glucose LESS THAN 70 milliGRAM(s)/deciliter  glucagon  Injectable 1 milliGRAM(s) IntraMuscular once PRN Glucose LESS THAN 70 milligrams/deciliter        Physical Exam:  Gen: NAD  LS: nml respiratory effort  Card: pulse regularly present  GI: abd soft, nontender  Groin: left groin w/ prevena VAC and DAGO serosang   Ext: warm      Labs:    04-06    127<L>  |  93<L>  |  14  ----------------------------<  148<H>  4.4   |  24  |  0.62    Ca    8.8      06 Apr 2019 06:30  Phos  2.4     04-06  Mg     1.5     04-06                              9.3    11.71 )-----------( 142      ( 06 Apr 2019 06:30 )             28.7 C-Team Surgery Progress Note     Subjective/24hour Events: No acute events overnight. Pain controlled. Tolerating consistent carb diet. No N/V. No CP or SOB.    Vital Signs:  Vital Signs Last 24 Hrs  T(C): 36.6 (07 Apr 2019 05:39), Max: 37.3 (06 Apr 2019 06:35)  T(F): 97.9 (07 Apr 2019 05:39), Max: 99.1 (06 Apr 2019 06:35)  HR: 71 (07 Apr 2019 05:39) (59 - 92)  BP: 122/60 (07 Apr 2019 05:39) (122/60 - 173/72)  BP(mean): --  RR: 16 (07 Apr 2019 05:39) (16 - 18)  SpO2: 99% (07 Apr 2019 05:39) (96% - 100%)    CAPILLARY BLOOD GLUCOSE      POCT Blood Glucose.: 211 mg/dL (06 Apr 2019 21:08)  POCT Blood Glucose.: 237 mg/dL (06 Apr 2019 17:50)  POCT Blood Glucose.: 150 mg/dL (06 Apr 2019 13:43)  POCT Blood Glucose.: 202 mg/dL (06 Apr 2019 08:53)      I&O's Detail    05 Apr 2019 07:01  -  06 Apr 2019 07:00  --------------------------------------------------------  IN:  Total IN: 0 mL    OUT:    Bulb: 102 mL    Indwelling Catheter - Urethral: 2075 mL  Total OUT: 2177 mL    Total NET: -2177 mL      06 Apr 2019 07:01  -  07 Apr 2019 05:49  --------------------------------------------------------  IN:  Total IN: 0 mL    OUT:    Bulb: 32.5 mL    Indwelling Catheter - Urethral: 700 mL    Intermittent Catheterization - Urethral: 200 mL    Voided: 1120 mL  Total OUT: 2052.5 mL    Total NET: -2052.5 mL            MEDICATIONS  (STANDING):  ampicillin/sulbactam  IVPB      ampicillin/sulbactam  IVPB 3 Gram(s) IV Intermittent every 6 hours  atorvastatin 20 milliGRAM(s) Oral at bedtime  cholecalciferol 2000 Unit(s) Oral daily  clopidogrel Tablet 75 milliGRAM(s) Oral daily  cyanocobalamin 2000 MICROGram(s) Oral daily  dextrose 5%. 1000 milliLiter(s) (50 mL/Hr) IV Continuous <Continuous>  dextrose 50% Injectable 12.5 Gram(s) IV Push once  dextrose 50% Injectable 25 Gram(s) IV Push once  dextrose 50% Injectable 25 Gram(s) IV Push once  digoxin     Tablet 0.25 milliGRAM(s) Oral daily  docusate sodium 100 milliGRAM(s) Oral three times a day  glycerin Suppository - Adult 1 Suppository(s) Rectal at bedtime  insulin lispro (HumaLOG) corrective regimen sliding scale   SubCutaneous three times a day before meals  insulin lispro (HumaLOG) corrective regimen sliding scale   SubCutaneous at bedtime  losartan 25 milliGRAM(s) Oral daily  metoprolol succinate  milliGRAM(s) Oral daily  polyethylene glycol 3350 17 Gram(s) Oral daily  rivaroxaban 20 milliGRAM(s) Oral every 24 hours  senna 2 Tablet(s) Oral at bedtime  sodium chloride 1 Gram(s) Oral two times a day  sodium chloride 0.9% lock flush 3 milliLiter(s) IV Push every 8 hours  vancomycin  IVPB 750 milliGRAM(s) IV Intermittent every 12 hours    MEDICATIONS  (PRN):  acetaminophen   Tablet .. 650 milliGRAM(s) Oral every 6 hours PRN Mild Pain (1 - 3)  dextrose 40% Gel 15 Gram(s) Oral once PRN Blood Glucose LESS THAN 70 milliGRAM(s)/deciliter  glucagon  Injectable 1 milliGRAM(s) IntraMuscular once PRN Glucose LESS THAN 70 milligrams/deciliter        Physical Exam:  Gen: NAD  LS: nml respiratory effort  Card: pulse regularly present  GI: abd soft, nontender  Groin: left groin w/ prevena VAC and DAGO serjung osmel in place  Ext: warm      Labs:    04-06    127<L>  |  93<L>  |  14  ----------------------------<  148<H>  4.4   |  24  |  0.62    Ca    8.8      06 Apr 2019 06:30  Phos  2.4     04-06  Mg     1.5     04-06                              9.3    11.71 )-----------( 142      ( 06 Apr 2019 06:30 )             28.7

## 2019-04-07 NOTE — PROGRESS NOTE ADULT - PROBLEM SELECTOR PLAN 5
- urine osmolality high, c/w SIADH  - strictly limit free water - minimize medications via IV solution (most being given in D5W)  - replete electrolytes with oral agents  - 800 cc fluid restriction  - continue salt tabs  - recommend renal consult for worsening hyponatremia

## 2019-04-07 NOTE — CONSULT NOTE ADULT - ATTENDING COMMENTS
I saw and evaluated the patient today 4/8/19 agree with lasix recommend increase salt tabs to 3 tid.

## 2019-04-07 NOTE — PROGRESS NOTE ADULT - ASSESSMENT
73yo M presented 3/27 from [facility]. w 2wk malaise, weakness, & stopped ambulating. Found to have + klebsiella UA (on rocephin). 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 Now s/p excision of L fem art & patch w/ interposition vein graft & sartorius flap on 4/3.    Plan  - Pain control: Tylenol 650mg PO q6h PRN  - Consistent carb diet  - C/w Unasyn abx   - F/u ID recs  - Encourage OOB   - DVT ppx: subQ heparin  - Dispo planning to rehab 75yo M presented 3/27 from [facility]. w 2wk malaise, weakness, & stopped ambulating. Found to have + klebsiella UA (on rocephin). 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 Now s/p excision of L fem art & patch w/ interposition vein graft & sartorius flap on 4/3.    Plan  - Pain control: Tylenol 650mg PO q6h PRN  - Consistent carb diet  - C/w vanco and Unasyn abx       - F/u ID recs  - C/w aspirin and Plavix  - Encourage OOB   - DVT ppx: subQ heparin  - Dispo planning to rehab

## 2019-04-08 LAB
ANION GAP SERPL CALC-SCNC: 10 MMO/L — SIGNIFICANT CHANGE UP (ref 7–14)
BLD GP AB SCN SERPL QL: NEGATIVE — SIGNIFICANT CHANGE UP
BUN SERPL-MCNC: 13 MG/DL — SIGNIFICANT CHANGE UP (ref 7–23)
CALCIUM SERPL-MCNC: 8.4 MG/DL — SIGNIFICANT CHANGE UP (ref 8.4–10.5)
CHLORIDE SERPL-SCNC: 92 MMOL/L — LOW (ref 98–107)
CHLORIDE UR-SCNC: 40 MMOL/L — SIGNIFICANT CHANGE UP
CO2 SERPL-SCNC: 23 MMOL/L — SIGNIFICANT CHANGE UP (ref 22–31)
CREAT ?TM UR-MCNC: 45.6 MG/DL — SIGNIFICANT CHANGE UP
CREAT SERPL-MCNC: 0.6 MG/DL — SIGNIFICANT CHANGE UP (ref 0.5–1.3)
CULTURE - SURGICAL SITE: SIGNIFICANT CHANGE UP
GLUCOSE SERPL-MCNC: 129 MG/DL — HIGH (ref 70–99)
HCT VFR BLD CALC: 24.2 % — LOW (ref 39–50)
HCT VFR BLD CALC: 26.2 % — LOW (ref 39–50)
HGB BLD-MCNC: 7.8 G/DL — LOW (ref 13–17)
HGB BLD-MCNC: 8.1 G/DL — LOW (ref 13–17)
MAGNESIUM SERPL-MCNC: 1.6 MG/DL — SIGNIFICANT CHANGE UP (ref 1.6–2.6)
MCHC RBC-ENTMCNC: 27.2 PG — SIGNIFICANT CHANGE UP (ref 27–34)
MCHC RBC-ENTMCNC: 28 PG — SIGNIFICANT CHANGE UP (ref 27–34)
MCHC RBC-ENTMCNC: 30.9 % — LOW (ref 32–36)
MCHC RBC-ENTMCNC: 32.2 % — SIGNIFICANT CHANGE UP (ref 32–36)
MCV RBC AUTO: 86.7 FL — SIGNIFICANT CHANGE UP (ref 80–100)
MCV RBC AUTO: 87.9 FL — SIGNIFICANT CHANGE UP (ref 80–100)
NRBC # FLD: 0 K/UL — SIGNIFICANT CHANGE UP (ref 0–0)
NRBC # FLD: 0 K/UL — SIGNIFICANT CHANGE UP (ref 0–0)
OSMOLALITY SERPL: 261 MOSMO/KG — LOW (ref 275–295)
OSMOLALITY UR: 322 MOSMO/KG — SIGNIFICANT CHANGE UP (ref 50–1200)
PHOSPHATE SERPL-MCNC: 3 MG/DL — SIGNIFICANT CHANGE UP (ref 2.5–4.5)
PLATELET # BLD AUTO: 153 K/UL — SIGNIFICANT CHANGE UP (ref 150–400)
PLATELET # BLD AUTO: 153 K/UL — SIGNIFICANT CHANGE UP (ref 150–400)
PMV BLD: 10.4 FL — SIGNIFICANT CHANGE UP (ref 7–13)
PMV BLD: 10.6 FL — SIGNIFICANT CHANGE UP (ref 7–13)
POTASSIUM SERPL-MCNC: 4.2 MMOL/L — SIGNIFICANT CHANGE UP (ref 3.5–5.3)
POTASSIUM SERPL-SCNC: 4.2 MMOL/L — SIGNIFICANT CHANGE UP (ref 3.5–5.3)
POTASSIUM UR-SCNC: 18.1 MMOL/L — SIGNIFICANT CHANGE UP
RBC # BLD: 2.79 M/UL — LOW (ref 4.2–5.8)
RBC # BLD: 2.98 M/UL — LOW (ref 4.2–5.8)
RBC # FLD: 14.6 % — HIGH (ref 10.3–14.5)
RBC # FLD: 14.6 % — HIGH (ref 10.3–14.5)
RH IG SCN BLD-IMP: POSITIVE — SIGNIFICANT CHANGE UP
SODIUM SERPL-SCNC: 125 MMOL/L — LOW (ref 135–145)
SODIUM UR-SCNC: 65 MMOL/L — SIGNIFICANT CHANGE UP
VANCOMYCIN TROUGH SERPL-MCNC: 16.1 UG/ML — SIGNIFICANT CHANGE UP (ref 10–20)
WBC # BLD: 5.93 K/UL — SIGNIFICANT CHANGE UP (ref 3.8–10.5)
WBC # BLD: 6.77 K/UL — SIGNIFICANT CHANGE UP (ref 3.8–10.5)
WBC # FLD AUTO: 5.93 K/UL — SIGNIFICANT CHANGE UP (ref 3.8–10.5)
WBC # FLD AUTO: 6.77 K/UL — SIGNIFICANT CHANGE UP (ref 3.8–10.5)

## 2019-04-08 PROCEDURE — 99232 SBSQ HOSP IP/OBS MODERATE 35: CPT

## 2019-04-08 PROCEDURE — 99233 SBSQ HOSP IP/OBS HIGH 50: CPT

## 2019-04-08 RX ORDER — MAGNESIUM SULFATE 500 MG/ML
2 VIAL (ML) INJECTION ONCE
Qty: 0 | Refills: 0 | Status: COMPLETED | OUTPATIENT
Start: 2019-04-08 | End: 2019-04-08

## 2019-04-08 RX ORDER — VANCOMYCIN HCL 1 G
1000 VIAL (EA) INTRAVENOUS EVERY 12 HOURS
Qty: 0 | Refills: 0 | Status: DISCONTINUED | OUTPATIENT
Start: 2019-04-08 | End: 2019-04-09

## 2019-04-08 RX ORDER — FUROSEMIDE 40 MG
20 TABLET ORAL ONCE
Qty: 0 | Refills: 0 | Status: COMPLETED | OUTPATIENT
Start: 2019-04-08 | End: 2019-04-08

## 2019-04-08 RX ORDER — INSULIN GLARGINE 100 [IU]/ML
8 INJECTION, SOLUTION SUBCUTANEOUS AT BEDTIME
Qty: 0 | Refills: 0 | Status: DISCONTINUED | OUTPATIENT
Start: 2019-04-08 | End: 2019-04-09

## 2019-04-08 RX ORDER — SODIUM CHLORIDE 9 MG/ML
2 INJECTION INTRAMUSCULAR; INTRAVENOUS; SUBCUTANEOUS THREE TIMES A DAY
Qty: 0 | Refills: 0 | Status: DISCONTINUED | OUTPATIENT
Start: 2019-04-08 | End: 2019-04-09

## 2019-04-08 RX ADMIN — Medication 1 SUPPOSITORY(S): at 22:10

## 2019-04-08 RX ADMIN — Medication 8: at 12:57

## 2019-04-08 RX ADMIN — Medication 50 GRAM(S): at 13:49

## 2019-04-08 RX ADMIN — SODIUM CHLORIDE 2 GRAM(S): 9 INJECTION INTRAMUSCULAR; INTRAVENOUS; SUBCUTANEOUS at 18:00

## 2019-04-08 RX ADMIN — AMPICILLIN SODIUM AND SULBACTAM SODIUM 200 GRAM(S): 250; 125 INJECTION, POWDER, FOR SUSPENSION INTRAMUSCULAR; INTRAVENOUS at 17:59

## 2019-04-08 RX ADMIN — Medication 0.25 MILLIGRAM(S): at 05:51

## 2019-04-08 RX ADMIN — ATORVASTATIN CALCIUM 20 MILLIGRAM(S): 80 TABLET, FILM COATED ORAL at 22:10

## 2019-04-08 RX ADMIN — Medication 100 MILLIGRAM(S): at 13:49

## 2019-04-08 RX ADMIN — AMPICILLIN SODIUM AND SULBACTAM SODIUM 200 GRAM(S): 250; 125 INJECTION, POWDER, FOR SUSPENSION INTRAMUSCULAR; INTRAVENOUS at 12:57

## 2019-04-08 RX ADMIN — SODIUM CHLORIDE 3 MILLILITER(S): 9 INJECTION INTRAMUSCULAR; INTRAVENOUS; SUBCUTANEOUS at 05:42

## 2019-04-08 RX ADMIN — Medication 100 MILLIGRAM(S): at 22:10

## 2019-04-08 RX ADMIN — CLOPIDOGREL BISULFATE 75 MILLIGRAM(S): 75 TABLET, FILM COATED ORAL at 12:44

## 2019-04-08 RX ADMIN — PREGABALIN 2000 MICROGRAM(S): 225 CAPSULE ORAL at 12:56

## 2019-04-08 RX ADMIN — SODIUM CHLORIDE 3 MILLILITER(S): 9 INJECTION INTRAMUSCULAR; INTRAVENOUS; SUBCUTANEOUS at 22:27

## 2019-04-08 RX ADMIN — LOSARTAN POTASSIUM 25 MILLIGRAM(S): 100 TABLET, FILM COATED ORAL at 05:51

## 2019-04-08 RX ADMIN — Medication 20 MILLIGRAM(S): at 15:18

## 2019-04-08 RX ADMIN — Medication 250 MILLIGRAM(S): at 11:21

## 2019-04-08 RX ADMIN — Medication 250 MILLIGRAM(S): at 22:46

## 2019-04-08 RX ADMIN — SODIUM CHLORIDE 3 MILLILITER(S): 9 INJECTION INTRAMUSCULAR; INTRAVENOUS; SUBCUTANEOUS at 13:53

## 2019-04-08 RX ADMIN — AMPICILLIN SODIUM AND SULBACTAM SODIUM 200 GRAM(S): 250; 125 INJECTION, POWDER, FOR SUSPENSION INTRAMUSCULAR; INTRAVENOUS at 00:31

## 2019-04-08 RX ADMIN — AMPICILLIN SODIUM AND SULBACTAM SODIUM 200 GRAM(S): 250; 125 INJECTION, POWDER, FOR SUSPENSION INTRAMUSCULAR; INTRAVENOUS at 05:51

## 2019-04-08 RX ADMIN — SODIUM CHLORIDE 2 GRAM(S): 9 INJECTION INTRAMUSCULAR; INTRAVENOUS; SUBCUTANEOUS at 05:52

## 2019-04-08 RX ADMIN — RIVAROXABAN 20 MILLIGRAM(S): KIT at 17:59

## 2019-04-08 RX ADMIN — POLYETHYLENE GLYCOL 3350 17 GRAM(S): 17 POWDER, FOR SOLUTION ORAL at 12:45

## 2019-04-08 RX ADMIN — Medication 2: at 09:23

## 2019-04-08 RX ADMIN — SODIUM CHLORIDE 2 GRAM(S): 9 INJECTION INTRAMUSCULAR; INTRAVENOUS; SUBCUTANEOUS at 22:10

## 2019-04-08 RX ADMIN — SENNA PLUS 2 TABLET(S): 8.6 TABLET ORAL at 22:10

## 2019-04-08 RX ADMIN — INSULIN GLARGINE 8 UNIT(S): 100 INJECTION, SOLUTION SUBCUTANEOUS at 22:23

## 2019-04-08 RX ADMIN — Medication 150 MILLIGRAM(S): at 05:50

## 2019-04-08 RX ADMIN — Medication 2000 UNIT(S): at 12:43

## 2019-04-08 NOTE — PROVIDER CONTACT NOTE (OTHER) - ACTION/TREATMENT ORDERED:
Provider made aware, stated it was ok to change dressing and is aware about DAGO drain leaking slightly. Continue to monitor.

## 2019-04-08 NOTE — PROGRESS NOTE ADULT - PROBLEM SELECTOR PLAN 5
- urine osmolality high, c/w SIADH  - strictly limit free water - minimize medications via IV solution (most being given in D5W)  - replete electrolytes with oral agents  - 800 cc fluid restriction  - continue salt tabs  - appreciate renal consult  - sodium slightly improved today

## 2019-04-08 NOTE — PROGRESS NOTE ADULT - PROBLEM SELECTOR PLAN 4
A1C 5.9, FS elevated post op.  -fingersticks labile  -start Lantus 8 U qhs given elevated blood glucose  -continue correction scale insulin and monitor fingersticks  -Resume metformin at discharge

## 2019-04-08 NOTE — PROGRESS NOTE ADULT - SUBJECTIVE AND OBJECTIVE BOX
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 Denies fever chills. Remains with drain in left leg with bloody discharge.     Rest of ROS negative.    Allergies  No Known Allergies    ANTIMICROBIALS:  ampicillin/sulbactam  IVPB    ampicillin/sulbactam  IVPB 3 every 6 hours  vancomycin  IVPB 1000 every 12 hours    OTHER MEDS:  acetaminophen   Tablet .. 650 milliGRAM(s) Oral every 6 hours PRN  atorvastatin 20 milliGRAM(s) Oral at bedtime  cholecalciferol 2000 Unit(s) Oral daily  clopidogrel Tablet 75 milliGRAM(s) Oral daily  cyanocobalamin 2000 MICROGram(s) Oral daily  dextrose 40% Gel 15 Gram(s) Oral once PRN  dextrose 5%. 1000 milliLiter(s) IV Continuous <Continuous>  dextrose 50% Injectable 12.5 Gram(s) IV Push once  dextrose 50% Injectable 25 Gram(s) IV Push once  dextrose 50% Injectable 25 Gram(s) IV Push once  digoxin     Tablet 0.25 milliGRAM(s) Oral daily  docusate sodium 100 milliGRAM(s) Oral three times a day  glucagon  Injectable 1 milliGRAM(s) IntraMuscular once PRN  glycerin Suppository - Adult 1 Suppository(s) Rectal at bedtime  insulin glargine Injectable (LANTUS) 8 Unit(s) SubCutaneous at bedtime  insulin lispro (HumaLOG) corrective regimen sliding scale   SubCutaneous three times a day before meals  insulin lispro (HumaLOG) corrective regimen sliding scale   SubCutaneous at bedtime  losartan 25 milliGRAM(s) Oral daily  magnesium sulfate  IVPB 2 Gram(s) IV Intermittent once  metoprolol succinate  milliGRAM(s) Oral daily  polyethylene glycol 3350 17 Gram(s) Oral daily  rivaroxaban 20 milliGRAM(s) Oral every 24 hours  senna 2 Tablet(s) Oral at bedtime  sodium chloride 2 Gram(s) Oral two times a day  sodium chloride 0.9% lock flush 3 milliLiter(s) IV Push every 8 hours    PE:    Vital Signs Last 24 Hrs  T(C): 36.4 (2019 10:57), Max: 37 (2019 14:02)  T(F): 97.6 (2019 10:57), Max: 98.6 (2019 14:02)  HR: 56 (2019 10:57) (51 - 68)  BP: 139/50 (2019 10:57) (130/64 - 140/62)  BP(mean): --  RR: 18 (2019 10:57) (16 - 18)  SpO2: 99% (2019 10:57) (99% - 100%)    Gen: AOx3, NAD, non-toxic  CV: S1+S2 normal, no murmurs  Resp: Clear bilat, no resp distress  Abd: Soft, nontender, +BS  Ext: Left leg site C/D/I; DAGO drain in place with bloody discharge  : No Lyle  IV/Skin: No thrombophlebitis  Neuro: no focal deficits    LABS:                          8.1    5.93  )-----------( 153      ( 2019 11:20 )             26.2       04-08    125<L>  |  92<L>  |  13  ----------------------------<  129<H>  4.2   |  23  |  0.60    Ca    8.4      2019 05:49  Phos  3.0     04-08  Mg     1.6     04-08        Urinalysis Basic - ( 2019 14:50 )    Color: BROWN / Appearance: Lt TURBID / S.015 / pH: 7.0  Gluc: 50 / Ketone: NEGATIVE  / Bili: NEGATIVE / Urobili: NORMAL   Blood: LARGE / Protein: 50 / Nitrite: NEGATIVE   Leuk Esterase: TRACE / RBC: >50 / WBC 11-25   Sq Epi: OCC / Non Sq Epi: x / Bacteria: NEGATIVE        MICROBIOLOGY:  v  OTHER  19 --  --  --      OTHER  19 --  --  --      BLOOD  19 --  --  --      BLOOD VENOUS  19 --  --  --      URINE CATHETER  19 --  --  Enterococcus faecalis      .Blood Blood-Peripheral  19   No growth at 5 days.  --  --      .Urine Clean Catch (Midstream)  19   >100,000 CFU/ml Klebsiella pneumoniae  --  Klebsiella pneumoniae    RADIOLOGY:    < from: Xray Chest 1 View- PORTABLE-Urgent (19 @ 11:25) >  IMPRESSION:  Right apical opacity, of indeterminate nature. An apical   mass, pleural thickening, and loculated pleural fluid are in the   differential diagnosis. Correlation with a CT scan of the chest could be   performed for further evaluation, if felt to be clinically indicated.    Nonspecific increased reticular opacities in the right lower lung.    < end of copied text >

## 2019-04-08 NOTE — PROGRESS NOTE ADULT - ASSESSMENT
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

## 2019-04-08 NOTE — PROGRESS NOTE ADULT - PROBLEM SELECTOR PLAN 2
Likely catheter related.  Ucx (+) for Enterococcus faecalis, sensitive to Ampicilin  -patient on Unasyn, vancomycin

## 2019-04-08 NOTE — PROGRESS NOTE ADULT - SUBJECTIVE AND OBJECTIVE BOX
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 Per wife, patient's urinary retention is managed with intermittent cath's at home. Patient denies any SOB. Denies N/V. Wife noted LUE edema x 2 days.    MEDICATIONS  (STANDING):  ampicillin/sulbactam  IVPB      ampicillin/sulbactam  IVPB 3 Gram(s) IV Intermittent every 6 hours  atorvastatin 20 milliGRAM(s) Oral at bedtime  cholecalciferol 2000 Unit(s) Oral daily  clopidogrel Tablet 75 milliGRAM(s) Oral daily  cyanocobalamin 2000 MICROGram(s) Oral daily  dextrose 5%. 1000 milliLiter(s) (50 mL/Hr) IV Continuous <Continuous>  dextrose 50% Injectable 12.5 Gram(s) IV Push once  dextrose 50% Injectable 25 Gram(s) IV Push once  dextrose 50% Injectable 25 Gram(s) IV Push once  digoxin     Tablet 0.25 milliGRAM(s) Oral daily  docusate sodium 100 milliGRAM(s) Oral three times a day  glycerin Suppository - Adult 1 Suppository(s) Rectal at bedtime  insulin lispro (HumaLOG) corrective regimen sliding scale   SubCutaneous three times a day before meals  insulin lispro (HumaLOG) corrective regimen sliding scale   SubCutaneous at bedtime  losartan 25 milliGRAM(s) Oral daily  magnesium sulfate  IVPB 2 Gram(s) IV Intermittent once  metoprolol succinate  milliGRAM(s) Oral daily  polyethylene glycol 3350 17 Gram(s) Oral daily  rivaroxaban 20 milliGRAM(s) Oral every 24 hours  senna 2 Tablet(s) Oral at bedtime  sodium chloride 2 Gram(s) Oral two times a day  sodium chloride 0.9% lock flush 3 milliLiter(s) IV Push every 8 hours  vancomycin  IVPB 1000 milliGRAM(s) IV Intermittent every 12 hours    MEDICATIONS  (PRN):  acetaminophen   Tablet .. 650 milliGRAM(s) Oral every 6 hours PRN Mild Pain (1 - 3)  dextrose 40% Gel 15 Gram(s) Oral once PRN Blood Glucose LESS THAN 70 milliGRAM(s)/deciliter  glucagon  Injectable 1 milliGRAM(s) IntraMuscular once PRN Glucose LESS THAN 70 milligrams/deciliter      VITALS:  T(F): 97.6 (04-08-19 @ 10:57), Max: 98.6 (19 @ 14:02)  HR: 56 (19 @ 10:57) (51 - 68)  BP: 139/50 (19 @ 10:57) (130/64 - 140/62)  RR: 18 (19 @ 10:57) (16 - 18)  SpO2: 99% (19 @ 10:57)      CAPILLARY BLOOD GLUCOSE    Output     I&O's Summary  T(F): 97.6 (19 @ 10:57), Max: 98.6 (19 @ 14:02)  HR: 56 (19 @ 10:57) (51 - 68)  BP: 139/50 (19 @ 10:57) (130/64 - 140/62)  RR: 18 (19 @ 10:57) (16 - 18)  SpO2: 99% (19 @ 10:57)    PHYSICAL EXAM:  GENERAL: NAD, well-developed  HEAD:  Atraumatic, Normocephalic  EYES: EOMI  NECK: Supple, No JVD  CHEST/LUNG: nonlabored breathing  HEART: nl S1/S2  ABDOMEN: nondistended, soft  EXTREMITIES:  LUE with 1+ pitting edema  PSYCH: alert, answering questions appropriately  NEUROLOGY: non-focal  SKIN: No rashes noted    LABS:              8.1                  x    | x    | x            5.93  >-----------< 153     ------------------------< x                     26.2                 x    | x    | x                                            Ca x     Mg x     Ph x                  Urinalysis Basic - ( 2019 14:50 )    Color: BROWN / Appearance: Lt TURBID / S.015 / pH: 7.0  Gluc: 50 / Ketone: NEGATIVE  / Bili: NEGATIVE / Urobili: NORMAL   Blood: LARGE / Protein: 50 / Nitrite: NEGATIVE   Leuk Esterase: TRACE / RBC: >50 / WBC 11-25   Sq Epi: OCC / Non Sq Epi: x / Bacteria: NEGATIVE            MICROBIOLOGY:        RADIOLOGY & ADDITIONAL TESTS:    Imaging Personally Reviewed:      < from: Xray Abdomen 2 Views (19 @ 22:33) >      < end of copied text >      [x] Care Discussed with Consultants/Other Providers:      Rachel Hong M.D.  Hospitalist  Pager [phone]

## 2019-04-08 NOTE — PROGRESS NOTE ADULT - ASSESSMENT
73yo M presented 3/27 from [facility]. w 2wk malaise, weakness, & stopped ambulating. Found to have + klebsiella UA (on rocephin). 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 Now s/p excision of L fem art & patch w/ interposition vein graft & sartorius flap on 4/3.    Plan  - Pain control: Tylenol 650mg PO q6h PRN  - Consistent carb diet  - C/w Unasyn abx   - F/u ID recs  - Encourage OOB   - Remove bolivar before d/c to rehab  - DVT ppx: subQ heparin  - Dispo planning to rehab 75[facility] w 2wk malaise, weakness, & stopped ambulating. Found to have + klebsiella UA (on rocephin). 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 Now s/p excision of L fem art & patch w/ interposition vein graft & sartorius flap on 4/3.    Plan  - Pain control: Tylenol 650mg PO q6h PRN  - Consistent carb diet  - C/w Unasyn abx   - F/u ID recs  - Encourage OOB   - f/u 11am labs  - Remove bolivar before d/c to rehab  - DVT ppx: subQ heparin  - Dispo planning to rehab 75yo M presented 3/27 from [facility]. w 2wk malaise, weakness, & stopped ambulating. Found to have + klebsiella UA (on rocephin). 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 Now s/p excision of L fem art & patch w/ interposition vein graft & sartorius flap on 4/3.    Plan  - Pain control: Tylenol 650mg PO q6h PRN  - Consistent carb diet  - Free water restriction  - Will consult nephrology   - C/w Unasyn abx   - F/u ID recs  - Encourage OOB   - f/u 11am labs  - Remove bolivar before d/c to rehab  - DVT ppx: subQ heparin  - Dispo planning to rehab 73yo M presented 3/27 from [facility]. w 2wk malaise, weakness, & stopped ambulating. Found to have + klebsiella UA (on rocephin). 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 Now s/p excision of L fem art & patch w/ interposition vein graft & sartorius flap on 4/3.    Plan  - Pain control: Tylenol 650mg PO q6h PRN  - Consistent carb diet  - Free water restriction  - Will consult nephrology   - C/w Unasyn abx   - F/u ID recs  - Encourage OOB   - f/u 11am labs  - Medicine to see patient   - Remove bolivar before d/c to rehab  - DVT ppx: subQ heparin  - Dispo planning to rehab

## 2019-04-08 NOTE — PROGRESS NOTE ADULT - SUBJECTIVE AND OBJECTIVE BOX
VASCULAR SURGERY DAILY PROGRESS NOTE:       Subjective:  Patient seen and examined on AM rounds. No acute events overnight. VSS. Afebrile. Pain well controlled. Preveena vac removed.         Objective:    PE:  Gen: NAD  Resp: airway patent, respirations unlabored, no increased WoB  Ext: Left groin c/d/i w/ staples. DAGO SS. Palpable PT b/l. Warm.       Vital Signs Last 24 Hrs  T(C): 36.7 (2019 05:36), Max: 37 (2019 14:02)  T(F): 98 (2019 05:36), Max: 98.6 (2019 14:02)  HR: 68 (2019 05:36) (51 - 68)  BP: 130/64 (2019 05:36) (130/64 - 140/62)  BP(mean): --  RR: 16 (2019 05:36) (16 - 18)  SpO2: 99% (2019 05:36) (99% - 100%)    I&O's Detail    2019 07:01  -  2019 07:00  --------------------------------------------------------  IN:  Total IN: 0 mL    OUT:    Bulb: 31 mL    Indwelling Catheter - Urethral: 3300 mL    Voided: 380 mL  Total OUT: 3711 mL    Total NET: -3711 mL          Daily     Daily     MEDICATIONS  (STANDING):  ampicillin/sulbactam  IVPB      ampicillin/sulbactam  IVPB 3 Gram(s) IV Intermittent every 6 hours  atorvastatin 20 milliGRAM(s) Oral at bedtime  cholecalciferol 2000 Unit(s) Oral daily  clopidogrel Tablet 75 milliGRAM(s) Oral daily  cyanocobalamin 2000 MICROGram(s) Oral daily  dextrose 5%. 1000 milliLiter(s) (50 mL/Hr) IV Continuous <Continuous>  dextrose 50% Injectable 12.5 Gram(s) IV Push once  dextrose 50% Injectable 25 Gram(s) IV Push once  dextrose 50% Injectable 25 Gram(s) IV Push once  digoxin     Tablet 0.25 milliGRAM(s) Oral daily  docusate sodium 100 milliGRAM(s) Oral three times a day  glycerin Suppository - Adult 1 Suppository(s) Rectal at bedtime  insulin lispro (HumaLOG) corrective regimen sliding scale   SubCutaneous three times a day before meals  insulin lispro (HumaLOG) corrective regimen sliding scale   SubCutaneous at bedtime  losartan 25 milliGRAM(s) Oral daily  magnesium sulfate  IVPB 2 Gram(s) IV Intermittent once  metoprolol succinate  milliGRAM(s) Oral daily  polyethylene glycol 3350 17 Gram(s) Oral daily  rivaroxaban 20 milliGRAM(s) Oral every 24 hours  senna 2 Tablet(s) Oral at bedtime  sodium chloride 2 Gram(s) Oral two times a day  sodium chloride 0.9% lock flush 3 milliLiter(s) IV Push every 8 hours  vancomycin  IVPB 1000 milliGRAM(s) IV Intermittent every 12 hours    MEDICATIONS  (PRN):  acetaminophen   Tablet .. 650 milliGRAM(s) Oral every 6 hours PRN Mild Pain (1 - 3)  dextrose 40% Gel 15 Gram(s) Oral once PRN Blood Glucose LESS THAN 70 milliGRAM(s)/deciliter  glucagon  Injectable 1 milliGRAM(s) IntraMuscular once PRN Glucose LESS THAN 70 milligrams/deciliter      LABS:                        7.8    6.77  )-----------( 153      ( 2019 05:49 )             24.2     04-08    125<L>  |  92<L>  |  13  ----------------------------<  129<H>  4.2   |  23  |  0.60    Ca    8.4      2019 05:49  Phos  3.0     04-08  Mg     1.6     04-08        Urinalysis Basic - ( 2019 14:50 )    Color: BROWN / Appearance: Lt TURBID / S.015 / pH: 7.0  Gluc: 50 / Ketone: NEGATIVE  / Bili: NEGATIVE / Urobili: NORMAL   Blood: LARGE / Protein: 50 / Nitrite: NEGATIVE   Leuk Esterase: TRACE / RBC: >50 / WBC 11-25   Sq Epi: OCC / Non Sq Epi: x / Bacteria: NEGATIVE        RADIOLOGY & ADDITIONAL STUDIES: VASCULAR SURGERY DAILY PROGRESS NOTE:       Subjective:  Patient seen and examined on AM rounds. No acute events overnight. VSS. Afebrile. Pain well controlled. Preveena vac removed. H/h minimally downtrending. Will redraw labs at 11am.         Objective:    PE:  Gen: NAD  Resp: airway patent, respirations unlabored, no increased WoB  Ext: Left groin c/d/i w/ staples. DAGO SS. Palpable PT b/l. Warm.       Vital Signs Last 24 Hrs  T(C): 36.7 (2019 05:36), Max: 37 (2019 14:02)  T(F): 98 (2019 05:36), Max: 98.6 (2019 14:02)  HR: 68 (2019 05:36) (51 - 68)  BP: 130/64 (2019 05:36) (130/64 - 140/62)  BP(mean): --  RR: 16 (2019 05:36) (16 - 18)  SpO2: 99% (2019 05:36) (99% - 100%)    I&O's Detail    2019 07:01  -  2019 07:00  --------------------------------------------------------  IN:  Total IN: 0 mL    OUT:    Bulb: 31 mL    Indwelling Catheter - Urethral: 3300 mL    Voided: 380 mL  Total OUT: 3711 mL    Total NET: -3711 mL          Daily     Daily     MEDICATIONS  (STANDING):  ampicillin/sulbactam  IVPB      ampicillin/sulbactam  IVPB 3 Gram(s) IV Intermittent every 6 hours  atorvastatin 20 milliGRAM(s) Oral at bedtime  cholecalciferol 2000 Unit(s) Oral daily  clopidogrel Tablet 75 milliGRAM(s) Oral daily  cyanocobalamin 2000 MICROGram(s) Oral daily  dextrose 5%. 1000 milliLiter(s) (50 mL/Hr) IV Continuous <Continuous>  dextrose 50% Injectable 12.5 Gram(s) IV Push once  dextrose 50% Injectable 25 Gram(s) IV Push once  dextrose 50% Injectable 25 Gram(s) IV Push once  digoxin     Tablet 0.25 milliGRAM(s) Oral daily  docusate sodium 100 milliGRAM(s) Oral three times a day  glycerin Suppository - Adult 1 Suppository(s) Rectal at bedtime  insulin lispro (HumaLOG) corrective regimen sliding scale   SubCutaneous three times a day before meals  insulin lispro (HumaLOG) corrective regimen sliding scale   SubCutaneous at bedtime  losartan 25 milliGRAM(s) Oral daily  magnesium sulfate  IVPB 2 Gram(s) IV Intermittent once  metoprolol succinate  milliGRAM(s) Oral daily  polyethylene glycol 3350 17 Gram(s) Oral daily  rivaroxaban 20 milliGRAM(s) Oral every 24 hours  senna 2 Tablet(s) Oral at bedtime  sodium chloride 2 Gram(s) Oral two times a day  sodium chloride 0.9% lock flush 3 milliLiter(s) IV Push every 8 hours  vancomycin  IVPB 1000 milliGRAM(s) IV Intermittent every 12 hours    MEDICATIONS  (PRN):  acetaminophen   Tablet .. 650 milliGRAM(s) Oral every 6 hours PRN Mild Pain (1 - 3)  dextrose 40% Gel 15 Gram(s) Oral once PRN Blood Glucose LESS THAN 70 milliGRAM(s)/deciliter  glucagon  Injectable 1 milliGRAM(s) IntraMuscular once PRN Glucose LESS THAN 70 milligrams/deciliter      LABS:                        7.8    6.77  )-----------( 153      ( 2019 05:49 )             24.2     04-08    125<L>  |  92<L>  |  13  ----------------------------<  129<H>  4.2   |  23  |  0.60    Ca    8.4      2019 05:49  Phos  3.0     04-08  Mg     1.6     04-08        Urinalysis Basic - ( 2019 14:50 )    Color: BROWN / Appearance: Lt TURBID / S.015 / pH: 7.0  Gluc: 50 / Ketone: NEGATIVE  / Bili: NEGATIVE / Urobili: NORMAL   Blood: LARGE / Protein: 50 / Nitrite: NEGATIVE   Leuk Esterase: TRACE / RBC: >50 / WBC 11-25   Sq Epi: OCC / Non Sq Epi: x / Bacteria: NEGATIVE        RADIOLOGY & ADDITIONAL STUDIES:

## 2019-04-09 ENCOUNTER — TRANSCRIPTION ENCOUNTER (OUTPATIENT)
Age: 74
End: 2019-04-09

## 2019-04-09 VITALS
OXYGEN SATURATION: 99 % | HEART RATE: 65 BPM | RESPIRATION RATE: 18 BRPM | SYSTOLIC BLOOD PRESSURE: 135 MMHG | TEMPERATURE: 98 F | DIASTOLIC BLOOD PRESSURE: 65 MMHG

## 2019-04-09 LAB
ANION GAP SERPL CALC-SCNC: 9 MMO/L — SIGNIFICANT CHANGE UP (ref 7–14)
BUN SERPL-MCNC: 14 MG/DL — SIGNIFICANT CHANGE UP (ref 7–23)
CALCIUM SERPL-MCNC: 8.4 MG/DL — SIGNIFICANT CHANGE UP (ref 8.4–10.5)
CHLORIDE SERPL-SCNC: 96 MMOL/L — LOW (ref 98–107)
CO2 SERPL-SCNC: 25 MMOL/L — SIGNIFICANT CHANGE UP (ref 22–31)
CREAT SERPL-MCNC: 0.66 MG/DL — SIGNIFICANT CHANGE UP (ref 0.5–1.3)
CULTURE - SURGICAL SITE: SIGNIFICANT CHANGE UP
GLUCOSE SERPL-MCNC: 100 MG/DL — HIGH (ref 70–99)
HCT VFR BLD CALC: 25.5 % — LOW (ref 39–50)
HGB BLD-MCNC: 7.9 G/DL — LOW (ref 13–17)
MAGNESIUM SERPL-MCNC: 1.7 MG/DL — SIGNIFICANT CHANGE UP (ref 1.6–2.6)
MCHC RBC-ENTMCNC: 26.9 PG — LOW (ref 27–34)
MCHC RBC-ENTMCNC: 31 % — LOW (ref 32–36)
MCV RBC AUTO: 86.7 FL — SIGNIFICANT CHANGE UP (ref 80–100)
NRBC # FLD: 0 K/UL — SIGNIFICANT CHANGE UP (ref 0–0)
PHOSPHATE SERPL-MCNC: 2.4 MG/DL — LOW (ref 2.5–4.5)
PLATELET # BLD AUTO: 184 K/UL — SIGNIFICANT CHANGE UP (ref 150–400)
PMV BLD: 10.4 FL — SIGNIFICANT CHANGE UP (ref 7–13)
POTASSIUM SERPL-MCNC: 4.4 MMOL/L — SIGNIFICANT CHANGE UP (ref 3.5–5.3)
POTASSIUM SERPL-SCNC: 4.4 MMOL/L — SIGNIFICANT CHANGE UP (ref 3.5–5.3)
RBC # BLD: 2.94 M/UL — LOW (ref 4.2–5.8)
RBC # FLD: 14.6 % — HIGH (ref 10.3–14.5)
SODIUM SERPL-SCNC: 130 MMOL/L — LOW (ref 135–145)
WBC # BLD: 5.3 K/UL — SIGNIFICANT CHANGE UP (ref 3.8–10.5)
WBC # FLD AUTO: 5.3 K/UL — SIGNIFICANT CHANGE UP (ref 3.8–10.5)

## 2019-04-09 PROCEDURE — 99232 SBSQ HOSP IP/OBS MODERATE 35: CPT

## 2019-04-09 PROCEDURE — 99233 SBSQ HOSP IP/OBS HIGH 50: CPT

## 2019-04-09 PROCEDURE — 99233 SBSQ HOSP IP/OBS HIGH 50: CPT | Mod: GC

## 2019-04-09 RX ORDER — RIVAROXABAN 15 MG-20MG
1 KIT ORAL
Qty: 0 | Refills: 0 | COMMUNITY

## 2019-04-09 RX ORDER — MAGNESIUM SULFATE 500 MG/ML
2 VIAL (ML) INJECTION ONCE
Qty: 0 | Refills: 0 | Status: COMPLETED | OUTPATIENT
Start: 2019-04-09 | End: 2019-04-09

## 2019-04-09 RX ORDER — ACETAMINOPHEN 500 MG
2 TABLET ORAL
Qty: 0 | Refills: 0 | DISCHARGE
Start: 2019-04-09

## 2019-04-09 RX ORDER — SODIUM,POTASSIUM PHOSPHATES 278-250MG
1 POWDER IN PACKET (EA) ORAL ONCE
Qty: 0 | Refills: 0 | Status: DISCONTINUED | OUTPATIENT
Start: 2019-04-09 | End: 2019-04-09

## 2019-04-09 RX ORDER — CHOLECALCIFEROL (VITAMIN D3) 125 MCG
2000 CAPSULE ORAL
Qty: 0 | Refills: 0 | DISCHARGE
Start: 2019-04-09

## 2019-04-09 RX ORDER — SODIUM CHLORIDE 9 MG/ML
2 INJECTION INTRAMUSCULAR; INTRAVENOUS; SUBCUTANEOUS
Qty: 0 | Refills: 0 | DISCHARGE
Start: 2019-04-09

## 2019-04-09 RX ADMIN — POLYETHYLENE GLYCOL 3350 17 GRAM(S): 17 POWDER, FOR SOLUTION ORAL at 11:32

## 2019-04-09 RX ADMIN — Medication 150 MILLIGRAM(S): at 05:51

## 2019-04-09 RX ADMIN — PREGABALIN 2000 MICROGRAM(S): 225 CAPSULE ORAL at 11:33

## 2019-04-09 RX ADMIN — SODIUM CHLORIDE 2 GRAM(S): 9 INJECTION INTRAMUSCULAR; INTRAVENOUS; SUBCUTANEOUS at 05:52

## 2019-04-09 RX ADMIN — Medication 2: at 13:10

## 2019-04-09 RX ADMIN — CLOPIDOGREL BISULFATE 75 MILLIGRAM(S): 75 TABLET, FILM COATED ORAL at 11:33

## 2019-04-09 RX ADMIN — AMPICILLIN SODIUM AND SULBACTAM SODIUM 200 GRAM(S): 250; 125 INJECTION, POWDER, FOR SUSPENSION INTRAMUSCULAR; INTRAVENOUS at 05:52

## 2019-04-09 RX ADMIN — SODIUM CHLORIDE 3 MILLILITER(S): 9 INJECTION INTRAMUSCULAR; INTRAVENOUS; SUBCUTANEOUS at 05:53

## 2019-04-09 RX ADMIN — AMPICILLIN SODIUM AND SULBACTAM SODIUM 200 GRAM(S): 250; 125 INJECTION, POWDER, FOR SUSPENSION INTRAMUSCULAR; INTRAVENOUS at 11:32

## 2019-04-09 RX ADMIN — SODIUM CHLORIDE 2 GRAM(S): 9 INJECTION INTRAMUSCULAR; INTRAVENOUS; SUBCUTANEOUS at 13:10

## 2019-04-09 RX ADMIN — LOSARTAN POTASSIUM 25 MILLIGRAM(S): 100 TABLET, FILM COATED ORAL at 05:51

## 2019-04-09 RX ADMIN — Medication 2000 UNIT(S): at 11:33

## 2019-04-09 RX ADMIN — Medication 63.75 MILLIMOLE(S): at 13:08

## 2019-04-09 RX ADMIN — AMPICILLIN SODIUM AND SULBACTAM SODIUM 200 GRAM(S): 250; 125 INJECTION, POWDER, FOR SUSPENSION INTRAMUSCULAR; INTRAVENOUS at 00:43

## 2019-04-09 RX ADMIN — Medication 0.25 MILLIGRAM(S): at 05:51

## 2019-04-09 RX ADMIN — SODIUM CHLORIDE 3 MILLILITER(S): 9 INJECTION INTRAMUSCULAR; INTRAVENOUS; SUBCUTANEOUS at 13:07

## 2019-04-09 RX ADMIN — Medication 50 GRAM(S): at 10:10

## 2019-04-09 RX ADMIN — Medication 250 MILLIGRAM(S): at 09:07

## 2019-04-09 NOTE — PROGRESS NOTE ADULT - PROVIDER SPECIALTY LIST ADULT
Cardiology
Cardiology
Hospitalist
Infectious Disease
Nephrology
Surgery
Vascular Surgery
Hospitalist

## 2019-04-09 NOTE — PROGRESS NOTE ADULT - SUBJECTIVE AND OBJECTIVE BOX
[facility] DIVISION OF KIDNEY DISEASES AND HYPERTENSION -- FOLLOW UP NOTE  --------------------------------------------------------------------------------  Chief Complaint:     24 hour events/subjective: Patient requesting decrease in fluid restriction so he can have more coffee. States otherwise feeling "great" awaiting discharge to rehab. Denies pain in leg or at drain site.         PAST HISTORY  --------------------------------------------------------------------------------  No significant changes to PMH, PSH, FHx, SHx, unless otherwise noted    ALLERGIES & MEDICATIONS  --------------------------------------------------------------------------------  Allergies    No Known Allergies    Intolerances      Standing Inpatient Medications  ampicillin/sulbactam  IVPB      ampicillin/sulbactam  IVPB 3 Gram(s) IV Intermittent every 6 hours  atorvastatin 20 milliGRAM(s) Oral at bedtime  cholecalciferol 2000 Unit(s) Oral daily  clopidogrel Tablet 75 milliGRAM(s) Oral daily  cyanocobalamin 2000 MICROGram(s) Oral daily  dextrose 5%. 1000 milliLiter(s) IV Continuous <Continuous>  dextrose 50% Injectable 12.5 Gram(s) IV Push once  dextrose 50% Injectable 25 Gram(s) IV Push once  dextrose 50% Injectable 25 Gram(s) IV Push once  digoxin     Tablet 0.25 milliGRAM(s) Oral daily  docusate sodium 100 milliGRAM(s) Oral three times a day  glycerin Suppository - Adult 1 Suppository(s) Rectal at bedtime  insulin glargine Injectable (LANTUS) 8 Unit(s) SubCutaneous at bedtime  insulin lispro (HumaLOG) corrective regimen sliding scale   SubCutaneous three times a day before meals  insulin lispro (HumaLOG) corrective regimen sliding scale   SubCutaneous at bedtime  losartan 25 milliGRAM(s) Oral daily  magnesium sulfate  IVPB 2 Gram(s) IV Intermittent once  metoprolol succinate  milliGRAM(s) Oral daily  polyethylene glycol 3350 17 Gram(s) Oral daily  rivaroxaban 20 milliGRAM(s) Oral every 24 hours  senna 2 Tablet(s) Oral at bedtime  sodium chloride 2 Gram(s) Oral three times a day  sodium chloride 0.9% lock flush 3 milliLiter(s) IV Push every 8 hours  sodium phosphate IVPB 15 milliMole(s) IV Intermittent once  vancomycin  IVPB 1000 milliGRAM(s) IV Intermittent every 12 hours    PRN Inpatient Medications  acetaminophen   Tablet .. 650 milliGRAM(s) Oral every 6 hours PRN  dextrose 40% Gel 15 Gram(s) Oral once PRN  glucagon  Injectable 1 milliGRAM(s) IntraMuscular once PRN      REVIEW OF SYSTEMS  --------------------------------------------------------------------------------  Gen: No weight changes, fatigue, fevers/chills, weakness  Skin: No rashes  Head/Eyes/Ears/Mouth: No headache; Normal hearing; Normal vision w/o blurriness; No sinus pain/discomfort, sore throat  Respiratory: No dyspnea, cough, wheezing, hemoptysis  CV: No chest pain, PND, orthopnea  GI: No abdominal pain, diarrhea, constipation, nausea, vomiting, melena, hematochezia  : No increased frequency, dysuria, hematuria, nocturia  MSK: No joint pain/swelling; no back pain; no edema  Neuro: No dizziness/lightheadedness, weakness, seizures, numbness, tingling  Heme: No easy bruising or bleeding  Endo: No heat/cold intolerance  Psych: No significant nervousness, anxiety, stress, depression    All other systems were reviewed and are negative, except as noted.    VITALS/PHYSICAL EXAM  --------------------------------------------------------------------------------  T(C): 36.7 (04-09-19 @ 05:13), Max: 36.8 (04-08-19 @ 21:49)  HR: 62 (04-09-19 @ 05:13) (52 - 62)  BP: 134/62 (04-09-19 @ 05:13) (121/47 - 143/53)  RR: 18 (04-09-19 @ 05:13) (17 - 18)  SpO2: 100% (04-09-19 @ 05:13) (99% - 100%)  Wt(kg): --        04-08-19 @ 07:01  -  04-09-19 @ 07:00  --------------------------------------------------------  IN: 0 mL / OUT: 2630 mL / NET: -2630 mL      Physical Exam:  	Gen: NAD, well-appearing  	HEENT: PERRL, supple neck, clear oropharynx  	Pulm: CTA B/L  	CV: RRR, S1S2; no rub  	Back: No spinal or CVA tenderness; no sacral edema  	Abd: +BS, soft, nontender/nondistended  	: No suprapubic tenderness  	UE: Warm, FROM, no clubbing, intact strength; no edema; no asterixis  	LE: Warm, FROM, no clubbing, intact strength; no edema  	Neuro: No focal deficits, intact gait  	Psych: Normal affect and mood  	Skin: Warm, without rashes  	Vascular access:    LABS/STUDIES  --------------------------------------------------------------------------------              8.1    5.93  >-----------<  153      [04-08-19 @ 11:20]              26.2     130  |  96  |  14  ----------------------------<  100      [04-09-19 @ 06:29]  4.4   |  25  |  0.66        Ca     8.4     [04-09-19 @ 06:29]      Mg     1.7     [04-09-19 @ 06:29]      Phos  2.4     [04-09-19 @ 06:29]          Serum Osmolality 261      [04-08-19 @ 05:49]    Creatinine Trend:  SCr 0.66 [04-09 @ 06:29]  SCr 0.60 [04-08 @ 05:49]  SCr 0.61 [04-07 @ 07:15]  SCr 0.62 [04-06 @ 06:30]  SCr 0.71 [04-05 @ 05:30]    Urinalysis - [04-06-19 @ 14:50]      Color BROWN / Appearance Lt TURBID / SG 1.015 / pH 7.0      Gluc 50 / Ketone NEGATIVE  / Bili NEGATIVE / Urobili NORMAL       Blood LARGE / Protein 50 / Leuk Est TRACE / Nitrite NEGATIVE      RBC >50 / WBC 11-25 / Hyaline NEGATIVE / Gran  / Sq Epi OCC / Non Sq Epi  / Bacteria NEGATIVE    Urine Creatinine 45.60      [04-08-19 @ 13:50]  Urine Sodium 65      [04-08-19 @ 10:30]  Urine Potassium 18.1      [04-08-19 @ 10:30]  Urine Chloride 40      [04-08-19 @ 10:30]  Urine Osmolality 322      [04-08-19 @ 10:30]    HbA1c 5.9      [03-25-19 @ 09:34]    HBsAb Nonreactive Resulted by Discern      [03-30-19 @ 06:20]  HBsAg Nonreactive      [03-30-19 @ 06:20]  HBcAb Nonreactive      [03-30-19 @ 06:20]  HCV 0.66, Nonreactive Hepatitis C AB  S/CO Ratio                        Interpretation  < 1.00                                   Non-Reactive  1.00 - 4.99                         Weakly-Reactive  > 5.00                                Reactive  Non-Reactive: A person with a non-reactive HCV antibody  result is considered uninfected.  No further action is  needed unless recent infection is suspected.  In these  cases, consider repeat testing later to detect  seroconversion..  Weakly-Reactive: HCV antibody test is abnormal, HCV RNA  Qualitative test will follow.  Reactive: HCV antibody test is abnormal, HCV RNA  Qualitative test will follow.  Note: HCV antibody testing is performed on the [employer]   system.      [03-30-19 @ 06:20] [facility] DIVISION OF KIDNEY DISEASES AND HYPERTENSION -- FOLLOW UP NOTE  --------------------------------------------------------------------------------  Chief Complaint:     24 hour events/subjective: Patient requesting decrease in fluid restriction so he can have more coffee. States otherwise feeling "great" awaiting discharge to rehab. Denies pain in leg or at drain site.         PAST HISTORY  --------------------------------------------------------------------------------  No significant changes to PMH, PSH, FHx, SHx, unless otherwise noted    ALLERGIES & MEDICATIONS  --------------------------------------------------------------------------------  Allergies    No Known Allergies    Intolerances      Standing Inpatient Medications  ampicillin/sulbactam  IVPB      ampicillin/sulbactam  IVPB 3 Gram(s) IV Intermittent every 6 hours  atorvastatin 20 milliGRAM(s) Oral at bedtime  cholecalciferol 2000 Unit(s) Oral daily  clopidogrel Tablet 75 milliGRAM(s) Oral daily  cyanocobalamin 2000 MICROGram(s) Oral daily  dextrose 5%. 1000 milliLiter(s) IV Continuous <Continuous>  dextrose 50% Injectable 12.5 Gram(s) IV Push once  dextrose 50% Injectable 25 Gram(s) IV Push once  dextrose 50% Injectable 25 Gram(s) IV Push once  digoxin     Tablet 0.25 milliGRAM(s) Oral daily  docusate sodium 100 milliGRAM(s) Oral three times a day  glycerin Suppository - Adult 1 Suppository(s) Rectal at bedtime  insulin glargine Injectable (LANTUS) 8 Unit(s) SubCutaneous at bedtime  insulin lispro (HumaLOG) corrective regimen sliding scale   SubCutaneous three times a day before meals  insulin lispro (HumaLOG) corrective regimen sliding scale   SubCutaneous at bedtime  losartan 25 milliGRAM(s) Oral daily  magnesium sulfate  IVPB 2 Gram(s) IV Intermittent once  metoprolol succinate  milliGRAM(s) Oral daily  polyethylene glycol 3350 17 Gram(s) Oral daily  rivaroxaban 20 milliGRAM(s) Oral every 24 hours  senna 2 Tablet(s) Oral at bedtime  sodium chloride 2 Gram(s) Oral three times a day  sodium chloride 0.9% lock flush 3 milliLiter(s) IV Push every 8 hours  sodium phosphate IVPB 15 milliMole(s) IV Intermittent once  vancomycin  IVPB 1000 milliGRAM(s) IV Intermittent every 12 hours    PRN Inpatient Medications  acetaminophen   Tablet .. 650 milliGRAM(s) Oral every 6 hours PRN  dextrose 40% Gel 15 Gram(s) Oral once PRN  glucagon  Injectable 1 milliGRAM(s) IntraMuscular once PRN      REVIEW OF SYSTEMS  --------------------------------------------------------------------------------  Gen: No weight changes, fatigue, fevers/chills, weakness  Skin: No rashes  Head/Eyes/Ears/Mouth: No headache; Normal hearing; Normal vision w/o blurriness; No sinus pain/discomfort, sore throat  Respiratory: No dyspnea, cough, wheezing, hemoptysis  CV: No chest pain, PND, orthopnea  GI: No abdominal pain, diarrhea, constipation, nausea, vomiting, melena, hematochezia  : No increased frequency, dysuria, hematuria, nocturia  MSK: No joint pain/swelling; no back pain; no edema  Neuro: No dizziness/lightheadedness, weakness, seizures, numbness, tingling  Heme: No easy bruising or bleeding  Endo: No heat/cold intolerance  Psych: No significant nervousness, anxiety, stress, depression    All other systems were reviewed and are negative, except as noted.    VITALS/PHYSICAL EXAM  --------------------------------------------------------------------------------  T(C): 36.7 (04-09-19 @ 05:13), Max: 36.8 (04-08-19 @ 21:49)  HR: 62 (04-09-19 @ 05:13) (52 - 62)  BP: 134/62 (04-09-19 @ 05:13) (121/47 - 143/53)  RR: 18 (04-09-19 @ 05:13) (17 - 18)  SpO2: 100% (04-09-19 @ 05:13) (99% - 100%)  Wt(kg): --        04-08-19 @ 07:01  -  04-09-19 @ 07:00  --------------------------------------------------------  IN: 0 mL / OUT: 2630 mL / NET: -2630 mL      Physical Exam:  	Gen: NAD, well-appearing, sitting up in bed, eating breakfast  	HEENT: PERRL, MMM  	Pulm: CTA B/L  	CV: RRR, S1S2; no rub  	Back: No spinal or CVA tenderness; no sacral edema  	Abd: +BS, soft, nontender/nondistended  	: No suprapubic tenderness  	UE: Warm, FROM, no clubbing, intact strength; no edema; no asterixis  	LE: Warm, left groin dressing CDI, DAGO drain with serosanguinous output, no edema bilaterally   	Neuro: No focal deficits  	Psych: Normal affect and mood  	Skin: Warm, without rashes    LABS/STUDIES  --------------------------------------------------------------------------------              8.1    5.93  >-----------<  153      [04-08-19 @ 11:20]              26.2     130  |  96  |  14  ----------------------------<  100      [04-09-19 @ 06:29]  4.4   |  25  |  0.66        Ca     8.4     [04-09-19 @ 06:29]      Mg     1.7     [04-09-19 @ 06:29]      Phos  2.4     [04-09-19 @ 06:29]          Serum Osmolality 261      [04-08-19 @ 05:49]    Creatinine Trend:  SCr 0.66 [04-09 @ 06:29]  SCr 0.60 [04-08 @ 05:49]  SCr 0.61 [04-07 @ 07:15]  SCr 0.62 [04-06 @ 06:30]  SCr 0.71 [04-05 @ 05:30]    Urinalysis - [04-06-19 @ 14:50]      Color BROWN / Appearance Lt TURBID / SG 1.015 / pH 7.0      Gluc 50 / Ketone NEGATIVE  / Bili NEGATIVE / Urobili NORMAL       Blood LARGE / Protein 50 / Leuk Est TRACE / Nitrite NEGATIVE      RBC >50 / WBC 11-25 / Hyaline NEGATIVE / Gran  / Sq Epi OCC / Non Sq Epi  / Bacteria NEGATIVE    Urine Creatinine 45.60      [04-08-19 @ 13:50]  Urine Sodium 65      [04-08-19 @ 10:30]  Urine Potassium 18.1      [04-08-19 @ 10:30]  Urine Chloride 40      [04-08-19 @ 10:30]  Urine Osmolality 322      [04-08-19 @ 10:30]    HbA1c 5.9      [03-25-19 @ 09:34]    HBsAb Nonreactive Resulted by Discern      [03-30-19 @ 06:20]  HBsAg Nonreactive      [03-30-19 @ 06:20]  HBcAb Nonreactive      [03-30-19 @ 06:20]  HCV 0.66, Nonreactive Hepatitis C AB  S/CO Ratio                        Interpretation  < 1.00                                   Non-Reactive  1.00 - 4.99                         Weakly-Reactive  > 5.00                                Reactive  Non-Reactive: A person with a non-reactive HCV antibody  result is considered uninfected.  No further action is  needed unless recent infection is suspected.  In these  cases, consider repeat testing later to detect  seroconversion..  Weakly-Reactive: HCV antibody test is abnormal, HCV RNA  Qualitative test will follow.  Reactive: HCV antibody test is abnormal, HCV RNA  Qualitative test will follow.  Note: HCV antibody testing is performed on the [employer]   system.      [03-30-19 @ 06:20] [facility] DIVISION OF KIDNEY DISEASES AND HYPERTENSION -- FOLLOW UP NOTE  --------------------------------------------------------------------------------  Chief Complaint:     24 hour events/subjective: Patient requesting decrease in fluid restriction so he can have more coffee. States otherwise feeling "great" awaiting discharge to rehab. Denies pain in leg or at drain site.         PAST HISTORY  --------------------------------------------------------------------------------  No significant changes to PMH, PSH, FHx, SHx, unless otherwise noted    ALLERGIES & MEDICATIONS  --------------------------------------------------------------------------------  Allergies    No Known Allergies    Intolerances      Standing Inpatient Medications  ampicillin/sulbactam  IVPB      ampicillin/sulbactam  IVPB 3 Gram(s) IV Intermittent every 6 hours  atorvastatin 20 milliGRAM(s) Oral at bedtime  cholecalciferol 2000 Unit(s) Oral daily  clopidogrel Tablet 75 milliGRAM(s) Oral daily  cyanocobalamin 2000 MICROGram(s) Oral daily  dextrose 5%. 1000 milliLiter(s) IV Continuous <Continuous>  dextrose 50% Injectable 12.5 Gram(s) IV Push once  dextrose 50% Injectable 25 Gram(s) IV Push once  dextrose 50% Injectable 25 Gram(s) IV Push once  digoxin     Tablet 0.25 milliGRAM(s) Oral daily  docusate sodium 100 milliGRAM(s) Oral three times a day  glycerin Suppository - Adult 1 Suppository(s) Rectal at bedtime  insulin glargine Injectable (LANTUS) 8 Unit(s) SubCutaneous at bedtime  insulin lispro (HumaLOG) corrective regimen sliding scale   SubCutaneous three times a day before meals  insulin lispro (HumaLOG) corrective regimen sliding scale   SubCutaneous at bedtime  losartan 25 milliGRAM(s) Oral daily  magnesium sulfate  IVPB 2 Gram(s) IV Intermittent once  metoprolol succinate  milliGRAM(s) Oral daily  polyethylene glycol 3350 17 Gram(s) Oral daily  rivaroxaban 20 milliGRAM(s) Oral every 24 hours  senna 2 Tablet(s) Oral at bedtime  sodium chloride 2 Gram(s) Oral three times a day  sodium chloride 0.9% lock flush 3 milliLiter(s) IV Push every 8 hours  sodium phosphate IVPB 15 milliMole(s) IV Intermittent once  vancomycin  IVPB 1000 milliGRAM(s) IV Intermittent every 12 hours    PRN Inpatient Medications  acetaminophen   Tablet .. 650 milliGRAM(s) Oral every 6 hours PRN  dextrose 40% Gel 15 Gram(s) Oral once PRN  glucagon  Injectable 1 milliGRAM(s) IntraMuscular once PRN      REVIEW OF SYSTEMS  --------------------------------------------------------------------------------  Gen: No weight changes, fatigue, fevers/chills, weakness  Skin: No rashes  Respiratory: No dyspnea, cough, wheezing, hemoptysis  CV: No chest pain, PND, orthopnea  GI: No abdominal pain, diarrhea, constipation, nausea, vomiting, melena, hematochezia  Neuro: No dizziness/lightheadedness, weakness, seizures, numbness, tingling  Heme: No easy bruising or bleeding  Endo: No heat/cold intolerance  Psych: No significant nervousness, anxiety, stress, depression    All other systems were reviewed and are negative, except as noted.    VITALS/PHYSICAL EXAM  --------------------------------------------------------------------------------  T(C): 36.7 (04-09-19 @ 05:13), Max: 36.8 (04-08-19 @ 21:49)  HR: 62 (04-09-19 @ 05:13) (52 - 62)  BP: 134/62 (04-09-19 @ 05:13) (121/47 - 143/53)  RR: 18 (04-09-19 @ 05:13) (17 - 18)  SpO2: 100% (04-09-19 @ 05:13) (99% - 100%)  Wt(kg): --        04-08-19 @ 07:01  -  04-09-19 @ 07:00  --------------------------------------------------------  IN: 0 mL / OUT: 2630 mL / NET: -2630 mL      Physical Exam:  	Gen: NAD, well-appearing, sitting up in bed, eating breakfast  	HEENT: PERRL, MMM  	Pulm: CTA B/L  	CV: RRR, S1S2; no rub  	Back: No spinal or CVA tenderness; no sacral edema  	Abd: +BS, soft, nontender/nondistended  	: No suprapubic tenderness  	LE: Warm, left groin dressing CDI, DAGO drain with serosanguinous output, no edema bilaterally   	Neuro: No focal deficits  	Psych: Normal affect and mood  	Skin: Warm, without rashes    LABS/STUDIES  --------------------------------------------------------------------------------              8.1    5.93  >-----------<  153      [04-08-19 @ 11:20]              26.2     130  |  96  |  14  ----------------------------<  100      [04-09-19 @ 06:29]  4.4   |  25  |  0.66        Ca     8.4     [04-09-19 @ 06:29]      Mg     1.7     [04-09-19 @ 06:29]      Phos  2.4     [04-09-19 @ 06:29]          Serum Osmolality 261      [04-08-19 @ 05:49]    Creatinine Trend:  SCr 0.66 [04-09 @ 06:29]  SCr 0.60 [04-08 @ 05:49]  SCr 0.61 [04-07 @ 07:15]  SCr 0.62 [04-06 @ 06:30]  SCr 0.71 [04-05 @ 05:30]    Urinalysis - [04-06-19 @ 14:50]      Color BROWN / Appearance Lt TURBID / SG 1.015 / pH 7.0      Gluc 50 / Ketone NEGATIVE  / Bili NEGATIVE / Urobili NORMAL       Blood LARGE / Protein 50 / Leuk Est TRACE / Nitrite NEGATIVE      RBC >50 / WBC 11-25 / Hyaline NEGATIVE / Gran  / Sq Epi OCC / Non Sq Epi  / Bacteria NEGATIVE    Urine Creatinine 45.60      [04-08-19 @ 13:50]  Urine Sodium 65      [04-08-19 @ 10:30]  Urine Potassium 18.1      [04-08-19 @ 10:30]  Urine Chloride 40      [04-08-19 @ 10:30]  Urine Osmolality 322      [04-08-19 @ 10:30]    HbA1c 5.9      [03-25-19 @ 09:34]    HBsAb Nonreactive Resulted by Discern      [03-30-19 @ 06:20]  HBsAg Nonreactive      [03-30-19 @ 06:20]  HBcAb Nonreactive      [03-30-19 @ 06:20]  HCV 0.66, Nonreactive Hepatitis C AB  S/CO Ratio                        Interpretation  < 1.00                                   Non-Reactive  1.00 - 4.99                         Weakly-Reactive  > 5.00                                Reactive  Non-Reactive: A person with a non-reactive HCV antibody  result is considered uninfected.  No further action is  needed unless recent infection is suspected.  In these  cases, consider repeat testing later to detect  seroconversion..  Weakly-Reactive: HCV antibody test is abnormal, HCV RNA  Qualitative test will follow.  Reactive: HCV antibody test is abnormal, HCV RNA  Qualitative test will follow.  Note: HCV antibody testing is performed on the [employer] system.      [03-30-19 @ 06:20]

## 2019-04-09 NOTE — PROGRESS NOTE ADULT - ASSESSMENT
75yo M presented 3/27 from [facility]. w 2wk malaise, weakness, & stopped ambulating. Found to have + klebsiella UA (on rocephin). 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 Now s/p excision of L fem art & patch w/ interposition vein graft & sartorius flap on 4/3.    Plan  - Pain control: Tylenol 650mg PO q6h PRN  - Consistent carb diet  - Free water restriction  - appreicate nephrology  recs  - stop abx today on DC  - F/u ID recs  - Encourage OOB   - Remove bolivar before d/c to rehab  - DVT ppx: subQ heparin  - Dispo rehab today

## 2019-04-09 NOTE — DISCHARGE NOTE PROVIDER - NSDCCPCAREPLAN_GEN_ALL_CORE_FT
PRINCIPAL DISCHARGE DIAGNOSIS  Diagnosis: Pseudoaneurysm of femoral artery  Assessment and Plan of Treatment:

## 2019-04-09 NOTE — DISCHARGE NOTE NURSING/CASE MANAGEMENT/SOCIAL WORK - NSDCPNINST_GEN_ALL_CORE
Notify doctor for any pus draining from surgical site, fever, chill or fever, persistent nausea and vomiting

## 2019-04-09 NOTE — PROGRESS NOTE ADULT - REASON FOR ADMISSION
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

## 2019-04-09 NOTE — PROGRESS NOTE ADULT - PROBLEM SELECTOR PLAN 1
-Improved after fluid restriction, limiting D5W IV fluids, sodium chloride 2 grams TID and one dose of furosemide 20 mg on 4/8  -continue sodium chloride 2 grams TID   -can decrease fluid restriction to 1000 mL daily (currently 800 mL) and continue on discharge   -no need for further furosemide   -continue monitoring daily BMP while inpatient -Improved after fluid restriction, limiting D5W IV fluids, sodium chloride 2 grams TID and one dose of furosemide 20 mg on 4/8  -continue sodium chloride 2 grams TID   -can decrease fluid restriction to 1500 mL daily (currently 800 mL) and continue on discharge   -start lasix 20 mg BID   -continue monitoring daily BMP while inpatient  -repeat BMP within 1 week of discharge at rehab   -follow up with nephrology in 1-2 weeks of discharge

## 2019-04-09 NOTE — PROGRESS NOTE ADULT - SUBJECTIVE AND OBJECTIVE BOX
VASCULAR SURGERY DAILY PROGRESS NOTE:       Subjective:  Patient seen and examined on AM rounds. No acute events overnight. VSS. Afebrile. Pain well controlled.     Objective:    PE:  Gen: NAD  Resp: airway patent, respirations unlabored, no increased WoB  Ext: Left groin c/d/i w/ staples. DAGO SS. Palpable PT b/l. Warm. Dopplerable DP    Vital Signs Last 24 Hrs  T(C): 36.7 (09 Apr 2019 05:13), Max: 36.8 (08 Apr 2019 21:49)  T(F): 98 (09 Apr 2019 05:13), Max: 98.3 (08 Apr 2019 21:49)  HR: 62 (09 Apr 2019 05:13) (52 - 62)  BP: 134/62 (09 Apr 2019 05:13) (121/47 - 143/53)  BP(mean): --  RR: 18 (09 Apr 2019 05:13) (17 - 18)  SpO2: 100% (09 Apr 2019 05:13) (99% - 100%)    I&O's Summary    08 Apr 2019 07:01  -  09 Apr 2019 07:00  --------------------------------------------------------  IN: 0 mL / OUT: 2630 mL / NET: -2630 mL            Daily     Daily     MEDICATIONS  (STANDING):  ampicillin/sulbactam  IVPB      ampicillin/sulbactam  IVPB 3 Gram(s) IV Intermittent every 6 hours  atorvastatin 20 milliGRAM(s) Oral at bedtime  cholecalciferol 2000 Unit(s) Oral daily  clopidogrel Tablet 75 milliGRAM(s) Oral daily  cyanocobalamin 2000 MICROGram(s) Oral daily  dextrose 5%. 1000 milliLiter(s) (50 mL/Hr) IV Continuous <Continuous>  dextrose 50% Injectable 12.5 Gram(s) IV Push once  dextrose 50% Injectable 25 Gram(s) IV Push once  dextrose 50% Injectable 25 Gram(s) IV Push once  digoxin     Tablet 0.25 milliGRAM(s) Oral daily  docusate sodium 100 milliGRAM(s) Oral three times a day  glycerin Suppository - Adult 1 Suppository(s) Rectal at bedtime  insulin lispro (HumaLOG) corrective regimen sliding scale   SubCutaneous three times a day before meals  insulin lispro (HumaLOG) corrective regimen sliding scale   SubCutaneous at bedtime  losartan 25 milliGRAM(s) Oral daily  magnesium sulfate  IVPB 2 Gram(s) IV Intermittent once  metoprolol succinate  milliGRAM(s) Oral daily  polyethylene glycol 3350 17 Gram(s) Oral daily  rivaroxaban 20 milliGRAM(s) Oral every 24 hours  senna 2 Tablet(s) Oral at bedtime  sodium chloride 2 Gram(s) Oral two times a day  sodium chloride 0.9% lock flush 3 milliLiter(s) IV Push every 8 hours  vancomycin  IVPB 1000 milliGRAM(s) IV Intermittent every 12 hours    MEDICATIONS  (PRN):  acetaminophen   Tablet .. 650 milliGRAM(s) Oral every 6 hours PRN Mild Pain (1 - 3)  dextrose 40% Gel 15 Gram(s) Oral once PRN Blood Glucose LESS THAN 70 milliGRAM(s)/deciliter  glucagon  Injectable 1 milliGRAM(s) IntraMuscular once PRN Glucose LESS THAN 70 milligrams/deciliter                          8.1    5.93  )-----------( 153      ( 08 Apr 2019 11:20 )             26.2   04-09    130<L>  |  96<L>  |  14  ----------------------------<  100<H>  4.4   |  25  |  0.66    Ca    8.4      09 Apr 2019 06:29  Phos  2.4     04-09  Mg     1.7     04-09

## 2019-04-09 NOTE — DISCHARGE NOTE NURSING/CASE MANAGEMENT/SOCIAL WORK - NSDCPEEMAIL_GEN_ALL_CORE
[facility] for Tobacco Control email [email or web address]

## 2019-04-09 NOTE — DISCHARGE NOTE NURSING/CASE MANAGEMENT/SOCIAL WORK - NSDCDPATPORTLINK_GEN_ALL_CORE
You can access the [facility] Patient Portal, offered by [facility], by registering with the following website: [email or web address]

## 2019-04-09 NOTE — PROGRESS NOTE ADULT - ATTENDING COMMENTS
Mike Valiente MD  Pager [phone]  After 5pm/weekends call [phone]
Mike Valiente MD  Pager [phone]  After 5pm/weekends call [phone]
discussed at length with patient and his wife at bedside. the combination of salts tabs and Lasix will improve free water excretion and hence hyponatremia;  and prevent de-or overhydration
Spoke to Dr Arce ID attending about urine cx results and abx, will change abx to Unasyn 3 gram Q6H X 5 days as per ID rec. to cover enterococcus and klebsiella in the urine.

## 2019-04-09 NOTE — DISCHARGE NOTE NURSING/CASE MANAGEMENT/SOCIAL WORK - NSDCPEWEB_GEN_ALL_CORE
[place] website --- [email or web address] for Tobacco Control website --- [email or web address]

## 2019-04-09 NOTE — PROGRESS NOTE ADULT - SUBJECTIVE AND OBJECTIVE BOX
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 Remains with drain. Denies fever, chills. Remains with bolivar.    Rest of ROS negative.    Allergies  No Known Allergies    ANTIMICROBIALS:  ampicillin/sulbactam  IVPB    ampicillin/sulbactam  IVPB 3 every 6 hours  vancomycin  IVPB 1000 every 12 hours    OTHER MEDS:  acetaminophen   Tablet .. 650 milliGRAM(s) Oral every 6 hours PRN  atorvastatin 20 milliGRAM(s) Oral at bedtime  cholecalciferol 2000 Unit(s) Oral daily  clopidogrel Tablet 75 milliGRAM(s) Oral daily  cyanocobalamin 2000 MICROGram(s) Oral daily  dextrose 40% Gel 15 Gram(s) Oral once PRN  dextrose 5%. 1000 milliLiter(s) IV Continuous <Continuous>  dextrose 50% Injectable 12.5 Gram(s) IV Push once  dextrose 50% Injectable 25 Gram(s) IV Push once  dextrose 50% Injectable 25 Gram(s) IV Push once  digoxin     Tablet 0.25 milliGRAM(s) Oral daily  docusate sodium 100 milliGRAM(s) Oral three times a day  glucagon  Injectable 1 milliGRAM(s) IntraMuscular once PRN  glycerin Suppository - Adult 1 Suppository(s) Rectal at bedtime  insulin glargine Injectable (LANTUS) 8 Unit(s) SubCutaneous at bedtime  insulin lispro (HumaLOG) corrective regimen sliding scale   SubCutaneous three times a day before meals  insulin lispro (HumaLOG) corrective regimen sliding scale   SubCutaneous at bedtime  losartan 25 milliGRAM(s) Oral daily  metoprolol succinate  milliGRAM(s) Oral daily  polyethylene glycol 3350 17 Gram(s) Oral daily  rivaroxaban 20 milliGRAM(s) Oral every 24 hours  senna 2 Tablet(s) Oral at bedtime  sodium chloride 2 Gram(s) Oral three times a day  sodium chloride 0.9% lock flush 3 milliLiter(s) IV Push every 8 hours    PE:    Vital Signs Last 24 Hrs  T(C): 36.7 (09 Apr 2019 10:18), Max: 36.8 (08 Apr 2019 21:49)  T(F): 98 (09 Apr 2019 10:18), Max: 98.3 (08 Apr 2019 21:49)  HR: 65 (09 Apr 2019 10:18) (52 - 65)  BP: 135/65 (09 Apr 2019 10:18) (132/60 - 143/53)  BP(mean): --  RR: 18 (09 Apr 2019 10:18) (17 - 18)  SpO2: 99% (09 Apr 2019 10:18) (99% - 100%)    Gen: AOx3, NAD, non-toxic  CV: S1+S2 normal, no murmurs  Resp: Clear bilat, no resp distress  Abd: Soft, nontender, +BS  Ext: Left surgical site with staples, C/D/I, drain in place  : +bolivar  IV/Skin: No thrombophlebitis  Neuro: no focal deficits    LABS:                          7.9    5.30  )-----------( 184      ( 09 Apr 2019 08:55 )             25.5       04-09    130<L>  |  96<L>  |  14  ----------------------------<  100<H>  4.4   |  25  |  0.66    Ca    8.4      09 Apr 2019 06:29  Phos  2.4     04-09  Mg     1.7     04-09    MICROBIOLOGY:  Vancomycin Level, Trough: 16.1 ug/mL (04-08-19 @ 21:25)  v  OTHER  04-03-19 --  --  --      OTHER  04-03-19 --  --  --      BLOOD  03-29-19 --  --  --      BLOOD VENOUS  03-28-19 --  --  --      URINE CATHETER  03-28-19 --  --  Enterococcus faecalis      .Blood Blood-Peripheral  03-25-19   No growth at 5 days.  --  --      .Urine Clean Catch (Midstream)  03-25-19   >100,000 CFU/ml Klebsiella pneumoniae  --  Klebsiella pneumoniae    RADIOLOGY:    < from: Xray Chest 1 View- PORTABLE-Urgent (04.02.19 @ 11:25) >  IMPRESSION:  Right apical opacity, of indeterminate nature. An apical   mass, pleural thickening, and loculated pleural fluid are in the   differential diagnosis. Correlation with a CT scan of the chest could be   performed for further evaluation, if felt to be clinically indicated.    Nonspecific increased reticular opacities in the right lower lung.    < end of copied text >

## 2019-04-09 NOTE — DISCHARGE NOTE PROVIDER - PROVIDER TOKENS
PROVIDER:[TOKEN:[[id number]],FOLLOWUP:[1 week]],PROVIDER:[TOKEN:[[id number]],FOLLOWUP:[1 week]]

## 2019-04-09 NOTE — DISCHARGE NOTE PROVIDER - INSTRUCTIONS
Consistent Carb; fluid restriction to 1000 mL daily Consistent Carb; fluid restriction to 1500 mL daily

## 2019-04-09 NOTE — PROGRESS NOTE ADULT - SUBJECTIVE AND OBJECTIVE BOX
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 Denies SOB. Denies pain.    MEDICATIONS  (STANDING):  ampicillin/sulbactam  IVPB      ampicillin/sulbactam  IVPB 3 Gram(s) IV Intermittent every 6 hours  atorvastatin 20 milliGRAM(s) Oral at bedtime  cholecalciferol 2000 Unit(s) Oral daily  clopidogrel Tablet 75 milliGRAM(s) Oral daily  cyanocobalamin 2000 MICROGram(s) Oral daily  dextrose 5%. 1000 milliLiter(s) (50 mL/Hr) IV Continuous <Continuous>  dextrose 50% Injectable 12.5 Gram(s) IV Push once  dextrose 50% Injectable 25 Gram(s) IV Push once  dextrose 50% Injectable 25 Gram(s) IV Push once  digoxin     Tablet 0.25 milliGRAM(s) Oral daily  docusate sodium 100 milliGRAM(s) Oral three times a day  glycerin Suppository - Adult 1 Suppository(s) Rectal at bedtime  insulin glargine Injectable (LANTUS) 8 Unit(s) SubCutaneous at bedtime  insulin lispro (HumaLOG) corrective regimen sliding scale   SubCutaneous three times a day before meals  insulin lispro (HumaLOG) corrective regimen sliding scale   SubCutaneous at bedtime  losartan 25 milliGRAM(s) Oral daily  metoprolol succinate  milliGRAM(s) Oral daily  polyethylene glycol 3350 17 Gram(s) Oral daily  rivaroxaban 20 milliGRAM(s) Oral every 24 hours  senna 2 Tablet(s) Oral at bedtime  sodium chloride 2 Gram(s) Oral three times a day  sodium chloride 0.9% lock flush 3 milliLiter(s) IV Push every 8 hours  sodium phosphate IVPB 15 milliMole(s) IV Intermittent once  vancomycin  IVPB 1000 milliGRAM(s) IV Intermittent every 12 hours    MEDICATIONS  (PRN):  acetaminophen   Tablet .. 650 milliGRAM(s) Oral every 6 hours PRN Mild Pain (1 - 3)  dextrose 40% Gel 15 Gram(s) Oral once PRN Blood Glucose LESS THAN 70 milliGRAM(s)/deciliter  glucagon  Injectable 1 milliGRAM(s) IntraMuscular once PRN Glucose LESS THAN 70 milligrams/deciliter      VITALS:  T(F): 98 (04-09-19 @ 10:18), Max: 98.3 (04-08-19 @ 21:49)  HR: 65 (04-09-19 @ 10:18) (52 - 65)  BP: 135/65 (04-09-19 @ 10:18) (121/47 - 143/53)  RR: 18 (04-09-19 @ 10:18) (17 - 18)  SpO2: 99% (04-09-19 @ 10:18)      CAPILLARY BLOOD GLUCOSE    Output     I&O's Summary  T(F): 98 (04-09-19 @ 10:18), Max: 98.3 (04-08-19 @ 21:49)  HR: 65 (04-09-19 @ 10:18) (52 - 65)  BP: 135/65 (04-09-19 @ 10:18) (121/47 - 143/53)  RR: 18 (04-09-19 @ 10:18) (17 - 18)  SpO2: 99% (04-09-19 @ 10:18)    PHYSICAL EXAM:  GENERAL: NAD, well-developed  HEAD:  Atraumatic, Normocephalic  EYES: EOMI  NECK: Supple, No JVD  CHEST/LUNG: nonlabored breathing  HEART: nl S1/S2  ABDOMEN: nondistended, soft  EXTREMITIES:  no LE edema  PSYCH: alert, answering questions appropriately  NEUROLOGY: non-focal  SKIN: No rashes noted    LABS:              7.9                  x    | x    | x            5.30  >-----------< 184     ------------------------< x                     25.5                 x    | x    | x                                            Ca x     Mg x     Ph x                          MICROBIOLOGY:        RADIOLOGY & ADDITIONAL TESTS:    Imaging Personally Reviewed:    < from: CT Abdomen and Pelvis w/ Oral Cont and w/ IV Cont (03.26.19 @ 14:29) >      < end of copied text >        [x] Care Discussed with Consultants/Other Providers: vascular surgery regarding hyponatremia      Rachel Hong M.D.  Hospitalist  Pager [phone]

## 2019-04-09 NOTE — DISCHARGE NOTE PROVIDER - NSDCFUADDINST_GEN_ALL_CORE_FT
URINARY:  straight cath TID   DAGO drain: Please empty and measure the drain as you have been taught daily.  WOUND CARE:  Please keep incisions clean and dry. Please do not Scrub or rub incisions. Do not use lotion or powder on incisions. Your staples will be removed at your follow up appointment.   BATHING: You may shower and/or sponge bathe. You may use warm soapy water in the shower and rinse, pat dry.  ACTIVITY: No heavy lifting or straining. Otherwise, you may return to your usual level of physical activity. If you are taking narcotic pain medication DO NOT drive a car, operate machinery or make important decisions.  NOTIFY YOUR SURGEON IF: You have any bleeding that does not stop, any pus draining from your wound(s), increased pain at surgical site, any fever (over 100.4 F) persistent nausea/vomiting, or if your pain is not controlled on your discharge pain medications.  Please follow up with your primary care physician in 1-2 weeks regarding your hospitalization.  Please follow up with your surgeon, Dr. Wright in 1 week. Please call office to make an appointment.

## 2019-04-09 NOTE — DISCHARGE NOTE PROVIDER - HOSPITAL COURSE
74 year old male history of atrial fibrillation AICD, diabetes type 2, hyperlipidemia, lung cancer ( status post radiation and chemo in remission 1997 ) presents as transfer from [facility]. Patient originally presented to [facility] with two weeks of constipation fatigue general malaise. He is a poor historian but his wife tells us that he has been weaker and weaker over the last two weeks and stopped ambulating. During his workup at [place] he was found to have a positive UA with a urine culture positive for klebsiella for which he was treated with rocephin. He also had a CT of the abdomen and pelvis with IV contrast that revealed a large left femoral artery pseudoaneurysm, bilateral perinephric stranding with patchy decreased enhancement of the right kidney and small amount of air within the urinary bladder. There were also signs of proctocolitis no bowel obstruction noted.    He has a prior surgical history of a left CEA 2004 (which subsequently became blocked). He had a right CEA in 2013 complicated by a pseudoaneurysm which was repaired with the insertion of carotid stents x4. In 2017 he had angioplasty of a bypass done previously in the right femoral to peroneal artery. He also previously had an infection of the right groin requiring a second procedure with a muscle flap (prior to Angioplasty). He also has a remote history of a left femoral endarterectomy.     Cardiology was consulted for optimization prior to surgery. Pt had a TTE which showed 1.Mitral annular calcification, otherwise normal mitral valve. Minimal mitral regurgitation.2. Aortic valve leaflet morphology not well visualized. Mild aortic regurgitation. 3. Normal left ventricular internal dimensions and wall thicknesses. 4. Normal left ventricular systolic function. No segmental    wall motion abnormalities. 5. The right ventricle is not well visualized; grossly normal right ventricular systolic function.  A device wire is noted in the right heart.            4/3 pt underwent left leg angiogram via proximal SFA retrograde access, large pseudoaneurysm seen off of previous femoral patch, left groin cutdown, excision of dacron patch evacuation of hematoma, excision of common femoral artery, harvest left GSV, end to end anastomosis from external iliac artery to distal common femoral artery, sartorius muscle flap 74 year old male history of atrial fibrillation AICD, diabetes type 2, hyperlipidemia, lung cancer ( status post radiation and chemo in remission 1997 ) presents as transfer from [facility]. Patient originally presented to [facility] with two weeks of constipation fatigue general malaise. He is a poor historian but his wife tells us that he has been weaker and weaker over the last two weeks and stopped ambulating. During his workup at [place] he was found to have a positive UA with a urine culture positive for klebsiella for which he was treated with rocephin. He also had a CT of the abdomen and pelvis with IV contrast that revealed a large left femoral artery pseudoaneurysm, bilateral perinephric stranding with patchy decreased enhancement of the right kidney and small amount of air within the urinary bladder. There were also signs of proctocolitis no bowel obstruction noted.    He has a prior surgical history of a left CEA 2004 (which subsequently became blocked). He had a right CEA in 2013 complicated by a pseudoaneurysm which was repaired with the insertion of carotid stents x4. In 2017 he had angioplasty of a bypass done previously in the right femoral to peroneal artery. He also previously had an infection of the right groin requiring a second procedure with a muscle flap (prior to Angioplasty). He also has a remote history of a left femoral endarterectomy.     Cardiology was consulted for optimization prior to surgery. Pt had a TTE which showed 1.Mitral annular calcification, otherwise normal mitral valve. Minimal mitral regurgitation.2. Aortic valve leaflet morphology not well visualized. Mild aortic regurgitation. 3. Normal left ventricular internal dimensions and wall thicknesses. 4. Normal left ventricular systolic function. No segmental    wall motion abnormalities. 5. The right ventricle is not well visualized; grossly normal right ventricular systolic function.  A device wire is noted in the right heart. He was deemed medically optimized for surgery.             4/3 pt underwent left leg angiogram via proximal SFA retrograde access, large pseudoaneurysm seen off of previous femoral patch, left groin cutdown, excision of dacron patch evacuation of hematoma, excision of common femoral artery, harvest left GSV, end to end anastomosis from external iliac artery to distal common femoral artery, sartorius muscle flap. Pt tolerated procedure.  Pt had Lyle removed prior to discharge on 4/9 and was instructed to continue intermittent TID catheterization.  On 4/9 pt completed his course of unasyn for enterococcus UTI. Pt has been deemed stable for rehab at this time.

## 2019-04-09 NOTE — DISCHARGE NOTE PROVIDER - CARE PROVIDER_API CALL
David Wright)  Surgery; Vascular Surgery  [address], [address]  [place] [address]  Phone: [phone]  Fax: [phone]  Follow Up Time: 1 week    Hardik Mallory)  Internal Medicine  [address] [place] [address]  Phone: [phone]  Fax: [phone]  Follow Up Time: 1 week

## 2019-04-09 NOTE — PROGRESS NOTE ADULT - PROBLEM SELECTOR PROBLEM 1
Hyponatremia
PAD (peripheral artery disease)

## 2019-04-09 NOTE — PROGRESS NOTE ADULT - NSHPATTENDINGPLANDISCUSS_GEN_ALL_CORE
Vascular team
patient
Hospitalist
patient and wife at bedside.
vascular

## 2019-04-09 NOTE — PROGRESS NOTE ADULT - ASSESSMENT
74-year-old male with medical history of DM, CAD, A. 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 Nephrology consulted for acute on chronic hyponatremia. Patient was noted to have hyponatremia to low 130s since 2013, but acutely more hyponatremic to 124 two days ago (4/7/19). Felt to be hypotonic hyponatremia. Na improved to 130 today (4/9/19). 74-year-old male with medical history of DM, CAD, A. 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 Nephrology consulted for acute on chronic hyponatremia. Patient was noted to have hyponatremia to low 130s since 2013, but acutely more hyponatremic to 124 two days ago (4/7/19). Na improved to 130 today (4/9/19).

## 2019-04-09 NOTE — PROGRESS NOTE ADULT - ASSESSMENT
74 year old male history of atrial fibrillation AICD, diabetes type 2, hyperlipidemia, lung cancer ( status post radiation and chemo in remission 1997 ) presents as transfer from [facility] for eval for femoral artery pseudoaneurysm    CT with bilateral ana maria-nephric stranding; L femoral artery pseudoaneurysm  UA positive, UCX with Klebsiella S CTX  BCX negative  Completed treatment course for UTI/Pyelo with ceftriaxone 4/3/19    s/p OR for excision of patch evacuation of hematoma, GSV graft, sartorius muscle flap 4/3/19  OR cultures with CoNS in liquid media suspect contaminant    On unasyn for enterococcus UTI  Remains with bolivar  Afebrile    Recommend:  -Continue unasyn to complete a 7-day postop course today    Discussed with primary team.

## 2019-04-09 NOTE — PROGRESS NOTE ADULT - PROBLEM SELECTOR PLAN 10
on SQ heparin
on full a/c

## 2019-04-10 LAB
SPECIMEN SOURCE: SIGNIFICANT CHANGE UP
SPECIMEN SOURCE: SIGNIFICANT CHANGE UP

## 2019-04-24 ENCOUNTER — INPATIENT (INPATIENT)
Facility: HOSPITAL | Age: 74
LOS: 1 days | Discharge: NURSING, REHAB OR LONG-TERM CARE | End: 2019-04-26
Attending: UROLOGY | Admitting: UROLOGY
Payer: MEDICARE

## 2019-04-24 VITALS
HEART RATE: 64 BPM | SYSTOLIC BLOOD PRESSURE: 125 MMHG | OXYGEN SATURATION: 100 % | DIASTOLIC BLOOD PRESSURE: 45 MMHG | TEMPERATURE: 98 F | RESPIRATION RATE: 18 BRPM

## 2019-04-24 DIAGNOSIS — I65.29 OCCLUSION AND STENOSIS OF UNSPECIFIED CAROTID ARTERY: Chronic | ICD-10-CM

## 2019-04-24 DIAGNOSIS — I65.21 OCCLUSION AND STENOSIS OF RIGHT CAROTID ARTERY: Chronic | ICD-10-CM

## 2019-04-24 DIAGNOSIS — Z95.810 PRESENCE OF AUTOMATIC (IMPLANTABLE) CARDIAC DEFIBRILLATOR: Chronic | ICD-10-CM

## 2019-04-24 LAB
ACANTHOCYTES BLD QL SMEAR: SLIGHT — SIGNIFICANT CHANGE UP
ALBUMIN SERPL ELPH-MCNC: 3.2 G/DL — LOW (ref 3.3–5)
ALP SERPL-CCNC: 67 U/L — SIGNIFICANT CHANGE UP (ref 40–120)
ALT FLD-CCNC: 8 U/L — SIGNIFICANT CHANGE UP (ref 4–41)
ANION GAP SERPL CALC-SCNC: 14 MMO/L — SIGNIFICANT CHANGE UP (ref 7–14)
ANISOCYTOSIS BLD QL: SLIGHT — SIGNIFICANT CHANGE UP
APPEARANCE UR: SIGNIFICANT CHANGE UP
APTT BLD: 38.4 SEC — HIGH (ref 27.5–36.3)
AST SERPL-CCNC: 16 U/L — SIGNIFICANT CHANGE UP (ref 4–40)
BACTERIA # UR AUTO: SIGNIFICANT CHANGE UP
BASE EXCESS BLDV CALC-SCNC: 1.9 MMOL/L — SIGNIFICANT CHANGE UP
BASE EXCESS BLDV CALC-SCNC: 4 MMOL/L — SIGNIFICANT CHANGE UP
BASOPHILS # BLD AUTO: 0.02 K/UL — SIGNIFICANT CHANGE UP (ref 0–0.2)
BASOPHILS NFR BLD AUTO: 0.3 % — SIGNIFICANT CHANGE UP (ref 0–2)
BASOPHILS NFR SPEC: 1.8 % — SIGNIFICANT CHANGE UP (ref 0–2)
BILIRUB SERPL-MCNC: 0.2 MG/DL — SIGNIFICANT CHANGE UP (ref 0.2–1.2)
BILIRUB UR-MCNC: NEGATIVE — SIGNIFICANT CHANGE UP
BLASTS # FLD: 0 % — SIGNIFICANT CHANGE UP (ref 0–0)
BLOOD GAS VENOUS - CREATININE: SIGNIFICANT CHANGE UP MG/DL (ref 0.5–1.3)
BLOOD GAS VENOUS - CREATININE: SIGNIFICANT CHANGE UP MG/DL (ref 0.5–1.3)
BLOOD UR QL VISUAL: SIGNIFICANT CHANGE UP
BUN SERPL-MCNC: 21 MG/DL — SIGNIFICANT CHANGE UP (ref 7–23)
CALCIUM SERPL-MCNC: 9.5 MG/DL — SIGNIFICANT CHANGE UP (ref 8.4–10.5)
CHLORIDE BLDV-SCNC: 100 MMOL/L — SIGNIFICANT CHANGE UP (ref 96–108)
CHLORIDE BLDV-SCNC: 94 MMOL/L — LOW (ref 96–108)
CHLORIDE SERPL-SCNC: 93 MMOL/L — LOW (ref 98–107)
CO2 SERPL-SCNC: 26 MMOL/L — SIGNIFICANT CHANGE UP (ref 22–31)
COLOR SPEC: SIGNIFICANT CHANGE UP
CREAT SERPL-MCNC: 0.91 MG/DL — SIGNIFICANT CHANGE UP (ref 0.5–1.3)
ELLIPTOCYTES BLD QL SMEAR: SLIGHT — SIGNIFICANT CHANGE UP
EOSINOPHIL # BLD AUTO: 0.11 K/UL — SIGNIFICANT CHANGE UP (ref 0–0.5)
EOSINOPHIL NFR BLD AUTO: 1.8 % — SIGNIFICANT CHANGE UP (ref 0–6)
EOSINOPHIL NFR FLD: 2.6 % — SIGNIFICANT CHANGE UP (ref 0–6)
EPI CELLS # UR: SIGNIFICANT CHANGE UP
GAS PNL BLDV: 129 MMOL/L — LOW (ref 136–146)
GAS PNL BLDV: 131 MMOL/L — LOW (ref 136–146)
GIANT PLATELETS BLD QL SMEAR: PRESENT — SIGNIFICANT CHANGE UP
GLUCOSE BLDV-MCNC: 125 — HIGH (ref 70–99)
GLUCOSE BLDV-MCNC: 144 — HIGH (ref 70–99)
GLUCOSE SERPL-MCNC: 151 MG/DL — HIGH (ref 70–99)
GLUCOSE UR-MCNC: NEGATIVE — SIGNIFICANT CHANGE UP
HCO3 BLDV-SCNC: 25 MMOL/L — SIGNIFICANT CHANGE UP (ref 20–27)
HCO3 BLDV-SCNC: 27 MMOL/L — SIGNIFICANT CHANGE UP (ref 20–27)
HCT VFR BLD CALC: 30 % — LOW (ref 39–50)
HCT VFR BLDV CALC: 26.3 % — LOW (ref 39–51)
HCT VFR BLDV CALC: 28.4 % — LOW (ref 39–51)
HGB BLD-MCNC: 9.2 G/DL — LOW (ref 13–17)
HGB BLDV-MCNC: 8.5 G/DL — LOW (ref 13–17)
HGB BLDV-MCNC: 9.2 G/DL — LOW (ref 13–17)
IMM GRANULOCYTES NFR BLD AUTO: 4.8 % — HIGH (ref 0–1.5)
INR BLD: 1.44 — HIGH (ref 0.88–1.17)
KETONES UR-MCNC: NEGATIVE — SIGNIFICANT CHANGE UP
LACTATE BLDV-MCNC: 2.5 MMOL/L — HIGH (ref 0.5–2)
LACTATE BLDV-MCNC: 4 MMOL/L — CRITICAL HIGH (ref 0.5–2)
LEUKOCYTE ESTERASE UR-ACNC: SIGNIFICANT CHANGE UP
LYMPHOCYTES # BLD AUTO: 0.95 K/UL — LOW (ref 1–3.3)
LYMPHOCYTES # BLD AUTO: 15.8 % — SIGNIFICANT CHANGE UP (ref 13–44)
LYMPHOCYTES NFR SPEC AUTO: 11.3 % — LOW (ref 13–44)
MCHC RBC-ENTMCNC: 27.1 PG — SIGNIFICANT CHANGE UP (ref 27–34)
MCHC RBC-ENTMCNC: 30.7 % — LOW (ref 32–36)
MCV RBC AUTO: 88.2 FL — SIGNIFICANT CHANGE UP (ref 80–100)
METAMYELOCYTES # FLD: 0 % — SIGNIFICANT CHANGE UP (ref 0–1)
MONOCYTES # BLD AUTO: 1.21 K/UL — HIGH (ref 0–0.9)
MONOCYTES NFR BLD AUTO: 20.1 % — HIGH (ref 2–14)
MONOCYTES NFR BLD: 7.8 % — SIGNIFICANT CHANGE UP (ref 2–9)
MYELOCYTES NFR BLD: 0 % — SIGNIFICANT CHANGE UP (ref 0–0)
NEUTROPHIL AB SER-ACNC: 73.9 % — SIGNIFICANT CHANGE UP (ref 43–77)
NEUTROPHILS # BLD AUTO: 3.45 K/UL — SIGNIFICANT CHANGE UP (ref 1.8–7.4)
NEUTROPHILS NFR BLD AUTO: 57.2 % — SIGNIFICANT CHANGE UP (ref 43–77)
NEUTS BAND # BLD: 0 % — SIGNIFICANT CHANGE UP (ref 0–6)
NITRITE UR-MCNC: NEGATIVE — SIGNIFICANT CHANGE UP
NRBC # FLD: 0 K/UL — SIGNIFICANT CHANGE UP (ref 0–0)
OTHER - HEMATOLOGY %: 0 — SIGNIFICANT CHANGE UP
PCO2 BLDV: 51 MMHG — SIGNIFICANT CHANGE UP (ref 41–51)
PCO2 BLDV: 55 MMHG — HIGH (ref 41–51)
PH BLDV: 7.34 PH — SIGNIFICANT CHANGE UP (ref 7.32–7.43)
PH BLDV: 7.35 PH — SIGNIFICANT CHANGE UP (ref 7.32–7.43)
PH UR: 6 — SIGNIFICANT CHANGE UP (ref 5–8)
PLATELET # BLD AUTO: 196 K/UL — SIGNIFICANT CHANGE UP (ref 150–400)
PLATELET COUNT - ESTIMATE: NORMAL — SIGNIFICANT CHANGE UP
PMV BLD: 10.6 FL — SIGNIFICANT CHANGE UP (ref 7–13)
PO2 BLDV: 22 MMHG — LOW (ref 35–40)
PO2 BLDV: 23 MMHG — LOW (ref 35–40)
POIKILOCYTOSIS BLD QL AUTO: SIGNIFICANT CHANGE UP
POLYCHROMASIA BLD QL SMEAR: SLIGHT — SIGNIFICANT CHANGE UP
POTASSIUM BLDV-SCNC: 4.3 MMOL/L — SIGNIFICANT CHANGE UP (ref 3.4–4.5)
POTASSIUM BLDV-SCNC: 4.8 MMOL/L — HIGH (ref 3.4–4.5)
POTASSIUM SERPL-MCNC: 4.5 MMOL/L — SIGNIFICANT CHANGE UP (ref 3.5–5.3)
POTASSIUM SERPL-SCNC: 4.5 MMOL/L — SIGNIFICANT CHANGE UP (ref 3.5–5.3)
PROMYELOCYTES # FLD: 0 % — SIGNIFICANT CHANGE UP (ref 0–0)
PROT SERPL-MCNC: 6.5 G/DL — SIGNIFICANT CHANGE UP (ref 6–8.3)
PROT UR-MCNC: 200 — HIGH
PROTHROM AB SERPL-ACNC: 16.2 SEC — HIGH (ref 9.8–13.1)
RBC # BLD: 3.4 M/UL — LOW (ref 4.2–5.8)
RBC # FLD: 16 % — HIGH (ref 10.3–14.5)
RBC CASTS # UR COMP ASSIST: >50 — HIGH (ref 0–?)
SAO2 % BLDV: 26.6 % — LOW (ref 60–85)
SAO2 % BLDV: 30.1 % — LOW (ref 60–85)
SODIUM SERPL-SCNC: 133 MMOL/L — LOW (ref 135–145)
SP GR SPEC: 1.02 — SIGNIFICANT CHANGE UP (ref 1–1.04)
UROBILINOGEN FLD QL: NORMAL — SIGNIFICANT CHANGE UP
VARIANT LYMPHS # BLD: 2.6 % — SIGNIFICANT CHANGE UP
WBC # BLD: 6.03 K/UL — SIGNIFICANT CHANGE UP (ref 3.8–10.5)
WBC # FLD AUTO: 6.03 K/UL — SIGNIFICANT CHANGE UP (ref 3.8–10.5)
WBC UR QL: HIGH (ref 0–?)

## 2019-04-24 PROCEDURE — 76770 US EXAM ABDO BACK WALL COMP: CPT | Mod: 26

## 2019-04-24 RX ORDER — SODIUM CHLORIDE 9 MG/ML
2000 INJECTION INTRAMUSCULAR; INTRAVENOUS; SUBCUTANEOUS ONCE
Qty: 0 | Refills: 0 | Status: COMPLETED | OUTPATIENT
Start: 2019-04-24 | End: 2019-04-24

## 2019-04-24 RX ADMIN — SODIUM CHLORIDE 2000 MILLILITER(S): 9 INJECTION INTRAMUSCULAR; INTRAVENOUS; SUBCUTANEOUS at 21:41

## 2019-04-24 NOTE — ED PROVIDER NOTE - ATTENDING CONTRIBUTION TO CARE
Dr. Garg:  I have personally performed a face to face bedside history and physical examination of this patient. I have discussed the history, examination, review of systems, assessment and plan of management with the resident. I have reviewed the electronic medical record and amended it to reflect my history, review of systems, physical exam, assessment and plan.    74M h/o afib with AICD, on anticoagulation, s/p recent left pseudoaneurysm repair 4/3/19 and had staples/drain removed yesterday, presents with hematuria today.  currently on Cipro for UTI x 3 days.  Denies fever/chills, cp, sob, n/v/d.    Exam:  - nad  - rrr  - ctab  - abd soft ntnd    A/P  - hematuria, likely hemorrhagic cystitis  - basic labs, ua, urine culture  - US renal

## 2019-04-24 NOTE — ED ADULT NURSE NOTE - NSIMPLEMENTINTERV_GEN_ALL_ED
Implemented All Fall with Harm Risk Interventions:  [place] to call system. Call bell, personal items and telephone within reach. Instruct patient to call for assistance. Room bathroom lighting operational. Non-slip footwear when patient is off stretcher. Physically safe environment: no spills, clutter or unnecessary equipment. Stretcher in lowest position, wheels locked, appropriate side rails in place. Provide visual cue, wrist band, yellow gown, etc. Monitor gait and stability. Monitor for mental status changes and reorient to person, place, and time. Review medications for side effects contributing to fall risk. Reinforce activity limits and safety measures with patient and family. Provide visual clues: red socks.

## 2019-04-24 NOTE — ED PROVIDER NOTE - OBJECTIVE STATEMENT
73 y/o M with history of afib with AICD on pradaxa and xarelto, recent admission for femoral psuedoaneurysm s/p repair presents with complaint of hematuria that began this evening. Patient with bolivar in place x month, has had multiple UTIs in past and is currently undergoing treatment for UTI with cipro x 3 days. Otherwise has been feeling well. Denies fever, chills, chest pain, SOB, abdominal pain, nausea, vomiting, diarrhea. Had follow up appointment with Dr. Wright yesterday and had staples removed.

## 2019-04-24 NOTE — ED PROVIDER NOTE - PROGRESS NOTE DETAILS
labs show Hb 9.2, elevated lactate, seen by urology who initiated CBI. on reassessment, uro recommended admission

## 2019-04-24 NOTE — ED ADULT NURSE NOTE - CHIEF COMPLAINT QUOTE
Arrives via EMS from [facility] for gross hematuria to [place] 300cc today.  Pt's wife endorses s/p pseudo aneurysm surgery.  Pt endorses that staples and drains were removed yesterday.

## 2019-04-24 NOTE — ED PROVIDER NOTE - CLINICAL SUMMARY MEDICAL DECISION MAKING FREE TEXT BOX
75 y/o afib on AICD on pradaxa/xarelto, DM, HLD presents with hematuria. Has chronic indwelling bolivar. S/p recent femoral artery pseudoaneurysm repair unlikely related to presentation. Hematuria more likely related to indwelling bolivar and uti. labs, UA, IVF, reassess for clearing of urine. May require bladder irrigation

## 2019-04-24 NOTE — ED ADULT TRIAGE NOTE - CHIEF COMPLAINT QUOTE
Arrives via EMS from [facility] for gross hematuria to [place] 300cc today.  Pt's wife endorses s/p pseudo aneurysm surgery.  Pt endorses that staples and drains were removed yesterday.

## 2019-04-24 NOTE — ED ADULT NURSE NOTE - OBJECTIVE STATEMENT
75 y/o male aaox4 c/o hematuria. Pt family states blood draining from bolivar since this morning. Pt denies flank pain, abdominal pain, N/V/D, fever, chills. PMH of frequent UTIs, stent placement w/ scar on L inguinal area, afib. Pt currently on abx x3 days. Bolivar draining 200ccs red blood with mix of urine; bag last emptied at 1800. Pt placed on cardiac monitor. Labs to be collected. Pt family at bedside, bed in lowest position. Call light within reach. Will continue to monitor. 75 y/o male aaox4 c/o hematuria. Pt family states blood draining from bolivar since this morning. Pt denies flank pain, abdominal pain, N/V/D, fever, chills. PMH of frequent UTIs, stent placement w/ scar on L inguinal area, afib. Pt currently on abx x3 days. Bolivar draining 200ccs red blood with mix of urine; bag last emptied at 1800. Pt placed on cardiac monitor. Labs to be collected. Pt family at bedside, bed in lowest position. Call light within reach. Will continue to monitor.    Addendum (primary RN GK): Pt with left femoral surgical incision/staples s/p pseudoaneurysm femoral repair. Sx site appears clean and dry.

## 2019-04-25 DIAGNOSIS — R31.9 HEMATURIA, UNSPECIFIED: ICD-10-CM

## 2019-04-25 LAB
GLUCOSE BLDC GLUCOMTR-MCNC: 132 MG/DL — HIGH (ref 70–99)
GLUCOSE BLDC GLUCOMTR-MCNC: 143 MG/DL — HIGH (ref 70–99)
GLUCOSE BLDC GLUCOMTR-MCNC: 170 MG/DL — HIGH (ref 70–99)

## 2019-04-25 RX ORDER — HEPARIN SODIUM 5000 [USP'U]/ML
5000 INJECTION INTRAVENOUS; SUBCUTANEOUS EVERY 8 HOURS
Qty: 0 | Refills: 0 | Status: DISCONTINUED | OUTPATIENT
Start: 2019-04-25 | End: 2019-04-26

## 2019-04-25 RX ORDER — DEXTROSE 50 % IN WATER 50 %
25 SYRINGE (ML) INTRAVENOUS ONCE
Qty: 0 | Refills: 0 | Status: DISCONTINUED | OUTPATIENT
Start: 2019-04-25 | End: 2019-04-26

## 2019-04-25 RX ORDER — PREGABALIN 225 MG/1
1000 CAPSULE ORAL DAILY
Qty: 0 | Refills: 0 | Status: DISCONTINUED | OUTPATIENT
Start: 2019-04-25 | End: 2019-04-26

## 2019-04-25 RX ORDER — ATORVASTATIN CALCIUM 80 MG/1
20 TABLET, FILM COATED ORAL AT BEDTIME
Qty: 0 | Refills: 0 | Status: DISCONTINUED | OUTPATIENT
Start: 2019-04-25 | End: 2019-04-26

## 2019-04-25 RX ORDER — POLYETHYLENE GLYCOL 3350 17 G/17G
17 POWDER, FOR SOLUTION ORAL DAILY
Qty: 0 | Refills: 0 | Status: DISCONTINUED | OUTPATIENT
Start: 2019-04-25 | End: 2019-04-26

## 2019-04-25 RX ORDER — FUROSEMIDE 40 MG
20 TABLET ORAL
Qty: 0 | Refills: 0 | Status: DISCONTINUED | OUTPATIENT
Start: 2019-04-25 | End: 2019-04-26

## 2019-04-25 RX ORDER — SENNA PLUS 8.6 MG/1
2 TABLET ORAL AT BEDTIME
Qty: 0 | Refills: 0 | Status: DISCONTINUED | OUTPATIENT
Start: 2019-04-25 | End: 2019-04-26

## 2019-04-25 RX ORDER — SODIUM CHLORIDE 9 MG/ML
1000 INJECTION, SOLUTION INTRAVENOUS
Qty: 0 | Refills: 0 | Status: DISCONTINUED | OUTPATIENT
Start: 2019-04-25 | End: 2019-04-26

## 2019-04-25 RX ORDER — DEXTROSE 50 % IN WATER 50 %
12.5 SYRINGE (ML) INTRAVENOUS ONCE
Qty: 0 | Refills: 0 | Status: DISCONTINUED | OUTPATIENT
Start: 2019-04-25 | End: 2019-04-26

## 2019-04-25 RX ORDER — AMPICILLIN TRIHYDRATE 250 MG
1 CAPSULE ORAL EVERY 6 HOURS
Qty: 0 | Refills: 0 | Status: DISCONTINUED | OUTPATIENT
Start: 2019-04-25 | End: 2019-04-26

## 2019-04-25 RX ORDER — SODIUM CHLORIDE 9 MG/ML
1 INJECTION INTRAMUSCULAR; INTRAVENOUS; SUBCUTANEOUS THREE TIMES A DAY
Qty: 0 | Refills: 0 | Status: DISCONTINUED | OUTPATIENT
Start: 2019-04-25 | End: 2019-04-26

## 2019-04-25 RX ORDER — METOPROLOL TARTRATE 50 MG
100 TABLET ORAL DAILY
Qty: 0 | Refills: 0 | Status: DISCONTINUED | OUTPATIENT
Start: 2019-04-25 | End: 2019-04-26

## 2019-04-25 RX ORDER — DIGOXIN 250 MCG
0.12 TABLET ORAL DAILY
Qty: 0 | Refills: 0 | Status: DISCONTINUED | OUTPATIENT
Start: 2019-04-25 | End: 2019-04-26

## 2019-04-25 RX ORDER — AMPICILLIN TRIHYDRATE 250 MG
CAPSULE ORAL
Qty: 0 | Refills: 0 | Status: DISCONTINUED | OUTPATIENT
Start: 2019-04-25 | End: 2019-04-26

## 2019-04-25 RX ORDER — AMPICILLIN TRIHYDRATE 250 MG
1 CAPSULE ORAL ONCE
Qty: 0 | Refills: 0 | Status: COMPLETED | OUTPATIENT
Start: 2019-04-25 | End: 2019-04-25

## 2019-04-25 RX ORDER — ASCORBIC ACID 60 MG
500 TABLET,CHEWABLE ORAL DAILY
Qty: 0 | Refills: 0 | Status: DISCONTINUED | OUTPATIENT
Start: 2019-04-25 | End: 2019-04-26

## 2019-04-25 RX ORDER — CHOLECALCIFEROL (VITAMIN D3) 125 MCG
1000 CAPSULE ORAL DAILY
Qty: 0 | Refills: 0 | Status: DISCONTINUED | OUTPATIENT
Start: 2019-04-25 | End: 2019-04-26

## 2019-04-25 RX ORDER — LOSARTAN POTASSIUM 100 MG/1
25 TABLET, FILM COATED ORAL DAILY
Qty: 0 | Refills: 0 | Status: DISCONTINUED | OUTPATIENT
Start: 2019-04-25 | End: 2019-04-26

## 2019-04-25 RX ORDER — DEXTROSE 50 % IN WATER 50 %
15 SYRINGE (ML) INTRAVENOUS ONCE
Qty: 0 | Refills: 0 | Status: DISCONTINUED | OUTPATIENT
Start: 2019-04-25 | End: 2019-04-26

## 2019-04-25 RX ORDER — FOLIC ACID 0.8 MG
1 TABLET ORAL DAILY
Qty: 0 | Refills: 0 | Status: DISCONTINUED | OUTPATIENT
Start: 2019-04-25 | End: 2019-04-26

## 2019-04-25 RX ORDER — DOCUSATE SODIUM 100 MG
100 CAPSULE ORAL THREE TIMES A DAY
Qty: 0 | Refills: 0 | Status: DISCONTINUED | OUTPATIENT
Start: 2019-04-25 | End: 2019-04-26

## 2019-04-25 RX ORDER — GLUCAGON INJECTION, SOLUTION 0.5 MG/.1ML
1 INJECTION, SOLUTION SUBCUTANEOUS ONCE
Qty: 0 | Refills: 0 | Status: DISCONTINUED | OUTPATIENT
Start: 2019-04-25 | End: 2019-04-26

## 2019-04-25 RX ORDER — ACETAMINOPHEN 500 MG
650 TABLET ORAL EVERY 6 HOURS
Qty: 0 | Refills: 0 | Status: DISCONTINUED | OUTPATIENT
Start: 2019-04-25 | End: 2019-04-26

## 2019-04-25 RX ORDER — INSULIN LISPRO 100/ML
VIAL (ML) SUBCUTANEOUS AT BEDTIME
Qty: 0 | Refills: 0 | Status: DISCONTINUED | OUTPATIENT
Start: 2019-04-25 | End: 2019-04-26

## 2019-04-25 RX ORDER — CLOPIDOGREL BISULFATE 75 MG/1
75 TABLET, FILM COATED ORAL DAILY
Qty: 0 | Refills: 0 | Status: DISCONTINUED | OUTPATIENT
Start: 2019-04-25 | End: 2019-04-25

## 2019-04-25 RX ORDER — INSULIN LISPRO 100/ML
VIAL (ML) SUBCUTANEOUS
Qty: 0 | Refills: 0 | Status: DISCONTINUED | OUTPATIENT
Start: 2019-04-25 | End: 2019-04-26

## 2019-04-25 RX ORDER — CLOPIDOGREL BISULFATE 75 MG/1
75 TABLET, FILM COATED ORAL DAILY
Qty: 0 | Refills: 0 | Status: DISCONTINUED | OUTPATIENT
Start: 2019-04-25 | End: 2019-04-26

## 2019-04-25 RX ADMIN — Medication 1 MILLIGRAM(S): at 13:58

## 2019-04-25 RX ADMIN — SODIUM CHLORIDE 100 MILLILITER(S): 9 INJECTION, SOLUTION INTRAVENOUS at 21:16

## 2019-04-25 RX ADMIN — PREGABALIN 1000 MICROGRAM(S): 225 CAPSULE ORAL at 17:50

## 2019-04-25 RX ADMIN — HEPARIN SODIUM 5000 UNIT(S): 5000 INJECTION INTRAVENOUS; SUBCUTANEOUS at 21:17

## 2019-04-25 RX ADMIN — Medication 20 MILLIGRAM(S): at 17:50

## 2019-04-25 RX ADMIN — SODIUM CHLORIDE 100 MILLILITER(S): 9 INJECTION, SOLUTION INTRAVENOUS at 10:51

## 2019-04-25 RX ADMIN — Medication 108 GRAM(S): at 14:04

## 2019-04-25 RX ADMIN — Medication 108 GRAM(S): at 21:17

## 2019-04-25 RX ADMIN — Medication 1 TABLET(S): at 13:58

## 2019-04-25 RX ADMIN — HEPARIN SODIUM 5000 UNIT(S): 5000 INJECTION INTRAVENOUS; SUBCUTANEOUS at 13:59

## 2019-04-25 RX ADMIN — ATORVASTATIN CALCIUM 20 MILLIGRAM(S): 80 TABLET, FILM COATED ORAL at 21:25

## 2019-04-25 RX ADMIN — Medication 500 MILLIGRAM(S): at 13:56

## 2019-04-25 RX ADMIN — Medication 1000 UNIT(S): at 17:50

## 2019-04-25 RX ADMIN — SODIUM CHLORIDE 1 GRAM(S): 9 INJECTION INTRAMUSCULAR; INTRAVENOUS; SUBCUTANEOUS at 17:54

## 2019-04-25 NOTE — ED ADULT NURSE REASSESSMENT NOTE - GENERAL PATIENT STATE
comfortable appearance/improvement verbalized/family/SO at bedside/smiling/interactive/cooperative/resting/sleeping

## 2019-04-25 NOTE — CONSULT NOTE ADULT - SUBJECTIVE AND OBJECTIVE BOX
Vascular Surgery Consult    Consulting attending: Dr. Wright       HPI:  74 year old male with a medical history significant for AFib, on Xarelto, AICD, CADs/p PCI, PVD, s/p revascularization, HLD, HTN, type 2 DM, GERD, with recent left femoral pseudoaneurysm, s/p surgical intervention ~2 weeks ago, presenting from rehab with one day of gross hematuria.  Patient has indwelling bolivar catheter in place while at rehab.  At baseline, he has bladder dysfunction, follows with Urologist Dr. Villasenor, and performs CIC Q6h daily.  Hematuria was noted without traumatic event.  Denies pain.  Reports bolivar has been draining well.  Patient remains on Plavix and Xarelto.  He is currently on Cipro for recent diagnosis of UTI.  Denies fevers. (2019 12:04)        PAST MEDICAL HISTORY:  AICD (automatic cardioverter/defibrillator) present  PVD (peripheral vascular disease)  Atrial fibrillation  Hyperlipidemia  HTN (hypertension)  Lung cancer  PAD (peripheral artery disease)  GERD (gastroesophageal reflux disease)  Carotid artery occlusion  Neuropathy  Type 2 diabetes mellitus        PAST SURGICAL HISTORY:  Carotid artery occlusion  AICD (automatic cardioverter/defibrillator) present  Carotid stenosis, right  S/P femoral-popliteal bypass surgery  S/P angioplasty with stent  S/P cervical spinal fusion        MEDICATIONS:  ampicillin  IVPB      ampicillin  IVPB 1 Gram(s) IV Intermittent every 6 hours  ascorbic acid 500 milliGRAM(s) Oral daily  atorvastatin 20 milliGRAM(s) Oral at bedtime  cholecalciferol 1000 Unit(s) Oral daily  cyanocobalamin 1000 MICROGram(s) Oral daily  dextrose 40% Gel 15 Gram(s) Oral once PRN  dextrose 5%. 1000 milliLiter(s) IV Continuous <Continuous>  dextrose 50% Injectable 12.5 Gram(s) IV Push once  dextrose 50% Injectable 25 Gram(s) IV Push once  dextrose 50% Injectable 25 Gram(s) IV Push once  digoxin     Tablet 0.125 milliGRAM(s) Oral daily  docusate sodium 100 milliGRAM(s) Oral three times a day  folic acid 1 milliGRAM(s) Oral daily  furosemide    Tablet 20 milliGRAM(s) Oral two times a day  glucagon  Injectable 1 milliGRAM(s) IntraMuscular once PRN  heparin  Injectable 5000 Unit(s) SubCutaneous every 8 hours  insulin lispro (HumaLOG) corrective regimen sliding scale   SubCutaneous three times a day before meals  insulin lispro (HumaLOG) corrective regimen sliding scale   SubCutaneous at bedtime  lactated ringers. 1000 milliLiter(s) IV Continuous <Continuous>  losartan 25 milliGRAM(s) Oral daily  metoprolol succinate  milliGRAM(s) Oral daily  multivitamin 1 Tablet(s) Oral daily  senna 2 Tablet(s) Oral at bedtime PRN  sodium chloride 1 Gram(s) Oral three times a day        ALLERGIES:  No Known Allergies        VITALS & I/Os:  Vital Signs Last 24 Hrs  T(C): 36.2 (2019 13:54), Max: 36.7 (2019 21:08)  T(F): 97.2 (2019 13:54), Max: 98.1 (2019 21:08)  HR: 63 (2019 13:54) (54 - 81)  BP: 153/75 (2019 13:54) (105/47 - 153/75)  BP(mean): --  RR: 16 (2019 13:54) (16 - 18)  SpO2: 100% (2019 13:54) (99% - 100%)    I&O's Summary    2019 07:  -  2019 07:00  --------------------------------------------------------  IN: 0 mL / OUT: 3400 mL / NET: -3400 mL    2019 07:01  -  2019 14:55  --------------------------------------------------------  IN: 0 mL / OUT: 5000 mL / NET: -5000 mL          PHYSICAL EXAM:  General: No acute distress  Respiratory: Nonlabored  Cardiovascular: RRR  Abdominal: Soft, nondistended, nontender. No rebound or guarding. No organomegaly, no palpable mass.  Extremities: Warm  Vascular:  - RUE:  - LUE:  - RLE:  - LLE:       LABS:                        9.2    6.03  )-----------( 196      ( 2019 21:20 )             30.0         133<L>  |  93<L>  |  21  ----------------------------<  151<H>  4.5   |  26  |  0.91    Ca    9.5      2019 21:20    TPro  6.5  /  Alb  3.2<L>  /  TBili  0.2  /  DBili  x   /  AST  16  /  ALT  8   /  AlkPhos  67      Lactate:   @ 23:05  2.5   @ 22:02  4.0    PT/INR - ( 2019 22:00 )   PT: 16.2 SEC;   INR: 1.44          PTT - ( 2019 22:00 )  PTT:38.4 SEC          Urinalysis Basic - ( 2019 21:20 )    Color: RED / Appearance: BLOODY / S.019 / pH: 6.0  Gluc: NEGATIVE / Ketone: NEGATIVE  / Bili: NEGATIVE / Urobili: NORMAL   Blood: LARGE / Protein: 200 / Nitrite: NEGATIVE   Leuk Esterase: TRACE / RBC: >50 / WBC 6-10   Sq Epi: x / Non Sq Epi: FEW / Bacteria: FEW          IMAGING: Vascular Surgery Consult    Consulting attending: Dr. Wright       HPI: Aurelio Leggett is a 74 y.o. man with history of AFib (Xarelto), CHF w/ AICD, CAD s/p PCI, RLE bypass, L iliac stent, carotid stent, and recent L femoral artery pseudoaneurysm s/p excision and GSV bypass who is currently admitted for hematuria. Vascular surgery consulted to to comment on ability to safely stop antiplatelet medication.     Patient seen and examined. Denies any recent fever, chills, weakness, or fatigue. Denies any pain in his left groin incision site.       PAST MEDICAL HISTORY:  AICD (automatic cardioverter/defibrillator) present  PVD (peripheral vascular disease)  Atrial fibrillation  Hyperlipidemia  HTN (hypertension)  Lung cancer  PAD (peripheral artery disease)  GERD (gastroesophageal reflux disease)  Carotid artery occlusion  Neuropathy  Type 2 diabetes mellitus      PAST SURGICAL HISTORY:  Carotid artery occlusion  AICD (automatic cardioverter/defibrillator) present  Carotid stenosis, right  S/P femoral-popliteal bypass surgery  S/P angioplasty with stent  S/P cervical spinal fusion      MEDICATIONS:  ampicillin  IVPB      ampicillin  IVPB 1 Gram(s) IV Intermittent every 6 hours  ascorbic acid 500 milliGRAM(s) Oral daily  atorvastatin 20 milliGRAM(s) Oral at bedtime  cholecalciferol 1000 Unit(s) Oral daily  cyanocobalamin 1000 MICROGram(s) Oral daily  dextrose 40% Gel 15 Gram(s) Oral once PRN  dextrose 5%. 1000 milliLiter(s) IV Continuous <Continuous>  dextrose 50% Injectable 12.5 Gram(s) IV Push once  dextrose 50% Injectable 25 Gram(s) IV Push once  dextrose 50% Injectable 25 Gram(s) IV Push once  digoxin     Tablet 0.125 milliGRAM(s) Oral daily  docusate sodium 100 milliGRAM(s) Oral three times a day  folic acid 1 milliGRAM(s) Oral daily  furosemide    Tablet 20 milliGRAM(s) Oral two times a day  glucagon  Injectable 1 milliGRAM(s) IntraMuscular once PRN  heparin  Injectable 5000 Unit(s) SubCutaneous every 8 hours  insulin lispro (HumaLOG) corrective regimen sliding scale   SubCutaneous three times a day before meals  insulin lispro (HumaLOG) corrective regimen sliding scale   SubCutaneous at bedtime  lactated ringers. 1000 milliLiter(s) IV Continuous <Continuous>  losartan 25 milliGRAM(s) Oral daily  metoprolol succinate  milliGRAM(s) Oral daily  multivitamin 1 Tablet(s) Oral daily  senna 2 Tablet(s) Oral at bedtime PRN  sodium chloride 1 Gram(s) Oral three times a day      ALLERGIES:  No Known Allergies      VITALS & I/Os:  Vital Signs Last 24 Hrs  T(C): 36.2 (2019 13:54), Max: 36.7 (2019 21:08)  T(F): 97.2 (2019 13:54), Max: 98.1 (2019 21:08)  HR: 63 (2019 13:54) (54 - 81)  BP: 153/75 (2019 13:54) (105/47 - 153/75)  BP(mean): --  RR: 16 (2019 13:54) (16 - 18)  SpO2: 100% (2019 13:54) (99% - 100%)      I&O's Summary  2019 07:  -  2019 07:00  --------------------------------------------------------  IN: 0 mL / OUT: 3400 mL / NET: -3400 mL    2019 07:  -  2019 14:55  --------------------------------------------------------  IN: 0 mL / OUT: 5000 mL / NET: -5000 mL      PHYSICAL EXAM:  General: No acute distress  Respiratory: Nonlabored  Cardiovascular: normotensive, regular rate  Abdominal: Soft, nondistended, nontender. No rebound or guarding. No organomegaly, no palpable mass.  Extremities: Warm, well perfused, left groin incision with minimal surrounding erythema, small area in middle of wound where skin was dehiscent now covered with eschar, no purulence or drainage.       LABS:                        9.2    6.03  )-----------( 196      ( 2019 21:20 )             30.0     04-24    133<L>  |  93<L>  |  21  ----------------------------<  151<H>  4.5   |  26  |  0.91    Ca    9.5      2019 21:20    TPro  6.5  /  Alb  3.2<L>  /  TBili  0.2  /  DBili  x   /  AST  16  /  ALT  8   /  AlkPhos  67      Lactate:   @ 23:05  2.5   @ 22:02  4.0    PT/INR - ( 2019 22:00 )   PT: 16.2 SEC;   INR: 1.44       PTT - ( 2019 22:00 )  PTT:38.4 SEC    Urinalysis Basic - ( 2019 21:20 )    Color: RED / Appearance: BLOODY / S.019 / pH: 6.0  Gluc: NEGATIVE / Ketone: NEGATIVE  / Bili: NEGATIVE / Urobili: NORMAL   Blood: LARGE / Protein: 200 / Nitrite: NEGATIVE   Leuk Esterase: TRACE / RBC: >50 / WBC 6-10   Sq Epi: x / Non Sq Epi: FEW / Bacteria: FEW

## 2019-04-25 NOTE — H&P ADULT - ASSESSMENT
74 year old male with a medical history significant for AFib, on Xarelto, AICD, CADs/p PCI, PVD, s/p revascularization, HLD, HTN, type 2 DM, GERD, with recent left femoral pseudoaneurysm, s/p surgical intervention ~2 weeks ago, presenting with indwelling bolivar and gross hematuria requiring CBI in setting of Plavix and Xarelto.    -Continue CBI, monitor color  - to wean  -Continue antibiotics  -Case to be discussed with Dr. Villasenor

## 2019-04-25 NOTE — CONSULT NOTE ADULT - SUBJECTIVE AND OBJECTIVE BOX
HPI: 74 year old male with a medical history significant for AFib, on Xarelto, AICD, CADs/p PCI, PVD, s/p revascularization, HLD, HTN, type 2 DM, GERD, with recent left femoral pseudoaneurysm, s/p surgical intervention ~2 weeks ago, presenting from rehab with one day of gross hematuria.  Patient has indwelling bolivar catheter in place while at rehab.  At baseline, he has bladder dysfunction, follows with Urologist Dr. Villasenor, and performs CIC Q6h daily.  Hematuria was noted without traumatic event.  Denies pain.  Reports bolivar has been draining well.  Patient remains on Plavix and Xarelto.  He is currently on Cipro for recent diagnosis of UTI.  Denies fevers.      PAST MEDICAL & SURGICAL HISTORY:  AICD (automatic cardioverter/defibrillator) present  PVD (peripheral vascular disease): s/p angioplasty with stent ; pt states Left Carotid Artery occluded  Atrial fibrillation: since   Hyperlipidemia  HTN (hypertension)  Lung cancer: small cell, stage 4, , radiation &amp; chemo- on remission  PAD (peripheral artery disease)  GERD (gastroesophageal reflux disease)  Carotid artery occlusion: left  Neuropathy  Type 2 diabetes mellitus: glucose this am 116  Carotid artery occlusion: right with pseudoaneurysm s/p stent and mesh 2017  AICD (automatic cardioverter/defibrillator) present: 10/21/14  Carotid stenosis, right: Right CEA   S/P femoral-popliteal bypass surgery: x 2, , right s/p Revision  S/P angioplasty with stent: left leg  S/P cervical spinal fusion: 2002, 1 plate &amp; 4 rods    MEDICATIONS:  Home Medications:  acetaminophen 325 mg oral tablet: 2 tab(s) orally every 6 hours, As needed, Mild Pain (1 - 3) (2019 11:26)  atorvastatin 20 mg oral tablet: 1 tab(s) orally once a day (25 Mar 2019 00:33)  calcium magnesium citrate: 1 tab(s) orally once a day (25 Mar 2019 00:33)  cholecalciferol oral tablet: 2000 unit(s) orally once a day (2019 11:31)  digoxin 125 mcg (0.125 mg) oral tablet: 1 tab(s) orally once a day (25 Mar 2019 00:33)  docusate sodium 100 mg oral capsule: 1 cap(s) orally 3 times a day, As Needed (2019 11:26)  folic acid: orally once a day (25 Mar 2019 00:33)  iron: 130 milligram(s) orally once a day (25 Mar 2019 00:33)  Lasix 20 mg oral tablet: 1 tab(s) orally 2 times a day (2019 15:09)  losartan 25 mg oral tablet: 1 tab(s) orally once a day (25 Mar 2019 00:33)  metFORMIN 1000 mg oral tablet:  orally once a day, (25 Mar 2019 00:33)  Metoprolol Succinate  mg oral tablet, extended release: 1.5 tab(s) orally once a day (25 Mar 2019 00:33)  multivitamin: 1 tab(s) orally once a day last dose (2019 11:26)  Plavix 75 mg oral tablet: 1 tab(s) orally once a day am (25 Mar 2019 00:33)  polyethylene glycol 3350 oral powder for reconstitution: 17 gram(s) orally once a day (26 Mar 2019 14:24)  senna oral tablet: 2 tab(s) orally once a day (at bedtime) (26 Mar 2019 14:24)  sodium chloride 1 g oral tablet: 2 tab(s) orally 3 times a day (2019 11:31)  Tradjenta 5 mg oral tablet: 1 tab(s) orally once a day (25 Mar 2019 00:33)  Vitamin B12:  orally 3000 mcg daily (25 Mar 2019 00:33)  Vitamin C 500 mg oral tablet: 1 tab(s) orally once a day (25 Mar 2019 00:33)  vitamin D:   2000 units daily (25 Mar 2019 00:33)  Xarelto 20 mg oral tablet: 1 tab(s) orally once a day (2019 11:26)    FAMILY HISTORY:  No pertinent family history in first degree relatives    Allergies  No Known Allergies    SOCIAL HISTORY:  , lives with wife, currently at a Rehab post surgery.  Retired   Denies smoking.     REVIEW OF SYSTEMS: Otherwise negative as stated in HPI    Vital Signs Last 24 Hrs  T(C): 36.5 (2019 06:36), Max: 36.7 (2019 21:08)  T(F): 97.7 (2019 06:36), Max: 98.1 (2019 21:08)  HR: 74 (2019 06:36) (54 - 81)  BP: 119/53 (2019 06:36) (105/47 - 131/54)  BP(mean): --  RR: 16 (2019 06:36) (16 - 18)  SpO2: 100% (2019 06:36) (100% - 100%)    PHYSICAL EXAM:    General: Awake and Alert in no acute distress    Respiratory and Thorax: clear  	  Cardiovascular: Irregular    Gastrointestinal: soft, non tender, no distention.     Genitourinary: Glans uncircumcised, +hypospadius, no meatus discharge, testes  descended, no tenderness.   Bolivar catheter was in place upon presentation.  Catheter appeared to be out further than it should without anything securing it in place, and 4cc in balloon instead of 30cc.  Dark red blood present in bolivar bag.   Attempted to replace, and irrigate, and no clot present, and cleared up with minimal irrigation, however color darkened after monitoring for an hour.  Catheter removed, and with sterile technique placed a 26F Six Eye catheter, with further irrigating of clear pink, no clots present.  Six eye catheter was removed, and 24F 3 way catheter was placed in sterile technique and CBI was started.     	  LABS:                        9.2    6.03  )-----------( 196      ( 2019 21:20 )             30.0     04-24    133<L>  |  93<L>  |  21  ----------------------------<  151<H>  4.5   |  26  |  0.91    Ca    9.5      2019 21:20    TPro  6.5  /  Alb  3.2<L>  /  TBili  0.2  /  DBili  x   /  AST  16  /  ALT  8   /  AlkPhos  67  04-24    PT/INR - ( 2019 22:00 )   PT: 16.2 SEC;   INR: 1.44       PTT - ( 2019 22:00 )  PTT:38.4 SEC  Urinalysis Basic - ( 2019 21:20 )    Color: RED / Appearance: BLOODY / S.019 / pH: 6.0  Gluc: NEGATIVE / Ketone: NEGATIVE  / Bili: NEGATIVE / Urobili: NORMAL   Blood: LARGE / Protein: 200 / Nitrite: NEGATIVE   Leuk Esterase: TRACE / RBC: >50 / WBC 6-10   Sq Epi: x / Non Sq Epi: FEW / Bacteria: FEW    RADIOLOGY:  < from: US Kidney and Bladder (19 @ 22:30) >  IMPRESSION:     No evidence of obstructive uropathy. Bolivar catheter decompresses the   bladder.    Cholelithiasis without sonographic evidence of acute cholecystitis.    < end of copied text >

## 2019-04-25 NOTE — ED ADULT NURSE REASSESSMENT NOTE - NS ED NURSE REASSESS COMMENT FT1
Report received by night RN. Pt resting comfortably in bed, denies any pain/ symptoms at this time, BP low- pt asymptomatic at this time, MD made aware, pt stable, in NAD, will continue to monitor. Report received by night RN. Pt resting comfortably in bed, denies any pain/ symptoms at this time, BP low- pt asymptomatic at this time, MD Haverty made aware- does not want to treat at this time, pt stable, in NAD, will continue to monitor.

## 2019-04-25 NOTE — CONSULT NOTE ADULT - ASSESSMENT
Plan:  - Continue Plavix if able  - If bleeding continues, may be off Plavix and may receive platelets, but should avoid cessation of Plavix if possible  - Recommend continued local wound care for left groin  - Plan discussed with Dr. Adriana Bryant, PGY2  [id number]

## 2019-04-25 NOTE — CONSULT NOTE ADULT - ASSESSMENT
74 year old male with a medical history significant for AFib, on Xarelto, AICD, CADs/p PCI, PVD, s/p revascularization, HLD, HTN, type 2 DM, GERD, with recent left femoral pseudoaneurysm, s/p surgical intervention ~2 weeks ago, presenting with indwelling bolivar and gorss hematuria requiring CBI in setting of Plavix and Xarelto.    -Continue CBI, monitor color  - to wean  -Case to be discussed with Dr. Villasenor 74 year old male with a medical history significant for AFib, on Xarelto, AICD, CADs/p PCI, PVD, s/p revascularization, HLD, HTN, type 2 DM, GERD, with recent left femoral pseudoaneurysm, s/p surgical intervention ~2 weeks ago, presenting with indwelling bolivar and gross hematuria requiring CBI in setting of Plavix and Xarelto.    -Continue CBI, monitor color  - to wean  -Case to be discussed with Dr. Villasenro

## 2019-04-25 NOTE — CONSULT NOTE ADULT - ATTENDING COMMENTS
Full consult note as above; discussed with surgery resident and vascular fellow.  He is well known to me. He is s/p treatment of a mycotic left femoral pseudoaneurysm with excision of the infected patch, a vein bypass and a muscle flap. He was admitted with gross hematuria. He is on anticoagulation for atrial fibrillation and anti-platelet therapy with Plavix for a carotid stent. The Plavix can be held if necessary, but the effect of the Plavix will continue for at least one week. Platelets can be administered if it is felt to be necessary, per urology. The groin incision has some superficial eschar that requires no intervention.

## 2019-04-25 NOTE — H&P ADULT - NSHPLABSRESULTS_GEN_ALL_CORE
9.2    6.03  )-----------( 196      ( 2019 21:20 )             30.0         133<L>  |  93<L>  |  21  ----------------------------<  151<H>  4.5   |  26  |  0.91    Ca    9.5      2019 21:20    TPro  6.5  /  Alb  3.2<L>  /  TBili  0.2  /  DBili  x   /  AST  16  /  ALT  8   /  AlkPhos  67        Urinalysis Basic - ( 2019 21:20 )    Color: RED / Appearance: BLOODY / S.019 / pH: 6.0  Gluc: NEGATIVE / Ketone: NEGATIVE  / Bili: NEGATIVE / Urobili: NORMAL   Blood: LARGE / Protein: 200 / Nitrite: NEGATIVE   Leuk Esterase: TRACE / RBC: >50 / WBC 6-10   Sq Epi: x / Non Sq Epi: FEW / Bacteria: FEW      RADIOLOGY:  < from: US Kidney and Bladder (19 @ 22:30) >  IMPRESSION:     No evidence of obstructive uropathy. Lyle catheter decompresses the   bladder.    Cholelithiasis without sonographic evidence of acute cholecystitis.

## 2019-04-25 NOTE — H&P ADULT - NSHPPHYSICALEXAM_GEN_ALL_CORE
General: Awake and Alert in no acute distress    Respiratory and Thorax: clear  	  Cardiovascular: Irregular    Gastrointestinal: soft, non tender, no distention.     Genitourinary: Glans uncircumcised, +hypospadius, no meatus discharge, testes  descended, no tenderness.   Bolivar catheter was in place upon presentation.  Catheter appeared to be out further than it should without anything securing it in place, and 4cc in balloon instead of 30cc.  Dark red blood present in bolivar bag.   Attempted to replace, and irrigate, and no clot present, and cleared up with minimal irrigation, however color darkened after monitoring for an hour.  Catheter removed, and with sterile technique placed a 26F Six Eye catheter, with further irrigating of clear pink, no clots present.  Six eye catheter was removed, and 24F 3 way catheter was placed in sterile technique and CBI was started.

## 2019-04-25 NOTE — ED ADULT NURSE REASSESSMENT NOTE - NS ED NURSE REASSESS COMMENT FT1
joaquin RN: pt started on CBI by urology. CBI running on moderate pace. Pt draining clear-héctor urine. Pt tolerating well. joaquin RN: 20Fr triple lumen indwelling catheter placed by urology. pt started on CBI by urology. CBI running on moderate pace. Pt draining clear-héctor urine. Pt tolerating well. pt denies pain or any discomfort at this time.

## 2019-04-26 ENCOUNTER — TRANSCRIPTION ENCOUNTER (OUTPATIENT)
Age: 74
End: 2019-04-26

## 2019-04-26 VITALS
SYSTOLIC BLOOD PRESSURE: 116 MMHG | OXYGEN SATURATION: 100 % | TEMPERATURE: 98 F | DIASTOLIC BLOOD PRESSURE: 61 MMHG | RESPIRATION RATE: 18 BRPM | HEART RATE: 84 BPM

## 2019-04-26 LAB
ANION GAP SERPL CALC-SCNC: 13 MMO/L — SIGNIFICANT CHANGE UP (ref 7–14)
BACTERIA UR CULT: SIGNIFICANT CHANGE UP
BLD GP AB SCN SERPL QL: NEGATIVE — SIGNIFICANT CHANGE UP
BUN SERPL-MCNC: 13 MG/DL — SIGNIFICANT CHANGE UP (ref 7–23)
CALCIUM SERPL-MCNC: 8.3 MG/DL — LOW (ref 8.4–10.5)
CHLORIDE SERPL-SCNC: 96 MMOL/L — LOW (ref 98–107)
CO2 SERPL-SCNC: 22 MMOL/L — SIGNIFICANT CHANGE UP (ref 22–31)
CREAT SERPL-MCNC: 0.63 MG/DL — SIGNIFICANT CHANGE UP (ref 0.5–1.3)
GLUCOSE BLDC GLUCOMTR-MCNC: 118 MG/DL — HIGH (ref 70–99)
GLUCOSE BLDC GLUCOMTR-MCNC: 230 MG/DL — HIGH (ref 70–99)
GLUCOSE SERPL-MCNC: 107 MG/DL — HIGH (ref 70–99)
HCT VFR BLD CALC: 26.4 % — LOW (ref 39–50)
HGB BLD-MCNC: 8 G/DL — LOW (ref 13–17)
MCHC RBC-ENTMCNC: 26.5 PG — LOW (ref 27–34)
MCHC RBC-ENTMCNC: 30.3 % — LOW (ref 32–36)
MCV RBC AUTO: 87.4 FL — SIGNIFICANT CHANGE UP (ref 80–100)
NRBC # FLD: 0.02 K/UL — SIGNIFICANT CHANGE UP (ref 0–0)
PLATELET # BLD AUTO: 104 K/UL — LOW (ref 150–400)
PMV BLD: 10.5 FL — SIGNIFICANT CHANGE UP (ref 7–13)
POTASSIUM SERPL-MCNC: 4 MMOL/L — SIGNIFICANT CHANGE UP (ref 3.5–5.3)
POTASSIUM SERPL-SCNC: 4 MMOL/L — SIGNIFICANT CHANGE UP (ref 3.5–5.3)
RBC # BLD: 3.02 M/UL — LOW (ref 4.2–5.8)
RBC # FLD: 16.1 % — HIGH (ref 10.3–14.5)
RH IG SCN BLD-IMP: POSITIVE — SIGNIFICANT CHANGE UP
SODIUM SERPL-SCNC: 131 MMOL/L — LOW (ref 135–145)
SPECIMEN SOURCE: SIGNIFICANT CHANGE UP
WBC # BLD: 4.08 K/UL — SIGNIFICANT CHANGE UP (ref 3.8–10.5)
WBC # FLD AUTO: 4.08 K/UL — SIGNIFICANT CHANGE UP (ref 3.8–10.5)

## 2019-04-26 RX ORDER — SODIUM CHLORIDE 9 MG/ML
1000 INJECTION INTRAMUSCULAR; INTRAVENOUS; SUBCUTANEOUS
Qty: 0 | Refills: 0 | Status: DISCONTINUED | OUTPATIENT
Start: 2019-04-26 | End: 2019-04-26

## 2019-04-26 RX ORDER — RIVAROXABAN 15 MG-20MG
1 KIT ORAL
Qty: 0 | Refills: 0 | COMMUNITY

## 2019-04-26 RX ORDER — FERROUS SULFATE 325(65) MG
325 TABLET ORAL DAILY
Qty: 0 | Refills: 0 | Status: DISCONTINUED | OUTPATIENT
Start: 2019-04-26 | End: 2019-04-26

## 2019-04-26 RX ADMIN — SODIUM CHLORIDE 1 GRAM(S): 9 INJECTION INTRAMUSCULAR; INTRAVENOUS; SUBCUTANEOUS at 05:09

## 2019-04-26 RX ADMIN — Medication 108 GRAM(S): at 15:56

## 2019-04-26 RX ADMIN — Medication 108 GRAM(S): at 03:48

## 2019-04-26 RX ADMIN — Medication 500 MILLIGRAM(S): at 12:35

## 2019-04-26 RX ADMIN — Medication 1000 UNIT(S): at 15:46

## 2019-04-26 RX ADMIN — Medication 0.12 MILLIGRAM(S): at 05:09

## 2019-04-26 RX ADMIN — Medication 108 GRAM(S): at 09:59

## 2019-04-26 RX ADMIN — Medication 1 TABLET(S): at 12:36

## 2019-04-26 RX ADMIN — SODIUM CHLORIDE 100 MILLILITER(S): 9 INJECTION, SOLUTION INTRAVENOUS at 05:09

## 2019-04-26 RX ADMIN — SODIUM CHLORIDE 1 GRAM(S): 9 INJECTION INTRAMUSCULAR; INTRAVENOUS; SUBCUTANEOUS at 00:43

## 2019-04-26 RX ADMIN — PREGABALIN 1000 MICROGRAM(S): 225 CAPSULE ORAL at 15:55

## 2019-04-26 RX ADMIN — Medication 100 MILLIGRAM(S): at 05:09

## 2019-04-26 RX ADMIN — CLOPIDOGREL BISULFATE 75 MILLIGRAM(S): 75 TABLET, FILM COATED ORAL at 15:46

## 2019-04-26 RX ADMIN — SODIUM CHLORIDE 50 MILLILITER(S): 9 INJECTION INTRAMUSCULAR; INTRAVENOUS; SUBCUTANEOUS at 09:59

## 2019-04-26 RX ADMIN — HEPARIN SODIUM 5000 UNIT(S): 5000 INJECTION INTRAVENOUS; SUBCUTANEOUS at 05:09

## 2019-04-26 RX ADMIN — Medication 2: at 12:36

## 2019-04-26 RX ADMIN — LOSARTAN POTASSIUM 25 MILLIGRAM(S): 100 TABLET, FILM COATED ORAL at 05:09

## 2019-04-26 RX ADMIN — SODIUM CHLORIDE 1 GRAM(S): 9 INJECTION INTRAMUSCULAR; INTRAVENOUS; SUBCUTANEOUS at 15:48

## 2019-04-26 RX ADMIN — Medication 20 MILLIGRAM(S): at 05:09

## 2019-04-26 RX ADMIN — Medication 325 MILLIGRAM(S): at 12:35

## 2019-04-26 RX ADMIN — HEPARIN SODIUM 5000 UNIT(S): 5000 INJECTION INTRAVENOUS; SUBCUTANEOUS at 15:48

## 2019-04-26 NOTE — PROGRESS NOTE ADULT - SUBJECTIVE AND OBJECTIVE BOX
Overnight events:  None, did not require manual irrigation, CBI remained clear    Subjective:  Pt offers no complaints, feels well, wants to go back to rehab    Objective:    Vital signs  T(C): , Max: 36.8 (04-26-19 @ 05:02)  HR: 84 (04-26-19 @ 05:02)  BP: 136/59 (04-26-19 @ 05:02)  SpO2: 100% (04-26-19 @ 05:02)  Wt(kg): --    Output   CBI  04-25 @ 07:01  -  04-26 @ 07:00  --------------------------------------------------------  IN: 700 mL / OUT: 5000 mL / NET: -4300 mL        Gen: NAD  Abd: soft, nontender, dressing left groin c/d/i  : osmel secured    Labs                        8.0    4.08  )-----------( 104      ( 26 Apr 2019 05:48 )             26.4     26 Apr 2019 05:48    131    |  96     |  13     ----------------------------<  107    4.0     |  22     |  0.63     Ca    8.3        26 Apr 2019 05:48        Urine Cx: Pending

## 2019-04-26 NOTE — DISCHARGE NOTE PROVIDER - CARE PROVIDER_API CALL
David Wright)  Surgery; Vascular Surgery  [address], [address]  [place] [address]  Phone: [phone]  Fax: [phone]  Follow Up Time:     Pranav Villasenor)  Urology  [address], [address]  [place] [address]  Phone: [phone]  Fax: [phone]  Follow Up Time:

## 2019-04-26 NOTE — PROGRESS NOTE ADULT - SUBJECTIVE AND OBJECTIVE BOX
C Team - Vascular Surgery Consult - Daily Progress Note    SUBJECTIVE:  Doing well.   No overnight events.     OBJECTIVE:     ** VITAL SIGNS / I&O's **    T(C): 36.8 (04-26-19 @ 05:02), Max: 36.8 (04-26-19 @ 05:02)  T(F): 98.2 (04-26-19 @ 05:02), Max: 98.2 (04-26-19 @ 05:02)  HR: 84 (04-26-19 @ 05:02) (57 - 84)  BP: 136/59 (04-26-19 @ 05:02) (130/58 - 153/75)  RR: 17 (04-26-19 @ 05:02) (16 - 17)  SpO2: 100% (04-26-19 @ 05:02) (99% - 100%)      25 Apr 2019 07:01  -  26 Apr 2019 07:00  --------------------------------------------------------  IN:    lactated ringers.: 700 mL  Total IN: 700 mL    OUT:    Continuous Bladder Irrigation: 5000 mL  Total OUT: 5000 mL    Total NET: -4300 mL      ** PHYSICAL EXAM **    General: No acute distress  Respiratory: Nonlabored  Cardiovascular: normotensive, regular rate  Abdominal: Soft, nondistended, nontender. No rebound or guarding. No organomegaly, no palpable mass.  Extremities: Warm, well perfused, left groin incision with minimal surrounding erythema, small area in middle of wound where skin was dehiscent now covered with eschar, no purulence or drainage.     ** LABS **                 8.0    4.08   )----------(  104       ( 26 Apr 2019 05:48 )               26.4      131    |  96     |  13     ----------------------------<  107        ( 26 Apr 2019 05:48 )  4.0     |  22     |  0.63     Ca    8.3        ( 26 Apr 2019 05:48 )

## 2019-04-26 NOTE — DISCHARGE NOTE PROVIDER - NSDCCPCAREPLAN_GEN_ALL_CORE_FT
PRINCIPAL DISCHARGE DIAGNOSIS  Diagnosis: Hematuria  Assessment and Plan of Treatment: Do not restart Xarelto until 4/29/19  Take cipro through 4/29/19  Empty bolivar bag as needed as instructed  Follow up with Dr. Villasenor for further management and bolivar removal  Call his office if you have fever greater than 101, no urine in bag, nausea/vomiting.        SECONDARY DISCHARGE DIAGNOSES  Diagnosis: Status post femoral-popliteal bypass surgery  Assessment and Plan of Treatment: Wash area daily with soap and water and apply dry sterile dressing.  Follow up with Dr. Wright

## 2019-04-26 NOTE — PROGRESS NOTE ADULT - ASSESSMENT
Aurelio Leggett is a 74 y.o. man with history of AFib (Xarelto), CHF w/ AICD, CAD s/p PCI, RLE bypass, L iliac stent, carotid stent, and recent L femoral artery pseudoaneurysm s/p excision and GSV bypass who is currently admitted for hematuria. Vascular surgery consulted to to comment on ability to safely stop antiplatelet medication.     Plan:  - Continue Plavix if able  - If bleeding continues, may be off Plavix and may receive platelets, but should avoid cessation of Plavix if possible  - Recommend continued local wound care for left groin  - Will sign off; reconsult with questions    Chano Bryant, PGY2  [id number]

## 2019-04-26 NOTE — DISCHARGE NOTE PROVIDER - HOSPITAL COURSE
73 yo M h/o AFib, on Xarelto, AICD, CADs/p PCI, PVD, s/p revascularization, HLD, HTN, type 2 DM, GERD, with recent left femoral pseudoaneurysm, s/p surgical intervention ~2 weeks ago on Plavix; bladder dysfunction, follows with Urologist Dr. Villasenor, and performs CIC Q6h daily was d/c to rehab with bolivar, presented to ED from rehab with one day of gross hematuria where he was placed on cipro for UTI.  Bolivar catheter was in place upon presentation however catheter appeared to be out further than it should without anything securing it in place, and 4cc in balloon instead of 30cc,dark red blood present in bolivar bag. Attempted to replace, and irrigate, and no clot present, and cleared up with minimal irrigation, however color darkened after monitoring for an hour. Catheter removed, and a 26F Six Eye catheter placed, with further irrigating of clear pink, no clots present.  Six eye catheter was removed, and 24F 3 way catheter was placed and CBI was started.  Per cardiology okay to hold Xarelto for 4 days, per vasc surgery, continue on Plavix.  CBI d/c on 4/25, urine remained yellow.  Ucx negative.  Pt remained afebrile and hemodynamically stable during his hospitalization.  Pt d/c back to rehab with bolivar to leg bag to hold Xarelto until 4/28 and to continue cipro for total of 7 days and f/u with Dr. Villasenor.

## 2019-04-26 NOTE — DISCHARGE NOTE NURSING/CASE MANAGEMENT/SOCIAL WORK - NSDCPNINST_GEN_ALL_CORE
Follow up with your urologist; follow up with your PCP regarding recent hospitalization. Notify your doctor if you develop dark red urine with clots, or if there is little or no urine in the urine bag. Bolivar care as instructed.    Patient discharge in stable condition from [facility]. Pt has bolivar catheter to drainage with clear pink urine; Left groin/thigh dressing dry and intact; IAD to perineum; healed scar on coccyx.

## 2019-04-26 NOTE — PHYSICAL THERAPY INITIAL EVALUATION ADULT - RANGE OF MOTION EXAMINATION, REHAB EVAL
bilateral upper extremity ROM was WFL (within functional limits)/bilateral lower extremity ROM was WFL (within functional limits)/deficits as listed below/b/l sh/: 0-90

## 2019-04-26 NOTE — DISCHARGE NOTE NURSING/CASE MANAGEMENT/SOCIAL WORK - NSDCDPATPORTLINK_GEN_ALL_CORE
You can access the [facility] Patient Portal, offered by [facility], by registering with the following website: [email or web address]

## 2019-04-26 NOTE — DISCHARGE NOTE PROVIDER - CARE PROVIDERS DIRECT ADDRESSES
[email or web address]

## 2019-04-26 NOTE — PROGRESS NOTE ADULT - ASSESSMENT
75 yo M h/o AFib, on Xarelto, AICD, CAD s/p PCI, PVD, s/p revascularization, HLD, HTN, type 2 DM, GERD, with recent left femoral pseudoaneurysm, s/p surgical intervention ~2 weeks ago, presented with indwelling bolivar and gross hematuria requiring CBI in setting of Plavix and Xarelto, per cards ok to hold xarelto for 4 days, per Vasc sx continue plavix, CBI currently clear    -Clamp CBI, monitor color  -AM labs reviewed  -F/U Ucx  -Continue antibiotics  -Continue plavix  -Hold Xarelto through 4/28  -PT consult for return to rehab

## 2019-05-02 ENCOUNTER — EMERGENCY (EMERGENCY)
Facility: HOSPITAL | Age: 74
LOS: 1 days | Discharge: HOME | End: 2019-05-02
Attending: EMERGENCY MEDICINE | Admitting: EMERGENCY MEDICINE
Payer: MEDICARE

## 2019-05-02 VITALS
RESPIRATION RATE: 20 BRPM | OXYGEN SATURATION: 100 % | SYSTOLIC BLOOD PRESSURE: 137 MMHG | DIASTOLIC BLOOD PRESSURE: 46 MMHG | HEART RATE: 60 BPM | TEMPERATURE: 98 F

## 2019-05-02 VITALS
HEART RATE: 82 BPM | DIASTOLIC BLOOD PRESSURE: 79 MMHG | RESPIRATION RATE: 15 BRPM | OXYGEN SATURATION: 97 % | SYSTOLIC BLOOD PRESSURE: 147 MMHG | WEIGHT: 134.92 LBS | TEMPERATURE: 98 F | HEIGHT: 72 IN

## 2019-05-02 DIAGNOSIS — I65.21 OCCLUSION AND STENOSIS OF RIGHT CAROTID ARTERY: Chronic | ICD-10-CM

## 2019-05-02 DIAGNOSIS — I65.29 OCCLUSION AND STENOSIS OF UNSPECIFIED CAROTID ARTERY: Chronic | ICD-10-CM

## 2019-05-02 DIAGNOSIS — Z95.810 PRESENCE OF AUTOMATIC (IMPLANTABLE) CARDIAC DEFIBRILLATOR: Chronic | ICD-10-CM

## 2019-05-02 LAB — GLUCOSE BLDC GLUCOMTR-MCNC: 128 MG/DL — HIGH (ref 70–99)

## 2019-05-02 PROCEDURE — 99283 EMERGENCY DEPT VISIT LOW MDM: CPT | Mod: 25

## 2019-05-02 PROCEDURE — 82962 GLUCOSE BLOOD TEST: CPT

## 2019-05-02 PROCEDURE — 99283 EMERGENCY DEPT VISIT LOW MDM: CPT

## 2019-05-02 NOTE — ED PROVIDER NOTE - OBJECTIVE STATEMENT
presents with bleeding from surgical wound. Had pseudo aneurysm removed on April presents with bleeding from surgical wound. Had pseudo aneurysm removed on April 3rd (vascular surgeon Dr. Wright) from left femoral artery. Then went to [facility] on April 9th. Had sutures removed on April 23rd. presents with bleeding from surgical wound. Had pseudo aneurysm removed on April 3rd (vascular surgeon Dr. Wright) from left femoral artery. Then went to [facility] on April 9th. Had sutures removed on April 23rd. Had follow up today and Dr. Wright was concerned about infection due to mild redness so opened the area and cleaned it out. patient already on abx due to recent UTI. bandage was applied and left office around 5:00 pm. by time he reached [facility], bandage was soaked with blood. on Plavix and Xarelto. denies any pain. no dizziness or weakness presents with bleeding from surgical wound. Had pseudo aneurysm removed on April 3rd (vascular surgeon Dr. Wright) from left femoral artery. Then went to [facility] on April 9th. Had sutures removed on April 23rd. Had follow up today and Dr. Wright was concerned about infection due to mild redness so opened the area and cleaned it out. patient already on abx due to recent UTI. bandage was applied and left office around 5:00 pm. by time he reached [place], bandage was soaked with blood. on Plavix and Xarelto. denies any pain. no dizziness or weakness.  currently staying at [place]

## 2019-05-02 NOTE — ED PROVIDER NOTE - CLINICAL SUMMARY MEDICAL DECISION MAKING FREE TEXT BOX
bleeding from surgical site. on Xarelto and plavix. slight continued oozing of blood. will control bleeding and apply bulky dressing.

## 2019-05-02 NOTE — ED ADULT NURSE NOTE - OBJECTIVE STATEMENT
wife states recent surgery for pseudoaneurysm at [facility]. "Area cleaned today at 5 pm and started bleeding". Presents to ER w/ blood under dressing. wife states recent surgery for pseudoaneurysm at [facility]. Surgery done April 3, 2019. "Area cleaned today at 5 pm and started bleeding". Presents to ER w/ blood under dressing.

## 2019-05-02 NOTE — ED PROVIDER NOTE - PROGRESS NOTE DETAILS
source of oozing blood identified. injected with lido with epi. surgicel dressing applied with bulky dressing. bleeding controlled. +homeostasis.  An opportunity to ask questions was given.  Discussed the importance of prompt, close medical follow-up.  Patient will return with any changes, concerns or persistent / worsening symptoms.  Understanding of all instructions verbalized.

## 2019-05-02 NOTE — ED ADULT NURSE NOTE - CHIEF COMPLAINT QUOTE
[facility] had left femoral pseudoaneuryam repair via her left groin, bleeding started today from left thigh.

## 2019-05-02 NOTE — ED ADULT NURSE NOTE - NS ED NURSE LEVEL OF CONSCIOUSNESS ORIENTATION
Age appropriate behavior/Recognizes caregiver Oriented - self; Oriented - place; Oriented - time/Age appropriate behavior/Recognizes caregiver

## 2019-05-02 NOTE — ED ADULT TRIAGE NOTE - CHIEF COMPLAINT QUOTE
[facility] had left femoral pseudoaneuryam repair via her left groin, bleeding started today from left thigh.

## 2019-05-02 NOTE — ED ADULT NURSE NOTE - CHPI ED NUR SYMPTOMS NEG
no chills/no fever/no purulent drainage/no drainage/no redness/no blood in mucus/no pain/no rectal pain/no vomiting

## 2019-05-02 NOTE — ED PROVIDER NOTE - ATTENDING CONTRIBUTION TO CARE
Vivek Kidd MD: I have personally performed a face to face diagnostic evaluation on this patient.  I have reviewed the PA note and agree with the history, exam, and plan of care, except as noted.  History and Exam by me shows same findings as documented  Attending Note: Patient with bleeding from dehisced surgical wound. Was seen by surgeon today and dressed, but wound bleeding through bandage. Some oozing from base of wound. No infection. Agree with plan

## 2019-05-02 NOTE — ED ADULT NURSE NOTE - NSIMPLEMENTINTERV_GEN_ALL_ED
Implemented All Fall with Harm Risk Interventions:  [place] to call system. Call bell, personal items and telephone within reach. Instruct patient to call for assistance. Room bathroom lighting operational. Non-slip footwear when patient is off stretcher. Physically safe environment: no spills, clutter or unnecessary equipment. Stretcher in lowest position, wheels locked, appropriate side rails in place. Provide visual cue, wrist band, yellow gown, etc. Monitor gait and stability. Monitor for mental status changes and reorient to person, place, and time. Review medications for side effects contributing to fall risk. Reinforce activity limits and safety measures with patient and family. Provide visual clues: red socks.

## 2019-05-03 LAB
FUNGUS SPEC QL CULT: SIGNIFICANT CHANGE UP
FUNGUS SPEC QL CULT: SIGNIFICANT CHANGE UP

## 2019-05-03 NOTE — PATIENT PROFILE ADULT - INFLUENZA IMMUNIZATION DATE (APPROXIMATE)
May 3, 2019      Pierre Joel MD  [address] [place] [id number]           [facility]  [address] [place] [id number]  Phone: [phone]  Fax: [phone]          Patient: Binu Jennings   MR Number: [id number]   YOB: 1990   Date of Visit: 5/3/2019       Dear Dr. Pierre Joel:    Thank you for referring Binu Jennings to me for evaluation. Attached you will find relevant portions of my assessment and plan of care.    If you have questions, please do not hesitate to call me. I look forward to following Binu Jennings along with you.    Sincerely,    Eloise Turk, ANDER    Enclosure  CC:  No Recipients    If you would like to receive this communication electronically, please contact [email or web address] or [phone] to request more information on [employer] access.    For providers and/or their staff who would like to refer a patient to [facility], please contact us through our one-stop-shop provider referral line, [facility], at [phone].    If you feel you have received this communication in error or would no longer like to receive these types of communications, please e-mail [email or web address]         
01-Oct-2019

## 2019-05-04 ENCOUNTER — EMERGENCY (EMERGENCY)
Facility: HOSPITAL | Age: 74
LOS: 1 days | Discharge: HOME | End: 2019-05-04
Attending: EMERGENCY MEDICINE | Admitting: EMERGENCY MEDICINE
Payer: MEDICARE

## 2019-05-04 VITALS
HEART RATE: 56 BPM | TEMPERATURE: 98 F | SYSTOLIC BLOOD PRESSURE: 113 MMHG | DIASTOLIC BLOOD PRESSURE: 63 MMHG | RESPIRATION RATE: 16 BRPM

## 2019-05-04 VITALS
DIASTOLIC BLOOD PRESSURE: 40 MMHG | SYSTOLIC BLOOD PRESSURE: 146 MMHG | OXYGEN SATURATION: 100 % | HEART RATE: 52 BPM | TEMPERATURE: 98 F | RESPIRATION RATE: 16 BRPM

## 2019-05-04 DIAGNOSIS — I65.21 OCCLUSION AND STENOSIS OF RIGHT CAROTID ARTERY: Chronic | ICD-10-CM

## 2019-05-04 DIAGNOSIS — I65.29 OCCLUSION AND STENOSIS OF UNSPECIFIED CAROTID ARTERY: Chronic | ICD-10-CM

## 2019-05-04 DIAGNOSIS — Z95.810 PRESENCE OF AUTOMATIC (IMPLANTABLE) CARDIAC DEFIBRILLATOR: Chronic | ICD-10-CM

## 2019-05-04 LAB
ALBUMIN SERPL ELPH-MCNC: 3.1 G/DL — LOW (ref 3.3–5)
ALP SERPL-CCNC: 61 U/L — SIGNIFICANT CHANGE UP (ref 40–120)
ALT FLD-CCNC: 6 U/L — SIGNIFICANT CHANGE UP (ref 4–41)
ANION GAP SERPL CALC-SCNC: 11 MMO/L — SIGNIFICANT CHANGE UP (ref 7–14)
APPEARANCE UR: SIGNIFICANT CHANGE UP
APTT BLD: 41 SEC — HIGH (ref 27.5–36.3)
AST SERPL-CCNC: 19 U/L — SIGNIFICANT CHANGE UP (ref 4–40)
BASOPHILS # BLD AUTO: 0.01 K/UL — SIGNIFICANT CHANGE UP (ref 0–0.2)
BASOPHILS NFR BLD AUTO: 0.2 % — SIGNIFICANT CHANGE UP (ref 0–2)
BILIRUB SERPL-MCNC: 0.2 MG/DL — SIGNIFICANT CHANGE UP (ref 0.2–1.2)
BILIRUB UR-MCNC: NEGATIVE — SIGNIFICANT CHANGE UP
BLD GP AB SCN SERPL QL: NEGATIVE — SIGNIFICANT CHANGE UP
BLOOD UR QL VISUAL: SIGNIFICANT CHANGE UP
BUN SERPL-MCNC: 19 MG/DL — SIGNIFICANT CHANGE UP (ref 7–23)
CALCIUM SERPL-MCNC: 9.2 MG/DL — SIGNIFICANT CHANGE UP (ref 8.4–10.5)
CHLORIDE SERPL-SCNC: 95 MMOL/L — LOW (ref 98–107)
CO2 SERPL-SCNC: 24 MMOL/L — SIGNIFICANT CHANGE UP (ref 22–31)
COLOR SPEC: SIGNIFICANT CHANGE UP
CREAT SERPL-MCNC: 0.86 MG/DL — SIGNIFICANT CHANGE UP (ref 0.5–1.3)
EOSINOPHIL # BLD AUTO: 0.15 K/UL — SIGNIFICANT CHANGE UP (ref 0–0.5)
EOSINOPHIL NFR BLD AUTO: 3 % — SIGNIFICANT CHANGE UP (ref 0–6)
GLUCOSE SERPL-MCNC: 90 MG/DL — SIGNIFICANT CHANGE UP (ref 70–99)
GLUCOSE UR-MCNC: NEGATIVE — SIGNIFICANT CHANGE UP
HCT VFR BLD CALC: 25 % — LOW (ref 39–50)
HGB BLD-MCNC: 7.7 G/DL — LOW (ref 13–17)
IMM GRANULOCYTES NFR BLD AUTO: 2.8 % — HIGH (ref 0–1.5)
INR BLD: 1.72 — HIGH (ref 0.88–1.17)
KETONES UR-MCNC: NEGATIVE — SIGNIFICANT CHANGE UP
LEUKOCYTE ESTERASE UR-ACNC: NEGATIVE — SIGNIFICANT CHANGE UP
LYMPHOCYTES # BLD AUTO: 0.94 K/UL — LOW (ref 1–3.3)
LYMPHOCYTES # BLD AUTO: 18.9 % — SIGNIFICANT CHANGE UP (ref 13–44)
MCHC RBC-ENTMCNC: 26.8 PG — LOW (ref 27–34)
MCHC RBC-ENTMCNC: 30.8 % — LOW (ref 32–36)
MCV RBC AUTO: 87.1 FL — SIGNIFICANT CHANGE UP (ref 80–100)
MONOCYTES # BLD AUTO: 1.68 K/UL — HIGH (ref 0–0.9)
MONOCYTES NFR BLD AUTO: 33.7 % — HIGH (ref 2–14)
NEUTROPHILS # BLD AUTO: 2.06 K/UL — SIGNIFICANT CHANGE UP (ref 1.8–7.4)
NEUTROPHILS NFR BLD AUTO: 41.4 % — LOW (ref 43–77)
NITRITE UR-MCNC: NEGATIVE — SIGNIFICANT CHANGE UP
NRBC # FLD: 0 K/UL — SIGNIFICANT CHANGE UP (ref 0–0)
PH UR: 6.5 — SIGNIFICANT CHANGE UP (ref 5–8)
PLATELET # BLD AUTO: 164 K/UL — SIGNIFICANT CHANGE UP (ref 150–400)
PMV BLD: 10.4 FL — SIGNIFICANT CHANGE UP (ref 7–13)
POTASSIUM SERPL-MCNC: 4.6 MMOL/L — SIGNIFICANT CHANGE UP (ref 3.5–5.3)
POTASSIUM SERPL-SCNC: 4.6 MMOL/L — SIGNIFICANT CHANGE UP (ref 3.5–5.3)
PROT SERPL-MCNC: 6 G/DL — SIGNIFICANT CHANGE UP (ref 6–8.3)
PROT UR-MCNC: 100 — HIGH
PROTHROM AB SERPL-ACNC: 19.9 SEC — HIGH (ref 9.8–13.1)
RBC # BLD: 2.87 M/UL — LOW (ref 4.2–5.8)
RBC # FLD: 16.5 % — HIGH (ref 10.3–14.5)
RBC CASTS # UR COMP ASSIST: > 50 — SIGNIFICANT CHANGE UP (ref 0–?)
RH IG SCN BLD-IMP: POSITIVE — SIGNIFICANT CHANGE UP
SODIUM SERPL-SCNC: 130 MMOL/L — LOW (ref 135–145)
SP GR SPEC: 1.02 — SIGNIFICANT CHANGE UP (ref 1–1.04)
UROBILINOGEN FLD QL: 0.2 — SIGNIFICANT CHANGE UP
WBC # BLD: 4.98 K/UL — SIGNIFICANT CHANGE UP (ref 3.8–10.5)
WBC # FLD AUTO: 4.98 K/UL — SIGNIFICANT CHANGE UP (ref 3.8–10.5)
WBC UR QL: SIGNIFICANT CHANGE UP (ref 0–?)

## 2019-05-04 PROCEDURE — 74178 CT ABD&PLV WO CNTR FLWD CNTR: CPT | Mod: 26

## 2019-05-04 PROCEDURE — 99284 EMERGENCY DEPT VISIT MOD MDM: CPT | Mod: GC

## 2019-05-04 NOTE — ED PROVIDER NOTE - SKIN AREA #1
upper/surgical incision appears dry, intact, small amount of yellow discharge over wound and on bandage

## 2019-05-04 NOTE — ED ADULT NURSE NOTE - CHIEF COMPLAINT QUOTE
BIBEMS from [facility] for gross hematuria starting today. Lyle catheter in place, flushed twice prior to arrival. On plavix and xarelto. Seen here recently for same thing. Denies any pain, cp, sob, dizziness.

## 2019-05-04 NOTE — ED PROVIDER NOTE - CARE PROVIDERS DIRECT ADDRESSES
[email or web address]

## 2019-05-04 NOTE — ED PROVIDER NOTE - NSFOLLOWUPINSTRUCTIONS_ED_ALL_ED_FT
Please follow up with Dr. Villasenor or Dr. Guthrie. Return to ED if urine no longer draining from bolivar, increased pain, other concerning symptoms.

## 2019-05-04 NOTE — ED ADULT TRIAGE NOTE - CHIEF COMPLAINT QUOTE
BIBEMS from [facility] for gross hematuria starting today. Lyle catheter in place, flushed twice prior to arrival. On plavix and xarelto. Seen here recently for same thing. Denies any pain, cp, sob, dizziness.

## 2019-05-04 NOTE — ED ADULT NURSE NOTE - OBJECTIVE STATEMENT
75 y/o male presents to ED with c/o hematuria.  Pt states that the hematuria started spontaneously.  Pt with chronic indwelling bolivar cat.  Pt states that cath was replaced by Urology yesterday without difficulty.  Pt denies any pulling or trauma to the cath.  Pt denies fever/chills, no abd pain, no n/v/d.  Pt offers no somatic complaints states that he feels well. Pt with gross hematuria in bolivar, no clots seen.  no bleeding at urinary meatus.  IV established, labs drawn and sent, providers evaluating

## 2019-05-04 NOTE — CONSULT NOTE ADULT - ASSESSMENT
74M hx AFib on Xarelto, AICD, CAD s/p PCI, PVD s/p revascularization, HLD, HTN, type 2 DM, GERD, bladder dysfunction presenting for hematuria.     22 Fr 3 way inserted. Urine irrigated to clear after a few flushes. No clots presents.     Paraphimosis reduced.    - Please obtain CT urogram to initiate hematuria workup  - Urine culture  - Patient will need outpatient cystoscopy with Dr. Villasenor or Dr. Padilla

## 2019-05-04 NOTE — ED ADULT NURSE REASSESSMENT NOTE - NS ED NURSE REASSESS COMMENT FT1
received report from elvin ELIZABETH. Pt is a/o x 3. no complaints of chest pain, headache, nausea, diziness, vomiting, SOB, fever, chills verbilized. Pt has 18g iv placed to left hand with no redness or swelling noted. Awaiting ct results. pt noted to have 600cc of dark red urine from bolivar. Will continue to monitor.

## 2019-05-04 NOTE — ED PROVIDER NOTE - ATTENDING CONTRIBUTION TO CARE
Pt was seen and evaluated by me. Pt is a 73 y/o male with PMHx of AFib, on Xarelto, AICD, CAD s/p PCI, PVD, HLD, HTN, DM, and GERD sent from NH for hematuria. Pt states he was recently here for hematuria and Urology flushed him and was sent back. Pt was seen by Dr. Francisco there and sent in for cystoscopy. Pt denies any pain. Pt denies any fever, chills, nausea, vomiting, SOB, chest pain, or abd pain. Pt also had recent pseudoaneurysm repair with healing wound to left thigh. Lungs CTA b/l. RRR. Abd soft, non-tender. Noted to have Lyle in with blood, no clots. Pt has open wound to left thigh. No surrounding erythema or current discharge. + distal pulses.

## 2019-05-04 NOTE — ED PROVIDER NOTE - OBJECTIVE STATEMENT
74M h/o AFib, on Xarelto, AICD, CADs/p PCI, PVD, s/p revascularization, HLD, HTN, type 2 DM, GERD, sent from NH for hematuria. Pt notes painless hematuria starting earlier this morning, had normal urine when bag was emptied around 6am, followed by dark red urine in bag since. Has had similar symptoms 2 weeks ago, admitted at that time and received CBI from urology. Catheter last changed 3 days ago by urologist at [facility].    Urologist Dr. Villasenor

## 2019-05-04 NOTE — ED PROVIDER NOTE - CLINICAL SUMMARY MEDICAL DECISION MAKING FREE TEXT BOX
74M w/ above history p/w painless hematuria x1 day, similar symptoms 2 weeks ago leading to admission for irrigation, concerning for medication adverse effect  -labs, u/a, ucx, uro c/s

## 2019-05-04 NOTE — CONSULT NOTE ADULT - SUBJECTIVE AND OBJECTIVE BOX
HPI  Mr Leggett is a 74 year old male with hx of AFib on Xarelto, AICD, CAD s/p PCI, PVD s/p revascularization, HLD, HTN, type 2 DM, GERD nad bladder dysfunction who presents to the hospital ED with hematuria. Patient was recently admitted for this same issue. At that time, a bolivar catheter was placed and patient was irrigated to clear. Patient was started on CBI and xarelto was held. He staid in the hospital for a few days and was later discharged once clear. At rehab, patient was found to have hematuria and was sent in to the hospital for evaluation. Patient also notes swelling at the penis.     He denies fevers, chills, flank pain.      PAST MEDICAL & SURGICAL HISTORY:  AICD (automatic cardioverter/defibrillator) present  PVD (peripheral vascular disease): s/p angioplasty with stent ; pt states Left Carotid Artery occluded  Atrial fibrillation: since   Hyperlipidemia  HTN (hypertension)  Lung cancer: small cell, stage 4, , radiation &amp; chemo- on remission  PAD (peripheral artery disease)  GERD (gastroesophageal reflux disease)  Carotid artery occlusion: left  Neuropathy  Type 2 diabetes mellitus: glucose this am 116  Carotid artery occlusion: right with pseudoaneurysm s/p stent and mesh 2017  AICD (automatic cardioverter/defibrillator) present: 10/21/14  Carotid stenosis, right: Right CEA   S/P femoral-popliteal bypass surgery: x 2, , right s/p Revision  S/P angioplasty with stent: left leg  S/P cervical spinal fusion: 2002, 1 plate &amp; 4 rods      FAMILY HISTORY:  No pertinent family history in first degree relatives      Allergies  No Known Allergies      REVIEW OF SYSTEMS:   Otherwise negative as stated in HPI    Physical Exam  Vital signs  T(C): 36.5 (19 @ 14:32), Max: 36.5 (19 @ 14:32)  HR: 57 (19 @ 16:04)  BP: 119/48 (19 @ 16:04)  SpO2: 100% (19 @ 16:04)  Wt(kg): --    Output    Gen:  NAD    Pulm:  No respiratory distress  	  CV:  RRR    GI:  S/ND/NT    :  Clear red urine with no clots  Paraphimosis      LABS:   @ 15:35    WBC 4.98  / Hct 25.0  / SCr 0.86     05-04    130<L>  |  95<L>  |  19  ----------------------------<  90  4.6   |  24  |  0.86    Ca    9.2      04 May 2019 15:35    TPro  6.0  /  Alb  3.1<L>  /  TBili  0.2  /  DBili  x   /  AST  19  /  ALT  6   /  AlkPhos  61  05-04    PT/INR - ( 04 May 2019 15:35 )   PT: 19.9 SEC;   INR: 1.72          PTT - ( 04 May 2019 15:35 )  PTT:41.0 SEC  Urinalysis Basic - ( 04 May 2019 15:40 )    Color: RED / Appearance: BLOODY / S.025 / pH: 6.5  Gluc: NEGATIVE / Ketone: NEGATIVE  / Bili: NEGATIVE / Urobili: 0.2   Blood: LARGE / Protein: 100 / Nitrite: NEGATIVE   Leuk Esterase: NEGATIVE / RBC: > 50 / WBC 2-4   Sq Epi: x / Non Sq Epi: x / Bacteria: x

## 2019-05-04 NOTE — ED PROVIDER NOTE - CARE PROVIDER_API CALL
Pranav Villasenor)  Urology  [address], [address]  [place] [address]  Phone: [phone]  Fax: [phone]  Follow Up Time:     Raymond Padilla)  Urology  [address] [place] [address] [address]  [place] [address]  Phone: [phone]  Fax: [phone]  Follow Up Time:

## 2019-05-04 NOTE — PROVIDER CONTACT NOTE (OTHER) - ASSESSMENT
Medical team referred this case as pt needs a ride to his  facility. Case was discussed with the medical team informed that pt needs ambulance due to .  Non Emergent ambulance form fill by MD and has been attached to the pt's envelope for EMS.  Medical team informed nursing home is aware of pts return back. Writer called [facility] [phone]  spoke with Mr. Zacarias to set up ambulance service and pt will be picked up around 11:30 pm by [facility]- Trip # [id number].[facility] Address: [address] [place] [address] Phone: [phone].

## 2019-05-06 LAB
BACTERIA UR CULT: SIGNIFICANT CHANGE UP
SPECIMEN SOURCE: SIGNIFICANT CHANGE UP

## 2019-05-15 LAB
ACID FAST STN SPEC: SIGNIFICANT CHANGE UP
ACID FAST STN SPEC: SIGNIFICANT CHANGE UP

## 2019-07-15 NOTE — PROVIDER CONTACT NOTE (MEDICATION) - REASON
Render Note In Bullet Format When Appropriate: No refusing Magnesium Number Of Freeze-Thaw Cycles: 1 freeze-thaw cycle Detail Level: Detailed Consent: The patient's consent was obtained including but not limited to risks of crusting, scabbing, blistering, scarring, darker or lighter pigmentary change, recurrence, incomplete removal and infection. Post-Care Instructions: I reviewed with the patient in detail post-care instructions. Patient is to wear sunprotection, and avoid picking at any of the treated lesions. Pt may apply Vaseline to crusted or scabbing areas. Duration Of Freeze Thaw-Cycle (Seconds): 0

## 2019-10-27 NOTE — ED PROVIDER NOTE - CROS ED SKIN ALL NEG
Valentín GARCIAS: Received sign out from Dr. Fortune. Pt sleeping, no distress,, responsive to tactile stimuli. Pt is awake, A&O x 3, normal gait, eating food. Denies suicidal ideation, refused detox, denies being un-domiciled, refused  consult.   Pt is well appearing walking with normal gait, stable for discharge and follow up with medical doctor. Pt educated on care and need for follow up. Discussed anticipatory guidance and return precautions. Questions answered. I had a detailed discussion with the patient and/or guardian regarding the historical points, exam findings, and any diagnostic results supporting the discharge diagnosis. negative...

## 2019-11-11 ENCOUNTER — INPATIENT (INPATIENT)
Facility: HOSPITAL | Age: 74
LOS: 9 days | Discharge: HOSPICE | End: 2019-11-21
Attending: STUDENT IN AN ORGANIZED HEALTH CARE EDUCATION/TRAINING PROGRAM | Admitting: STUDENT IN AN ORGANIZED HEALTH CARE EDUCATION/TRAINING PROGRAM
Payer: MEDICARE

## 2019-11-11 VITALS
RESPIRATION RATE: 16 BRPM | OXYGEN SATURATION: 91 % | HEART RATE: 76 BPM | DIASTOLIC BLOOD PRESSURE: 68 MMHG | SYSTOLIC BLOOD PRESSURE: 143 MMHG | TEMPERATURE: 98 F

## 2019-11-11 DIAGNOSIS — I65.21 OCCLUSION AND STENOSIS OF RIGHT CAROTID ARTERY: Chronic | ICD-10-CM

## 2019-11-11 DIAGNOSIS — I96 GANGRENE, NOT ELSEWHERE CLASSIFIED: ICD-10-CM

## 2019-11-11 DIAGNOSIS — Z95.810 PRESENCE OF AUTOMATIC (IMPLANTABLE) CARDIAC DEFIBRILLATOR: Chronic | ICD-10-CM

## 2019-11-11 DIAGNOSIS — I65.29 OCCLUSION AND STENOSIS OF UNSPECIFIED CAROTID ARTERY: Chronic | ICD-10-CM

## 2019-11-11 LAB
ALBUMIN SERPL ELPH-MCNC: 3.1 G/DL — LOW (ref 3.3–5)
ALP SERPL-CCNC: 81 U/L — SIGNIFICANT CHANGE UP (ref 40–120)
ALT FLD-CCNC: 23 U/L — SIGNIFICANT CHANGE UP (ref 4–41)
ANION GAP SERPL CALC-SCNC: 15 MMO/L — HIGH (ref 7–14)
APTT BLD: 45.3 SEC — HIGH (ref 27.5–36.3)
AST SERPL-CCNC: 33 U/L — SIGNIFICANT CHANGE UP (ref 4–40)
BASE EXCESS BLDV CALC-SCNC: -0.8 MMOL/L — SIGNIFICANT CHANGE UP
BILIRUB SERPL-MCNC: < 0.2 MG/DL — LOW (ref 0.2–1.2)
BLOOD GAS VENOUS - CREATININE: 0.79 MG/DL — SIGNIFICANT CHANGE UP (ref 0.5–1.3)
BLOOD GAS VENOUS - FIO2: 0 — SIGNIFICANT CHANGE UP
BUN SERPL-MCNC: 28 MG/DL — HIGH (ref 7–23)
CALCIUM SERPL-MCNC: 9.4 MG/DL — SIGNIFICANT CHANGE UP (ref 8.4–10.5)
CHLORIDE BLDV-SCNC: 88 MMOL/L — LOW (ref 96–108)
CHLORIDE SERPL-SCNC: 86 MMOL/L — LOW (ref 98–107)
CO2 SERPL-SCNC: 21 MMOL/L — LOW (ref 22–31)
CREAT SERPL-MCNC: 0.78 MG/DL — SIGNIFICANT CHANGE UP (ref 0.5–1.3)
CRP SERPL-MCNC: 85.3 MG/L — HIGH
ERYTHROCYTE [SEDIMENTATION RATE] IN BLOOD: 77 MM/HR — HIGH (ref 1–15)
GAS PNL BLDV: 121 MMOL/L — LOW (ref 136–146)
GLUCOSE BLDV-MCNC: 198 MG/DL — HIGH (ref 70–99)
GLUCOSE SERPL-MCNC: 208 MG/DL — HIGH (ref 70–99)
HCO3 BLDV-SCNC: 22 MMOL/L — SIGNIFICANT CHANGE UP (ref 20–27)
HCT VFR BLD CALC: 26.9 % — LOW (ref 39–50)
HCT VFR BLDV CALC: 27.6 % — LOW (ref 39–51)
HGB BLD-MCNC: 8.5 G/DL — LOW (ref 13–17)
HGB BLDV-MCNC: 8.9 G/DL — LOW (ref 13–17)
INR BLD: 2.33 — HIGH (ref 0.88–1.17)
LACTATE BLDV-MCNC: 3.6 MMOL/L — HIGH (ref 0.5–2)
MCHC RBC-ENTMCNC: 25.1 PG — LOW (ref 27–34)
MCHC RBC-ENTMCNC: 31.6 % — LOW (ref 32–36)
MCV RBC AUTO: 79.4 FL — LOW (ref 80–100)
NRBC # FLD: 0 K/UL — SIGNIFICANT CHANGE UP (ref 0–0)
PCO2 BLDV: 51 MMHG — SIGNIFICANT CHANGE UP (ref 41–51)
PH BLDV: 7.31 PH — LOW (ref 7.32–7.43)
PLATELET # BLD AUTO: 262 K/UL — SIGNIFICANT CHANGE UP (ref 150–400)
PMV BLD: 11 FL — SIGNIFICANT CHANGE UP (ref 7–13)
PO2 BLDV: < 24 MMHG — LOW (ref 35–40)
POTASSIUM BLDV-SCNC: 4.4 MMOL/L — SIGNIFICANT CHANGE UP (ref 3.4–4.5)
POTASSIUM SERPL-MCNC: 4.6 MMOL/L — SIGNIFICANT CHANGE UP (ref 3.5–5.3)
POTASSIUM SERPL-SCNC: 4.6 MMOL/L — SIGNIFICANT CHANGE UP (ref 3.5–5.3)
PROT SERPL-MCNC: 7.1 G/DL — SIGNIFICANT CHANGE UP (ref 6–8.3)
PROTHROM AB SERPL-ACNC: 26.6 SEC — HIGH (ref 9.8–13.1)
RBC # BLD: 3.39 M/UL — LOW (ref 4.2–5.8)
RBC # FLD: 14.8 % — HIGH (ref 10.3–14.5)
SAO2 % BLDV: 24.4 % — LOW (ref 60–85)
SODIUM SERPL-SCNC: 122 MMOL/L — LOW (ref 135–145)
WBC # BLD: 11.27 K/UL — HIGH (ref 3.8–10.5)
WBC # FLD AUTO: 11.27 K/UL — HIGH (ref 3.8–10.5)

## 2019-11-11 PROCEDURE — 73630 X-RAY EXAM OF FOOT: CPT | Mod: 26,LT

## 2019-11-11 PROCEDURE — 73590 X-RAY EXAM OF LOWER LEG: CPT | Mod: 26,LT

## 2019-11-11 PROCEDURE — 73620 X-RAY EXAM OF FOOT: CPT | Mod: 26,LT

## 2019-11-11 PROCEDURE — 99223 1ST HOSP IP/OBS HIGH 75: CPT

## 2019-11-11 RX ORDER — CEFEPIME 1 G/1
1000 INJECTION, POWDER, FOR SOLUTION INTRAMUSCULAR; INTRAVENOUS ONCE
Refills: 0 | Status: COMPLETED | OUTPATIENT
Start: 2019-11-11 | End: 2019-11-11

## 2019-11-11 RX ORDER — INFLUENZA VIRUS VACCINE 15; 15; 15; 15 UG/.5ML; UG/.5ML; UG/.5ML; UG/.5ML
0.5 SUSPENSION INTRAMUSCULAR ONCE
Refills: 0 | Status: COMPLETED | OUTPATIENT
Start: 2019-11-11 | End: 2019-11-21

## 2019-11-11 RX ORDER — VANCOMYCIN HCL 1 G
1000 VIAL (EA) INTRAVENOUS ONCE
Refills: 0 | Status: COMPLETED | OUTPATIENT
Start: 2019-11-11 | End: 2019-11-11

## 2019-11-11 RX ADMIN — CEFEPIME 100 MILLIGRAM(S): 1 INJECTION, POWDER, FOR SOLUTION INTRAMUSCULAR; INTRAVENOUS at 21:30

## 2019-11-11 RX ADMIN — Medication 250 MILLIGRAM(S): at 19:37

## 2019-11-11 RX ADMIN — Medication 1000 MILLIGRAM(S): at 21:29

## 2019-11-11 NOTE — ED PROVIDER NOTE - PROGRESS NOTE DETAILS
D/w podiatry and vascular, consults placed, both services to come see patient. RAMSES Bradshaw, EM PGY3

## 2019-11-11 NOTE — ED PROVIDER NOTE - OBJECTIVE STATEMENT
David GARRETT:   5 weeks ago, infection started  home visit doctor, podiatrist, following 5 weeks. No antibiotics until this past Friday when saw urgent care Dr. Teresa?. today saw Dr. Alex Leos (podiatrist) who stated that left foot has gangrene. Infection, on big toe, heel, 5th toe. No fevers. Patient has not been walking and developed a bed sore.   xarelto and plavix (carotid pseudoaneurysm) David GARRETT: Patient is a 73 yo M with history of DM, HTN, hyperlipidemia, afib on xarelto and plavix (carotid pseudoaneurysm) PAD, PVD, here for left foot gangrene. Per family and patient, infection started 5 weeks ago and was being monitored by home visit doctor, podiatrist, Dr. Mcpherson. Patient was on Mupirocin for the 5th toe at that time. This past Friday when saw urgent care Dr. Matthews who prescribed Bactrim. Today saw Dr. Alex Leos (podiatrist) who stated that left foot has gangrene. Patient sent in for IV antibiotics and admission. Patient has Infection on left big toe, heel, 5th toe. No fevers. Patient has not been walking and developed a bed sore and stiffness.

## 2019-11-11 NOTE — CONSULT NOTE ADULT - SUBJECTIVE AND OBJECTIVE BOX
Patient is a 74y old  Male who presents with a chief complaint of left foot gangrene    HPI: Patient is a 75 yo M with history of DM, HTN, hyperlipidemia, afib on xarelto and plavix (carotid pseudoaneurysm) PAD, PVD, here for left foot gangrene. Per family and patient, infection started 5 weeks ago and was being monitored by home visit doctor, podiatrist, Dr. Mcpherson. Patient was on Mupirocin for the 5th toe at that time. This past Friday when saw urgent care Dr. Matthews who prescribed Bactrim. Today saw Dr. Alex Leos (podiatrist) who stated that left foot has gangrene. Patient sent in for IV antibiotics and admission. Patient has Infection on left big toe, heel, 5th toe. No fevers. Patient has not been walking and developed a bed sore and stiffness.      PAST MEDICAL & SURGICAL HISTORY:  AICD (automatic cardioverter/defibrillator) present  PVD (peripheral vascular disease): s/p angioplasty with stent ; pt states Left Carotid Artery occluded  Atrial fibrillation: since 2013  Hyperlipidemia  HTN (hypertension)  Lung cancer: small cell, stage 4, 1997, radiation &amp; chemo- on remission  PAD (peripheral artery disease)  GERD (gastroesophageal reflux disease)  Carotid artery occlusion: left  Neuropathy  Type 2 diabetes mellitus: glucose this am 116  Carotid artery occlusion: right with pseudoaneurysm s/p stent and mesh 4/2017  AICD (automatic cardioverter/defibrillator) present: 10/21/14  Carotid stenosis, right: Right CEA 2013  S/P femoral-popliteal bypass surgery: x 2, 2013, right s/p Revision  S/P angioplasty with stent: left leg  S/P cervical spinal fusion: 2002, 1 plate &amp; 4 rods      MEDICATIONS  (STANDING):    MEDICATIONS  (PRN):      Allergies    No Known Allergies    Intolerances        VITALS:    Vital Signs Last 24 Hrs  T(C): 36.7 (11 Nov 2019 16:00), Max: 36.7 (11 Nov 2019 16:00)  T(F): 98.1 (11 Nov 2019 16:00), Max: 98.1 (11 Nov 2019 16:00)  HR: 75 (11 Nov 2019 18:43) (74 - 76)  BP: 128/62 (11 Nov 2019 18:43) (128/62 - 143/68)  BP(mean): --  RR: 18 (11 Nov 2019 18:43) (16 - 18)  SpO2: 98% (11 Nov 2019 18:43) (91% - 99%)    LABS:                          8.5    11.27 )-----------( 262      ( 11 Nov 2019 19:20 )             26.9       11-11    122<L>  |  86<L>  |  28<H>  ----------------------------<  208<H>  4.6   |  21<L>  |  0.78    Ca    9.4      11 Nov 2019 19:20    TPro  7.1  /  Alb  3.1<L>  /  TBili  < 0.2<L>  /  DBili  x   /  AST  33  /  ALT  23  /  AlkPhos  81  11-11      CAPILLARY BLOOD GLUCOSE          PT/INR - ( 11 Nov 2019 19:20 )   PT: 26.6 SEC;   INR: 2.33          PTT - ( 11 Nov 2019 19:20 )  PTT:45.3 SEC    LOWER EXTREMITY PHYSICAL EXAM:    Vascular: DP/PT 0/4, B/L, CFT <3 seconds B/L, Temperature gradient cool to cool, B/L.   Neuro: Epicritic sensation absent to level of ankles B/L.  Musculoskeletal/Ortho: left lower extremity flexion contraction of knee w/ increased pressure on plantar heel  Skin: Left foot plantar heel deep tissue injury w/ ecchymosis & no open lesions    Wound #1:  Left foot distal hallux eschar, 2.5x2.0cm, depth to subq, wound bed necrotic, no active drainage, no malodor, periwound erythematous to interphalangeal joint; etiology 2/ 2 medial hallucal nail border partial nail avulsion & PVD    Wound #2: Left foot lateral 4th digit ulcer, 1.0x1.0cm, depth to subq, wound bed fibrotic, no active drainage, no malodor, periwound stable w/ erythema to dorsal distal forefoot & ascending lymphangitis to level of midfoot, eitology 2/2 chronic pressure from hypertrophic 4th digit proximal phalanx base abutting against 5th digit proximal phalanx head & PVD      RADIOLOGY & ADDITIONAL STUDIES:

## 2019-11-11 NOTE — ED ADULT TRIAGE NOTE - CHIEF COMPLAINT QUOTE
pt is wheelchair bound, sent over by physician for gangrene to left foot. pt also c/o generalized pain. no cp, no fevers pmhx: pvd

## 2019-11-11 NOTE — ED ADULT NURSE NOTE - OBJECTIVE STATEMENT
p/t received awake and responsive, c/o of pain and discoloration to lt foot/toes, sent by PMD for possible gangrene  work up, bilateral breath sounds clear and equal, abd soft to touch with bs present, p/t appears very stiff, bloods drawn and sent to lab, no further c/o noted will continue to monitor

## 2019-11-11 NOTE — CONSULT NOTE ADULT - ASSESSMENT
74M w/ PMHx of DM, PVD, HTN, HLD w/ c/c of left foot gangrene  - Pt seen and evaluated in ED  - Vitals stable, WBC 11.27, ESR & CRP pending  -  Left foot distal hallux eschar, 2.5x2.0cm, depth to subq, wound bed necrotic, no active drainage, no malodor, periwound erythematous to interphalangeal joint; etiology 2/ 2 medial hallucal nail border partial nail avulsion & PVD  - Left foot distal hallux eschar, 2.5x2.0cm, depth to subq, wound bed necrotic, no active drainage, no malodor, periwound erythematous to interphalangeal joint; etiology 2/ 2 medial hallucal nail border partial nail avulsion & PVD  - Left foot x-ray results reviewed w/ final read pending; resident read demonstrating radiolucency of hallucal distal phalanx w/ concern for OM  - Wound culture obtained from 4th digit wound  - Applied betadine and dressed w/ DSD  - Ordered FAB/PVR of LLE to assess vascular status of patient  - Pod rec vascular consult to evaluated for intervention  - Pod rec empiric antibiotics of Vancomycin & Cefepime  - Podiatry will continue to monitor  - d/w attending

## 2019-11-11 NOTE — ED PROVIDER NOTE - PHYSICAL EXAMINATION
Gen: cachetic, minimally conversational  Eyes: PERRLA, EOMI   HENT: Normocephalic, atraumatic. External ears normal, no rhinorrhea, dry mucous membranes.   CV: RRR, radial pulses 2+ justine  Resp: CTAB, speaking without difficulty on room air  Abd: soft, non tender, non rigid, no guarding or rebound tenderness  Back: No CVAT bilaterally, no midline ttp  Skin: dry, wwp, scattered ecchymosis upper extremities. Left great toe with distal portion of black dry skin and proximal lymphangitis streaking of the dorsal surface of the foot, heel ecchymosis, fourth/fifth toe webspace with shallow ulceration   Neuro: Alert, oriented to hospital, speech is fluent and appropriate  Psych: Mood "ok", affect euthymic

## 2019-11-11 NOTE — ED PROVIDER NOTE - CLINICAL SUMMARY MEDICAL DECISION MAKING FREE TEXT BOX
74M hx of cardiac issues including a-fib, aicd, also PAD/PVD, presenting with gangrene of left great toe and cellulitis / lymphangitic streaking of the foot, will provide iv abx, podiatry / vascular consultation, follow up studies, reassess, dispo. 74M hx of cardiac issues including a-fib, aicd, also PAD/PVD, presenting with gangrene of left great toe and cellulitis / lymphangitic streaking of the foot, will provide iv abx, podiatry / vascular consultation, follow up studies, reassess, dispo. Patient seen by both podiatry and vascular, recommendation for IV Abx and admission to medicine. They will continue to follow.

## 2019-11-12 DIAGNOSIS — I96 GANGRENE, NOT ELSEWHERE CLASSIFIED: ICD-10-CM

## 2019-11-12 DIAGNOSIS — E87.1 HYPO-OSMOLALITY AND HYPONATREMIA: ICD-10-CM

## 2019-11-12 DIAGNOSIS — Z29.9 ENCOUNTER FOR PROPHYLACTIC MEASURES, UNSPECIFIED: ICD-10-CM

## 2019-11-12 DIAGNOSIS — I48.91 UNSPECIFIED ATRIAL FIBRILLATION: ICD-10-CM

## 2019-11-12 DIAGNOSIS — D64.9 ANEMIA, UNSPECIFIED: ICD-10-CM

## 2019-11-12 DIAGNOSIS — D68.9 COAGULATION DEFECT, UNSPECIFIED: ICD-10-CM

## 2019-11-12 DIAGNOSIS — E11.9 TYPE 2 DIABETES MELLITUS WITHOUT COMPLICATIONS: ICD-10-CM

## 2019-11-12 DIAGNOSIS — K21.9 GASTRO-ESOPHAGEAL REFLUX DISEASE WITHOUT ESOPHAGITIS: ICD-10-CM

## 2019-11-12 DIAGNOSIS — R33.9 RETENTION OF URINE, UNSPECIFIED: ICD-10-CM

## 2019-11-12 DIAGNOSIS — Z79.899 OTHER LONG TERM (CURRENT) DRUG THERAPY: ICD-10-CM

## 2019-11-12 DIAGNOSIS — I73.9 PERIPHERAL VASCULAR DISEASE, UNSPECIFIED: ICD-10-CM

## 2019-11-12 DIAGNOSIS — I10 ESSENTIAL (PRIMARY) HYPERTENSION: ICD-10-CM

## 2019-11-12 LAB
ALBUMIN SERPL ELPH-MCNC: 2.9 G/DL — LOW (ref 3.3–5)
ALP SERPL-CCNC: 63 U/L — SIGNIFICANT CHANGE UP (ref 40–120)
ALT FLD-CCNC: 21 U/L — SIGNIFICANT CHANGE UP (ref 4–41)
ANION GAP SERPL CALC-SCNC: 11 MMO/L — SIGNIFICANT CHANGE UP (ref 7–14)
ANION GAP SERPL CALC-SCNC: 11 MMO/L — SIGNIFICANT CHANGE UP (ref 7–14)
ANISOCYTOSIS BLD QL: SIGNIFICANT CHANGE UP
APTT BLD: 33.5 SEC — SIGNIFICANT CHANGE UP (ref 27.5–36.3)
AST SERPL-CCNC: 28 U/L — SIGNIFICANT CHANGE UP (ref 4–40)
BASOPHILS # BLD AUTO: 0.01 K/UL — SIGNIFICANT CHANGE UP (ref 0–0.2)
BASOPHILS NFR BLD AUTO: 0.1 % — SIGNIFICANT CHANGE UP (ref 0–2)
BASOPHILS NFR SPEC: 0 % — SIGNIFICANT CHANGE UP (ref 0–2)
BILIRUB SERPL-MCNC: < 0.2 MG/DL — LOW (ref 0.2–1.2)
BLASTS # FLD: 0 % — SIGNIFICANT CHANGE UP (ref 0–0)
BLD GP AB SCN SERPL QL: NEGATIVE — SIGNIFICANT CHANGE UP
BUN SERPL-MCNC: 20 MG/DL — SIGNIFICANT CHANGE UP (ref 7–23)
BUN SERPL-MCNC: 22 MG/DL — SIGNIFICANT CHANGE UP (ref 7–23)
BURR CELLS BLD QL SMEAR: PRESENT — SIGNIFICANT CHANGE UP
CALCIUM SERPL-MCNC: 7.9 MG/DL — LOW (ref 8.4–10.5)
CALCIUM SERPL-MCNC: 8.8 MG/DL — SIGNIFICANT CHANGE UP (ref 8.4–10.5)
CHLORIDE SERPL-SCNC: 86 MMOL/L — LOW (ref 98–107)
CHLORIDE SERPL-SCNC: 92 MMOL/L — LOW (ref 98–107)
CO2 SERPL-SCNC: 20 MMOL/L — LOW (ref 22–31)
CO2 SERPL-SCNC: 22 MMOL/L — SIGNIFICANT CHANGE UP (ref 22–31)
CREAT SERPL-MCNC: 0.66 MG/DL — SIGNIFICANT CHANGE UP (ref 0.5–1.3)
CREAT SERPL-MCNC: 0.68 MG/DL — SIGNIFICANT CHANGE UP (ref 0.5–1.3)
ELLIPTOCYTES BLD QL SMEAR: SLIGHT — SIGNIFICANT CHANGE UP
EOSINOPHIL # BLD AUTO: 0.01 K/UL — SIGNIFICANT CHANGE UP (ref 0–0.5)
EOSINOPHIL NFR BLD AUTO: 0.1 % — SIGNIFICANT CHANGE UP (ref 0–6)
EOSINOPHIL NFR FLD: 0.9 % — SIGNIFICANT CHANGE UP (ref 0–6)
FACT II CIRC INHIB PPP QL: 14.5 SEC — HIGH (ref 9.8–13.1)
FACT II CIRC INHIB PPP QL: SIGNIFICANT CHANGE UP SEC (ref 27.5–37.4)
GIANT PLATELETS BLD QL SMEAR: PRESENT — SIGNIFICANT CHANGE UP
GLUCOSE BLDC GLUCOMTR-MCNC: 144 MG/DL — HIGH (ref 70–99)
GLUCOSE BLDC GLUCOMTR-MCNC: 161 MG/DL — HIGH (ref 70–99)
GLUCOSE BLDC GLUCOMTR-MCNC: 167 MG/DL — HIGH (ref 70–99)
GLUCOSE SERPL-MCNC: 156 MG/DL — HIGH (ref 70–99)
GLUCOSE SERPL-MCNC: 192 MG/DL — HIGH (ref 70–99)
GRAM STN SPEC: SIGNIFICANT CHANGE UP
HCT VFR BLD CALC: 23.5 % — LOW (ref 39–50)
HGB BLD-MCNC: 7.6 G/DL — LOW (ref 13–17)
HYPOCHROMIA BLD QL: SLIGHT — SIGNIFICANT CHANGE UP
IMM GRANULOCYTES NFR BLD AUTO: 1.3 % — SIGNIFICANT CHANGE UP (ref 0–1.5)
INR BLD: 1.52 — HIGH (ref 0.88–1.17)
INR BLD: 1.52 — HIGH (ref 0.88–1.17)
LYMPHOCYTES # BLD AUTO: 0.7 K/UL — LOW (ref 1–3.3)
LYMPHOCYTES # BLD AUTO: 10 % — LOW (ref 13–44)
LYMPHOCYTES NFR SPEC AUTO: 3.8 % — LOW (ref 13–44)
MACROCYTES BLD QL: SLIGHT — SIGNIFICANT CHANGE UP
MAGNESIUM SERPL-MCNC: 1.5 MG/DL — LOW (ref 1.6–2.6)
MCHC RBC-ENTMCNC: 25.8 PG — LOW (ref 27–34)
MCHC RBC-ENTMCNC: 32.3 % — SIGNIFICANT CHANGE UP (ref 32–36)
MCV RBC AUTO: 79.7 FL — LOW (ref 80–100)
METAMYELOCYTES # FLD: 1.9 % — HIGH (ref 0–1)
MICROCYTES BLD QL: SIGNIFICANT CHANGE UP
MONOCYTES # BLD AUTO: 2.14 K/UL — HIGH (ref 0–0.9)
MONOCYTES NFR BLD AUTO: 30.4 % — HIGH (ref 2–14)
MONOCYTES NFR BLD: 21.9 % — HIGH (ref 2–9)
MYELOCYTES NFR BLD: 1 % — HIGH (ref 0–0)
NEUTROPHIL AB SER-ACNC: 67.6 % — SIGNIFICANT CHANGE UP (ref 43–77)
NEUTROPHILS # BLD AUTO: 4.08 K/UL — SIGNIFICANT CHANGE UP (ref 1.8–7.4)
NEUTROPHILS NFR BLD AUTO: 58.1 % — SIGNIFICANT CHANGE UP (ref 43–77)
NEUTS BAND # BLD: 1 % — SIGNIFICANT CHANGE UP (ref 0–6)
NRBC # FLD: 0 K/UL — SIGNIFICANT CHANGE UP (ref 0–0)
OSMOLALITY SERPL: 261 MOSMO/KG — LOW (ref 275–295)
OTHER - HEMATOLOGY %: 0 — SIGNIFICANT CHANGE UP
PHOSPHATE SERPL-MCNC: 2.6 MG/DL — SIGNIFICANT CHANGE UP (ref 2.5–4.5)
PLATELET # BLD AUTO: 240 K/UL — SIGNIFICANT CHANGE UP (ref 150–400)
PLATELET COUNT - ESTIMATE: NORMAL — SIGNIFICANT CHANGE UP
PMV BLD: 11.1 FL — SIGNIFICANT CHANGE UP (ref 7–13)
POIKILOCYTOSIS BLD QL AUTO: SIGNIFICANT CHANGE UP
POTASSIUM SERPL-MCNC: 4.4 MMOL/L — SIGNIFICANT CHANGE UP (ref 3.5–5.3)
POTASSIUM SERPL-MCNC: 4.5 MMOL/L — SIGNIFICANT CHANGE UP (ref 3.5–5.3)
POTASSIUM SERPL-SCNC: 4.4 MMOL/L — SIGNIFICANT CHANGE UP (ref 3.5–5.3)
POTASSIUM SERPL-SCNC: 4.5 MMOL/L — SIGNIFICANT CHANGE UP (ref 3.5–5.3)
PROMYELOCYTES # FLD: 0 % — SIGNIFICANT CHANGE UP (ref 0–0)
PROT SERPL-MCNC: 6.4 G/DL — SIGNIFICANT CHANGE UP (ref 6–8.3)
PROTHROM AB SERPL-ACNC: 17.5 SEC — HIGH (ref 9.8–13.1)
PROTHROM AB SERPL-ACNC: 17.5 SEC — HIGH (ref 9.8–13.1)
PROTHROMBIN TIME/NOMAL: 11.8 SEC — SIGNIFICANT CHANGE UP (ref 9.8–13.1)
PROTHROMBIN TIME/NOMAL: 34.9 SEC — SIGNIFICANT CHANGE UP (ref 27.5–37.4)
PT INHIB SC 2 HR: 16.1 SEC — HIGH (ref 9.8–13.1)
PTT INHIB SC 2 HR: SIGNIFICANT CHANGE UP SEC (ref 27.5–37.4)
RBC # BLD: 2.95 M/UL — LOW (ref 4.2–5.8)
RBC # FLD: 14.8 % — HIGH (ref 10.3–14.5)
REVIEW TO FOLLOW: YES — SIGNIFICANT CHANGE UP
RH IG SCN BLD-IMP: POSITIVE — SIGNIFICANT CHANGE UP
SCHISTOCYTES BLD QL AUTO: SIGNIFICANT CHANGE UP
SMUDGE CELLS # BLD: PRESENT — SIGNIFICANT CHANGE UP
SODIUM SERPL-SCNC: 120 MMOL/L — CRITICAL LOW (ref 135–145)
SODIUM SERPL-SCNC: 123 MMOL/L — LOW (ref 135–145)
SPECIMEN SOURCE: SIGNIFICANT CHANGE UP
SPHEROCYTES BLD QL SMEAR: SLIGHT — SIGNIFICANT CHANGE UP
VARIANT LYMPHS # BLD: 1.9 % — SIGNIFICANT CHANGE UP
WBC # BLD: 7.03 K/UL — SIGNIFICANT CHANGE UP (ref 3.8–10.5)
WBC # FLD AUTO: 7.03 K/UL — SIGNIFICANT CHANGE UP (ref 3.8–10.5)

## 2019-11-12 PROCEDURE — 73706 CT ANGIO LWR EXTR W/O&W/DYE: CPT | Mod: 26,50,52

## 2019-11-12 PROCEDURE — 93923 UPR/LXTR ART STDY 3+ LVLS: CPT | Mod: 26

## 2019-11-12 PROCEDURE — 99233 SBSQ HOSP IP/OBS HIGH 50: CPT | Mod: GC

## 2019-11-12 RX ORDER — CEFEPIME 1 G/1
INJECTION, POWDER, FOR SOLUTION INTRAMUSCULAR; INTRAVENOUS
Refills: 0 | Status: DISCONTINUED | OUTPATIENT
Start: 2019-11-12 | End: 2019-11-12

## 2019-11-12 RX ORDER — SODIUM CHLORIDE 9 MG/ML
1000 INJECTION, SOLUTION INTRAVENOUS
Refills: 0 | Status: DISCONTINUED | OUTPATIENT
Start: 2019-11-12 | End: 2019-11-21

## 2019-11-12 RX ORDER — ASCORBIC ACID 60 MG
1 TABLET,CHEWABLE ORAL
Qty: 0 | Refills: 0 | DISCHARGE

## 2019-11-12 RX ORDER — METOPROLOL TARTRATE 50 MG
150 TABLET ORAL DAILY
Refills: 0 | Status: DISCONTINUED | OUTPATIENT
Start: 2019-11-12 | End: 2019-11-21

## 2019-11-12 RX ORDER — SODIUM CHLORIDE 9 MG/ML
500 INJECTION INTRAMUSCULAR; INTRAVENOUS; SUBCUTANEOUS ONCE
Refills: 0 | Status: COMPLETED | OUTPATIENT
Start: 2019-11-12 | End: 2019-11-12

## 2019-11-12 RX ORDER — RIVAROXABAN 15 MG-20MG
1 KIT ORAL
Qty: 0 | Refills: 0 | DISCHARGE

## 2019-11-12 RX ORDER — FOLIC ACID 0.8 MG
0 TABLET ORAL
Qty: 0 | Refills: 0 | DISCHARGE

## 2019-11-12 RX ORDER — DEXTROSE 50 % IN WATER 50 %
25 SYRINGE (ML) INTRAVENOUS ONCE
Refills: 0 | Status: DISCONTINUED | OUTPATIENT
Start: 2019-11-12 | End: 2019-11-21

## 2019-11-12 RX ORDER — MOXIFLOXACIN HYDROCHLORIDE TABLETS, 400 MG 400 MG/1
1 TABLET, FILM COATED ORAL
Qty: 0 | Refills: 0 | DISCHARGE

## 2019-11-12 RX ORDER — CEFEPIME 1 G/1
2000 INJECTION, POWDER, FOR SOLUTION INTRAMUSCULAR; INTRAVENOUS ONCE
Refills: 0 | Status: COMPLETED | OUTPATIENT
Start: 2019-11-12 | End: 2019-11-12

## 2019-11-12 RX ORDER — RIVAROXABAN 15 MG-20MG
20 KIT ORAL
Refills: 0 | Status: DISCONTINUED | OUTPATIENT
Start: 2019-11-12 | End: 2019-11-21

## 2019-11-12 RX ORDER — INSULIN LISPRO 100/ML
VIAL (ML) SUBCUTANEOUS
Refills: 0 | Status: DISCONTINUED | OUTPATIENT
Start: 2019-11-12 | End: 2019-11-21

## 2019-11-12 RX ORDER — METFORMIN HYDROCHLORIDE 850 MG/1
0 TABLET ORAL
Qty: 0 | Refills: 0 | DISCHARGE

## 2019-11-12 RX ORDER — PREGABALIN 225 MG/1
0 CAPSULE ORAL
Qty: 0 | Refills: 0 | DISCHARGE

## 2019-11-12 RX ORDER — LOSARTAN POTASSIUM 100 MG/1
25 TABLET, FILM COATED ORAL DAILY
Refills: 0 | Status: DISCONTINUED | OUTPATIENT
Start: 2019-11-12 | End: 2019-11-12

## 2019-11-12 RX ORDER — DIGOXIN 250 MCG
0.12 TABLET ORAL DAILY
Refills: 0 | Status: DISCONTINUED | OUTPATIENT
Start: 2019-11-12 | End: 2019-11-21

## 2019-11-12 RX ORDER — CLOPIDOGREL BISULFATE 75 MG/1
1 TABLET, FILM COATED ORAL
Qty: 0 | Refills: 0 | DISCHARGE

## 2019-11-12 RX ORDER — FUROSEMIDE 40 MG
1 TABLET ORAL
Qty: 0 | Refills: 0 | DISCHARGE

## 2019-11-12 RX ORDER — SODIUM CHLORIDE 9 MG/ML
1000 INJECTION INTRAMUSCULAR; INTRAVENOUS; SUBCUTANEOUS
Refills: 0 | Status: DISCONTINUED | OUTPATIENT
Start: 2019-11-12 | End: 2019-11-13

## 2019-11-12 RX ORDER — ATORVASTATIN CALCIUM 80 MG/1
1 TABLET, FILM COATED ORAL
Qty: 0 | Refills: 0 | DISCHARGE

## 2019-11-12 RX ORDER — DEXTROSE 50 % IN WATER 50 %
12.5 SYRINGE (ML) INTRAVENOUS ONCE
Refills: 0 | Status: DISCONTINUED | OUTPATIENT
Start: 2019-11-12 | End: 2019-11-21

## 2019-11-12 RX ORDER — DEXTROSE 50 % IN WATER 50 %
15 SYRINGE (ML) INTRAVENOUS ONCE
Refills: 0 | Status: DISCONTINUED | OUTPATIENT
Start: 2019-11-12 | End: 2019-11-21

## 2019-11-12 RX ORDER — PIPERACILLIN AND TAZOBACTAM 4; .5 G/20ML; G/20ML
3.38 INJECTION, POWDER, LYOPHILIZED, FOR SOLUTION INTRAVENOUS EVERY 8 HOURS
Refills: 0 | Status: DISCONTINUED | OUTPATIENT
Start: 2019-11-12 | End: 2019-11-16

## 2019-11-12 RX ORDER — ATORVASTATIN CALCIUM 80 MG/1
20 TABLET, FILM COATED ORAL AT BEDTIME
Refills: 0 | Status: DISCONTINUED | OUTPATIENT
Start: 2019-11-12 | End: 2019-11-21

## 2019-11-12 RX ORDER — LOSARTAN POTASSIUM 100 MG/1
1 TABLET, FILM COATED ORAL
Qty: 0 | Refills: 0 | DISCHARGE

## 2019-11-12 RX ORDER — VANCOMYCIN HCL 1 G
750 VIAL (EA) INTRAVENOUS EVERY 12 HOURS
Refills: 0 | Status: DISCONTINUED | OUTPATIENT
Start: 2019-11-12 | End: 2019-11-13

## 2019-11-12 RX ORDER — GLUCAGON INJECTION, SOLUTION 0.5 MG/.1ML
1 INJECTION, SOLUTION SUBCUTANEOUS ONCE
Refills: 0 | Status: DISCONTINUED | OUTPATIENT
Start: 2019-11-12 | End: 2019-11-21

## 2019-11-12 RX ORDER — METOPROLOL TARTRATE 50 MG
1.5 TABLET ORAL
Qty: 0 | Refills: 0 | DISCHARGE

## 2019-11-12 RX ORDER — PIPERACILLIN AND TAZOBACTAM 4; .5 G/20ML; G/20ML
3.38 INJECTION, POWDER, LYOPHILIZED, FOR SOLUTION INTRAVENOUS ONCE
Refills: 0 | Status: COMPLETED | OUTPATIENT
Start: 2019-11-12 | End: 2019-11-12

## 2019-11-12 RX ORDER — LINAGLIPTIN 5 MG/1
1 TABLET, FILM COATED ORAL
Qty: 0 | Refills: 0 | DISCHARGE

## 2019-11-12 RX ORDER — CLOPIDOGREL BISULFATE 75 MG/1
75 TABLET, FILM COATED ORAL DAILY
Refills: 0 | Status: DISCONTINUED | OUTPATIENT
Start: 2019-11-12 | End: 2019-11-21

## 2019-11-12 RX ORDER — SODIUM CHLORIDE 9 MG/ML
1000 INJECTION, SOLUTION INTRAVENOUS
Refills: 0 | Status: DISCONTINUED | OUTPATIENT
Start: 2019-11-12 | End: 2019-11-12

## 2019-11-12 RX ORDER — INSULIN LISPRO 100/ML
VIAL (ML) SUBCUTANEOUS AT BEDTIME
Refills: 0 | Status: DISCONTINUED | OUTPATIENT
Start: 2019-11-12 | End: 2019-11-21

## 2019-11-12 RX ORDER — CEFEPIME 1 G/1
2000 INJECTION, POWDER, FOR SOLUTION INTRAMUSCULAR; INTRAVENOUS EVERY 12 HOURS
Refills: 0 | Status: DISCONTINUED | OUTPATIENT
Start: 2019-11-12 | End: 2019-11-12

## 2019-11-12 RX ADMIN — RIVAROXABAN 20 MILLIGRAM(S): KIT at 19:09

## 2019-11-12 RX ADMIN — SODIUM CHLORIDE 100 MILLILITER(S): 9 INJECTION INTRAMUSCULAR; INTRAVENOUS; SUBCUTANEOUS at 09:17

## 2019-11-12 RX ADMIN — SODIUM CHLORIDE 2000 MILLILITER(S): 9 INJECTION INTRAMUSCULAR; INTRAVENOUS; SUBCUTANEOUS at 09:26

## 2019-11-12 RX ADMIN — SODIUM CHLORIDE 100 MILLILITER(S): 9 INJECTION INTRAMUSCULAR; INTRAVENOUS; SUBCUTANEOUS at 18:05

## 2019-11-12 RX ADMIN — Medication 250 MILLIGRAM(S): at 18:07

## 2019-11-12 RX ADMIN — ATORVASTATIN CALCIUM 20 MILLIGRAM(S): 80 TABLET, FILM COATED ORAL at 22:36

## 2019-11-12 RX ADMIN — SODIUM CHLORIDE 100 MILLILITER(S): 9 INJECTION INTRAMUSCULAR; INTRAVENOUS; SUBCUTANEOUS at 09:26

## 2019-11-12 RX ADMIN — PIPERACILLIN AND TAZOBACTAM 25 GRAM(S): 4; .5 INJECTION, POWDER, LYOPHILIZED, FOR SOLUTION INTRAVENOUS at 22:37

## 2019-11-12 RX ADMIN — Medication 150 MILLIGRAM(S): at 06:08

## 2019-11-12 RX ADMIN — SODIUM CHLORIDE 100 MILLILITER(S): 9 INJECTION INTRAMUSCULAR; INTRAVENOUS; SUBCUTANEOUS at 09:23

## 2019-11-12 RX ADMIN — LOSARTAN POTASSIUM 25 MILLIGRAM(S): 100 TABLET, FILM COATED ORAL at 06:08

## 2019-11-12 RX ADMIN — Medication 1: at 09:16

## 2019-11-12 RX ADMIN — CEFEPIME 100 MILLIGRAM(S): 1 INJECTION, POWDER, FOR SOLUTION INTRAMUSCULAR; INTRAVENOUS at 04:11

## 2019-11-12 RX ADMIN — PIPERACILLIN AND TAZOBACTAM 200 GRAM(S): 4; .5 INJECTION, POWDER, LYOPHILIZED, FOR SOLUTION INTRAVENOUS at 13:38

## 2019-11-12 RX ADMIN — Medication 1: at 18:00

## 2019-11-12 RX ADMIN — CLOPIDOGREL BISULFATE 75 MILLIGRAM(S): 75 TABLET, FILM COATED ORAL at 13:01

## 2019-11-12 RX ADMIN — SODIUM CHLORIDE 75 MILLILITER(S): 9 INJECTION, SOLUTION INTRAVENOUS at 02:47

## 2019-11-12 RX ADMIN — Medication 250 MILLIGRAM(S): at 07:42

## 2019-11-12 NOTE — PROGRESS NOTE ADULT - SUBJECTIVE AND OBJECTIVE BOX
Podiatry pager #: [phone] ([place])/ [phone] ([facility])    Patient is a 74y old  Male who presents with a chief complaint of Left foot gangrene, rule out osteomyelitis (12 Nov 2019 09:19)       INTERVAL HPI/OVERNIGHT EVENTS:  Patient seen and evaluated at bedside.  Pt is resting comfortable in NAD. Denies N/V/F/C.     Allergies    No Known Allergies    Intolerances        Vital Signs Last 24 Hrs  T(C): 36.5 (12 Nov 2019 05:23), Max: 36.8 (11 Nov 2019 20:53)  T(F): 97.7 (12 Nov 2019 05:23), Max: 98.3 (11 Nov 2019 20:53)  HR: 79 (12 Nov 2019 05:23) (60 - 79)  BP: 146/86 (12 Nov 2019 05:23) (128/62 - 149/78)  BP(mean): --  RR: 18 (12 Nov 2019 05:23) (16 - 18)  SpO2: 100% (12 Nov 2019 05:23) (91% - 100%)    LABS:                        7.6    7.03  )-----------( 240      ( 12 Nov 2019 07:15 )             23.5     11-12    120<LL>  |  86<L>  |  22  ----------------------------<  192<H>  4.5   |  22  |  0.68    Ca    8.8      12 Nov 2019 07:15  Phos  2.6     11-12  Mg     1.5     11-12    TPro  6.4  /  Alb  2.9<L>  /  TBili  < 0.2<L>  /  DBili  x   /  AST  28  /  ALT  21  /  AlkPhos  63  11-12    PT/INR - ( 12 Nov 2019 07:15 )   PT: 17.5 SEC;   INR: 1.52          PTT - ( 12 Nov 2019 07:15 )  PTT:33.5 SEC    CAPILLARY BLOOD GLUCOSE      POCT Blood Glucose.: 189 mg/dL (12 Nov 2019 08:55)      Lower Extremity Physical Exam:  Vascular: DP/PT 0/4, B/L, CFT <3 seconds B/L, Temperature gradient cool to cool, B/L.   Neuro: Epicritic sensation absent to level of ankles B/L.  Musculoskeletal/Ortho: left lower extremity flexion contraction of knee w/ increased pressure on plantar heel  Skin: Left foot plantar heel deep tissue injury w/ ecchymosis & no open lesions    Wound #1:  Left foot distal hallux eschar, 2.5x2.0cm, depth to subq, wound bed necrotic, no active drainage, no malodor, periwound erythematous to interphalangeal joint; etiology 2/ 2 medial hallucal nail border partial nail avulsion & PVD    Wound #2: Left foot lateral 4th digit ulcer, 1.0x1.0cm, depth to subq, wound bed fibrotic, no active drainage, no malodor, periwound stable w/ erythema to dorsal distal forefoot & ascending lymphangitis to level of midfoot, eitology 2/2 chronic pressure from hypertrophic 4th digit proximal phalanx base abutting against 5th digit proximal phalanx head & PVD    RADIOLOGY & ADDITIONAL TESTS:  < from: Xray Foot AP + Lateral, Left (11.11.19 @ 19:41) >    EXAM:  RAD FOOT 2 VIEWS LEFT      EXAM:  RAD TIB-FIB LT        PROCEDURE DATE:  Nov 11 2019         INTERPRETATION:  Clinical indication: Left foot gangrene.    Technique: 2 views of the left tibia/fibula, 3 views of the left foot.    Comparison: No prior studies available.    Findings:  No acute fracture or dislocation. The joint spaces are preserved. Area of   lucency within the soft tissue of the first toe, likely corresponding to   area of ulceration. Vascular calcifications.    Impression:  No acute fracture. Area of lucency within the soft tissue of the first   toe, likely corresponding to area of ulceration.               SADIA DIEGO M.D., RADIOLOGY RESIDENT  This document has been electronically signed.  JAMES GIFFORD M.D., ATTENDINGRADIOLOGIST  This document has been electronically signed. Nov 12 2019  8:39AM              < end of copied text >

## 2019-11-12 NOTE — PROGRESS NOTE ADULT - PROBLEM SELECTOR PLAN 4
Likely 2/2 hypovolemia. No active symptoms at this time   -c/w NS @ 100c/hr  -F/u serum osm and urine studies  -BMPs q 6H

## 2019-11-12 NOTE — PROGRESS NOTE ADULT - PROBLEM SELECTOR PLAN 1
-pt with gangrene on the left foot with lesions on the tip of L 1st and 5th digit and stage 2 ulcer on heel, ESR and CRP elevated, and mild leukocytosis  - Xray L foot: Diffuse soft tissue swelling with cutaneous ulceration over the distal phalynx of the left first digit and subjacent cortical erosion of the first distal phalynx.  -s/p vanc and cefepime in the ED  -c/w vanc and cefepime as per Podiatry recs. Will likely d/c cefepime and start zosyn  -if pt spikes a fever, or has increasing WBC, will add anaerobic coverage  -f/u FAB/PVR ordered by podiatry  -f/u blood cxs  -f/u podiatry recs  -f/u vascular recs

## 2019-11-12 NOTE — CONSULT NOTE ADULT - ASSESSMENT
74M w/ DM, PVD, HTN, HLD, p/w L foot gangrene    - Agree w/ Podiatry assessment of the wounds  - will f/u on FAB/PVR results  - continue IV Abx  - f/u official read for xray of LLE  - appreciate podiatry recs  - Discussed w/ vascular fellow on call      PORSHA Michaud PGY-2  C Surgery  [id number] 74M w/ DM, PVD, HTN, HLD, p/w L foot gangrene    - Agree w/ Podiatry assessment of the wounds  - will f/u on FAB/PVR results  - continue IV Abx  - f/u official read for xray of LLE  - appreciate podiatry recs  - Discussed w/ vascular fellow on call. Will discuss w/ Dr. Wright in the AM and update the primary team if       PORSHA Michaud PGY-2  C Surgery  [phone]

## 2019-11-12 NOTE — CONSULT NOTE ADULT - SUBJECTIVE AND OBJECTIVE BOX
Vascular Surgery Consult  Consulting surgical team: C Team  Consulting attending: Dr. 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 Per family and patient, infection started 5 weeks ago and was being monitored by home visit doctor, podiatrist, Dr. Mcpherson. Patient was on Mupirocin for the 5th toe at that time. This past Friday when saw urgent care Dr. Matthews who prescribed Bactrim. Today saw Dr. Alex Leos (podiatrist) who stated that left foot has gangrenous wound. Patient sent in for IV antibiotics and admission. Patient has Infection on left big toe, heel, 5th toe. No fevers. Patient has not been walking and developed a bed sore and stiffness.  LLE xray prelim read showing signs of possible OM    Vascular Surgery consulted to evaluate for revascularization of LLE.         PAST MEDICAL HISTORY:  AICD (automatic cardioverter/defibrillator) present  PVD (peripheral vascular disease)  Atrial fibrillation  Hyperlipidemia  HTN (hypertension)  Lung cancer  PAD (peripheral artery disease)  GERD (gastroesophageal reflux disease)  Carotid artery occlusion  Neuropathy  Type 2 diabetes mellitus      PAST SURGICAL HISTORY:  Carotid artery occlusion  AICD (automatic cardioverter/defibrillator) present  Carotid stenosis, right  S/P femoral-popliteal bypass surgery  S/P angioplasty with stent  S/P cervical spinal fusion      MEDICATIONS:  influenza   Vaccine 0.5 milliLiter(s) IntraMuscular once      ALLERGIES:  No Known Allergies      VITALS & I/Os:  Vital Signs Last 24 Hrs  T(C): 36.2 (11 Nov 2019 23:49), Max: 36.8 (11 Nov 2019 20:53)  T(F): 97.2 (11 Nov 2019 23:49), Max: 98.3 (11 Nov 2019 20:53)  HR: 72 (11 Nov 2019 23:49) (60 - 76)  BP: 149/78 (11 Nov 2019 23:49) (128/62 - 149/78)  BP(mean): --  RR: 18 (11 Nov 2019 23:49) (16 - 18)  SpO2: 98% (11 Nov 2019 23:49) (91% - 100%)    I&O's Summary      PHYSICAL EXAM:  General: No acute distress  Respiratory: Nonlabored  Cardiovascular: RRR  Extremities: Warm, b/l femoral pulses palpable, b/l DP signals, LLE w/ eschar on distal hallux w/ necrotic tissue noted w/o discharge or pus, 4th digit ulcer noted w/o active drainage    LABS:                        8.5    11.27 )-----------( 262      ( 11 Nov 2019 19:20 )             26.9     11-11    122<L>  |  86<L>  |  28<H>  ----------------------------<  208<H>  4.6   |  21<L>  |  0.78    Ca    9.4      11 Nov 2019 19:20    TPro  7.1  /  Alb  3.1<L>  /  TBili  < 0.2<L>  /  DBili  x   /  AST  33  /  ALT  23  /  AlkPhos  81  11-11    Lactate:  11-11 @ 19:20  3.6    PT/INR - ( 11 Nov 2019 19:20 )   PT: 26.6 SEC;   INR: 2.33          PTT - ( 11 Nov 2019 19:20 )  PTT:45.3 SEC      IMAGING:  < from: Xray Tibia + Fibula 2 Views, Left (11.11.19 @ 19:41) >    ******PRELIMINARY REPORT******    ******PRELIMINARY REPORT******            EXAM:  RAD TIB-FIB LT        PROCEDURE DATE:  Nov 11 2019     ******PRELIMINARY REPORT******    ******PRELIMINARY REPORT******            INTERPRETATION:  Diffuse soft tissue swelling with cutaneous ulceration   over the distal phalynx of the left first digit and subjacent cortical   erosion of the first distal phalynx.            ******PRELIMINARY REPORT******    ******PRELIMINARY REPORT******          ERIC MCCOY M.D., RADIOLOGY RESIDENT                        < end of copied text > Vascular Surgery Consult  Consulting surgical team: C Team  Consulting attending: Dr. 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 Wright, April 2019), here for left foot gangrene. Per family and patient, infection started 5 weeks ago and was being monitored by home visit doctor, podiatrist, Dr. Mcpherson. Patient was on Mupirocin for the 5th toe at that time. This past Friday when saw urgent care Dr. Matthews who prescribed Bactrim. Today saw Dr. Alex Leos (podiatrist) who stated that left foot has gangrenous wound. Patient sent in for IV antibiotics and admission. Patient has Infection on left big toe, heel, 5th toe. No fevers. Patient has not been walking and developed a bed sore and stiffness.  LLE xray prelim read showing signs of possible OM    Vascular Surgery consulted to evaluate for revascularization of LLE.         PAST MEDICAL HISTORY:  AICD (automatic cardioverter/defibrillator) present  PVD (peripheral vascular disease)  Atrial fibrillation  Hyperlipidemia  HTN (hypertension)  Lung cancer  PAD (peripheral artery disease)  GERD (gastroesophageal reflux disease)  Carotid artery occlusion  Neuropathy  Type 2 diabetes mellitus      PAST SURGICAL HISTORY:  Carotid artery occlusion  AICD (automatic cardioverter/defibrillator) present  Carotid stenosis, right  S/P femoral-popliteal bypass surgery  S/P angioplasty with stent  S/P cervical spinal fusion      MEDICATIONS:  influenza   Vaccine 0.5 milliLiter(s) IntraMuscular once      ALLERGIES:  No Known Allergies      VITALS & I/Os:  Vital Signs Last 24 Hrs  T(C): 36.2 (11 Nov 2019 23:49), Max: 36.8 (11 Nov 2019 20:53)  T(F): 97.2 (11 Nov 2019 23:49), Max: 98.3 (11 Nov 2019 20:53)  HR: 72 (11 Nov 2019 23:49) (60 - 76)  BP: 149/78 (11 Nov 2019 23:49) (128/62 - 149/78)  BP(mean): --  RR: 18 (11 Nov 2019 23:49) (16 - 18)  SpO2: 98% (11 Nov 2019 23:49) (91% - 100%)    I&O's Summary      PHYSICAL EXAM:  General: No acute distress  Respiratory: Nonlabored  Cardiovascular: RRR  Extremities: Warm, b/l femoral pulses palpable, b/l DP signals, LLE w/ eschar on distal hallux w/ necrotic tissue noted w/o discharge or pus, 4th digit ulcer noted w/o active drainage    LABS:                        8.5    11.27 )-----------( 262      ( 11 Nov 2019 19:20 )             26.9     11-11    122<L>  |  86<L>  |  28<H>  ----------------------------<  208<H>  4.6   |  21<L>  |  0.78    Ca    9.4      11 Nov 2019 19:20    TPro  7.1  /  Alb  3.1<L>  /  TBili  < 0.2<L>  /  DBili  x   /  AST  33  /  ALT  23  /  AlkPhos  81  11-11    Lactate:  11-11 @ 19:20  3.6    PT/INR - ( 11 Nov 2019 19:20 )   PT: 26.6 SEC;   INR: 2.33          PTT - ( 11 Nov 2019 19:20 )  PTT:45.3 SEC      IMAGING:  < from: Xray Tibia + Fibula 2 Views, Left (11.11.19 @ 19:41) >    ******PRELIMINARY REPORT******    ******PRELIMINARY REPORT******            EXAM:  RAD TIB-FIB LT        PROCEDURE DATE:  Nov 11 2019     ******PRELIMINARY REPORT******    ******PRELIMINARY REPORT******            INTERPRETATION:  Diffuse soft tissue swelling with cutaneous ulceration   over the distal phalynx of the left first digit and subjacent cortical   erosion of the first distal phalynx.            ******PRELIMINARY REPORT******    ******PRELIMINARY REPORT******          ERIC MCCOY M.D., RADIOLOGY RESIDENT                        < end of copied text >

## 2019-11-12 NOTE — H&P ADULT - PROBLEM SELECTOR PLAN 8
-on xarelto    Transitions of Care Status:  1.  Name of PCP: Dr. Jayjay Ybarra ) [phone]  2.  PCP Contacted on Admission: [ ] Y    [ x] N    3.  PCP contacted at Discharge: [ ] Y    [ ] N    [ ] N/A  4.  Post-Discharge Appointment Date and Location:  5.  Summary of Handoff given to PCP: INR=2.33  -can be 2/2 xarelto vs poor nutrition Vs  inhibitor Vs likely liver dysfunction  -repeat INR in the AM  -f/u mixing study. INR=2.33  -can be 2/2 xarelto vs poor nutrition Vs  recent abx use Vs inhibitor Vs likely liver dysfunction  -repeat INR in the AM  -f/u mixing study.

## 2019-11-12 NOTE — H&P ADULT - PROBLEM SELECTOR PLAN 1
-pt with gangrene on the left foot with lesions on the tip of L 1st and 5th digit and stage 2 ulcer on heel, ESR and CRP elevated, and mild leukocytosis  - Xray L foot: Diffuse soft tissue swelling with cutaneous ulceration over the distal phalynx of the left first digit and subjacent cortical erosion of the first distal phalynx.  -s/p vanc and cefepime in the ED  -c/w vanc and cefepime  -f/u FAB/PVR ordered by podiatry  -f/u podiatry recs  -f/u vascular recs -pt with gangrene on the left foot with lesions on the tip of L 1st and 5th digit and stage 2 ulcer on heel, ESR and CRP elevated, and mild leukocytosis  - Xray L foot: Diffuse soft tissue swelling with cutaneous ulceration over the distal phalynx of the left first digit and subjacent cortical erosion of the first distal phalynx.  -s/p vanc and cefepime in the ED  -c/w vanc and cefepime.  -If pt spikes a fever, or has increasing WBC, will add anaerobic coverage.  -f/u FAB/PVR ordered by podiatry  -f/u podiatry recs  -f/u vascular recs -pt with gangrene on the left foot with lesions on the tip of L 1st and 5th digit and stage 2 ulcer on heel, ESR and CRP elevated, and mild leukocytosis  - Xray L foot: Diffuse soft tissue swelling with cutaneous ulceration over the distal phalynx of the left first digit and subjacent cortical erosion of the first distal phalynx.  -s/p vanc and cefepime in the ED  -c/w vanc and cefepime.  -If pt spikes a fever, or has increasing WBC, will add anaerobic coverage.  -f/u FAB/PVR ordered by podiatry  -f/u blood cx  -f/u podiatry recs  -f/u vascular recs -pt with gangrene on the left foot with lesions on the tip of L 1st and 5th digit and stage 2 ulcer on heel, ESR and CRP elevated, and mild leukocytosis  - Xray L foot: Diffuse soft tissue swelling with cutaneous ulceration over the distal phalynx of the left first digit and subjacent cortical erosion of the first distal phalynx.  -s/p vanc and cefepime in the ED  -c/w vanc and cefepime as per Podiatry recs  -if pt spikes a fever, or has increasing WBC, will add anaerobic coverage  -f/u FAB/PVR ordered by podiatry  -f/u blood cxs  -f/u podiatry recs  -f/u vascular recs

## 2019-11-12 NOTE — H&P ADULT - PROBLEM SELECTOR PLAN 4
-likely in the setting of hypovolemia   -No active symptoms at this time.   -will end serum osm and urine studies  -increase by 8-10 in 24 horus -likely in the setting of hypovolemia   -No active symptoms at this time.   -Gentle hydration  -will end serum osm and urine studies  -increase by 8-10 in 24 horus Likely in the setting of hypovolemia.   -c/w isotonic IVF with NS given hypovolemia for now at 75 cc/hr.   -no active symptoms at this time  -will send serum osm and urine studies  -do not increase by more than 8 mEq in 24 hours Likely in the setting of hypovolemia.   -c/w isotonic IVF with NS given hypovolemia for now at 75 cc/hr  -no active symptoms at this time  -will send serum osm and urine studies  -do not increase by more than 8 mEq in 24 hours

## 2019-11-12 NOTE — H&P ADULT - ATTENDING COMMENTS
Given hypovolemia in setting of hyponatremia to serum Na 122, pt should be on ISOTONIC IVF, not D5 1/2NS, as per discussion with [facility] Admit resident, Jostin Griffin, overnight. Will discontinue D5 1/2NS stat, check stat BMP, and switch to NS or LR. RN notified by primary team of plan. Monitor BMP q4hrs for now. Given hypovolemia in setting of hyponatremia to serum Na 122, pt should be on ISOTONIC IVF, not D5 1/2NS, as per discussion with Night Admit resident, Jostin Griffin, overnight. Will discontinue D5 1/2NS stat, check stat BMP, and switch to NS or LR for volume repletion. RN notified by primary team of plan. Monitor BMP q4hrs for now.

## 2019-11-12 NOTE — H&P ADULT - PROBLEM SELECTOR PLAN 2
-s/p L bypass and stent to the L ileac now on plavix  -c/w plavix  -f/u vasc recs -s/p L bypass and stent to the L iliac, now on plavix  -c/w plavix  -f/u FAB/PVR ordered by podiatry  -f/u vascular recs

## 2019-11-12 NOTE — PROGRESS NOTE ADULT - PROBLEM SELECTOR PLAN 8
INR=2.33 on admission. Likely 2/2 xarelto vs poor nutrition Vs  recent abx use Vs inhibitor Vs likely liver dysfunction  -repeat INR in the AM  -f/u mixing study. INR=2.33 on admission. Likely 2/2 xarelto vs  recent abx use   -Trend INR  -Will continue to monitor

## 2019-11-12 NOTE — PROGRESS NOTE ADULT - ASSESSMENT
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 Wright, April 2019).Presented 11/11 for left foot gangrene that started 5wk prior to admit.    Plan  - F/u on FAB/PVR results (ordered 11/11)  - Continue IV antibiotics  - Recommend CTA

## 2019-11-12 NOTE — PROGRESS NOTE ADULT - ASSESSMENT
74M w/ PMHx of DM, PVD, HTN, HLD w/ left hallux gangrene  -Pt seen and evaluated  -Left foot distal hallux eschar 2/2 partial nail avulsion & PVD, well-adhered and stable with no acute signs of infection. Left foot lateral 4th toe wound to subQ, fibrotic with no acute signs of infection.  -Left foot x-rays negative for osteo  -Ordered FAB/PVR of LLE to assess vascular status of patient  -Recommend vascular work up  -No podiatric surgical intervention at this time  -Continue IV abx and local wound care w/ betadine  -Will continue to monitor  -Seen w/ attending

## 2019-11-12 NOTE — H&P ADULT - NSHPLABSRESULTS_GEN_ALL_CORE
8.5    11.27 )-----------( 262      ( 11 Nov 2019 19:20 )             26.9       11-11    122<L>  |  86<L>  |  28<H>  ----------------------------<  208<H>  4.6   |  21<L>  |  0.78    Ca    9.4      11 Nov 2019 19:20    TPro  7.1  /  Alb  3.1<L>  /  TBili  < 0.2<L>  /  DBili  x   /  AST  33  /  ALT  23  /  AlkPhos  81  11-11                  PT/INR - ( 11 Nov 2019 19:20 )   PT: 26.6 SEC;   INR: 2.33          PTT - ( 11 Nov 2019 19:20 )  PTT:45.3 SEC 8.5    11.27 )-----------( 262      ( 11 Nov 2019 19:20 )             26.9       11-11    122<L>  |  86<L>  |  28<H>  ----------------------------<  208<H>  4.6   |  21<L>  |  0.78    Ca    9.4      11 Nov 2019 19:20    TPro  7.1  /  Alb  3.1<L>  /  TBili  < 0.2<L>  /  DBili  x   /  AST  33  /  ALT  23  /  AlkPhos  81  11-11        PT/INR - ( 11 Nov 2019 19:20 )   PT: 26.6 SEC;   INR: 2.33       PTT - ( 11 Nov 2019 19:20 )  PTT:45.3 SEC

## 2019-11-12 NOTE — H&P ADULT - PROBLEM SELECTOR PLAN 7
-pt is chronically anemic.  hgb=8.5 with an MCV=79.  No overt evidence of bleeding.  -Will send UA as pt with prior hematuria that improved s/p CBI.  -will send anemia workup in AM Pt is chronically anemic  hgb=8.5 with an MCV=79.  No overt evidence of bleeding  -Will send UA as pt with prior hematuria that improved s/p CBI.  -Will send anemia workup in AM

## 2019-11-12 NOTE — PROGRESS NOTE ADULT - ATTENDING COMMENTS
Pt with dry gangrene on left foot, as well as moderate hyponatremia. Pt states he wishes to go home because hospitalization was unexpected; he does not grasp the gravity of his current presentation, even after he was counseled that he might require surgery to avoid worsening foot infection. He lacks capacity to leave AMA.     Will continue broad-spectrum abx, check FAB/PVR, as well as CT angio LE (if desired by Vascular). Continue IV NS and recheck BMP q6h today. If Na normalizing can extend to q8h or BID tomorrow.

## 2019-11-12 NOTE — PROGRESS NOTE ADULT - PROBLEM SELECTOR PLAN 2
-s/p L bypass and stent to the L iliac, now on plavix  -c/w plavix  -f/u FAB/PVR ordered by podiatry  -f/u vascular recs

## 2019-11-12 NOTE — PROGRESS NOTE ADULT - PROBLEM SELECTOR PLAN 9
-DVT ppx: On xarelto    Transitions of Care Status:  1.  Name of PCP: Dr. Jayjay Ybarra ) [phone]  2.  PCP Contacted on Admission: [ ] Y    [ x] N    3.  PCP contacted at Discharge: [ ] Y    [ ] N    [ ] N/A  4.  Post-Discharge Appointment Date and Location:  5.  Summary of Handoff given to PCP: Pt w/ chronic urinary retention per family > 5 years. Per family 2/2 DM and fall > 5 years  -Family reports straight cath q 6 hrs at home. Pt bleeds w/ bolivar cath   -Will continue bladder scan q 8 w/ straight cath prn

## 2019-11-12 NOTE — H&P ADULT - NSHPREVIEWOFSYSTEMS_GEN_ALL_CORE
REVIEW OF SYSTEMS:    CONSTITUTIONAL: No new weakness, fevers or chills  EYES/ENT: No visual changes;  No vertigo or throat pain   NECK: No pain or stiffness  RESPIRATORY: No cough, wheezing, hemoptysis; No shortness of breath  CARDIOVASCULAR: No chest pain or palpitations  GASTROINTESTINAL: No abdominal or epigastric pain. No nausea, vomiting, or hematemesis; No diarrhea or constipation. No melena or hematochezia.  GENITOURINARY: No dysuria, frequency or hematuria  NEUROLOGICAL: No numbness or weakness  SKIN: No itching, burning, rashes, or lesions   All other review of systems is negative unless indicated above. Constitutional: No weakness, fevers, chills, or weight loss  Eyes: No visual changes, double vision, or eye pain  Ears, Nose, Mouth, Throat: No runny nose, sinus pain, ear pain, tinnitus, sore throat, dysphagia, or odynophagia  Cardiovascular: No chest pain, palpitations, or LE edema  Respiratory: No cough, wheezing, hemoptysis, or shortness of breath  Gastrointestinal: +Decreased PO intake. No abdominal pain, nausea/vomiting, diarrhea/constipation, hematemesis, BRBPR, or melena.  Genitourinary: No dysuria, frequency, urgency, or hematuria  Musculoskeletal: +L foot gangrene. No joint pain, joint swelling, or decreased ROM.  Skin: +Multiple L foot open wounds.   Neurologic: +Diabetic neuropathy. No seizures, headache, or syncope.  Psychiatric: No depression, anxiety, or agitation  Endocrine: No heat/cold intolerance, mood swings, sweats, polydipsia, or polyuria  Hematologic/lymphatic: No purpura, petechia, or prolonged or excessive bleeding after dental extraction / injury    Positives and pertinent negatives noted and all other systems negative.

## 2019-11-12 NOTE — H&P ADULT - ASSESSMENT
73 YO M with MHx of HTN, hyperlipidemia, afib on xarelto, AICD (placed in 2014), CAD s/p PCI on plavix, PAD s/p L bypass and L ileac stent, DMII c/b neuropathy admitted for left foot gangrene,  r/o osteomyelitis

## 2019-11-12 NOTE — H&P ADULT - NSHPSOCIALHISTORY_GEN_ALL_CORE
Pt lives with wife. Drinks one beer three times per week, former smoker, denies illicit drug use. , lives with wife. Drinks one beer three times per week, former smoker, denies illicit drug use.

## 2019-11-12 NOTE — H&P ADULT - PROBLEM SELECTOR PLAN 9
Please confirm all home meds in the AM -on xarelto    Transitions of Care Status:  1.  Name of PCP: Dr. Jayjay Ybarra ) [phone]  2.  PCP Contacted on Admission: [ ] Y    [ x] N    3.  PCP contacted at Discharge: [ ] Y    [ ] N    [ ] N/A  4.  Post-Discharge Appointment Date and Location:  5.  Summary of Handoff given to PCP: -DVT ppx: On xarelto    Transitions of Care Status:  1.  Name of PCP: Dr. Jayjay Ybarra ) [phone]  2.  PCP Contacted on Admission: [ ] Y    [ x] N    3.  PCP contacted at Discharge: [ ] Y    [ ] N    [ ] N/A  4.  Post-Discharge Appointment Date and Location:  5.  Summary of Handoff given to PCP:

## 2019-11-12 NOTE — PROGRESS NOTE ADULT - SUBJECTIVE AND OBJECTIVE BOX
Monika Masters MD  PGY 1 Department of Internal Medicine  Pager: [phone]    Patient is a 74y old  Male who presents with a chief complaint of Left foot gangrene, rule out osteomyelitis (12 Nov 2019 01:22)      SUBJECTIVE / OVERNIGHT EVENTS: Pt seen and examined. No acute overnight events. Denies fevers, chills, CP, SOB, Constipation, diarrhea.         MEDICATIONS  (STANDING):  atorvastatin 20 milliGRAM(s) Oral at bedtime  cefepime   IVPB      cefepime   IVPB 2000 milliGRAM(s) IV Intermittent every 12 hours  clopidogrel Tablet 75 milliGRAM(s) Oral daily  dextrose 5%. 1000 milliLiter(s) (50 mL/Hr) IV Continuous <Continuous>  dextrose 50% Injectable 12.5 Gram(s) IV Push once  dextrose 50% Injectable 25 Gram(s) IV Push once  dextrose 50% Injectable 25 Gram(s) IV Push once  digoxin     Tablet 0.125 milliGRAM(s) Oral daily  influenza   Vaccine 0.5 milliLiter(s) IntraMuscular once  insulin lispro (HumaLOG) corrective regimen sliding scale   SubCutaneous three times a day before meals  insulin lispro (HumaLOG) corrective regimen sliding scale   SubCutaneous at bedtime  metoprolol succinate  milliGRAM(s) Oral daily  rivaroxaban 20 milliGRAM(s) Oral with dinner  sodium chloride 0.9% Bolus 500 milliLiter(s) IV Bolus once  sodium chloride 0.9%. 1000 milliLiter(s) (100 mL/Hr) IV Continuous <Continuous>  vancomycin  IVPB 750 milliGRAM(s) IV Intermittent every 12 hours    MEDICATIONS  (PRN):  dextrose 40% Gel 15 Gram(s) Oral once PRN Blood Glucose LESS THAN 70 milliGRAM(s)/deciliter  glucagon  Injectable 1 milliGRAM(s) IntraMuscular once PRN Glucose LESS THAN 70 milligrams/deciliter      I&O's Summary    11 Nov 2019 07:01  -  12 Nov 2019 07:00  --------------------------------------------------------  IN: 250 mL / OUT: 0 mL / NET: 250 mL    12 Nov 2019 07:01  -  12 Nov 2019 09:19  --------------------------------------------------------  IN: 1000 mL / OUT: 0 mL / NET: 1000 mL        Vital Signs Last 24 Hrs  T(C): 36.5 (12 Nov 2019 05:23), Max: 36.8 (11 Nov 2019 20:53)  T(F): 97.7 (12 Nov 2019 05:23), Max: 98.3 (11 Nov 2019 20:53)  HR: 79 (12 Nov 2019 05:23) (60 - 79)  BP: 146/86 (12 Nov 2019 05:23) (128/62 - 149/78)  BP(mean): --  RR: 18 (12 Nov 2019 05:23) (16 - 18)  SpO2: 100% (12 Nov 2019 05:23) (91% - 100%)    CAPILLARY BLOOD GLUCOSE      POCT Blood Glucose.: 189 mg/dL (12 Nov 2019 08:55)      PHYSICAL EXAM:  	Constitutional: WDWN resting comfortably in bed; NAD. Cachectic appearing.  	Head: NC/AT  	Eyes: PERRL, EOMI, anicteric sclera  	Neck: Supple  	Respiratory: CTA B/L; no W/R/R, good inspiratory effort with no increased work of breathing  	Cardiac: +S1/S2; RRR; no M/R/G. No peripheral edema b/l.  	Gastrointestinal: Soft, NT/ND; no rebound or guarding; +BSx4  	Extremities: No cyanosis. +Palpable radial and femoral pulses b/l, faint DP pulses b/l.  	Musculoskeletal: NROM x4; no joint swelling, tenderness or erythema  	Skin: L toe wrapped in clean dry dressing.   	Neurologic: Alert and oriented x3, no focal deficits appreciated              Psychiatric: Affect and characteristics of appearance, verbalizations, behaviors are appropriate       LABS:                        7.6    7.03  )-----------( 240      ( 12 Nov 2019 07:15 )             23.5     Auto Eosinophil # 0.01  / Auto Eosinophil % 0.1   / Auto Neutrophil # 4.08  / Auto Neutrophil % 58.1  / BANDS % x                            8.5    11.27 )-----------( 262      ( 11 Nov 2019 19:20 )             26.9     Auto Eosinophil # x     / Auto Eosinophil % x     / Auto Neutrophil # x     / Auto Neutrophil % x     / BANDS % x        11-12    120<LL>  |  86<L>  |  22  ----------------------------<  192<H>  4.5   |  22  |  0.68  11-11    122<L>  |  86<L>  |  28<H>  ----------------------------<  208<H>  4.6   |  21<L>  |  0.78    Ca    8.8      12 Nov 2019 07:15  Mg     1.5     11-12  Phos  2.6     11-12  TPro  6.4  /  Alb  2.9<L>  /  TBili  < 0.2<L>  /  DBili  x   /  AST  28  /  ALT  21  /  AlkPhos  63  11-12  TPro  7.1  /  Alb  3.1<L>  /  TBili  < 0.2<L>  /  DBili  x   /  AST  33  /  ALT  23  /  AlkPhos  81  11-11    PT/INR - ( 12 Nov 2019 07:15 )   PT: 17.5 SEC;   INR: 1.52          PTT - ( 12 Nov 2019 07:15 )  PTT:33.5 SEC              RADIOLOGY & ADDITIONAL TESTS:    Imaging Personally Reviewed:    Consultant(s) Notes Reviewed:      Care Discussed with Consultants/Other Providers: Monika Masters MD  PGY 1 Department of Internal Medicine  Pager: [phone]    Patient is a 74y old  Male who presents with a chief complaint of Left foot gangrene, rule out osteomyelitis (12 Nov 2019 01:22)      SUBJECTIVE / OVERNIGHT EVENTS: Pt seen and examined. No acute overnight events. Denies fevers, chills, CP, SOB, Constipation, diarrhea.         MEDICATIONS  (STANDING):  atorvastatin 20 milliGRAM(s) Oral at bedtime  cefepime   IVPB      cefepime   IVPB 2000 milliGRAM(s) IV Intermittent every 12 hours  clopidogrel Tablet 75 milliGRAM(s) Oral daily  dextrose 5%. 1000 milliLiter(s) (50 mL/Hr) IV Continuous <Continuous>  dextrose 50% Injectable 12.5 Gram(s) IV Push once  dextrose 50% Injectable 25 Gram(s) IV Push once  dextrose 50% Injectable 25 Gram(s) IV Push once  digoxin     Tablet 0.125 milliGRAM(s) Oral daily  influenza   Vaccine 0.5 milliLiter(s) IntraMuscular once  insulin lispro (HumaLOG) corrective regimen sliding scale   SubCutaneous three times a day before meals  insulin lispro (HumaLOG) corrective regimen sliding scale   SubCutaneous at bedtime  metoprolol succinate  milliGRAM(s) Oral daily  rivaroxaban 20 milliGRAM(s) Oral with dinner  sodium chloride 0.9% Bolus 500 milliLiter(s) IV Bolus once  sodium chloride 0.9%. 1000 milliLiter(s) (100 mL/Hr) IV Continuous <Continuous>  vancomycin  IVPB 750 milliGRAM(s) IV Intermittent every 12 hours    MEDICATIONS  (PRN):  dextrose 40% Gel 15 Gram(s) Oral once PRN Blood Glucose LESS THAN 70 milliGRAM(s)/deciliter  glucagon  Injectable 1 milliGRAM(s) IntraMuscular once PRN Glucose LESS THAN 70 milligrams/deciliter      I&O's Summary    11 Nov 2019 07:01  -  12 Nov 2019 07:00  --------------------------------------------------------  IN: 250 mL / OUT: 0 mL / NET: 250 mL    12 Nov 2019 07:01  -  12 Nov 2019 09:19  --------------------------------------------------------  IN: 1000 mL / OUT: 0 mL / NET: 1000 mL        Vital Signs Last 24 Hrs  T(C): 36.5 (12 Nov 2019 05:23), Max: 36.8 (11 Nov 2019 20:53)  T(F): 97.7 (12 Nov 2019 05:23), Max: 98.3 (11 Nov 2019 20:53)  HR: 79 (12 Nov 2019 05:23) (60 - 79)  BP: 146/86 (12 Nov 2019 05:23) (128/62 - 149/78)  BP(mean): --  RR: 18 (12 Nov 2019 05:23) (16 - 18)  SpO2: 100% (12 Nov 2019 05:23) (91% - 100%)    CAPILLARY BLOOD GLUCOSE      POCT Blood Glucose.: 189 mg/dL (12 Nov 2019 08:55)      PHYSICAL EXAM:  	Constitutional: WDWN resting comfortably in bed; NAD. Cachectic appearing.  	Head: NC/AT  	Eyes: PERRL, EOMI, anicteric sclera  	Neck: Supple  	Respiratory: CTA B/L; no W/R/R, good inspiratory effort with no increased work of breathing  	Cardiac: +S1/S2; RRR; no M/R/G. No peripheral edema b/l.  	Gastrointestinal: Soft, NT/ND; no rebound or guarding; +BSx4  	Extremities: No cyanosis. +Palpable radial and femoral pulses b/l, faint DP pulses b/l.  	Musculoskeletal: NROM x4; no joint swelling, tenderness or erythema  	Skin: L toe wrapped in clean dry dressing. No bleed through  	Neurologic: Alert and oriented x3, no focal deficits appreciated              Psychiatric: Affect and characteristics of appearance, verbalizations, behaviors are appropriate       LABS:                        7.6    7.03  )-----------( 240      ( 12 Nov 2019 07:15 )             23.5     Auto Eosinophil # 0.01  / Auto Eosinophil % 0.1   / Auto Neutrophil # 4.08  / Auto Neutrophil % 58.1  / BANDS % x                            8.5    11.27 )-----------( 262      ( 11 Nov 2019 19:20 )             26.9     Auto Eosinophil # x     / Auto Eosinophil % x     / Auto Neutrophil # x     / Auto Neutrophil % x     / BANDS % x        11-12    120<LL>  |  86<L>  |  22  ----------------------------<  192<H>  4.5   |  22  |  0.68  11-11    122<L>  |  86<L>  |  28<H>  ----------------------------<  208<H>  4.6   |  21<L>  |  0.78    Ca    8.8      12 Nov 2019 07:15  Mg     1.5     11-12  Phos  2.6     11-12  TPro  6.4  /  Alb  2.9<L>  /  TBili  < 0.2<L>  /  DBili  x   /  AST  28  /  ALT  21  /  AlkPhos  63  11-12  TPro  7.1  /  Alb  3.1<L>  /  TBili  < 0.2<L>  /  DBili  x   /  AST  33  /  ALT  23  /  AlkPhos  81  11-11    PT/INR - ( 12 Nov 2019 07:15 )   PT: 17.5 SEC;   INR: 1.52          PTT - ( 12 Nov 2019 07:15 )  PTT:33.5 SEC              RADIOLOGY & ADDITIONAL TESTS:    Imaging Personally Reviewed:    Consultant(s) Notes Reviewed:      Care Discussed with Consultants/Other Providers:

## 2019-11-12 NOTE — H&P ADULT - PROBLEM SELECTOR PLAN 6
-need to confirm home meds  -Losartan 25mg in the system, will continue this medication Need to confirm home meds

## 2019-11-12 NOTE — H&P ADULT - NSHPPHYSICALEXAM_GEN_ALL_CORE
.  VITAL SIGNS:  T(C): 36.2 (11-11-19 @ 23:49), Max: 36.8 (11-11-19 @ 20:53)  T(F): 97.2 (11-11-19 @ 23:49), Max: 98.3 (11-11-19 @ 20:53)  HR: 72 (11-11-19 @ 23:49) (60 - 76)  BP: 149/78 (11-11-19 @ 23:49) (128/62 - 149/78)  BP(mean): --  RR: 18 (11-11-19 @ 23:49) (16 - 18)  SpO2: 98% (11-11-19 @ 23:49) (91% - 100%)  Wt(kg): --    PHYSICAL EXAM:    Constitutional: WDWN resting comfortably in bed; NAD  Head: NC/AT, cachectic appearing  Eyes: PERRL, EOMI, anicteric sclera  ENT: no nasal discharge, MMM  Neck: supple; no JVD appreciated  Respiratory: CTA B/L; no W/R/R, no increased work of breathing  Cardiac: +S1/S2; RRR; no M/R/G  Gastrointestinal: soft, NT/ND; no rebound or guarding; +BSx4  Extremities: Pt with gangrenous tip of  Left big toe, medial aspect of L small toe, and stage 2 heel ulcer. no peripheral edema  Musculoskeletal: NROM x4; no joint swelling, tenderness or erythema  Dermatologic: skin warm, dry and intact  Neurologic: Alert and oriented, no focal deficits appreciated  Psychiatric: affect and characteristics of appearance, verbalizations, behaviors are appropriate .  VITAL SIGNS:  T(C): 36.2 (11-11-19 @ 23:49), Max: 36.8 (11-11-19 @ 20:53)  T(F): 97.2 (11-11-19 @ 23:49), Max: 98.3 (11-11-19 @ 20:53)  HR: 72 (11-11-19 @ 23:49) (60 - 76)  BP: 149/78 (11-11-19 @ 23:49) (128/62 - 149/78)  BP(mean): --  RR: 18 (11-11-19 @ 23:49) (16 - 18)  SpO2: 98% (11-11-19 @ 23:49) (91% - 100%)  Wt(kg): --    PHYSICAL EXAM:    Constitutional: WDWN resting comfortably in bed; NAD. Cachectic appearing.  Head: NC/AT  Eyes: PERRL, EOMI, anicteric sclera  Mouth: MMM, no oropharyngeal exudates  Neck: Supple, trachea midline, no JVD appreciated  Respiratory: CTA B/L; no W/R/R, good inspiratory effort with no increased work of breathing  Cardiac: +S1/S2; RRR; no M/R/G. No peripheral edema b/l.  Gastrointestinal: Soft, NT/ND; no rebound or guarding; +BSx4  Extremities: No cyanosis. +Palpable radial and femoral pulses b/l, faint DP pulses b/l.  Musculoskeletal: NROM x4; no joint swelling, tenderness or erythema  Skin: Pt with gangrenous tip of left big toe, medial aspect of L small toe, and stage 2 heel ulcer.   Neurologic: Alert and oriented x3, no focal deficits appreciated  Psychiatric: Affect and characteristics of appearance, verbalizations, behaviors are appropriate .  VITAL SIGNS:  T(C): 36.2 (11-11-19 @ 23:49), Max: 36.8 (11-11-19 @ 20:53)  T(F): 97.2 (11-11-19 @ 23:49), Max: 98.3 (11-11-19 @ 20:53)  HR: 72 (11-11-19 @ 23:49) (60 - 76)  BP: 149/78 (11-11-19 @ 23:49) (128/62 - 149/78)  BP(mean): --  RR: 18 (11-11-19 @ 23:49) (16 - 18)  SpO2: 98% (11-11-19 @ 23:49) (91% - 100%)  Wt(kg): --    PHYSICAL EXAM:    Constitutional: WDWN resting comfortably in bed; NAD. Thin appearing.  Head: NC/AT  Eyes: PERRL, EOMI, anicteric sclera  Mouth: MMM, no oropharyngeal exudates  Neck: Supple, trachea midline, no JVD appreciated  Respiratory: CTA B/L; no W/R/R, good inspiratory effort with no increased work of breathing  Cardiac: +S1/S2; RRR; no M/R/G. No peripheral edema b/l.  Gastrointestinal: Soft, NT/ND; no rebound or guarding; +BSx4  Extremities: No cyanosis. +Palpable radial and femoral pulses b/l, faint DP pulses b/l.  Musculoskeletal: NROM x4; no joint swelling, tenderness or erythema  Skin: Pt with gangrenous tip of left big toe, medial aspect of L small toe, and stage 2 heel ulcer.   Neurologic: Alert and oriented x3, no focal deficits appreciated  Psychiatric: Affect and characteristics of appearance, verbalizations, behaviors are appropriate

## 2019-11-12 NOTE — H&P ADULT - HISTORY OF PRESENT ILLNESS
75 YO M with MHx of DM, HTN, hyperlipidemia, afib on xarelto and plavix (carotid pseudoaneurysm) PAD, PVD, sent to ED by his podiatrist for left foot gangrene. Pt states the the infection started 5 weeks ago and was being monitored by the podiatrist. Pt went to urgent care 4 days ago and was started on bactrim, then was seen today by the podiatrist to sent him to the hospital for IV abx. 75 YO M with MHx of HTN, hyperlipidemia, afib on xarelto, AICD (placed in 2014), CAD s/p PCI on plavix, PAD s/p L bypass and L ileac stent, DMII c/b neuropathy, sent to ED by his podiatrist for left foot gangrene. Pt states the the infection started 2 weeks ago and was being monitored by the podiatrist. Pt went was seen by a doctor 4 days ago and was started on bactrim, then was seen today by the podiatrist (Dr. Alex Leos ) who sent him to the hospital for IV abx. Pt states that he has long standing diabetic neuropathy and does not complain of pain at the site if gangrene. Pt does endorse decrease PO intake but denies fever, chills, nausea, vomiting, or abdominal pain.     In the ED: Tmax98.3F, /68, HR, Resp=, sat 98%. Labs: wbc=11.27, esr=77, crp=85.3, INR=2.33, Zp=266. xray: diffuse soft tissue swelling with cutaneous ulceration over the distal phalynx of the left first digit and subjacent cortical erosion of the first distal phalynx. pt was given a dose of vanc and cefepime. 73 YO M with MHx of HTN, hyperlipidemia, afib on xarelto, AICD (placed in 2014), CAD s/p PCI on plavix, PAD s/p L bypass and L iliac stent, DM2 c/b neuropathy, sent to ED by his podiatrist for left foot gangrene. Pt states the the infection started 2 weeks ago and was being monitored by the podiatrist. Pt went was seen by a doctor 4 days ago and was started on bactrim, then was seen today by the podiatrist (Dr. Alex Leos ) who sent him to the hospital for IV abx. Pt states that he has long standing diabetic neuropathy and does not complain of pain at the site of gangrene. Pt does endorse decreased PO intake but denies fever, chills, nausea, vomiting, or abdominal pain.     In the ED: Tmax98.3F, /68, HR, Resp=, sat 98%. Labs: wbc=11.27, esr=77, crp=85.3, INR=2.33, Yv=637. xray: diffuse soft tissue swelling with cutaneous ulceration over the distal phalynx of the left first digit and subjacent cortical erosion of the first distal phalynx. pt was given a dose of vanc and cefepime.

## 2019-11-12 NOTE — PROGRESS NOTE ADULT - SUBJECTIVE AND OBJECTIVE BOX
Vascular Surgery Progress Note     Subjective/24hour Events: No acute events overnight. Denies pain. No CP or SOB.    Vital Signs:  Vital Signs Last 24 Hrs  T(C): 36.5 (12 Nov 2019 05:23), Max: 36.8 (11 Nov 2019 20:53)  T(F): 97.7 (12 Nov 2019 05:23), Max: 98.3 (11 Nov 2019 20:53)  HR: 79 (12 Nov 2019 05:23) (60 - 79)  BP: 146/86 (12 Nov 2019 05:23) (128/62 - 149/78)  BP(mean): --  RR: 18 (12 Nov 2019 05:23) (16 - 18)  SpO2: 100% (12 Nov 2019 05:23) (91% - 100%)    CAPILLARY BLOOD GLUCOSE      POCT Blood Glucose.: 189 mg/dL (12 Nov 2019 08:55)      I&O's Detail    11 Nov 2019 07:01  -  12 Nov 2019 07:00  --------------------------------------------------------  IN:    dextrose 5% + sodium chloride 0.45%.: 150 mL    IV PiggyBack: 100 mL  Total IN: 250 mL    OUT:  Total OUT: 0 mL    Total NET: 250 mL      12 Nov 2019 07:01  -  12 Nov 2019 10:43  --------------------------------------------------------  IN:    sodium chloride 0.9%.: 1000 mL  Total IN: 1000 mL    OUT:  Total OUT: 0 mL    Total NET: 1000 mL            MEDICATIONS  (STANDING):  atorvastatin 20 milliGRAM(s) Oral at bedtime  clopidogrel Tablet 75 milliGRAM(s) Oral daily  dextrose 5%. 1000 milliLiter(s) (50 mL/Hr) IV Continuous <Continuous>  dextrose 50% Injectable 12.5 Gram(s) IV Push once  dextrose 50% Injectable 25 Gram(s) IV Push once  dextrose 50% Injectable 25 Gram(s) IV Push once  digoxin     Tablet 0.125 milliGRAM(s) Oral daily  influenza   Vaccine 0.5 milliLiter(s) IntraMuscular once  insulin lispro (HumaLOG) corrective regimen sliding scale   SubCutaneous three times a day before meals  insulin lispro (HumaLOG) corrective regimen sliding scale   SubCutaneous at bedtime  metoprolol succinate  milliGRAM(s) Oral daily  piperacillin/tazobactam IVPB. 3.375 Gram(s) IV Intermittent once  piperacillin/tazobactam IVPB.. 3.375 Gram(s) IV Intermittent every 8 hours  rivaroxaban 20 milliGRAM(s) Oral with dinner  sodium chloride 0.9%. 1000 milliLiter(s) (100 mL/Hr) IV Continuous <Continuous>  vancomycin  IVPB 750 milliGRAM(s) IV Intermittent every 12 hours    MEDICATIONS  (PRN):  dextrose 40% Gel 15 Gram(s) Oral once PRN Blood Glucose LESS THAN 70 milliGRAM(s)/deciliter  glucagon  Injectable 1 milliGRAM(s) IntraMuscular once PRN Glucose LESS THAN 70 milligrams/deciliter        Physical Exam:  Gen: NAD  LS: nml respiratory effort  Card: pulse regularly present  GI: abd soft, nontender  Ext: warm      Labs:    11-12    120<LL>  |  86<L>  |  22  ----------------------------<  192<H>  4.5   |  22  |  0.68    Ca    8.8      12 Nov 2019 07:15  Phos  2.6     11-12  Mg     1.5     11-12    TPro  6.4  /  Alb  2.9<L>  /  TBili  < 0.2<L>  /  DBili  x   /  AST  28  /  ALT  21  /  AlkPhos  63  11-12    LIVER FUNCTIONS - ( 12 Nov 2019 07:15 )  Alb: 2.9 g/dL / Pro: 6.4 g/dL / ALK PHOS: 63 u/L / ALT: 21 u/L / AST: 28 u/L / GGT: x                                 7.6    7.03  )-----------( 240      ( 12 Nov 2019 07:15 )             23.5     PT/INR - ( 12 Nov 2019 07:15 )   PT: 17.5 SEC;   INR: 1.52          PTT - ( 12 Nov 2019 07:15 )  PTT:33.5 SEC          Imaging:  EXAM:  RAD FOOT 2 VIEWS LEFT      EXAM:  RAD TIB-FIB LT      PROCEDURE DATE:  Nov 11 2019     INTERPRETATION:  Clinical indication: Left foot gangrene.    Technique: 2 views of the left tibia/fibula, 3 views of the left foot.    Comparison: No prior studies available.    Findings:  No acute fracture or dislocation. The joint spaces are preserved. Area of   lucency within the soft tissue of the first toe, likely corresponding to   area of ulceration. Vascular calcifications.    Impression:  No acute fracture. Area of lucency within the soft tissue of the first   toe, likely corresponding to area of ulceration. Vascular Surgery Progress Note     Subjective/24hour Events: No acute events overnight. Denies pain. No CP or SOB.    Vital Signs:  Vital Signs Last 24 Hrs  T(C): 36.5 (12 Nov 2019 05:23), Max: 36.8 (11 Nov 2019 20:53)  T(F): 97.7 (12 Nov 2019 05:23), Max: 98.3 (11 Nov 2019 20:53)  HR: 79 (12 Nov 2019 05:23) (60 - 79)  BP: 146/86 (12 Nov 2019 05:23) (128/62 - 149/78)  BP(mean): --  RR: 18 (12 Nov 2019 05:23) (16 - 18)  SpO2: 100% (12 Nov 2019 05:23) (91% - 100%)    CAPILLARY BLOOD GLUCOSE      POCT Blood Glucose.: 189 mg/dL (12 Nov 2019 08:55)      I&O's Detail    11 Nov 2019 07:01  -  12 Nov 2019 07:00  --------------------------------------------------------  IN:    dextrose 5% + sodium chloride 0.45%.: 150 mL    IV PiggyBack: 100 mL  Total IN: 250 mL    OUT:  Total OUT: 0 mL    Total NET: 250 mL      12 Nov 2019 07:01  -  12 Nov 2019 10:43  --------------------------------------------------------  IN:    sodium chloride 0.9%.: 1000 mL  Total IN: 1000 mL    OUT:  Total OUT: 0 mL    Total NET: 1000 mL            MEDICATIONS  (STANDING):  atorvastatin 20 milliGRAM(s) Oral at bedtime  clopidogrel Tablet 75 milliGRAM(s) Oral daily  dextrose 5%. 1000 milliLiter(s) (50 mL/Hr) IV Continuous <Continuous>  dextrose 50% Injectable 12.5 Gram(s) IV Push once  dextrose 50% Injectable 25 Gram(s) IV Push once  dextrose 50% Injectable 25 Gram(s) IV Push once  digoxin     Tablet 0.125 milliGRAM(s) Oral daily  influenza   Vaccine 0.5 milliLiter(s) IntraMuscular once  insulin lispro (HumaLOG) corrective regimen sliding scale   SubCutaneous three times a day before meals  insulin lispro (HumaLOG) corrective regimen sliding scale   SubCutaneous at bedtime  metoprolol succinate  milliGRAM(s) Oral daily  piperacillin/tazobactam IVPB. 3.375 Gram(s) IV Intermittent once  piperacillin/tazobactam IVPB.. 3.375 Gram(s) IV Intermittent every 8 hours  rivaroxaban 20 milliGRAM(s) Oral with dinner  sodium chloride 0.9%. 1000 milliLiter(s) (100 mL/Hr) IV Continuous <Continuous>  vancomycin  IVPB 750 milliGRAM(s) IV Intermittent every 12 hours    MEDICATIONS  (PRN):  dextrose 40% Gel 15 Gram(s) Oral once PRN Blood Glucose LESS THAN 70 milliGRAM(s)/deciliter  glucagon  Injectable 1 milliGRAM(s) IntraMuscular once PRN Glucose LESS THAN 70 milligrams/deciliter        Physical Exam:  Gen: NAD  LS: nml respiratory effort  Card: pulse regularly present  GI: abd soft, nontender  Groin: left groin w/ healed incisional scar and palpable pulse/thrill  Ext: warm, left 1st and 5th toes w/ partial dry gangrene      Labs:    11-12    120<LL>  |  86<L>  |  22  ----------------------------<  192<H>  4.5   |  22  |  0.68    Ca    8.8      12 Nov 2019 07:15  Phos  2.6     11-12  Mg     1.5     11-12    TPro  6.4  /  Alb  2.9<L>  /  TBili  < 0.2<L>  /  DBili  x   /  AST  28  /  ALT  21  /  AlkPhos  63  11-12    LIVER FUNCTIONS - ( 12 Nov 2019 07:15 )  Alb: 2.9 g/dL / Pro: 6.4 g/dL / ALK PHOS: 63 u/L / ALT: 21 u/L / AST: 28 u/L / GGT: x                                 7.6    7.03  )-----------( 240      ( 12 Nov 2019 07:15 )             23.5     PT/INR - ( 12 Nov 2019 07:15 )   PT: 17.5 SEC;   INR: 1.52          PTT - ( 12 Nov 2019 07:15 )  PTT:33.5 SEC          Imaging:  EXAM:  RAD FOOT 2 VIEWS LEFT      EXAM:  RAD TIB-FIB LT      PROCEDURE DATE:  Nov 11 2019     INTERPRETATION:  Clinical indication: Left foot gangrene.    Technique: 2 views of the left tibia/fibula, 3 views of the left foot.    Comparison: No prior studies available.    Findings:  No acute fracture or dislocation. The joint spaces are preserved. Area of   lucency within the soft tissue of the first toe, likely corresponding to   area of ulceration. Vascular calcifications.    Impression:  No acute fracture. Area of lucency within the soft tissue of the first   toe, likely corresponding to area of ulceration.

## 2019-11-12 NOTE — H&P ADULT - PROBLEM SELECTOR PLAN 5
-need to confirm home meds.  -ISS for now. -need to confirm home meds  -ISS and FSG qAC and qhs for now

## 2019-11-12 NOTE — PROGRESS NOTE ADULT - PROBLEM SELECTOR PLAN 7
W/ chronic microcytic anemia likely 2/2 ACD vs iron def anemia. Baseline 8-9  -Will send iron studies   -No active signs of bleeding at this time  -Maintain active type and screen  -Monitor CBCs

## 2019-11-13 LAB
ANION GAP SERPL CALC-SCNC: 10 MMO/L — SIGNIFICANT CHANGE UP (ref 7–14)
ANION GAP SERPL CALC-SCNC: 11 MMO/L — SIGNIFICANT CHANGE UP (ref 7–14)
ANION GAP SERPL CALC-SCNC: 13 MMO/L — SIGNIFICANT CHANGE UP (ref 7–14)
ANION GAP SERPL CALC-SCNC: 14 MMO/L — SIGNIFICANT CHANGE UP (ref 7–14)
APTT BLD: 40 SEC — HIGH (ref 27.5–36.3)
BASE EXCESS BLDV CALC-SCNC: -1.4 MMOL/L — SIGNIFICANT CHANGE UP
BLOOD GAS VENOUS - CREATININE: 0.74 MG/DL — SIGNIFICANT CHANGE UP (ref 0.5–1.3)
BUN SERPL-MCNC: 16 MG/DL — SIGNIFICANT CHANGE UP (ref 7–23)
BUN SERPL-MCNC: 17 MG/DL — SIGNIFICANT CHANGE UP (ref 7–23)
BUN SERPL-MCNC: 18 MG/DL — SIGNIFICANT CHANGE UP (ref 7–23)
BUN SERPL-MCNC: 20 MG/DL — SIGNIFICANT CHANGE UP (ref 7–23)
CALCIUM SERPL-MCNC: 8.6 MG/DL — SIGNIFICANT CHANGE UP (ref 8.4–10.5)
CHLORIDE BLDV-SCNC: 92 MMOL/L — LOW (ref 96–108)
CHLORIDE SERPL-SCNC: 87 MMOL/L — LOW (ref 98–107)
CHLORIDE SERPL-SCNC: 87 MMOL/L — LOW (ref 98–107)
CHLORIDE SERPL-SCNC: 88 MMOL/L — LOW (ref 98–107)
CHLORIDE SERPL-SCNC: 88 MMOL/L — LOW (ref 98–107)
CO2 SERPL-SCNC: 18 MMOL/L — LOW (ref 22–31)
CO2 SERPL-SCNC: 21 MMOL/L — LOW (ref 22–31)
CO2 SERPL-SCNC: 21 MMOL/L — LOW (ref 22–31)
CO2 SERPL-SCNC: 22 MMOL/L — SIGNIFICANT CHANGE UP (ref 22–31)
CREAT SERPL-MCNC: 0.67 MG/DL — SIGNIFICANT CHANGE UP (ref 0.5–1.3)
CREAT SERPL-MCNC: 0.68 MG/DL — SIGNIFICANT CHANGE UP (ref 0.5–1.3)
CREAT SERPL-MCNC: 0.7 MG/DL — SIGNIFICANT CHANGE UP (ref 0.5–1.3)
CREAT SERPL-MCNC: 0.72 MG/DL — SIGNIFICANT CHANGE UP (ref 0.5–1.3)
DIGOXIN SERPL-MCNC: 0.7 NG/ML — LOW (ref 0.8–2)
FERRITIN SERPL-MCNC: 104.4 NG/ML — SIGNIFICANT CHANGE UP (ref 30–400)
GAS PNL BLDV: 116 MMOL/L — CRITICAL LOW (ref 136–146)
GLUCOSE BLDC GLUCOMTR-MCNC: 149 MG/DL — HIGH (ref 70–99)
GLUCOSE BLDC GLUCOMTR-MCNC: 160 MG/DL — HIGH (ref 70–99)
GLUCOSE BLDC GLUCOMTR-MCNC: 167 MG/DL — HIGH (ref 70–99)
GLUCOSE BLDC GLUCOMTR-MCNC: 170 MG/DL — HIGH (ref 70–99)
GLUCOSE BLDV-MCNC: 168 MG/DL — HIGH (ref 70–99)
GLUCOSE SERPL-MCNC: 144 MG/DL — HIGH (ref 70–99)
GLUCOSE SERPL-MCNC: 153 MG/DL — HIGH (ref 70–99)
GLUCOSE SERPL-MCNC: 160 MG/DL — HIGH (ref 70–99)
GLUCOSE SERPL-MCNC: 168 MG/DL — HIGH (ref 70–99)
HBA1C BLD-MCNC: 5.8 % — HIGH (ref 4–5.6)
HCO3 BLDV-SCNC: 23 MMOL/L — SIGNIFICANT CHANGE UP (ref 20–27)
HCT VFR BLD CALC: 23.3 % — LOW (ref 39–50)
HCT VFR BLDV CALC: 28.6 % — LOW (ref 39–51)
HGB BLD-MCNC: 7.5 G/DL — LOW (ref 13–17)
HGB BLDV-MCNC: 9.2 G/DL — LOW (ref 13–17)
INR BLD: 2.24 — HIGH (ref 0.88–1.17)
IRON SATN MFR SERPL: 16 UG/DL — LOW (ref 45–165)
IRON SATN MFR SERPL: 197 UG/DL — SIGNIFICANT CHANGE UP (ref 155–535)
LACTATE BLDV-MCNC: 1.2 MMOL/L — SIGNIFICANT CHANGE UP (ref 0.5–2)
MAGNESIUM SERPL-MCNC: 1.4 MG/DL — LOW (ref 1.6–2.6)
MCHC RBC-ENTMCNC: 25.2 PG — LOW (ref 27–34)
MCHC RBC-ENTMCNC: 32.2 % — SIGNIFICANT CHANGE UP (ref 32–36)
MCV RBC AUTO: 78.2 FL — LOW (ref 80–100)
NRBC # FLD: 0 K/UL — SIGNIFICANT CHANGE UP (ref 0–0)
OSMOLALITY SERPL: 276 MOSMO/KG — SIGNIFICANT CHANGE UP (ref 275–295)
OSMOLALITY UR: 490 MOSMO/KG — SIGNIFICANT CHANGE UP (ref 50–1200)
PCO2 BLDV: 37 MMHG — LOW (ref 41–51)
PH BLDV: 7.41 PH — SIGNIFICANT CHANGE UP (ref 7.32–7.43)
PHOSPHATE SERPL-MCNC: 2.4 MG/DL — LOW (ref 2.5–4.5)
PLATELET # BLD AUTO: 256 K/UL — SIGNIFICANT CHANGE UP (ref 150–400)
PMV BLD: 10.7 FL — SIGNIFICANT CHANGE UP (ref 7–13)
PO2 BLDV: 45 MMHG — HIGH (ref 35–40)
POTASSIUM BLDV-SCNC: 4.2 MMOL/L — SIGNIFICANT CHANGE UP (ref 3.4–4.5)
POTASSIUM SERPL-MCNC: 4.2 MMOL/L — SIGNIFICANT CHANGE UP (ref 3.5–5.3)
POTASSIUM SERPL-MCNC: 4.6 MMOL/L — SIGNIFICANT CHANGE UP (ref 3.5–5.3)
POTASSIUM SERPL-MCNC: 4.6 MMOL/L — SIGNIFICANT CHANGE UP (ref 3.5–5.3)
POTASSIUM SERPL-MCNC: 4.7 MMOL/L — SIGNIFICANT CHANGE UP (ref 3.5–5.3)
POTASSIUM SERPL-SCNC: 4.2 MMOL/L — SIGNIFICANT CHANGE UP (ref 3.5–5.3)
POTASSIUM SERPL-SCNC: 4.6 MMOL/L — SIGNIFICANT CHANGE UP (ref 3.5–5.3)
POTASSIUM SERPL-SCNC: 4.6 MMOL/L — SIGNIFICANT CHANGE UP (ref 3.5–5.3)
POTASSIUM SERPL-SCNC: 4.7 MMOL/L — SIGNIFICANT CHANGE UP (ref 3.5–5.3)
PROTHROM AB SERPL-ACNC: 25.5 SEC — HIGH (ref 9.8–13.1)
RBC # BLD: 2.98 M/UL — LOW (ref 4.2–5.8)
RBC # FLD: 14.8 % — HIGH (ref 10.3–14.5)
SAO2 % BLDV: 76 % — SIGNIFICANT CHANGE UP (ref 60–85)
SODIUM SERPL-SCNC: 119 MMOL/L — CRITICAL LOW (ref 135–145)
SODIUM SERPL-SCNC: 120 MMOL/L — CRITICAL LOW (ref 135–145)
SODIUM SERPL-SCNC: 120 MMOL/L — CRITICAL LOW (ref 135–145)
SODIUM SERPL-SCNC: 121 MMOL/L — LOW (ref 135–145)
SODIUM UR-SCNC: 57 MMOL/L — SIGNIFICANT CHANGE UP
UIBC SERPL-MCNC: 180.8 UG/DL — SIGNIFICANT CHANGE UP (ref 110–370)
VANCOMYCIN TROUGH SERPL-MCNC: 14.4 UG/ML — SIGNIFICANT CHANGE UP (ref 10–20)
WBC # BLD: 8.75 K/UL — SIGNIFICANT CHANGE UP (ref 3.8–10.5)
WBC # FLD AUTO: 8.75 K/UL — SIGNIFICANT CHANGE UP (ref 3.8–10.5)

## 2019-11-13 PROCEDURE — 99233 SBSQ HOSP IP/OBS HIGH 50: CPT | Mod: GC

## 2019-11-13 PROCEDURE — 99223 1ST HOSP IP/OBS HIGH 75: CPT | Mod: GC

## 2019-11-13 RX ORDER — FERROUS SULFATE 325(65) MG
325 TABLET ORAL DAILY
Refills: 0 | Status: DISCONTINUED | OUTPATIENT
Start: 2019-11-13 | End: 2019-11-21

## 2019-11-13 RX ORDER — SODIUM CHLORIDE 5 G/100ML
500 INJECTION, SOLUTION INTRAVENOUS
Refills: 0 | Status: DISCONTINUED | OUTPATIENT
Start: 2019-11-13 | End: 2019-11-14

## 2019-11-13 RX ORDER — SODIUM CHLORIDE 9 MG/ML
1000 INJECTION INTRAMUSCULAR; INTRAVENOUS; SUBCUTANEOUS
Refills: 0 | Status: DISCONTINUED | OUTPATIENT
Start: 2019-11-13 | End: 2019-11-13

## 2019-11-13 RX ORDER — VANCOMYCIN HCL 1 G
1000 VIAL (EA) INTRAVENOUS EVERY 12 HOURS
Refills: 0 | Status: DISCONTINUED | OUTPATIENT
Start: 2019-11-13 | End: 2019-11-16

## 2019-11-13 RX ADMIN — Medication 250 MILLIGRAM(S): at 06:20

## 2019-11-13 RX ADMIN — PIPERACILLIN AND TAZOBACTAM 25 GRAM(S): 4; .5 INJECTION, POWDER, LYOPHILIZED, FOR SOLUTION INTRAVENOUS at 07:06

## 2019-11-13 RX ADMIN — ATORVASTATIN CALCIUM 20 MILLIGRAM(S): 80 TABLET, FILM COATED ORAL at 21:18

## 2019-11-13 RX ADMIN — Medication 0.12 MILLIGRAM(S): at 06:20

## 2019-11-13 RX ADMIN — PIPERACILLIN AND TAZOBACTAM 25 GRAM(S): 4; .5 INJECTION, POWDER, LYOPHILIZED, FOR SOLUTION INTRAVENOUS at 13:09

## 2019-11-13 RX ADMIN — Medication 1: at 18:29

## 2019-11-13 RX ADMIN — SODIUM CHLORIDE 100 MILLILITER(S): 9 INJECTION INTRAMUSCULAR; INTRAVENOUS; SUBCUTANEOUS at 08:04

## 2019-11-13 RX ADMIN — Medication 250 MILLIGRAM(S): at 19:23

## 2019-11-13 RX ADMIN — Medication 150 MILLIGRAM(S): at 06:20

## 2019-11-13 RX ADMIN — Medication 325 MILLIGRAM(S): at 18:28

## 2019-11-13 RX ADMIN — SODIUM CHLORIDE 50 MILLILITER(S): 5 INJECTION, SOLUTION INTRAVENOUS at 18:02

## 2019-11-13 RX ADMIN — PIPERACILLIN AND TAZOBACTAM 25 GRAM(S): 4; .5 INJECTION, POWDER, LYOPHILIZED, FOR SOLUTION INTRAVENOUS at 21:19

## 2019-11-13 RX ADMIN — CLOPIDOGREL BISULFATE 75 MILLIGRAM(S): 75 TABLET, FILM COATED ORAL at 13:08

## 2019-11-13 RX ADMIN — RIVAROXABAN 20 MILLIGRAM(S): KIT at 18:28

## 2019-11-13 RX ADMIN — Medication 1: at 09:10

## 2019-11-13 NOTE — PROCEDURE NOTE - ADDITIONAL PROCEDURE DETAILS
Please keep bolivar catheter until patient is at baseline, including having regular bowel movements.

## 2019-11-13 NOTE — CONSULT NOTE ADULT - SUBJECTIVE AND OBJECTIVE BOX
Consult note    75 YO M with MHx of HTN, hyperlipidemia, afib on xarelto, AICD (placed in 2014), CAD s/p PCI on plavix, PAD s/p L bypass and L ileac stent, DMII c/b neuropathy admitted for left foot gangrene. Nephrology consulted for hyponatremia management. No dysuria, frequency, urgency, or hematuria. Patient endorses decreased PO intake on presentation. Denies vomiting, diarrhea. Sodium 122 on admission, down to 120 today. Bun and Cr wnl. No hx of renal or liver pathology. Doesn't follow regularly with doctor.     ROS negative except as above    OBJECTIVE:    VITAL SIGNS:  ICU Vital Signs Last 24 Hrs  T(C): 36.4 (13 Nov 2019 06:14), Max: 36.4 (13 Nov 2019 06:14)  T(F): 97.5 (13 Nov 2019 06:14), Max: 97.5 (13 Nov 2019 06:14)  HR: 78 (13 Nov 2019 06:14) (58 - 82)  BP: 135/60 (13 Nov 2019 06:14) (105/65 - 135/60)  BP(mean): --  ABP: --  ABP(mean): --  RR: 18 (13 Nov 2019 06:14) (18 - 18)  SpO2: 98% (13 Nov 2019 06:14) (98% - 99%)        11-12 @ 07:01  -  11-13 @ 07:00  --------------------------------------------------------  IN: 2750 mL / OUT: 650 mL / NET: 2100 mL      CAPILLARY BLOOD GLUCOSE      POCT Blood Glucose.: 170 mg/dL (13 Nov 2019 08:53)      PHYSICAL EXAM:    Constitutional: WDWN resting comfortably in bed; NAD. Thin appearing.  Head: NC/AT  Eyes: PERRL, EOMI, anicteric sclera  Mouth: MMM, no oropharyngeal exudates  Neck: Supple, trachea midline, no JVD appreciated  Respiratory: CTA B/L; no W/R/R, good inspiratory effort with no increased work of breathing  Cardiac: +S1/S2; RRR; no M/R/G. No peripheral edema b/l.  Gastrointestinal: Soft, NT/ND; no rebound or guarding; +BSx4  Extremities: No cyanosis. +Palpable radial and femoral pulses b/l, faint DP pulses b/l.  Musculoskeletal: NROM x4; no joint swelling, tenderness or erythema  Skin: Pt with gangrenous tip of left big toe, medial aspect of L small toe, and stage 2 heel ulcer.   Neurologic: Alert and oriented x3, no focal deficits appreciated  Psychiatric: Affect and characteristics of appearance, verbalizations, behaviors are appropriate    MEDICATIONS:  MEDICATIONS  (STANDING):  atorvastatin 20 milliGRAM(s) Oral at bedtime  clopidogrel Tablet 75 milliGRAM(s) Oral daily  dextrose 5%. 1000 milliLiter(s) (50 mL/Hr) IV Continuous <Continuous>  dextrose 50% Injectable 12.5 Gram(s) IV Push once  dextrose 50% Injectable 25 Gram(s) IV Push once  dextrose 50% Injectable 25 Gram(s) IV Push once  digoxin     Tablet 0.125 milliGRAM(s) Oral daily  influenza   Vaccine 0.5 milliLiter(s) IntraMuscular once  insulin lispro (HumaLOG) corrective regimen sliding scale   SubCutaneous three times a day before meals  insulin lispro (HumaLOG) corrective regimen sliding scale   SubCutaneous at bedtime  metoprolol succinate  milliGRAM(s) Oral daily  piperacillin/tazobactam IVPB.. 3.375 Gram(s) IV Intermittent every 8 hours  rivaroxaban 20 milliGRAM(s) Oral with dinner  vancomycin  IVPB 750 milliGRAM(s) IV Intermittent every 12 hours    MEDICATIONS  (PRN):  dextrose 40% Gel 15 Gram(s) Oral once PRN Blood Glucose LESS THAN 70 milliGRAM(s)/deciliter  glucagon  Injectable 1 milliGRAM(s) IntraMuscular once PRN Glucose LESS THAN 70 milligrams/deciliter      ALLERGIES:  Allergies    No Known Allergies    Intolerances        LABS:                        7.5    8.75  )-----------( 256      ( 13 Nov 2019 06:54 )             23.3     11-13    121<L>  |  87<L>  |  18  ----------------------------<  168<H>  4.6   |  21<L>  |  0.67    Ca    8.6      13 Nov 2019 06:54  Phos  2.6     11-12  Mg     1.5     11-12    TPro  6.4  /  Alb  2.9<L>  /  TBili  < 0.2<L>  /  DBili  x   /  AST  28  /  ALT  21  /  AlkPhos  63  11-12    PT/INR - ( 13 Nov 2019 06:54 )   PT: 25.5 SEC;   INR: 2.24          PTT - ( 13 Nov 2019 06:54 )  PTT:40.0 SEC      RADIOLOGY & ADDITIONAL TESTS: Reviewed. Consult note    73 YO M with MHx of HTN, hyperlipidemia, afib on xarelto, AICD (placed in 2014), CAD s/p PCI on plavix, PAD s/p L bypass and L ileac stent, DMII c/b neuropathy admitted for left foot gangrene. Nephrology consulted for hyponatremia management. No dysuria, frequency, urgency, or hematuria. Patient endorses decreased PO intake on presentation. Denies vomiting, diarrhea. Sodium 122 on admission, down to 120 today. Bun and Cr wnl. No hx of renal or liver pathology. According to patient's daughter, sodium normally around 130s. Hasn't urinated since arrival, however has made nearly 1 L of urine since bolivar placement this morning.     ROS negative except as above    OBJECTIVE:    VITAL SIGNS:  ICU Vital Signs Last 24 Hrs  T(C): 36.4 (13 Nov 2019 06:14), Max: 36.4 (13 Nov 2019 06:14)  T(F): 97.5 (13 Nov 2019 06:14), Max: 97.5 (13 Nov 2019 06:14)  HR: 78 (13 Nov 2019 06:14) (58 - 82)  BP: 135/60 (13 Nov 2019 06:14) (105/65 - 135/60)  BP(mean): --  ABP: --  ABP(mean): --  RR: 18 (13 Nov 2019 06:14) (18 - 18)  SpO2: 98% (13 Nov 2019 06:14) (98% - 99%)        11-12 @ 07:01  -  11-13 @ 07:00  --------------------------------------------------------  IN: 2750 mL / OUT: 650 mL / NET: 2100 mL      CAPILLARY BLOOD GLUCOSE      POCT Blood Glucose.: 170 mg/dL (13 Nov 2019 08:53)      PHYSICAL EXAM:    Constitutional: WDWN resting comfortably in bed; NAD. Thin appearing.  Head: NC/AT  Eyes: PERRL, EOMI, anicteric sclera  Mouth: MMM, no oropharyngeal exudates  Neck: Supple, trachea midline, no JVD appreciated  Respiratory: CTA B/L; no W/R/R, good inspiratory effort with no increased work of breathing  Cardiac: +S1/S2; RRR; no M/R/G. No peripheral edema b/l.  Gastrointestinal: Soft, NT/ND; no rebound or guarding; +BSx4  Extremities: No cyanosis. +Palpable radial and femoral pulses b/l, faint DP pulses b/l.  Musculoskeletal: NROM x4; no joint swelling, tenderness or erythema  Skin: Pt with gangrenous tip of left big toe, medial aspect of L small toe, and stage 2 heel ulcer.   Neurologic: Alert and oriented x3, no focal deficits appreciated  Psychiatric: Affect and characteristics of appearance, verbalizations, behaviors are appropriate    MEDICATIONS:  MEDICATIONS  (STANDING):  atorvastatin 20 milliGRAM(s) Oral at bedtime  clopidogrel Tablet 75 milliGRAM(s) Oral daily  dextrose 5%. 1000 milliLiter(s) (50 mL/Hr) IV Continuous <Continuous>  dextrose 50% Injectable 12.5 Gram(s) IV Push once  dextrose 50% Injectable 25 Gram(s) IV Push once  dextrose 50% Injectable 25 Gram(s) IV Push once  digoxin     Tablet 0.125 milliGRAM(s) Oral daily  influenza   Vaccine 0.5 milliLiter(s) IntraMuscular once  insulin lispro (HumaLOG) corrective regimen sliding scale   SubCutaneous three times a day before meals  insulin lispro (HumaLOG) corrective regimen sliding scale   SubCutaneous at bedtime  metoprolol succinate  milliGRAM(s) Oral daily  piperacillin/tazobactam IVPB.. 3.375 Gram(s) IV Intermittent every 8 hours  rivaroxaban 20 milliGRAM(s) Oral with dinner  vancomycin  IVPB 750 milliGRAM(s) IV Intermittent every 12 hours    MEDICATIONS  (PRN):  dextrose 40% Gel 15 Gram(s) Oral once PRN Blood Glucose LESS THAN 70 milliGRAM(s)/deciliter  glucagon  Injectable 1 milliGRAM(s) IntraMuscular once PRN Glucose LESS THAN 70 milligrams/deciliter      ALLERGIES:  Allergies    No Known Allergies    Intolerances        LABS:                        7.5    8.75  )-----------( 256      ( 13 Nov 2019 06:54 )             23.3     11-13    121<L>  |  87<L>  |  18  ----------------------------<  168<H>  4.6   |  21<L>  |  0.67    Ca    8.6      13 Nov 2019 06:54  Phos  2.6     11-12  Mg     1.5     11-12    TPro  6.4  /  Alb  2.9<L>  /  TBili  < 0.2<L>  /  DBili  x   /  AST  28  /  ALT  21  /  AlkPhos  63  11-12    PT/INR - ( 13 Nov 2019 06:54 )   PT: 25.5 SEC;   INR: 2.24          PTT - ( 13 Nov 2019 06:54 )  PTT:40.0 SEC      RADIOLOGY & ADDITIONAL TESTS: Reviewed.

## 2019-11-13 NOTE — CONSULT NOTE ADULT - ATTENDING COMMENTS
Full consult note as above; discussed with surgery resident and vascular fellow.   Mr. Leggett is a long-term pt of mine who has complicated arterial disease. He was most recently had an infected left femoral endarterectomy patch removed. This was replaced with a saphenous vein and muscle flap. 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 There are also multiple stenoses in the superficial femoral artery. He has localized gangrene in his toes. He is extremely frail in appearance now. He also has a knee contracture and is non ambulatory. I do not feel that a revascularization is possible or appropriate at this time.
s/p bolivar placement with 1l output.   expect Na to improve.   awaiting repeat Na level to determine management plan

## 2019-11-13 NOTE — PROGRESS NOTE ADULT - ATTENDING COMMENTS
Pt with moderate-severe hyponatremia despite fluids; concern for SIADH. Appreciate prompt Nephrology consult. Once urine Na results, anticipate possible 1.5%NS for slow correction. 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 Pt with moderate-severe hyponatremia despite fluids; concern for SIADH. Appreciate prompt Nephrology consult. Once urine Na results, anticipate possible 1.5%NS for slow correction. 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    Once hyponatremia corrected, anticipate LE vascular intervention given severe PVD (LLE with non-Dopplerable pulses).

## 2019-11-13 NOTE — PROCEDURE NOTE - NSURITECHNIQUE_GU_A_CORE
Proper hand hygiene was performed/Sterile gloves were worn for the duration of the procedure/The catheter was secured with a securement device (e.g. StatLock)/The site was cleaned with soap/water and sterile solution (betadine)/The urinary drainage system is closed at the end of the procedure/All applicable medical record documentation is completed/The catheter was appropriately lubricated/The collection bag is below the level of the patient and urinary bladder/A sterile drape was used to cover all adjacent areas

## 2019-11-13 NOTE — DIETITIAN INITIAL EVALUATION ADULT. - PERTINENT LABORATORY DATA
11-13 Na 121 mmol/L<L> Glu 168 mg/dL<H> K+ 4.6 mmol/L Cr 0.67 mg/dL BUN 18 mg/dL Phos n/a   Alb n/a   PAB n/a   Hgb 7.5 g/dL<L> Hct 23.3 %<L> HgA1C 5.8 %<H>  Hemoglobin A1C, Whole Blood: 5.8 % (11-13-19 @ 06:54)  Glucose, Serum: 168 mg/dL <H>  Glucose, Serum: 160 mg/dL <H>  Glucose, Serum: 156 mg/dL <H>   24Hr FS:149 mg/dL  170 mg/dL  167 mg/dL  161 mg/dL

## 2019-11-13 NOTE — CHART NOTE - NSCHARTNOTEFT_GEN_A_CORE
NUTRITION SERVICES     Upon Nutritional Assessment by the Registered Dietitian your patient was determined to meet criteria/ has evidence of the following diagnosis/diagnoses:  [x] Severe Protein Calorie Malnutrition   [x] Underweight / BMI <19    Findings as based on:  •  Comprehensive nutritional assessment and consultation    Please refer to Initial Dietitian Evaluation or Nutrition Follow-up via documents section of [employer] [facility] [facility] for further recommendations.

## 2019-11-13 NOTE — PROGRESS NOTE ADULT - SUBJECTIVE AND OBJECTIVE BOX
Monika Masters MD  PGY 1 Department of Internal Medicine  Pager: [phone]    Patient is a 74y old  Male who presents with a chief complaint of Left foot gangrene, rule out osteomyelitis (12 Nov 2019 10:43)      SUBJECTIVE / OVERNIGHT EVENTS: Pt seen and examined. No acute overnight events.        MEDICATIONS  (STANDING):  atorvastatin 20 milliGRAM(s) Oral at bedtime  clopidogrel Tablet 75 milliGRAM(s) Oral daily  dextrose 5%. 1000 milliLiter(s) (50 mL/Hr) IV Continuous <Continuous>  dextrose 50% Injectable 12.5 Gram(s) IV Push once  dextrose 50% Injectable 25 Gram(s) IV Push once  dextrose 50% Injectable 25 Gram(s) IV Push once  digoxin     Tablet 0.125 milliGRAM(s) Oral daily  influenza   Vaccine 0.5 milliLiter(s) IntraMuscular once  insulin lispro (HumaLOG) corrective regimen sliding scale   SubCutaneous three times a day before meals  insulin lispro (HumaLOG) corrective regimen sliding scale   SubCutaneous at bedtime  metoprolol succinate  milliGRAM(s) Oral daily  piperacillin/tazobactam IVPB.. 3.375 Gram(s) IV Intermittent every 8 hours  rivaroxaban 20 milliGRAM(s) Oral with dinner  vancomycin  IVPB 750 milliGRAM(s) IV Intermittent every 12 hours    MEDICATIONS  (PRN):  dextrose 40% Gel 15 Gram(s) Oral once PRN Blood Glucose LESS THAN 70 milliGRAM(s)/deciliter  glucagon  Injectable 1 milliGRAM(s) IntraMuscular once PRN Glucose LESS THAN 70 milligrams/deciliter      I&O's Summary    11 Nov 2019 07:01  -  12 Nov 2019 07:00  --------------------------------------------------------  IN: 250 mL / OUT: 0 mL / NET: 250 mL    12 Nov 2019 07:01  -  13 Nov 2019 06:51  --------------------------------------------------------  IN: 2500 mL / OUT: 650 mL / NET: 1850 mL        Vital Signs Last 24 Hrs  T(C): 36.4 (13 Nov 2019 06:14), Max: 36.4 (13 Nov 2019 06:14)  T(F): 97.5 (13 Nov 2019 06:14), Max: 97.5 (13 Nov 2019 06:14)  HR: 78 (13 Nov 2019 06:14) (58 - 82)  BP: 135/60 (13 Nov 2019 06:14) (105/65 - 135/60)  BP(mean): --  RR: 18 (13 Nov 2019 06:14) (18 - 18)  SpO2: 98% (13 Nov 2019 06:14) (98% - 99%)    CAPILLARY BLOOD GLUCOSE      POCT Blood Glucose.: 167 mg/dL (12 Nov 2019 22:38)  POCT Blood Glucose.: 161 mg/dL (12 Nov 2019 17:56)  POCT Blood Glucose.: 144 mg/dL (12 Nov 2019 12:25)  POCT Blood Glucose.: 189 mg/dL (12 Nov 2019 08:55)      PHYSICAL EXAM:              Constitutional: WDWN resting comfortably in bed; NAD. Cachectic appearing.  	Head: NC/AT  	Eyes: PERRL, EOMI, anicteric sclera  	Neck: Supple  	Respiratory: CTA B/L; no W/R/R, good inspiratory effort with no increased work of breathing  	Cardiac: +S1/S2; RRR; no M/R/G. No peripheral edema b/l.  	Gastrointestinal: Soft, NT/ND; no rebound or guarding; +BSx4  	Extremities: No cyanosis. +Palpable radial and femoral pulses b/l, faint DP pulses b/l.  	Musculoskeletal: NROM x4; no joint swelling, tenderness or erythema  	Skin: L toe wrapped in clean dry dressing. No bleed through  	Neurologic: Alert and oriented x3, no focal deficits appreciated              Psychiatric: Affect and characteristics of appearance, verbalizations, behaviors are appropriate       LABS:                        7.6    7.03  )-----------( 240      ( 12 Nov 2019 07:15 )             23.5     Auto Eosinophil # 0.01  / Auto Eosinophil % 0.1   / Auto Neutrophil # 4.08  / Auto Neutrophil % 58.1  / BANDS % 1.0                          8.5    11.27 )-----------( 262      ( 11 Nov 2019 19:20 )             26.9     Auto Eosinophil # x     / Auto Eosinophil % x     / Auto Neutrophil # x     / Auto Neutrophil % x     / BANDS % x        11-12    120<LL>  |  87<L>  |  20  ----------------------------<  160<H>  4.6   |  22  |  0.72  11-12    123<L>  |  92<L>  |  20  ----------------------------<  156<H>  4.4   |  20<L>  |  0.66  11-12    120<LL>  |  86<L>  |  22  ----------------------------<  192<H>  4.5   |  22  |  0.68    Ca    8.6      12 Nov 2019 23:30  Mg     1.5     11-12  Phos  2.6     11-12  TPro  6.4  /  Alb  2.9<L>  /  TBili  < 0.2<L>  /  DBili  x   /  AST  28  /  ALT  21  /  AlkPhos  63  11-12  TPro  7.1  /  Alb  3.1<L>  /  TBili  < 0.2<L>  /  DBili  x   /  AST  33  /  ALT  23  /  AlkPhos  81  11-11    PT/INR - ( 12 Nov 2019 07:15 )   PT: 17.5 SEC;   INR: 1.52          PTT - ( 12 Nov 2019 07:15 )  PTT:33.5 SEC              RADIOLOGY & ADDITIONAL TESTS:    Imaging Personally Reviewed:    Consultant(s) Notes Reviewed:      Care Discussed with Consultants/Other Providers: Monika Masters MD  PGY 1 Department of Internal Medicine  Pager: [phone]    Patient is a 74y old  Male who presents with a chief complaint of Left foot gangrene, rule out osteomyelitis (12 Nov 2019 10:43)      SUBJECTIVE / OVERNIGHT EVENTS: Pt seen and examined. No acute overnight events. More confused than baseline today per family. Pt now AAOx2 on exam. Denies fevers, chills. N/V, CP, SOB, constipation, diarrhea, LE swelling         MEDICATIONS  (STANDING):  atorvastatin 20 milliGRAM(s) Oral at bedtime  clopidogrel Tablet 75 milliGRAM(s) Oral daily  dextrose 5%. 1000 milliLiter(s) (50 mL/Hr) IV Continuous <Continuous>  dextrose 50% Injectable 12.5 Gram(s) IV Push once  dextrose 50% Injectable 25 Gram(s) IV Push once  dextrose 50% Injectable 25 Gram(s) IV Push once  digoxin     Tablet 0.125 milliGRAM(s) Oral daily  influenza   Vaccine 0.5 milliLiter(s) IntraMuscular once  insulin lispro (HumaLOG) corrective regimen sliding scale   SubCutaneous three times a day before meals  insulin lispro (HumaLOG) corrective regimen sliding scale   SubCutaneous at bedtime  metoprolol succinate  milliGRAM(s) Oral daily  piperacillin/tazobactam IVPB.. 3.375 Gram(s) IV Intermittent every 8 hours  rivaroxaban 20 milliGRAM(s) Oral with dinner  vancomycin  IVPB 750 milliGRAM(s) IV Intermittent every 12 hours    MEDICATIONS  (PRN):  dextrose 40% Gel 15 Gram(s) Oral once PRN Blood Glucose LESS THAN 70 milliGRAM(s)/deciliter  glucagon  Injectable 1 milliGRAM(s) IntraMuscular once PRN Glucose LESS THAN 70 milligrams/deciliter      I&O's Summary    11 Nov 2019 07:01  -  12 Nov 2019 07:00  --------------------------------------------------------  IN: 250 mL / OUT: 0 mL / NET: 250 mL    12 Nov 2019 07:01  -  13 Nov 2019 06:51  --------------------------------------------------------  IN: 2500 mL / OUT: 650 mL / NET: 1850 mL        Vital Signs Last 24 Hrs  T(C): 36.4 (13 Nov 2019 06:14), Max: 36.4 (13 Nov 2019 06:14)  T(F): 97.5 (13 Nov 2019 06:14), Max: 97.5 (13 Nov 2019 06:14)  HR: 78 (13 Nov 2019 06:14) (58 - 82)  BP: 135/60 (13 Nov 2019 06:14) (105/65 - 135/60)  BP(mean): --  RR: 18 (13 Nov 2019 06:14) (18 - 18)  SpO2: 98% (13 Nov 2019 06:14) (98% - 99%)    CAPILLARY BLOOD GLUCOSE      POCT Blood Glucose.: 167 mg/dL (12 Nov 2019 22:38)  POCT Blood Glucose.: 161 mg/dL (12 Nov 2019 17:56)  POCT Blood Glucose.: 144 mg/dL (12 Nov 2019 12:25)  POCT Blood Glucose.: 189 mg/dL (12 Nov 2019 08:55)      PHYSICAL EXAM:              Constitutional: WDWN resting comfortably in bed; NAD. Cachectic appearing.  	Head: NC/AT  	Eyes: PERRL, EOMI, anicteric sclera  	Neck: Supple  	Respiratory: CTA B/L  	Cardiac: +S1/S2; RRR; no M/R/G. No peripheral edema b/l.  	Gastrointestinal: Soft, NT/ND; no rebound or guarding; +BSx4  	Extremities: No cyanosis. +Palpable radial pulses b/l, LE pulses non palpable B/L  	Musculoskeletal: NROM x4; no joint swelling, tenderness or erythema  	Skin: L toe wrapped in clean dry dressing. No bleed through  	Neurologic: Alert and oriented x3, no focal deficits appreciated         LABS:                        7.6    7.03  )-----------( 240      ( 12 Nov 2019 07:15 )             23.5     Auto Eosinophil # 0.01  / Auto Eosinophil % 0.1   / Auto Neutrophil # 4.08  / Auto Neutrophil % 58.1  / BANDS % 1.0                          8.5    11.27 )-----------( 262      ( 11 Nov 2019 19:20 )             26.9     Auto Eosinophil # x     / Auto Eosinophil % x     / Auto Neutrophil # x     / Auto Neutrophil % x     / BANDS % x        11-12    120<LL>  |  87<L>  |  20  ----------------------------<  160<H>  4.6   |  22  |  0.72  11-12    123<L>  |  92<L>  |  20  ----------------------------<  156<H>  4.4   |  20<L>  |  0.66  11-12    120<LL>  |  86<L>  |  22  ----------------------------<  192<H>  4.5   |  22  |  0.68    Ca    8.6      12 Nov 2019 23:30  Mg     1.5     11-12  Phos  2.6     11-12  TPro  6.4  /  Alb  2.9<L>  /  TBili  < 0.2<L>  /  DBili  x   /  AST  28  /  ALT  21  /  AlkPhos  63  11-12  TPro  7.1  /  Alb  3.1<L>  /  TBili  < 0.2<L>  /  DBili  x   /  AST  33  /  ALT  23  /  AlkPhos  81  11-11    PT/INR - ( 12 Nov 2019 07:15 )   PT: 17.5 SEC;   INR: 1.52          PTT - ( 12 Nov 2019 07:15 )  PTT:33.5 SEC              RADIOLOGY & ADDITIONAL TESTS:    Imaging Personally Reviewed:    Consultant(s) Notes Reviewed:      Care Discussed with Consultants/Other Providers:

## 2019-11-13 NOTE — CONSULT NOTE ADULT - ASSESSMENT
75 YO M with MHx of HTN, hyperlipidemia, afib on xarelto, AICD (placed in 2014), CAD s/p PCI on plavix, PAD s/p L bypass and L ileac stent, DMII c/b neuropathy admitted for left foot gangrene.    hyponatremia    - can be secondary to urinary obstruction  - According to patient's daughter, sodium normally around 130s.   - Hasn't urinated since arrival, however has made nearly 1 L of urine since bolivar placement this morning  - urine sodium 57  - random urine osmolality 490  - patient asymptomatic  - continue trending sodium post bolivar placement, if doesnt improve will consider salt tabs 75 YO M with MHx of HTN, hyperlipidemia, afib on xarelto, AICD (placed in 2014), CAD s/p PCI on plavix, PAD s/p L bypass and L ileac stent, DMII c/b neuropathy admitted for left foot gangrene.    hyponatremia    - can be secondary to urinary obstruction  - According to patient's daughter, sodium normally around 130s.   - Hasn't urinated since arrival, however has made nearly 1 L of urine since bolivar placement this morning  - urine sodium 57  - random urine osmolality 490  - patient asymptomatic  - continue trending sodium post bolivar placement, if doesn't improve will consider salt tabs    Doug Gonzales  PGY-1 Internal Medicine  [phone] 73 YO M with MHx of HTN, hyperlipidemia, afib on xarelto, AICD (placed in 2014), CAD s/p PCI on plavix, PAD s/p L bypass and L ileac stent, DMII c/b neuropathy admitted for left foot gangrene.    hyponatremia    - can be secondary to urinary obstruction  - According to patient's daughter, sodium normally around 130s.   - Hasn't urinated since arrival, however has made nearly 1 L of urine since bolivar placement this morning  - urine sodium 57  - random urine osmolality 490  - patient asymptomatic  - continue trending sodium post bolivar placement.     Doug Gonzales  PGY-1 Internal Medicine  [phone]

## 2019-11-13 NOTE — PROGRESS NOTE ADULT - ASSESSMENT
73 YO M with MHx of HTN, hyperlipidemia, afib on xarelto, AICD (placed in 2014), CAD s/p PCI on plavix, PAD s/p L bypass and L ileac stent, DMII c/b neuropathy admitted for left foot gangrene,  r/o osteomyelitis 73 YO M with MHx of HTN, hyperlipidemia, afib on xarelto, AICD (placed in 2014), CAD s/p PCI on plavix, PAD s/p L bypass and L ileac stent, DMII c/b neuropathy admitted for left foot gangrene.

## 2019-11-13 NOTE — DIETITIAN INITIAL EVALUATION ADULT. - RD TO REMAIN AVAILABLE
1. Continue Consistent Carbohydrate, Low Sodium diet. Fluid Restriction per medical team discretion. 2. Recommend [employer] Therapeutic Nutrition Shake 240mls 3x daily (660kcals, 30g protein). 3. Consider multivitamin daily for micronutrient coverage. 4. Please Encourage po intake, assist with meals and menu selections, provide alternatives PRN.

## 2019-11-13 NOTE — PROGRESS NOTE ADULT - ASSESSMENT
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 Wright, April 2019). Presented 11/11 for left foot gangrene that started 5wk prior to admit. LE CTA on 11/12 showed severe stenosis of left external iliac, femoral, and popliteal arteries, as well as right femoral and popiteal occlusion w/ patent peroneal bypass graft.    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 Wright, April 2019). Presented 11/11 for left foot gangrene that started 5wk prior to admit. LE CTA on 11/12 showed severe stenosis of left external iliac, femoral, and popliteal arteries, as well as right femoral and popiteal occlusion w/ patent peroneal bypass graft.    Plan  - FAB/PVR performed 11/12  - Continue IV antibiotics  - CTA of lower extremities noted above      - will discuss with Dr. Wright    page [phone] with questions or concerns

## 2019-11-13 NOTE — DIETITIAN INITIAL EVALUATION ADULT. - PHYSICAL APPEARANCE
Skin: left posterior heel suspected DTI, left 1st, 4th, 5th digits PVD/diabetic ulcer/underweight/other (specify) Nutrition focused physical exam conducted - found signs of malnutrition [ ]absent [x]present   Subcutaneous fat loss: [MODERATE] Orbital fat pads region, [MODERATE]Buccal fat region, [SEVERE]Triceps region, Muscle wasting: [SEVERE] Temples region, [SEVERE]Clavicle region, [SEVERE]Shoulder region, [SEVERE]thigh region

## 2019-11-13 NOTE — PROGRESS NOTE ADULT - PROBLEM SELECTOR PLAN 7
W/ chronic microcytic anemia likely 2/2 ACD vs iron def anemia. Baseline 8-9  -Will send iron studies   -No active signs of bleeding at this time  -Maintain active type and screen  -Monitor CBCs W/ chronic microcytic anemia likely 2/2 ACD vs iron def anemia. Baseline 8-9  -TIBC, Ferritin wnl. Serum iron dec to 16. Will start iron suppl  -No active signs of bleeding at this time  -Maintain active type and screen  -Monitor CBCs

## 2019-11-13 NOTE — PROGRESS NOTE ADULT - PROBLEM SELECTOR PLAN 2
-s/p L bypass and stent to the L iliac, now on plavix  -c/w plavix  -f/u FAB/PVR ordered by podiatry  -f/u vascular recs -s/p L bypass and stent to the L iliac, now on plavix  -c/w plavix  -FAB done, see above  -F/u vascular recs

## 2019-11-13 NOTE — PROGRESS NOTE ADULT - SUBJECTIVE AND OBJECTIVE BOX
Vascular Surgery Progress Note     Subjective/24hour Events: No acute events overnight. Pain controlled. Tolerating diet. No N/V. No CP or SOB.    Vital Signs:  Vital Signs Last 24 Hrs  T(C): 36.4 (13 Nov 2019 06:14), Max: 36.4 (13 Nov 2019 06:14)  T(F): 97.5 (13 Nov 2019 06:14), Max: 97.5 (13 Nov 2019 06:14)  HR: 78 (13 Nov 2019 06:14) (58 - 82)  BP: 135/60 (13 Nov 2019 06:14) (105/65 - 135/60)  BP(mean): --  RR: 18 (13 Nov 2019 06:14) (18 - 18)  SpO2: 98% (13 Nov 2019 06:14) (98% - 99%)    CAPILLARY BLOOD GLUCOSE      POCT Blood Glucose.: 170 mg/dL (13 Nov 2019 08:53)  POCT Blood Glucose.: 167 mg/dL (12 Nov 2019 22:38)  POCT Blood Glucose.: 161 mg/dL (12 Nov 2019 17:56)  POCT Blood Glucose.: 144 mg/dL (12 Nov 2019 12:25)      I&O's Detail    12 Nov 2019 07:01  -  13 Nov 2019 07:00  --------------------------------------------------------  IN:    IV PiggyBack: 350 mL    sodium chloride 0.9%: 2400 mL  Total IN: 2750 mL    OUT:    Intermittent Catheterization - Urethral: 650 mL  Total OUT: 650 mL    Total NET: 2100 mL            MEDICATIONS  (STANDING):  atorvastatin 20 milliGRAM(s) Oral at bedtime  clopidogrel Tablet 75 milliGRAM(s) Oral daily  dextrose 5%. 1000 milliLiter(s) (50 mL/Hr) IV Continuous <Continuous>  dextrose 50% Injectable 12.5 Gram(s) IV Push once  dextrose 50% Injectable 25 Gram(s) IV Push once  dextrose 50% Injectable 25 Gram(s) IV Push once  digoxin     Tablet 0.125 milliGRAM(s) Oral daily  influenza   Vaccine 0.5 milliLiter(s) IntraMuscular once  insulin lispro (HumaLOG) corrective regimen sliding scale   SubCutaneous three times a day before meals  insulin lispro (HumaLOG) corrective regimen sliding scale   SubCutaneous at bedtime  metoprolol succinate  milliGRAM(s) Oral daily  piperacillin/tazobactam IVPB.. 3.375 Gram(s) IV Intermittent every 8 hours  rivaroxaban 20 milliGRAM(s) Oral with dinner  vancomycin  IVPB 750 milliGRAM(s) IV Intermittent every 12 hours    MEDICATIONS  (PRN):  dextrose 40% Gel 15 Gram(s) Oral once PRN Blood Glucose LESS THAN 70 milliGRAM(s)/deciliter  glucagon  Injectable 1 milliGRAM(s) IntraMuscular once PRN Glucose LESS THAN 70 milligrams/deciliter        Physical Exam:  Gen: NAD  LS: nml respiratory effort  Card: pulse regularly present  GI: abd soft, nontender  Ext: warm      Labs:    11-13    121<L>  |  87<L>  |  18  ----------------------------<  168<H>  4.6   |  21<L>  |  0.67    Ca    8.6      13 Nov 2019 06:54  Phos  2.6     11-12  Mg     1.5     11-12    TPro  6.4  /  Alb  2.9<L>  /  TBili  < 0.2<L>  /  DBili  x   /  AST  28  /  ALT  21  /  AlkPhos  63  11-12    LIVER FUNCTIONS - ( 12 Nov 2019 07:15 )  Alb: 2.9 g/dL / Pro: 6.4 g/dL / ALK PHOS: 63 u/L / ALT: 21 u/L / AST: 28 u/L / GGT: x                                 7.5    8.75  )-----------( 256      ( 13 Nov 2019 06:54 )             23.3     PT/INR - ( 13 Nov 2019 06:54 )   PT: 25.5 SEC;   INR: 2.24          PTT - ( 13 Nov 2019 06:54 )  PTT:40.0 SEC          Imaging:  EXAM:  CT ANGIO LWR EXT (W)AW IC BI      PROCEDURE DATE:  Nov 12 2019     INTERPRETATION:  CLINICAL INFORMATION: Left foot gangrene.    COMPARISON: CT abdomen pelvis 5/4/2019.    CT ANGIOGRAM ABDOMEN, PELVIS, AND LOWER EXTREMITIES:    TECHNIQUE:   Initially, nonenhanced CT was obtained through the calves. Then,   following the rapid administration of intravenous contrast, CT   angiography was performed through the abdomen, pelvis, and lower   extremities down to the toes.  Delayed images through the calves were   also obtained. Sagittal and coronal reformats as well as 3D multiplanar   reconstructions were performed.    90 mls of Omnipaque 350 was administered intravenously without   complication and 10 mls were discarded.    COMPARISON: None available.    FINDINGS:    ABDOMEN AND PELVIS ARTERIAL SYSTEM:     ABDOMINAL AORTA: Widely patent.  CELIAC ARTERY: Severe stenosis at origin with poststenotic dilatation.  SUPERIOR MESENTERIC ARTERY (SMA): Mild to moderate stenosis at origin.  INFERIOR MESENTERIC ARTERY: Patent.  RIGHT RENAL ARTERY: Patent.  LEFT RENAL ARTERY: Moderate-severe stenosis at origin. Accessory left   renal artery.    RIGHT LOWER EXTREMITY:    COMMON ILIAC ARTERY: Short segment focal stenosis.  EXTERNAL ILIAC ARTERY: Patent.  INTERNAL ILIAC ARTERY: Patent.  COMMON FEMORAL ARTERY: Patent.  FEMORAL ARTERY: Occluded. Patent femoral peroneal bypass graft.  PROFUNDA FEMORAL ARTERY: Patent.  POPLITEAL ARTERY: Occluded.  ANTERIOR TIBIAL ARTERY: Nondiagnostic due to extensive calcification.  TIBIOPERONEAL TRUNK: Nondiagnostic due to extensive calcification.  POSTERIOR TIBIAL ARTERY: Nondiagnostic due to extensive calcification.  PERONEAL ARTERY: Nondiagnostic due to extensive calcification.    LEFT LOWER EXTREMITY:    COMMON ILIAC ARTERY: Patent left common iliac stent.  EXTERNAL ILIAC ARTERY: Distal long segment severe stenosis.  INTERNAL ILIAC ARTERY: Patent.  COMMON FEMORAL ARTERY: Patent.  FEMORAL ARTERY: Diffuse severe stenosis.  PROFUNDA FEMORAL ARTERY: Patent.  POPLITEAL ARTERY: Diffuse severe stenosis.  ANTERIOR TIBIAL ARTERY: Nondiagnostic due to extensive calcification.  TIBIOPERONEAL TRUNK: Nondiagnostic due to extensive calcification.  POSTERIOR TIBIAL ARTERY: Nondiagnostic due to extensive calcification.  PERONEAL ARTERY: Nondiagnostic due to extensive calcification.    ADDITIONAL FINDINGS: Small left and trace right pleural effusions. 4 mm   right lower lobe nodule (5:1). Small focus of branching nodular opacities   in the right middlelobe which may represent mild infection.   Cardiomegaly. Cardiac lead in right ventricle. Cholelithiasis.    IMPRESSION:     Left lower extremity:  Diffuse severe stenosis of the left external iliac, femoral and popliteal   arteries. Nondiagnostic evaluation below left knee due to extensive   vascular calcification.    Right lower extremity:  Right femoral and popliteal occlusion with patent femoral peroneal bypass   graft.  Nondiagnostic evaluation below right knee due to extensive vascular   calcification.            Patient name: Aurelio Leggett  Date of test: 11/12/2019  MR#: [id number]  [employer] #: [id number]    Location: [facility] Physician(s): , Not Available Doctor, MD  Interpreted by: Rell Anders MD, [facility]  Tech: [facility]  Type of Test: LE Arterial: FAB/Segm.  ------------------------------------------------------------------------  Procedure: Segmental Pressure/Waveform - complete  noninvasive physiologic studies of the bilateral lower  extremity arteries.  Indications: Atherosclerosis of native arteries of left  leg with ulceration of unspecified site (I70.249)  ------------------------------------------------------------------------  PRESSURE (mm Hg):  ------------------------------------------------------------------------  RIGHT (BP):  Brachial: 130  High Thigh:  Low Thigh:  Calf:  Ankle-PT: 117  Ankle-DP: 109  Greate Toe: 38  FAB: 0.90  ------------------------------------------------------------------------  LEFT (BP):  Brachial: 129  High Thigh:  Low Thigh:  Calf:  Ankle-PT:  Ankle-DP:  Greate Toe:  FAB:  ------------------------------------------------------------------------  WAVEFORM:  ------------------------------------------------------------------------  RIGHT:  CFA:  Popliteal:  Ankle-PT:  Ankle-DP:  ------------------------------------------------------------------------  LEFT:  CFA:  Popliteal:  Ankle-PT:  Ankle-DP:  ------------------------------------------------------------------------  PSA/AVF:  ------------------------------------------------------------------------  RIGHT:  Pseudoaneurysm:  A-V fistula:  TBI: 0.29  ------------------------------------------------------------------------  LEFT:  Pseudoaneurysm:  A-V fistula:  TBI:  ------------------------------------------------------------------------  Right Findings: The ankle-brachial index on the right is  borderline (0.90).  The toe-brachial index (TBI) on the right is severely  reduced (0.29). The toe pressure is 38 mmHg.  Pulse volume recording (PVR) waveforms are triphasic with  normal amplitudes at the right ankle. Waveforms are  severely diminished in amplitude at the right metatarsal  and digit segments.  Left Findings: The ankle-brachial index (FAB) on the left  was not assessed because of non-detectable Doppler DP or  PT signals.  Pulse volume recording (PVR) waveforms are flat/absent at  the ankle, metatarsal, and digit levels.  ------------------------------------------------------------------------  Summary/Impressions:  1. Right FAB is borderline (0.90). Right TBI is severely  reduced (0.29). Findings suggest the presence of  predominantly below-ankle small vessel arterial disease in  the right foot.  2. Left FAB was not assessed because of non-detectable  Doppler DP or PT signals. Waveforms were flat/absent at  ankle, metatarsal, and digit segments. Findings suggest  the presence of severe occlusive arterial disease in the  left lower extremity.                  EXAM:  RAD FOOT 2 VIEWS LEFT      EXAM:  RAD TIB-FIB LT      PROCEDURE DATE:  Nov 11 2019     INTERPRETATION:  Clinical indication: Left foot gangrene.    Technique: 2 views of the left tibia/fibula, 3 views of the left foot.    Comparison: No prior studies available.    Findings:  No acute fracture or dislocation. The joint spaces are preserved. Area of   lucency within the soft tissue of the first toe, likely corresponding to   area of ulceration. Vascular calcifications.    Impression:  No acute fracture. Area of lucency within the soft tissue of the first   toe, likely corresponding to area of ulceration.             EXAM:  RAD FOOT 2 VIEWS LEFT      EXAM:  RAD TIB-FIB LT      PROCEDURE DATE:  Nov 11 2019     INTERPRETATION:  Clinical indication: Left foot gangrene.    Technique: 2 views of the left tibia/fibula, 3 views of the left foot.    Comparison: No prior studies available.    Findings:  No acute fracture or dislocation. The joint spaces are preserved. Area of   lucency within the soft tissue of the first toe, likely corresponding to   area of ulceration. Vascular calcifications.    Impression:  No acute fracture. Area of lucency within the soft tissue of the first   toe, likely corresponding to area of ulceration.

## 2019-11-13 NOTE — PROGRESS NOTE ADULT - PROBLEM SELECTOR PLAN 4
Likely 2/2 hypovolemia. No active symptoms at this time   -c/w NS @ 100c/hr  -F/u serum osm and urine studies  -BMPs q 6H Likely hypotonic hyponatremia 2/2 SIADH vs urinary obstruction. Serum osm 276. Urine osm 470. Sodium random urine 57.   -Pt now w/ notable confusion on exam.   -Will start fluid restriction 1L  -Nephrology consulted, f/u recs. Pt may benefit from hypertonic saline vs salt tabs  -BMPs q 6H

## 2019-11-13 NOTE — DIETITIAN INITIAL EVALUATION ADULT. - PERTINENT MEDS FT
atorvastatin  clopidogrel Tablet  digoxin     Tablet  glucagon  Injectable PRN  influenza   Vaccine  insulin lispro (HumaLOG) corrective regimen sliding scale  metoprolol succinate ER  piperacillin/tazobactam IVPB..  rivaroxaban  vancomycin  IVPB

## 2019-11-13 NOTE — PROGRESS NOTE ADULT - PROBLEM SELECTOR PLAN 9
Pt w/ chronic urinary retention per family > 5 years. Per family 2/2 DM and fall > 5 years  -Family reports straight cath q 6 hrs at home. Pt bleeds w/ bolivar cath   -Will continue bladder scan q 8 w/ straight cath prn Pt w/ chronic urinary retention per family > 5 years. Per family 2/2 DM and fall > 5 years  -Family reports straight cath q 6 hrs at home. Pt bleeds w/ bolivar cath   -No return on straight cath yesterday. Bolivar placed today by urology

## 2019-11-13 NOTE — PROGRESS NOTE ADULT - PROBLEM SELECTOR PLAN 8
INR=2.33 on admission. Likely 2/2 xarelto vs  recent abx use   -Trend INR  -Will continue to monitor

## 2019-11-13 NOTE — PROGRESS NOTE ADULT - PROBLEM SELECTOR PLAN 1
-pt with gangrene on the left foot with lesions on the tip of L 1st and 5th digit and stage 2 ulcer on heel, ESR and CRP elevated, and mild leukocytosis  - Xray L foot: Diffuse soft tissue swelling with cutaneous ulceration over the distal phalynx of the left first digit and subjacent cortical erosion of the first distal phalynx.  -s/p vanc and cefepime in the ED  -c/w vanc and cefepime as per Podiatry recs. Will likely d/c cefepime and start zosyn  -if pt spikes a fever, or has increasing WBC, will add anaerobic coverage  -f/u FAB/PVR ordered by podiatry  -f/u blood cxs  -f/u podiatry recs  -f/u vascular recs -pt with gangrene on the left foot with lesions on the tip of L 1st and 5th digit and stage 2 ulcer on heel, ESR and CRP elevated, and mild leukocytosis  - Xray L foot: Diffuse soft tissue swelling with cutaneous ulceration over the distal phalynx of the left first digit and subjacent cortical erosion of the first distal phalynx.  -FAB : severely reduced on the right (.29), non-detectable Doppler DP or PT signals on the left c/f severe occlusive arterial disease   CTA sig for diffuse severe stenosis of the left external iliac, femoral and popliteal   arteries  -c/w vanc and zosyn (11/12-  -Blood cx NGTD, wound cx sig for coag neg staph  -F/u podiatry recs  -F/u vascular recs

## 2019-11-14 LAB
ALBUMIN SERPL ELPH-MCNC: 2.7 G/DL — LOW (ref 3.3–5)
ALP SERPL-CCNC: 63 U/L — SIGNIFICANT CHANGE UP (ref 40–120)
ALT FLD-CCNC: 20 U/L — SIGNIFICANT CHANGE UP (ref 4–41)
ANION GAP SERPL CALC-SCNC: 12 MMO/L — SIGNIFICANT CHANGE UP (ref 7–14)
ANION GAP SERPL CALC-SCNC: 13 MMO/L — SIGNIFICANT CHANGE UP (ref 7–14)
APTT BLD: 42.1 SEC — HIGH (ref 27.5–36.3)
AST SERPL-CCNC: 27 U/L — SIGNIFICANT CHANGE UP (ref 4–40)
BACTERIA UR CULT: SIGNIFICANT CHANGE UP
BILIRUB SERPL-MCNC: 0.2 MG/DL — SIGNIFICANT CHANGE UP (ref 0.2–1.2)
BUN SERPL-MCNC: 15 MG/DL — SIGNIFICANT CHANGE UP (ref 7–23)
BUN SERPL-MCNC: 15 MG/DL — SIGNIFICANT CHANGE UP (ref 7–23)
CALCIUM SERPL-MCNC: 8.5 MG/DL — SIGNIFICANT CHANGE UP (ref 8.4–10.5)
CALCIUM SERPL-MCNC: 8.6 MG/DL — SIGNIFICANT CHANGE UP (ref 8.4–10.5)
CHLORIDE SERPL-SCNC: 88 MMOL/L — LOW (ref 98–107)
CHLORIDE SERPL-SCNC: 88 MMOL/L — LOW (ref 98–107)
CO2 SERPL-SCNC: 19 MMOL/L — LOW (ref 22–31)
CO2 SERPL-SCNC: 22 MMOL/L — SIGNIFICANT CHANGE UP (ref 22–31)
CREAT SERPL-MCNC: 0.68 MG/DL — SIGNIFICANT CHANGE UP (ref 0.5–1.3)
CREAT SERPL-MCNC: 0.77 MG/DL — SIGNIFICANT CHANGE UP (ref 0.5–1.3)
CULTURE RESULTS: SIGNIFICANT CHANGE UP
GLUCOSE BLDC GLUCOMTR-MCNC: 126 MG/DL — HIGH (ref 70–99)
GLUCOSE BLDC GLUCOMTR-MCNC: 172 MG/DL — HIGH (ref 70–99)
GLUCOSE BLDC GLUCOMTR-MCNC: 183 MG/DL — HIGH (ref 70–99)
GLUCOSE BLDC GLUCOMTR-MCNC: 212 MG/DL — HIGH (ref 70–99)
GLUCOSE SERPL-MCNC: 140 MG/DL — HIGH (ref 70–99)
GLUCOSE SERPL-MCNC: 166 MG/DL — HIGH (ref 70–99)
HCT VFR BLD CALC: 24.3 % — LOW (ref 39–50)
HGB BLD-MCNC: 7.9 G/DL — LOW (ref 13–17)
INR BLD: 2.72 — HIGH (ref 0.88–1.17)
MAGNESIUM SERPL-MCNC: 1.4 MG/DL — LOW (ref 1.6–2.6)
MAGNESIUM SERPL-MCNC: 1.4 MG/DL — LOW (ref 1.6–2.6)
MCHC RBC-ENTMCNC: 25.4 PG — LOW (ref 27–34)
MCHC RBC-ENTMCNC: 32.5 % — SIGNIFICANT CHANGE UP (ref 32–36)
MCV RBC AUTO: 78.1 FL — LOW (ref 80–100)
NRBC # FLD: 0 K/UL — SIGNIFICANT CHANGE UP (ref 0–0)
PHOSPHATE SERPL-MCNC: 2.3 MG/DL — LOW (ref 2.5–4.5)
PHOSPHATE SERPL-MCNC: 3.4 MG/DL — SIGNIFICANT CHANGE UP (ref 2.5–4.5)
PLATELET # BLD AUTO: 277 K/UL — SIGNIFICANT CHANGE UP (ref 150–400)
PMV BLD: 9.9 FL — SIGNIFICANT CHANGE UP (ref 7–13)
POTASSIUM SERPL-MCNC: 4.2 MMOL/L — SIGNIFICANT CHANGE UP (ref 3.5–5.3)
POTASSIUM SERPL-MCNC: 4.3 MMOL/L — SIGNIFICANT CHANGE UP (ref 3.5–5.3)
POTASSIUM SERPL-SCNC: 4.2 MMOL/L — SIGNIFICANT CHANGE UP (ref 3.5–5.3)
POTASSIUM SERPL-SCNC: 4.3 MMOL/L — SIGNIFICANT CHANGE UP (ref 3.5–5.3)
PROT SERPL-MCNC: 6 G/DL — SIGNIFICANT CHANGE UP (ref 6–8.3)
PROTHROM AB SERPL-ACNC: 32 SEC — HIGH (ref 9.8–13.1)
RBC # BLD: 3.11 M/UL — LOW (ref 4.2–5.8)
RBC # FLD: 14.6 % — HIGH (ref 10.3–14.5)
SODIUM SERPL-SCNC: 120 MMOL/L — CRITICAL LOW (ref 135–145)
SODIUM SERPL-SCNC: 122 MMOL/L — LOW (ref 135–145)
SPECIMEN SOURCE: SIGNIFICANT CHANGE UP
WBC # BLD: 9.61 K/UL — SIGNIFICANT CHANGE UP (ref 3.8–10.5)
WBC # FLD AUTO: 9.61 K/UL — SIGNIFICANT CHANGE UP (ref 3.8–10.5)

## 2019-11-14 PROCEDURE — 99233 SBSQ HOSP IP/OBS HIGH 50: CPT | Mod: GC

## 2019-11-14 PROCEDURE — 71045 X-RAY EXAM CHEST 1 VIEW: CPT | Mod: 26

## 2019-11-14 RX ORDER — SODIUM CHLORIDE 9 MG/ML
2 INJECTION INTRAMUSCULAR; INTRAVENOUS; SUBCUTANEOUS THREE TIMES A DAY
Refills: 0 | Status: DISCONTINUED | OUTPATIENT
Start: 2019-11-14 | End: 2019-11-14

## 2019-11-14 RX ORDER — POTASSIUM PHOSPHATE, MONOBASIC POTASSIUM PHOSPHATE, DIBASIC 236; 224 MG/ML; MG/ML
15 INJECTION, SOLUTION INTRAVENOUS ONCE
Refills: 0 | Status: COMPLETED | OUTPATIENT
Start: 2019-11-14 | End: 2019-11-14

## 2019-11-14 RX ORDER — SODIUM CHLORIDE 5 G/100ML
500 INJECTION, SOLUTION INTRAVENOUS
Refills: 0 | Status: DISCONTINUED | OUTPATIENT
Start: 2019-11-14 | End: 2019-11-15

## 2019-11-14 RX ORDER — MAGNESIUM SULFATE 500 MG/ML
1 VIAL (ML) INJECTION ONCE
Refills: 0 | Status: COMPLETED | OUTPATIENT
Start: 2019-11-14 | End: 2019-11-14

## 2019-11-14 RX ORDER — FUROSEMIDE 40 MG
60 TABLET ORAL ONCE
Refills: 0 | Status: COMPLETED | OUTPATIENT
Start: 2019-11-14 | End: 2019-11-14

## 2019-11-14 RX ADMIN — Medication 1: at 09:22

## 2019-11-14 RX ADMIN — CLOPIDOGREL BISULFATE 75 MILLIGRAM(S): 75 TABLET, FILM COATED ORAL at 12:00

## 2019-11-14 RX ADMIN — PIPERACILLIN AND TAZOBACTAM 25 GRAM(S): 4; .5 INJECTION, POWDER, LYOPHILIZED, FOR SOLUTION INTRAVENOUS at 21:55

## 2019-11-14 RX ADMIN — Medication 150 MILLIGRAM(S): at 06:17

## 2019-11-14 RX ADMIN — ATORVASTATIN CALCIUM 20 MILLIGRAM(S): 80 TABLET, FILM COATED ORAL at 21:55

## 2019-11-14 RX ADMIN — Medication 325 MILLIGRAM(S): at 12:00

## 2019-11-14 RX ADMIN — Medication 60 MILLIGRAM(S): at 14:02

## 2019-11-14 RX ADMIN — Medication 0.12 MILLIGRAM(S): at 06:18

## 2019-11-14 RX ADMIN — PIPERACILLIN AND TAZOBACTAM 25 GRAM(S): 4; .5 INJECTION, POWDER, LYOPHILIZED, FOR SOLUTION INTRAVENOUS at 14:01

## 2019-11-14 RX ADMIN — Medication 2: at 19:19

## 2019-11-14 RX ADMIN — Medication 250 MILLIGRAM(S): at 17:16

## 2019-11-14 RX ADMIN — POTASSIUM PHOSPHATE, MONOBASIC POTASSIUM PHOSPHATE, DIBASIC 62.5 MILLIMOLE(S): 236; 224 INJECTION, SOLUTION INTRAVENOUS at 19:09

## 2019-11-14 RX ADMIN — RIVAROXABAN 20 MILLIGRAM(S): KIT at 17:17

## 2019-11-14 RX ADMIN — SODIUM CHLORIDE 75 MILLILITER(S): 5 INJECTION, SOLUTION INTRAVENOUS at 14:02

## 2019-11-14 RX ADMIN — PIPERACILLIN AND TAZOBACTAM 25 GRAM(S): 4; .5 INJECTION, POWDER, LYOPHILIZED, FOR SOLUTION INTRAVENOUS at 06:16

## 2019-11-14 RX ADMIN — SODIUM CHLORIDE 2 GRAM(S): 9 INJECTION INTRAMUSCULAR; INTRAVENOUS; SUBCUTANEOUS at 12:00

## 2019-11-14 RX ADMIN — Medication 100 GRAM(S): at 15:25

## 2019-11-14 RX ADMIN — Medication 250 MILLIGRAM(S): at 06:15

## 2019-11-14 NOTE — PROGRESS NOTE ADULT - PROBLEM SELECTOR PLAN 1
-pt with gangrene on the left foot with lesions on the tip of L 1st and 5th digit and stage 2 ulcer on heel, ESR and CRP elevated, and mild leukocytosis  - Xray L foot: Diffuse soft tissue swelling with cutaneous ulceration over the distal phalynx of the left first digit and subjacent cortical erosion of the first distal phalynx.  -FAB : severely reduced on the right (.29), non-detectable Doppler DP or PT signals on the left c/f severe occlusive arterial disease   CTA sig for diffuse severe stenosis of the left external iliac, femoral and popliteal   arteries  -c/w vanc and zosyn (11/12-  -Blood cx NGTD, wound cx sig for coag neg staph  -F/u podiatry recs  -F/u vascular recs

## 2019-11-14 NOTE — PHYSICAL THERAPY INITIAL EVALUATION ADULT - PLANNED THERAPY INTERVENTIONS, PT EVAL
ROM/balance training/bed mobility training/Patient left supine in bed in NAD, call bell in reach, all lines intact. +bed alarm. Positioned for safety and comfort./strengthening/transfer training

## 2019-11-14 NOTE — PROGRESS NOTE ADULT - ATTENDING COMMENTS
Moderate-severe hyponatremia persists despite brief trial of 1.5% NS. Picture is thus far consistent with euvolemic (true) hyponatremia, likely SIADH. Anticipate need for additional hypertonic fluids +/- NaCl tabs. Will discuss with Nephrology to guide management.     Spoke at length with pt and family, who both express a desire for the pt to leave the hospital soon. I explained his LE PVD is very severe and after correction of hyponatremia we will have to consider additional vascular interventions to optimize the chance of foot ulcer healing. For now, continue abx and monitor.

## 2019-11-14 NOTE — PHYSICAL THERAPY INITIAL EVALUATION ADULT - PHYSICAL ASSIST/NONPHYSICAL ASSIST: SUPINE/SIT, REHAB EVAL
verbal cues/nonverbal cues (demo/gestures)/2 person assist/patient unable to sit unsupported with increased left lateral lean noted

## 2019-11-14 NOTE — PROGRESS NOTE ADULT - SUBJECTIVE AND OBJECTIVE BOX
Patient is a 74y old  Male who presents with a chief complaint of Left foot gangrene, rule out osteomyelitis (14 Nov 2019 12:25)       INTERVAL HPI/OVERNIGHT EVENTS:  Patient seen and evaluated at bedside.  Pt is resting comfortable in NAD. Denies N/V/F/C.    Allergies    No Known Allergies    Intolerances        Vital Signs Last 24 Hrs  T(C): 36.6 (14 Nov 2019 14:20), Max: 36.6 (14 Nov 2019 14:20)  T(F): 97.9 (14 Nov 2019 14:20), Max: 97.9 (14 Nov 2019 14:20)  HR: 69 (14 Nov 2019 14:20) (68 - 93)  BP: 144/68 (14 Nov 2019 14:20) (125/81 - 144/68)  BP(mean): --  RR: 18 (14 Nov 2019 14:20) (16 - 18)  SpO2: 97% (14 Nov 2019 14:20) (96% - 100%)    LABS:                        7.9    9.61  )-----------( 277      ( 14 Nov 2019 04:20 )             24.3     11-14    120<LL>  |  88<L>  |  15  ----------------------------<  140<H>  4.2   |  19<L>  |  0.68    Ca    8.6      14 Nov 2019 04:20  Phos  2.3     11-14  Mg     1.4     11-14    TPro  6.0  /  Alb  2.7<L>  /  TBili  0.2  /  DBili  x   /  AST  27  /  ALT  20  /  AlkPhos  63  11-14    PT/INR - ( 14 Nov 2019 04:20 )   PT: 32.0 SEC;   INR: 2.72          PTT - ( 14 Nov 2019 04:20 )  PTT:42.1 SEC    CAPILLARY BLOOD GLUCOSE      POCT Blood Glucose.: 126 mg/dL (14 Nov 2019 13:02)  POCT Blood Glucose.: 183 mg/dL (14 Nov 2019 08:53)  POCT Blood Glucose.: 167 mg/dL (13 Nov 2019 22:15)  POCT Blood Glucose.: 160 mg/dL (13 Nov 2019 18:07)      Lower Extremity Physical Exam:  Vascular: DP/PT 0/4, B/L, CFT <3 seconds B/L, Temperature gradient cool to cool, B/L.   Neuro: Epicritic sensation absent to level of ankles B/L.  Musculoskeletal/Ortho: left lower extremity flexion contraction of knee w/ increased pressure on plantar heel  Skin: Left foot plantar heel deep tissue injury w/ ecchymosis & no open lesions    Wound #1:  Left foot distal hallux eschar, 2.5x2.0cm, depth to subq, wound bed necrotic, no active drainage, no malodor, periwound erythematous to interphalangeal joint; etiology 2/ 2 medial hallucal nail border partial nail avulsion & PVD    Wound #2: Left foot lateral 4th digit ulcer, 1.0x1.0cm, depth to subq, wound bed fibrotic, no active drainage, no malodor, periwound stable w/ erythema to dorsal distal forefoot & ascending lymphangitis to level of midfoot, eitology 2/2 chronic pressure from hypertrophic 4th digit proximal phalanx base abutting against 5th digit proximal phalanx head & PVD    - 4th and 5th digits appear dusky

## 2019-11-14 NOTE — PROGRESS NOTE ADULT - ASSESSMENT
73 YO M with MHx of HTN, hyperlipidemia, afib on xarelto, AICD (placed in 2014), CAD s/p PCI on plavix, PAD s/p L bypass and L ileac stent, DMII c/b neuropathy admitted for left foot gangrene.

## 2019-11-14 NOTE — PHYSICAL THERAPY INITIAL EVALUATION ADULT - ADDITIONAL COMMENTS
Patient's wife reports patient was bedbound for the last 5 weeks and she assists with all ADLs, no home health aide.  Patient's wife reports patient was standing with rolling walker and assist 5 weeks prior to admission.

## 2019-11-14 NOTE — PROGRESS NOTE ADULT - SUBJECTIVE AND OBJECTIVE BOX
Monika Masters MD  PGY 1 Department of Internal Medicine  Pager: [phone]    Patient is a 74y old  Male who presents with a chief complaint of Left foot gangrene, rule out osteomyelitis (13 Nov 2019 11:53)      SUBJECTIVE / OVERNIGHT EVENTS: Pt seen and examined. No acute overnight events.        MEDICATIONS  (STANDING):  atorvastatin 20 milliGRAM(s) Oral at bedtime  clopidogrel Tablet 75 milliGRAM(s) Oral daily  dextrose 5%. 1000 milliLiter(s) (50 mL/Hr) IV Continuous <Continuous>  dextrose 50% Injectable 12.5 Gram(s) IV Push once  dextrose 50% Injectable 25 Gram(s) IV Push once  dextrose 50% Injectable 25 Gram(s) IV Push once  digoxin     Tablet 0.125 milliGRAM(s) Oral daily  ferrous    sulfate 325 milliGRAM(s) Oral daily  influenza   Vaccine 0.5 milliLiter(s) IntraMuscular once  insulin lispro (HumaLOG) corrective regimen sliding scale   SubCutaneous three times a day before meals  insulin lispro (HumaLOG) corrective regimen sliding scale   SubCutaneous at bedtime  metoprolol succinate  milliGRAM(s) Oral daily  piperacillin/tazobactam IVPB.. 3.375 Gram(s) IV Intermittent every 8 hours  rivaroxaban 20 milliGRAM(s) Oral with dinner  sodium chloride 1.5%. 500 milliLiter(s) (50 mL/Hr) IV Continuous <Continuous>  vancomycin  IVPB 1000 milliGRAM(s) IV Intermittent every 12 hours    MEDICATIONS  (PRN):  dextrose 40% Gel 15 Gram(s) Oral once PRN Blood Glucose LESS THAN 70 milliGRAM(s)/deciliter  glucagon  Injectable 1 milliGRAM(s) IntraMuscular once PRN Glucose LESS THAN 70 milligrams/deciliter      I&O's Summary    13 Nov 2019 07:01  -  14 Nov 2019 07:00  --------------------------------------------------------  IN: 120 mL / OUT: 1500 mL / NET: -1380 mL        Vital Signs Last 24 Hrs  T(C): 36.4 (14 Nov 2019 06:13), Max: 36.5 (13 Nov 2019 18:15)  T(F): 97.6 (14 Nov 2019 06:13), Max: 97.7 (13 Nov 2019 18:15)  HR: 87 (14 Nov 2019 06:13) (68 - 93)  BP: 141/76 (14 Nov 2019 06:13) (125/81 - 141/76)  BP(mean): --  RR: 17 (14 Nov 2019 06:13) (16 - 18)  SpO2: 100% (14 Nov 2019 06:13) (96% - 100%)    CAPILLARY BLOOD GLUCOSE      POCT Blood Glucose.: 167 mg/dL (13 Nov 2019 22:15)  POCT Blood Glucose.: 160 mg/dL (13 Nov 2019 18:07)  POCT Blood Glucose.: 149 mg/dL (13 Nov 2019 12:54)  POCT Blood Glucose.: 170 mg/dL (13 Nov 2019 08:53)      PHYSICAL EXAM:              Constitutional: WDWN resting comfortably in bed; NAD. Cachectic appearing.  	Head: NC/AT  	Eyes: PERRL, EOMI, anicteric sclera  	Neck: Supple  	Respiratory: CTA B/L  	Cardiac: +S1/S2; RRR; no M/R/G. No peripheral edema b/l.  	Gastrointestinal: Soft, NT/ND; no rebound or guarding; +BSx4  	Extremities: No cyanosis. +Palpable radial pulses b/l, LE pulses non palpable B/L  	Musculoskeletal: NROM x4; no joint swelling, tenderness or erythema  	Skin: L toe wrapped in clean dry dressing. No bleed through  	Neurologic: Alert and oriented x3, no focal deficits appreciated         LABS:                        7.9    9.61  )-----------( 277      ( 14 Nov 2019 04:20 )             24.3     Auto Eosinophil # x     / Auto Eosinophil % x     / Auto Neutrophil # x     / Auto Neutrophil % x     / BANDS % x                            7.5    8.75  )-----------( 256      ( 13 Nov 2019 06:54 )             23.3     Auto Eosinophil # x     / Auto Eosinophil % x     / Auto Neutrophil # x     / Auto Neutrophil % x     / BANDS % x        11-14    120<LL>  |  88<L>  |  15  ----------------------------<  140<H>  4.2   |  19<L>  |  0.68  11-13    119<LL>  |  88<L>  |  17  ----------------------------<  153<H>  4.2   |  21<L>  |  0.70  11-13    120<LL>  |  88<L>  |  16  ----------------------------<  144<H>  4.7   |  18<L>  |  0.68    Ca    8.6      14 Nov 2019 04:20  Mg     1.4     11-14  Phos  2.3     11-14  TPro  6.0  /  Alb  2.7<L>  /  TBili  0.2  /  DBili  x   /  AST  27  /  ALT  20  /  AlkPhos  63  11-14    PT/INR - ( 14 Nov 2019 04:20 )   PT: 32.0 SEC;   INR: 2.72          PTT - ( 14 Nov 2019 04:20 )  PTT:42.1 SEC              RADIOLOGY & ADDITIONAL TESTS:    Imaging Personally Reviewed:    Consultant(s) Notes Reviewed:      Care Discussed with Consultants/Other Providers: Monika Masters MD  PGY 1 Department of Internal Medicine  Pager: [phone]    Patient is a 74y old  Male who presents with a chief complaint of Left foot gangrene, rule out osteomyelitis (13 Nov 2019 11:53)      SUBJECTIVE / OVERNIGHT EVENTS: Pt seen and examined. No acute overnight events. This AM noted to have clear thought process, answering questions. Denies fever, chills, N/V, CP, SOB        MEDICATIONS  (STANDING):  atorvastatin 20 milliGRAM(s) Oral at bedtime  clopidogrel Tablet 75 milliGRAM(s) Oral daily  dextrose 5%. 1000 milliLiter(s) (50 mL/Hr) IV Continuous <Continuous>  dextrose 50% Injectable 12.5 Gram(s) IV Push once  dextrose 50% Injectable 25 Gram(s) IV Push once  dextrose 50% Injectable 25 Gram(s) IV Push once  digoxin     Tablet 0.125 milliGRAM(s) Oral daily  ferrous    sulfate 325 milliGRAM(s) Oral daily  influenza   Vaccine 0.5 milliLiter(s) IntraMuscular once  insulin lispro (HumaLOG) corrective regimen sliding scale   SubCutaneous three times a day before meals  insulin lispro (HumaLOG) corrective regimen sliding scale   SubCutaneous at bedtime  metoprolol succinate  milliGRAM(s) Oral daily  piperacillin/tazobactam IVPB.. 3.375 Gram(s) IV Intermittent every 8 hours  rivaroxaban 20 milliGRAM(s) Oral with dinner  sodium chloride 1.5%. 500 milliLiter(s) (50 mL/Hr) IV Continuous <Continuous>  vancomycin  IVPB 1000 milliGRAM(s) IV Intermittent every 12 hours    MEDICATIONS  (PRN):  dextrose 40% Gel 15 Gram(s) Oral once PRN Blood Glucose LESS THAN 70 milliGRAM(s)/deciliter  glucagon  Injectable 1 milliGRAM(s) IntraMuscular once PRN Glucose LESS THAN 70 milligrams/deciliter      I&O's Summary    13 Nov 2019 07:01  -  14 Nov 2019 07:00  --------------------------------------------------------  IN: 120 mL / OUT: 1500 mL / NET: -1380 mL        Vital Signs Last 24 Hrs  T(C): 36.4 (14 Nov 2019 06:13), Max: 36.5 (13 Nov 2019 18:15)  T(F): 97.6 (14 Nov 2019 06:13), Max: 97.7 (13 Nov 2019 18:15)  HR: 87 (14 Nov 2019 06:13) (68 - 93)  BP: 141/76 (14 Nov 2019 06:13) (125/81 - 141/76)  BP(mean): --  RR: 17 (14 Nov 2019 06:13) (16 - 18)  SpO2: 100% (14 Nov 2019 06:13) (96% - 100%)    CAPILLARY BLOOD GLUCOSE      POCT Blood Glucose.: 167 mg/dL (13 Nov 2019 22:15)  POCT Blood Glucose.: 160 mg/dL (13 Nov 2019 18:07)  POCT Blood Glucose.: 149 mg/dL (13 Nov 2019 12:54)  POCT Blood Glucose.: 170 mg/dL (13 Nov 2019 08:53)      PHYSICAL EXAM:              Constitutional: WDWN resting comfortably in bed; NAD. Cachectic appearing.  	Head: NC/AT  	Eyes: PERRL, EOMI, anicteric sclera  	Neck: Supple  	Respiratory: CTA B/L  	Cardiac: +S1/S2; RRR; no M/R/G. No peripheral edema b/l.  	Gastrointestinal: Soft, NT/ND; no rebound or guarding; +BSx4  	Extremities: No cyanosis. +Palpable radial pulses b/l, LE pulses non palpable B/L  	Musculoskeletal: NROM x4; no joint swelling, tenderness or erythema  	Skin: + Dry gangrene L toe. L feet cold to touch w/ blue/ purple discoloration.  	Neurologic: Alert and oriented x3, no focal deficits appreciated         LABS:                        7.9    9.61  )-----------( 277      ( 14 Nov 2019 04:20 )             24.3     Auto Eosinophil # x     / Auto Eosinophil % x     / Auto Neutrophil # x     / Auto Neutrophil % x     / BANDS % x                            7.5    8.75  )-----------( 256      ( 13 Nov 2019 06:54 )             23.3     Auto Eosinophil # x     / Auto Eosinophil % x     / Auto Neutrophil # x     / Auto Neutrophil % x     / BANDS % x        11-14    120<LL>  |  88<L>  |  15  ----------------------------<  140<H>  4.2   |  19<L>  |  0.68  11-13    119<LL>  |  88<L>  |  17  ----------------------------<  153<H>  4.2   |  21<L>  |  0.70  11-13    120<LL>  |  88<L>  |  16  ----------------------------<  144<H>  4.7   |  18<L>  |  0.68    Ca    8.6      14 Nov 2019 04:20  Mg     1.4     11-14  Phos  2.3     11-14  TPro  6.0  /  Alb  2.7<L>  /  TBili  0.2  /  DBili  x   /  AST  27  /  ALT  20  /  AlkPhos  63  11-14    PT/INR - ( 14 Nov 2019 04:20 )   PT: 32.0 SEC;   INR: 2.72          PTT - ( 14 Nov 2019 04:20 )  PTT:42.1 SEC              RADIOLOGY & ADDITIONAL TESTS:    Imaging Personally Reviewed:    Consultant(s) Notes Reviewed:      Care Discussed with Consultants/Other Providers:

## 2019-11-14 NOTE — PROGRESS NOTE ADULT - ATTENDING COMMENTS
increase 1.5% rate and give another 500cc total at 75cc/hr.   also give Lasix to improve Free water clearance   Christopher Johns MD, FACP.  Attending Nephrologist  Office: [phone]  Pager: [phone]  Email: [email or web address]

## 2019-11-14 NOTE — PROGRESS NOTE ADULT - ASSESSMENT
74M w/ PMHx of DM, PVD, HTN, HLD w/ left hallux gangrene  -Pt seen and evaluated  -Left foot x-rays negative for osteo  -Left foot distal hallux eschar 2/2 partial nail avulsion & PVD, well-adhered and stable with no acute signs of infection. Left foot lateral 4th toe wound to subQ, fibrotic with no acute signs of infection.  - 4th and 5th digits dusky  - applied betadine + DSD  - FAB/ PVRS poor  - s/p CTA showing severe stenosis to LLE, patent fem-peroneal bypass RLE  - No podiatric surgical intervention at this time  - Continue IV abx and local wound care w/ betadine  - awaiting final vasc recs post CTA  - will continue to monitor  - seen w/ attending

## 2019-11-14 NOTE — PHYSICAL THERAPY INITIAL EVALUATION ADULT - DISCHARGE DISPOSITION, PT EVAL
anticipate [facility] to improve functional mobility and strength; please follow PT progress notes for updates.  Patient and patient's wife requesting patient be discharged to rehab since prior to 5 weeks ago, patient was able to stand with assist. Plan based on progress with PT, anticipate [facility] to improve functional mobility and strength; please follow PT progress notes for updates.  Patient and patient's wife requesting patient be discharged to rehab since prior to 5 weeks ago, patient was able to stand with assist.

## 2019-11-14 NOTE — PHYSICAL THERAPY INITIAL EVALUATION ADULT - PERTINENT HX OF CURRENT PROBLEM, REHAB EVAL
74 year old male, s/p AICD (placed in 2014), DM2 c/b neuropathy, sent to ED by his podiatrist for left foot gangrene. Pt states the the infection started 2 weeks ago and was being monitored by the podiatrist. Pt went was seen by a doctor 4 days ago and was started on bactrim, then was seen on day of admission by the podiatrist (Dr. Alex Leos ) who sent him to the hospital for IV abx.  Xray foot no acute fracture.

## 2019-11-14 NOTE — PROGRESS NOTE ADULT - PROBLEM SELECTOR PLAN 9
Pt w/ chronic urinary retention per family > 5 years. Per family 2/2 DM and fall > 5 years  -Family reports straight cath q 6 hrs at home. Pt bleeds w/ bolivar cath   -No return on straight cath yesterday. Bolivar placed today by urology Pt w/ chronic urinary retention per family > 5 years. Per family 2/2 DM and fall > 5 years  -c/w bolivar

## 2019-11-14 NOTE — PROGRESS NOTE ADULT - PROBLEM SELECTOR PLAN 7
W/ chronic microcytic anemia likely 2/2 ACD vs iron def anemia. Baseline 8-9  -TIBC, Ferritin wnl. Serum iron dec to 16. Will start iron suppl  -No active signs of bleeding at this time  -Maintain active type and screen  -Monitor CBCs W/ chronic microcytic anemia likely 2/2 ACD vs iron def anemia. Baseline 8-9  -TIBC, Ferritin wnl. Serum iron dec to 16.  -c/w iron suppl  -No active signs of bleeding at this time  -Maintain active type and screen  -Monitor CBCs

## 2019-11-14 NOTE — PROGRESS NOTE ADULT - SUBJECTIVE AND OBJECTIVE BOX
INTERVAL HPI/OVERNIGHT EVENTS:    SUBJECTIVE: Patient seen and examined at bedside. Daughter noted the patient seemed more confused earlier this morning but now seems normal without confusion. Answering questions appropriately.       OBJECTIVE:    VITAL SIGNS:  ICU Vital Signs Last 24 Hrs  T(C): 36.4 (14 Nov 2019 06:13), Max: 36.5 (13 Nov 2019 18:15)  T(F): 97.6 (14 Nov 2019 06:13), Max: 97.7 (13 Nov 2019 18:15)  HR: 87 (14 Nov 2019 06:13) (68 - 93)  BP: 141/76 (14 Nov 2019 06:13) (125/81 - 141/76)  BP(mean): --  ABP: --  ABP(mean): --  RR: 17 (14 Nov 2019 06:13) (16 - 18)  SpO2: 100% (14 Nov 2019 06:13) (96% - 100%)        11-13 @ 07:01  -  11-14 @ 07:00  --------------------------------------------------------  IN: 860 mL / OUT: 1850 mL / NET: -990 mL      CAPILLARY BLOOD GLUCOSE      POCT Blood Glucose.: 183 mg/dL (14 Nov 2019 08:53)      PHYSICAL EXAM:    Constitutional: WDWN resting comfortably in bed; NAD. Thin appearing.  Head: NC/AT  Eyes: PERRL, EOMI, anicteric sclera  Mouth: MMM, no oropharyngeal exudates  Neck: Supple, trachea midline, no JVD appreciated  Respiratory: CTA B/L; no W/R/R, good inspiratory effort with no increased work of breathing  Cardiac: +S1/S2; RRR; no M/R/G. No peripheral edema b/l.  Gastrointestinal: Soft, NT/ND; no rebound or guarding; +BSx4  Extremities: No cyanosis. +Palpable radial and femoral pulses b/l, faint DP pulses b/l.  Musculoskeletal: NROM x4; no joint swelling, tenderness or erythema  Skin: Pt with gangrenous tip of left big toe, medial aspect of L small toe, and stage 2 heel ulcer.   Neurologic: Alert and oriented x3, no focal deficits appreciated  Psychiatric: Affect and characteristics of appearance, verbalizations, behaviors are appropriate      MEDICATIONS:  MEDICATIONS  (STANDING):  atorvastatin 20 milliGRAM(s) Oral at bedtime  clopidogrel Tablet 75 milliGRAM(s) Oral daily  dextrose 5%. 1000 milliLiter(s) (50 mL/Hr) IV Continuous <Continuous>  dextrose 50% Injectable 12.5 Gram(s) IV Push once  dextrose 50% Injectable 25 Gram(s) IV Push once  dextrose 50% Injectable 25 Gram(s) IV Push once  digoxin     Tablet 0.125 milliGRAM(s) Oral daily  ferrous    sulfate 325 milliGRAM(s) Oral daily  influenza   Vaccine 0.5 milliLiter(s) IntraMuscular once  insulin lispro (HumaLOG) corrective regimen sliding scale   SubCutaneous three times a day before meals  insulin lispro (HumaLOG) corrective regimen sliding scale   SubCutaneous at bedtime  metoprolol succinate  milliGRAM(s) Oral daily  piperacillin/tazobactam IVPB.. 3.375 Gram(s) IV Intermittent every 8 hours  rivaroxaban 20 milliGRAM(s) Oral with dinner  sodium chloride 2 Gram(s) Oral three times a day  sodium chloride 1.5%. 500 milliLiter(s) (50 mL/Hr) IV Continuous <Continuous>  vancomycin  IVPB 1000 milliGRAM(s) IV Intermittent every 12 hours    MEDICATIONS  (PRN):  dextrose 40% Gel 15 Gram(s) Oral once PRN Blood Glucose LESS THAN 70 milliGRAM(s)/deciliter  glucagon  Injectable 1 milliGRAM(s) IntraMuscular once PRN Glucose LESS THAN 70 milligrams/deciliter      ALLERGIES:  Allergies    No Known Allergies    Intolerances        LABS:                        7.9    9.61  )-----------( 277      ( 14 Nov 2019 04:20 )             24.3     11-14    120<LL>  |  88<L>  |  15  ----------------------------<  140<H>  4.2   |  19<L>  |  0.68    Ca    8.6      14 Nov 2019 04:20  Phos  2.3     11-14  Mg     1.4     11-14    TPro  6.0  /  Alb  2.7<L>  /  TBili  0.2  /  DBili  x   /  AST  27  /  ALT  20  /  AlkPhos  63  11-14    PT/INR - ( 14 Nov 2019 04:20 )   PT: 32.0 SEC;   INR: 2.72          PTT - ( 14 Nov 2019 04:20 )  PTT:42.1 SEC      RADIOLOGY & ADDITIONAL TESTS: Reviewed.

## 2019-11-14 NOTE — PROGRESS NOTE ADULT - PROBLEM SELECTOR PLAN 2
-s/p L bypass and stent to the L iliac, now on plavix  -c/w plavix  -FAB done, see above  -F/u vascular recs

## 2019-11-14 NOTE — PROGRESS NOTE ADULT - PROBLEM SELECTOR PLAN 4
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 Serum osm 276. Urine osm 470. Sodium random urine 57.   -Pt now w/ notable confusion on exam.   -Will start fluid restriction 1L  -Nephrology consulted, f/u recs. 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 Serum osm 276. Urine osm 470. Sodium random urine 57.   -Pt w/ intermittent confusion on exam.   -s/p 1.5% NS @ 50cc x 10 h w/ no resolution  -c/w fluid restriction 1L  -Nephrology consulted, f/u recs. Pt may benefit from salt tabs  -Will obtain CXR to r/o recurrent malignancy   -BMPs q 6H

## 2019-11-14 NOTE — PROGRESS NOTE ADULT - ASSESSMENT
73 YO M with MHx of HTN, hyperlipidemia, afib on xarelto, AICD (placed in 2014), CAD s/p PCI on plavix, PAD s/p L bypass and L ileac stent, DMII c/b neuropathy admitted for left foot gangrene.    hyponatremia    - can be secondary to urinary obstruction  - According to patient's daughter, sodium normally around 130s.   - bolivar placed with appropriate urien drainage  - sodium not improving this AM  - 1.5 NaCl at 75 cc/hr  - lasix 60 IV once    Doug Gonzales  PGY-1 Internal Medicine  [phone] 75 YO M with MHx of HTN, hyperlipidemia, afib on xarelto, AICD (placed in 2014), CAD s/p PCI on plavix, PAD s/p L bypass and L ileac stent, DMII c/b neuropathy admitted for left foot gangrene.    hyponatremia    - can be secondary to urinary obstruction  - According to patient's daughter, sodium normally around 130s.   - bolivar placed with appropriate urine drainage  - sodium not improving this AM  - 1.5 NaCl at 75 cc/hr  - lasix 60 IV once    Doug Gonzales  PGY-1 Internal Medicine  [phone]

## 2019-11-15 LAB
ALBUMIN SERPL ELPH-MCNC: 2.7 G/DL — LOW (ref 3.3–5)
ALP SERPL-CCNC: 62 U/L — SIGNIFICANT CHANGE UP (ref 40–120)
ALT FLD-CCNC: 24 U/L — SIGNIFICANT CHANGE UP (ref 4–41)
ANION GAP SERPL CALC-SCNC: 11 MMO/L — SIGNIFICANT CHANGE UP (ref 7–14)
ANION GAP SERPL CALC-SCNC: 12 MMO/L — SIGNIFICANT CHANGE UP (ref 7–14)
ANION GAP SERPL CALC-SCNC: 14 MMO/L — SIGNIFICANT CHANGE UP (ref 7–14)
APTT BLD: 41.1 SEC — HIGH (ref 27.5–36.3)
AST SERPL-CCNC: 32 U/L — SIGNIFICANT CHANGE UP (ref 4–40)
BASOPHILS # BLD AUTO: 0.03 K/UL — SIGNIFICANT CHANGE UP (ref 0–0.2)
BASOPHILS NFR BLD AUTO: 0.3 % — SIGNIFICANT CHANGE UP (ref 0–2)
BILIRUB SERPL-MCNC: 0.3 MG/DL — SIGNIFICANT CHANGE UP (ref 0.2–1.2)
BUN SERPL-MCNC: 13 MG/DL — SIGNIFICANT CHANGE UP (ref 7–23)
BUN SERPL-MCNC: 14 MG/DL — SIGNIFICANT CHANGE UP (ref 7–23)
BUN SERPL-MCNC: 14 MG/DL — SIGNIFICANT CHANGE UP (ref 7–23)
CALCIUM SERPL-MCNC: 8.2 MG/DL — LOW (ref 8.4–10.5)
CALCIUM SERPL-MCNC: 8.4 MG/DL — SIGNIFICANT CHANGE UP (ref 8.4–10.5)
CALCIUM SERPL-MCNC: 8.6 MG/DL — SIGNIFICANT CHANGE UP (ref 8.4–10.5)
CHLORIDE SERPL-SCNC: 86 MMOL/L — LOW (ref 98–107)
CHLORIDE SERPL-SCNC: 87 MMOL/L — LOW (ref 98–107)
CHLORIDE SERPL-SCNC: 87 MMOL/L — LOW (ref 98–107)
CO2 SERPL-SCNC: 18 MMOL/L — LOW (ref 22–31)
CO2 SERPL-SCNC: 22 MMOL/L — SIGNIFICANT CHANGE UP (ref 22–31)
CO2 SERPL-SCNC: 23 MMOL/L — SIGNIFICANT CHANGE UP (ref 22–31)
CREAT SERPL-MCNC: 0.62 MG/DL — SIGNIFICANT CHANGE UP (ref 0.5–1.3)
CREAT SERPL-MCNC: 0.63 MG/DL — SIGNIFICANT CHANGE UP (ref 0.5–1.3)
CREAT SERPL-MCNC: 0.69 MG/DL — SIGNIFICANT CHANGE UP (ref 0.5–1.3)
EOSINOPHIL # BLD AUTO: 0.02 K/UL — SIGNIFICANT CHANGE UP (ref 0–0.5)
EOSINOPHIL NFR BLD AUTO: 0.2 % — SIGNIFICANT CHANGE UP (ref 0–6)
GLUCOSE BLDC GLUCOMTR-MCNC: 161 MG/DL — HIGH (ref 70–99)
GLUCOSE BLDC GLUCOMTR-MCNC: 168 MG/DL — HIGH (ref 70–99)
GLUCOSE BLDC GLUCOMTR-MCNC: 185 MG/DL — HIGH (ref 70–99)
GLUCOSE BLDC GLUCOMTR-MCNC: 207 MG/DL — HIGH (ref 70–99)
GLUCOSE SERPL-MCNC: 145 MG/DL — HIGH (ref 70–99)
GLUCOSE SERPL-MCNC: 162 MG/DL — HIGH (ref 70–99)
GLUCOSE SERPL-MCNC: 184 MG/DL — HIGH (ref 70–99)
HCT VFR BLD CALC: 25.5 % — LOW (ref 39–50)
HGB BLD-MCNC: 8.2 G/DL — LOW (ref 13–17)
IMM GRANULOCYTES NFR BLD AUTO: 1.4 % — SIGNIFICANT CHANGE UP (ref 0–1.5)
INR BLD: 2.36 — HIGH (ref 0.88–1.17)
LYMPHOCYTES # BLD AUTO: 0.77 K/UL — LOW (ref 1–3.3)
LYMPHOCYTES # BLD AUTO: 7.2 % — LOW (ref 13–44)
MAGNESIUM SERPL-MCNC: 1.4 MG/DL — LOW (ref 1.6–2.6)
MCHC RBC-ENTMCNC: 25.2 PG — LOW (ref 27–34)
MCHC RBC-ENTMCNC: 32.2 % — SIGNIFICANT CHANGE UP (ref 32–36)
MCV RBC AUTO: 78.5 FL — LOW (ref 80–100)
MONOCYTES # BLD AUTO: 3.35 K/UL — HIGH (ref 0–0.9)
MONOCYTES NFR BLD AUTO: 31.3 % — HIGH (ref 2–14)
NEUTROPHILS # BLD AUTO: 6.38 K/UL — SIGNIFICANT CHANGE UP (ref 1.8–7.4)
NEUTROPHILS NFR BLD AUTO: 59.6 % — SIGNIFICANT CHANGE UP (ref 43–77)
NRBC # FLD: 0 K/UL — SIGNIFICANT CHANGE UP (ref 0–0)
PHOSPHATE SERPL-MCNC: 2.7 MG/DL — SIGNIFICANT CHANGE UP (ref 2.5–4.5)
PLATELET # BLD AUTO: 288 K/UL — SIGNIFICANT CHANGE UP (ref 150–400)
PMV BLD: 10.6 FL — SIGNIFICANT CHANGE UP (ref 7–13)
POTASSIUM SERPL-MCNC: 4.2 MMOL/L — SIGNIFICANT CHANGE UP (ref 3.5–5.3)
POTASSIUM SERPL-MCNC: 4.3 MMOL/L — SIGNIFICANT CHANGE UP (ref 3.5–5.3)
POTASSIUM SERPL-MCNC: 4.3 MMOL/L — SIGNIFICANT CHANGE UP (ref 3.5–5.3)
POTASSIUM SERPL-SCNC: 4.2 MMOL/L — SIGNIFICANT CHANGE UP (ref 3.5–5.3)
POTASSIUM SERPL-SCNC: 4.3 MMOL/L — SIGNIFICANT CHANGE UP (ref 3.5–5.3)
POTASSIUM SERPL-SCNC: 4.3 MMOL/L — SIGNIFICANT CHANGE UP (ref 3.5–5.3)
PROT SERPL-MCNC: 6 G/DL — SIGNIFICANT CHANGE UP (ref 6–8.3)
PROTHROM AB SERPL-ACNC: 26.9 SEC — HIGH (ref 9.8–13.1)
RBC # BLD: 3.25 M/UL — LOW (ref 4.2–5.8)
RBC # FLD: 14.8 % — HIGH (ref 10.3–14.5)
SODIUM SERPL-SCNC: 119 MMOL/L — CRITICAL LOW (ref 135–145)
SODIUM SERPL-SCNC: 121 MMOL/L — LOW (ref 135–145)
SODIUM SERPL-SCNC: 121 MMOL/L — LOW (ref 135–145)
VANCOMYCIN TROUGH SERPL-MCNC: 16.9 UG/ML — SIGNIFICANT CHANGE UP (ref 10–20)
WBC # BLD: 10.7 K/UL — HIGH (ref 3.8–10.5)
WBC # FLD AUTO: 10.7 K/UL — HIGH (ref 3.8–10.5)

## 2019-11-15 PROCEDURE — 99233 SBSQ HOSP IP/OBS HIGH 50: CPT | Mod: GC

## 2019-11-15 RX ORDER — MAGNESIUM SULFATE 500 MG/ML
1 VIAL (ML) INJECTION ONCE
Refills: 0 | Status: COMPLETED | OUTPATIENT
Start: 2019-11-15 | End: 2019-11-15

## 2019-11-15 RX ORDER — ACETAMINOPHEN 500 MG
650 TABLET ORAL EVERY 6 HOURS
Refills: 0 | Status: DISCONTINUED | OUTPATIENT
Start: 2019-11-15 | End: 2019-11-21

## 2019-11-15 RX ORDER — FUROSEMIDE 40 MG
60 TABLET ORAL ONCE
Refills: 0 | Status: DISCONTINUED | OUTPATIENT
Start: 2019-11-15 | End: 2019-11-16

## 2019-11-15 RX ORDER — MAGNESIUM SULFATE 500 MG/ML
2 VIAL (ML) INJECTION ONCE
Refills: 0 | Status: COMPLETED | OUTPATIENT
Start: 2019-11-15 | End: 2019-11-15

## 2019-11-15 RX ORDER — SODIUM CHLORIDE 9 MG/ML
2 INJECTION INTRAMUSCULAR; INTRAVENOUS; SUBCUTANEOUS THREE TIMES A DAY
Refills: 0 | Status: DISCONTINUED | OUTPATIENT
Start: 2019-11-15 | End: 2019-11-15

## 2019-11-15 RX ORDER — SODIUM CHLORIDE 5 G/100ML
500 INJECTION, SOLUTION INTRAVENOUS
Refills: 0 | Status: DISCONTINUED | OUTPATIENT
Start: 2019-11-15 | End: 2019-11-15

## 2019-11-15 RX ADMIN — Medication 650 MILLIGRAM(S): at 12:19

## 2019-11-15 RX ADMIN — CLOPIDOGREL BISULFATE 75 MILLIGRAM(S): 75 TABLET, FILM COATED ORAL at 12:19

## 2019-11-15 RX ADMIN — Medication 1: at 09:17

## 2019-11-15 RX ADMIN — Medication 650 MILLIGRAM(S): at 12:49

## 2019-11-15 RX ADMIN — ATORVASTATIN CALCIUM 20 MILLIGRAM(S): 80 TABLET, FILM COATED ORAL at 21:49

## 2019-11-15 RX ADMIN — Medication 150 MILLIGRAM(S): at 05:31

## 2019-11-15 RX ADMIN — Medication 2: at 13:13

## 2019-11-15 RX ADMIN — Medication 1: at 18:38

## 2019-11-15 RX ADMIN — Medication 250 MILLIGRAM(S): at 19:46

## 2019-11-15 RX ADMIN — Medication 100 GRAM(S): at 07:50

## 2019-11-15 RX ADMIN — PIPERACILLIN AND TAZOBACTAM 25 GRAM(S): 4; .5 INJECTION, POWDER, LYOPHILIZED, FOR SOLUTION INTRAVENOUS at 15:01

## 2019-11-15 RX ADMIN — Medication 250 MILLIGRAM(S): at 05:32

## 2019-11-15 RX ADMIN — RIVAROXABAN 20 MILLIGRAM(S): KIT at 18:38

## 2019-11-15 RX ADMIN — Medication 50 GRAM(S): at 09:51

## 2019-11-15 RX ADMIN — Medication 0.12 MILLIGRAM(S): at 05:31

## 2019-11-15 RX ADMIN — PIPERACILLIN AND TAZOBACTAM 25 GRAM(S): 4; .5 INJECTION, POWDER, LYOPHILIZED, FOR SOLUTION INTRAVENOUS at 06:41

## 2019-11-15 RX ADMIN — SODIUM CHLORIDE 30 MILLILITER(S): 5 INJECTION, SOLUTION INTRAVENOUS at 10:58

## 2019-11-15 RX ADMIN — PIPERACILLIN AND TAZOBACTAM 25 GRAM(S): 4; .5 INJECTION, POWDER, LYOPHILIZED, FOR SOLUTION INTRAVENOUS at 23:46

## 2019-11-15 RX ADMIN — Medication 325 MILLIGRAM(S): at 12:19

## 2019-11-15 NOTE — PROGRESS NOTE ADULT - PROBLEM SELECTOR PLAN 7
W/ chronic microcytic anemia likely 2/2 ACD vs iron def anemia. Baseline 8-9  -TIBC, Ferritin wnl. Serum iron dec to 16.  -c/w iron suppl  -No active signs of bleeding at this time  -Maintain active type and screen  -Monitor CBCs

## 2019-11-15 NOTE — PROGRESS NOTE ADULT - PROBLEM SELECTOR PLAN 9
Pt w/ chronic urinary retention per family > 5 years. Per family 2/2 DM and fall > 5 years  -c/w bolivar

## 2019-11-15 NOTE — PROGRESS NOTE ADULT - SUBJECTIVE AND OBJECTIVE BOX
Monika Masters MD  PGY 1 Department of Internal Medicine  Pager: [phone]    Patient is a 74y old  Male who presents with a chief complaint of Left foot gangrene, rule out osteomyelitis (14 Nov 2019 14:41)      SUBJECTIVE / OVERNIGHT EVENTS: Pt seen and examined. No acute overnight events.        MEDICATIONS  (STANDING):  atorvastatin 20 milliGRAM(s) Oral at bedtime  clopidogrel Tablet 75 milliGRAM(s) Oral daily  dextrose 5%. 1000 milliLiter(s) (50 mL/Hr) IV Continuous <Continuous>  dextrose 50% Injectable 12.5 Gram(s) IV Push once  dextrose 50% Injectable 25 Gram(s) IV Push once  dextrose 50% Injectable 25 Gram(s) IV Push once  digoxin     Tablet 0.125 milliGRAM(s) Oral daily  ferrous    sulfate 325 milliGRAM(s) Oral daily  influenza   Vaccine 0.5 milliLiter(s) IntraMuscular once  insulin lispro (HumaLOG) corrective regimen sliding scale   SubCutaneous three times a day before meals  insulin lispro (HumaLOG) corrective regimen sliding scale   SubCutaneous at bedtime  magnesium sulfate  IVPB 1 Gram(s) IV Intermittent once  metoprolol succinate  milliGRAM(s) Oral daily  piperacillin/tazobactam IVPB.. 3.375 Gram(s) IV Intermittent every 8 hours  rivaroxaban 20 milliGRAM(s) Oral with dinner  sodium chloride 1.5%. 500 milliLiter(s) (75 mL/Hr) IV Continuous <Continuous>  vancomycin  IVPB 1000 milliGRAM(s) IV Intermittent every 12 hours    MEDICATIONS  (PRN):  dextrose 40% Gel 15 Gram(s) Oral once PRN Blood Glucose LESS THAN 70 milliGRAM(s)/deciliter  glucagon  Injectable 1 milliGRAM(s) IntraMuscular once PRN Glucose LESS THAN 70 milligrams/deciliter      I&O's Summary    14 Nov 2019 07:01  -  15 Nov 2019 07:00  --------------------------------------------------------  IN: 0 mL / OUT: 2300 mL / NET: -2300 mL        Vital Signs Last 24 Hrs  T(C): 36.6 (15 Nov 2019 05:04), Max: 36.8 (14 Nov 2019 21:21)  T(F): 97.8 (15 Nov 2019 05:04), Max: 98.2 (14 Nov 2019 21:21)  HR: 85 (15 Nov 2019 05:04) (69 - 89)  BP: 120/61 (15 Nov 2019 05:04) (120/61 - 144/68)  BP(mean): --  RR: 17 (15 Nov 2019 05:04) (17 - 18)  SpO2: 96% (15 Nov 2019 05:04) (96% - 98%)    CAPILLARY BLOOD GLUCOSE      POCT Blood Glucose.: 172 mg/dL (14 Nov 2019 21:58)  POCT Blood Glucose.: 212 mg/dL (14 Nov 2019 18:03)  POCT Blood Glucose.: 126 mg/dL (14 Nov 2019 13:02)  POCT Blood Glucose.: 183 mg/dL (14 Nov 2019 08:53)      PHYSICAL EXAM:              Constitutional: WDWN resting comfortably in bed; NAD. Cachectic appearing.  	Head: NC/AT  	Eyes: PERRL, EOMI, anicteric sclera  	Neck: Supple  	Respiratory: CTA B/L  	Cardiac: +S1/S2; RRR; no M/R/G. No peripheral edema b/l.  	Gastrointestinal: Soft, NT/ND; no rebound or guarding; +BSx4  	Extremities: No cyanosis. +Palpable radial pulses b/l, LE pulses non palpable B/L  	Musculoskeletal: NROM x4; no joint swelling, tenderness or erythema  	Skin: + Dry gangrene L toe. L feet cold to touch w/ blue/ purple discoloration.  	Neurologic: Alert and oriented x3, no focal deficits appreciated       LABS:                        7.9    9.61  )-----------( 277      ( 14 Nov 2019 04:20 )             24.3     Auto Eosinophil # x     / Auto Eosinophil % x     / Auto Neutrophil # x     / Auto Neutrophil % x     / BANDS % x        11-14    122<L>  |  88<L>  |  15  ----------------------------<  166<H>  4.3   |  22  |  0.77  11-14    120<LL>  |  88<L>  |  15  ----------------------------<  140<H>  4.2   |  19<L>  |  0.68  11-13    119<LL>  |  88<L>  |  17  ----------------------------<  153<H>  4.2   |  21<L>  |  0.70    Ca    8.5      14 Nov 2019 21:32  Mg     1.4     11-14  Phos  3.4     11-14  TPro  6.0  /  Alb  2.7<L>  /  TBili  0.2  /  DBili  x   /  AST  27  /  ALT  20  /  AlkPhos  63  11-14    PT/INR - ( 14 Nov 2019 04:20 )   PT: 32.0 SEC;   INR: 2.72          PTT - ( 14 Nov 2019 04:20 )  PTT:42.1 SEC              RADIOLOGY & ADDITIONAL TESTS:    Imaging Personally Reviewed:    Consultant(s) Notes Reviewed:      Care Discussed with Consultants/Other Providers: Monika Masters MD  PGY 1 Department of Internal Medicine  Pager: [phone]    Patient is a 74y old  Male who presents with a chief complaint of Left foot gangrene, rule out osteomyelitis (14 Nov 2019 14:41)      SUBJECTIVE / OVERNIGHT EVENTS: Pt seen and examined. No acute overnight events. This AM endorses leg pain. ROS otherwise negative.         MEDICATIONS  (STANDING):  atorvastatin 20 milliGRAM(s) Oral at bedtime  clopidogrel Tablet 75 milliGRAM(s) Oral daily  dextrose 5%. 1000 milliLiter(s) (50 mL/Hr) IV Continuous <Continuous>  dextrose 50% Injectable 12.5 Gram(s) IV Push once  dextrose 50% Injectable 25 Gram(s) IV Push once  dextrose 50% Injectable 25 Gram(s) IV Push once  digoxin     Tablet 0.125 milliGRAM(s) Oral daily  ferrous    sulfate 325 milliGRAM(s) Oral daily  influenza   Vaccine 0.5 milliLiter(s) IntraMuscular once  insulin lispro (HumaLOG) corrective regimen sliding scale   SubCutaneous three times a day before meals  insulin lispro (HumaLOG) corrective regimen sliding scale   SubCutaneous at bedtime  magnesium sulfate  IVPB 1 Gram(s) IV Intermittent once  metoprolol succinate  milliGRAM(s) Oral daily  piperacillin/tazobactam IVPB.. 3.375 Gram(s) IV Intermittent every 8 hours  rivaroxaban 20 milliGRAM(s) Oral with dinner  sodium chloride 1.5%. 500 milliLiter(s) (75 mL/Hr) IV Continuous <Continuous>  vancomycin  IVPB 1000 milliGRAM(s) IV Intermittent every 12 hours    MEDICATIONS  (PRN):  dextrose 40% Gel 15 Gram(s) Oral once PRN Blood Glucose LESS THAN 70 milliGRAM(s)/deciliter  glucagon  Injectable 1 milliGRAM(s) IntraMuscular once PRN Glucose LESS THAN 70 milligrams/deciliter      I&O's Summary    14 Nov 2019 07:01  -  15 Nov 2019 07:00  --------------------------------------------------------  IN: 0 mL / OUT: 2300 mL / NET: -2300 mL        Vital Signs Last 24 Hrs  T(C): 36.6 (15 Nov 2019 05:04), Max: 36.8 (14 Nov 2019 21:21)  T(F): 97.8 (15 Nov 2019 05:04), Max: 98.2 (14 Nov 2019 21:21)  HR: 85 (15 Nov 2019 05:04) (69 - 89)  BP: 120/61 (15 Nov 2019 05:04) (120/61 - 144/68)  BP(mean): --  RR: 17 (15 Nov 2019 05:04) (17 - 18)  SpO2: 96% (15 Nov 2019 05:04) (96% - 98%)    CAPILLARY BLOOD GLUCOSE      POCT Blood Glucose.: 172 mg/dL (14 Nov 2019 21:58)  POCT Blood Glucose.: 212 mg/dL (14 Nov 2019 18:03)  POCT Blood Glucose.: 126 mg/dL (14 Nov 2019 13:02)  POCT Blood Glucose.: 183 mg/dL (14 Nov 2019 08:53)      PHYSICAL EXAM:              Constitutional: WDWN resting comfortably in bed; NAD. Cachectic appearing.  	Head: NC/AT  	Eyes: PERRL, EOMI, anicteric sclera  	Neck: Supple  	Respiratory: CTA B/L  	Cardiac: +S1/S2; RRR; no M/R/G. No peripheral edema b/l.  	Gastrointestinal: Soft, NT/ND; no rebound or guarding; +BSx4  	Extremities: No cyanosis. +Palpable radial pulses b/l, LE pulses non palpable B/L  	Musculoskeletal: NROM x4; no joint swelling, tenderness or erythema  	Skin: + Dry gangrene L toe. L feet cold to touch w/ blue/ purple discoloration.  	Neurologic: Alert and oriented x3, no focal deficits appreciated       LABS:                        7.9    9.61  )-----------( 277      ( 14 Nov 2019 04:20 )             24.3     Auto Eosinophil # x     / Auto Eosinophil % x     / Auto Neutrophil # x     / Auto Neutrophil % x     / BANDS % x        11-14    122<L>  |  88<L>  |  15  ----------------------------<  166<H>  4.3   |  22  |  0.77  11-14    120<LL>  |  88<L>  |  15  ----------------------------<  140<H>  4.2   |  19<L>  |  0.68  11-13    119<LL>  |  88<L>  |  17  ----------------------------<  153<H>  4.2   |  21<L>  |  0.70    Ca    8.5      14 Nov 2019 21:32  Mg     1.4     11-14  Phos  3.4     11-14  TPro  6.0  /  Alb  2.7<L>  /  TBili  0.2  /  DBili  x   /  AST  27  /  ALT  20  /  AlkPhos  63  11-14    PT/INR - ( 14 Nov 2019 04:20 )   PT: 32.0 SEC;   INR: 2.72          PTT - ( 14 Nov 2019 04:20 )  PTT:42.1 SEC              RADIOLOGY & ADDITIONAL TESTS:    Imaging Personally Reviewed:    Consultant(s) Notes Reviewed:      Care Discussed with Consultants/Other Providers:

## 2019-11-15 NOTE — PROGRESS NOTE ADULT - ASSESSMENT
74M w/ PMHx of DM, PVD, HTN, HLD w/ left hallux gangrene  -Pt seen and evaluated  -Left foot x-rays negative for osteo  -Left foot distal hallux eschar 2/2 partial nail avulsion & PVD, well-adhered and stable with no acute signs of infection. Left foot lateral 4th toe wound to subQ, fibrotic with no acute signs of infection; 4th digit showing ischemic changes to the lateral aspect, dusky 5th digit   - applied betadine + DSD  - FAB/ PVRS poor  - s/p CTA showing severe stenosis to LLE, patent fem-peroneal bypass RLE  - No podiatric surgical intervention at this time  - Continue IV abx and local wound care w/ betadine  - awaiting final vasc recs   - will continue to monitor  - seen w/ attending

## 2019-11-15 NOTE — PROGRESS NOTE ADULT - PROBLEM SELECTOR PLAN 3
-c/w xarelto  -c/w metoprolol succinate 100mg daily  -c/w digoxin -s/p L bypass and stent to the L iliac, now on plavix  -c/w plavix  -FAB done, see above  -F/u vascular recs

## 2019-11-15 NOTE — PROGRESS NOTE ADULT - ATTENDING COMMENTS
serum sodium calculated to increase by 4 meq to 126 level      Christopher Johns MD, FACP.  Attending Nephrologist  Office: [phone]  Pager: [phone]  Email: [email or web address]

## 2019-11-15 NOTE — PROGRESS NOTE ADULT - ATTENDING COMMENTS
Na has been resistant to correction. After extensive discussion with Nephrology, will initiate 3% NS with frequent BMP monitoring. Once Na corrected, pt will likely need vascular intervention for severe b/l LE PAD. Will reach out to Vascular Surgery today. Continue broad-spectrum abx.

## 2019-11-15 NOTE — PROGRESS NOTE ADULT - PROBLEM SELECTOR PLAN 2
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 Serum osm 276. Urine osm 470. Sodium random urine 57.   -Pt w/ intermittent confusion on exam.   -s/p 1.5% NS @ 50cc x 10 h w/ no resolution  -c/w fluid restriction 1L  -Start 2g Salt tabs TID  -Start 3% NS @ 30cc/hr x 6 hours  -Nephrology consulted, f/u recs.   -CXR negative for recurrent malignancy   -BMPs q 6H

## 2019-11-15 NOTE — PROGRESS NOTE ADULT - ASSESSMENT
75 YO M with MHx of HTN, hyperlipidemia, afib on xarelto, AICD (placed in 2014), CAD s/p PCI on plavix, PAD s/p L bypass and L ileac stent, DMII c/b neuropathy admitted for left foot gangrene.

## 2019-11-15 NOTE — PROGRESS NOTE ADULT - SUBJECTIVE AND OBJECTIVE BOX
[facility] DIVISION OF KIDNEY DISEASES AND HYPERTENSION -- FOLLOW UP NOTE  --------------------------------------------------------------------------------  HPI: 75 YO M with MHx of HTN, hyperlipidemia, afib on xarelto, AICD (placed in 2014), CAD s/p PCI on plavix, PAD s/p L bypass and L ileac stent, DMII c/b neuropathy admitted for left foot gangrene. Nephrology consulted for hyponatremia management. Patient endorses decreased PO intake on presentation. Pt. also with urinary retention on admission. On lab review of [facility] serum sodium decrease ~ 130 since 2017. According to patient's daughter, sodium normally around 130s. Sodium 122 on admission on 11/11/19, serum sodium  down to 120 today despite HTS 1.5%.     Pt. was seen and examined with daughter at beside, feels well offers no complains. Denies CP, abdominal pain, nausea, vomiting, diarrhea, headache, dizziness.    PAST HISTORY  --------------------------------------------------------------------------------  No significant changes to PMH, PSH, FHx, SHx, unless otherwise noted    ALLERGIES & MEDICATIONS  --------------------------------------------------------------------------------  Allergies    No Known Allergies    Intolerances      Standing Inpatient Medications  atorvastatin 20 milliGRAM(s) Oral at bedtime  clopidogrel Tablet 75 milliGRAM(s) Oral daily  dextrose 5%. 1000 milliLiter(s) IV Continuous <Continuous>  dextrose 50% Injectable 12.5 Gram(s) IV Push once  dextrose 50% Injectable 25 Gram(s) IV Push once  dextrose 50% Injectable 25 Gram(s) IV Push once  digoxin     Tablet 0.125 milliGRAM(s) Oral daily  ferrous    sulfate 325 milliGRAM(s) Oral daily  furosemide   Injectable 60 milliGRAM(s) IV Push once  influenza   Vaccine 0.5 milliLiter(s) IntraMuscular once  insulin lispro (HumaLOG) corrective regimen sliding scale   SubCutaneous three times a day before meals  insulin lispro (HumaLOG) corrective regimen sliding scale   SubCutaneous at bedtime  metoprolol succinate  milliGRAM(s) Oral daily  piperacillin/tazobactam IVPB.. 3.375 Gram(s) IV Intermittent every 8 hours  rivaroxaban 20 milliGRAM(s) Oral with dinner  sodium chloride 3%. 500 milliLiter(s) IV Continuous <Continuous>  vancomycin  IVPB 1000 milliGRAM(s) IV Intermittent every 12 hours    PRN Inpatient Medications  dextrose 40% Gel 15 Gram(s) Oral once PRN  glucagon  Injectable 1 milliGRAM(s) IntraMuscular once PRN      REVIEW OF SYSTEMS  --------------------------------------------------------------------------------  Gen: No fevers/chills  Skin: No rashes  Head/Eyes/Ears: Normal hearing,   Respiratory: No dyspnea, cough  CV: No chest pain  GI: No abdominal pain, diarrhea  : No dysuria, hematuria  MSK: No  edema  Heme: No easy bruising or bleeding  Psych: No significant depression      All other systems were reviewed and are negative, except as noted.    VITALS/PHYSICAL EXAM  --------------------------------------------------------------------------------  T(C): 36.2 (11-15-19 @ 11:25), Max: 36.8 (11-14-19 @ 21:21)  HR: 60 (11-15-19 @ 11:25) (60 - 89)  BP: 101/55 (11-15-19 @ 11:25) (101/55 - 144/68)  RR: 18 (11-15-19 @ 11:25) (17 - 18)  SpO2: 99% (11-15-19 @ 11:25) (96% - 99%)  Wt(kg): --        11-14-19 @ 07:01  -  11-15-19 @ 07:00  --------------------------------------------------------  IN: 550 mL / OUT: 3000 mL / NET: -2450 mL        Physical Exam:  	Gen: NAD, elderly, fragile  	HEENT: MMM  	Pulm: CTA B/L  	CV: S1S2  	Abd: Soft, +BS   	Ext: No LE edema B/L  	Neuro: Awake  	Skin: Warm and dry      LABS/STUDIES  --------------------------------------------------------------------------------              8.2    10.70 >-----------<  288      [11-15-19 @ 06:40]              25.5     121  |  87  |  14  ----------------------------<  162      [11-15-19 @ 06:40]  4.3   |  22  |  0.69        Ca     8.6     [11-15-19 @ 06:40]      Mg     1.4     [11-15-19 @ 06:40]      Phos  2.7     [11-15-19 @ 06:40]    TPro  6.0  /  Alb  2.7  /  TBili  0.3  /  DBili  x   /  AST  32  /  ALT  24  /  AlkPhos  62  [11-15-19 @ 06:40]    PT/INR: PT 26.9 , INR 2.36       [11-15-19 @ 06:40]  PTT: 41.1       [11-15-19 @ 06:40]      Creatinine Trend:  SCr 0.69 [11-15 @ 06:40]  SCr 0.77 [11-14 @ 21:32]  SCr 0.68 [11-14 @ 04:20]  SCr 0.70 [11-13 @ 22:20]  SCr 0.68 [11-13 @ 15:15]      Urine Sodium 57      [11-13-19 @ 12:49]  Urine Osmolality 490      [11-13-19 @ 10:45]    Iron 16, TIBC 197, %sat --      [11-13-19 @ 06:54]  Ferritin 104.4      [11-13-19 @ 06:54]  HbA1c 5.8      [11-13-19 @ 06:54]

## 2019-11-15 NOTE — CHART NOTE - NSCHARTNOTEFT_GEN_A_CORE
did not respond to 1.5%.  start Nacl 2gm TID . lasix 60mg x 1 ordered   if not improved we can add UreNa

## 2019-11-15 NOTE — PROGRESS NOTE ADULT - PROBLEM SELECTOR PLAN 1
Pt,. with  acute on chronic hypotenic hyponatremia in the setting of infection, urinary retention, poor oral intake. On lab review of [facility] serum sodium decrease ~ 130 since 2017. According to patient's daughter, sodium normally around 130s. Sodium 122 on admission on 11/11/19, serum sodium  down to 120 today despite HTS 1.5%. 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 Recommend to start HTS 3% at 30 ml/hr for 6 hrs. Check serum sodium q 4 hrs. continue fluid restriction. Avoid overcorrection > 6-8 mEq in 24 hrs.

## 2019-11-15 NOTE — PROGRESS NOTE ADULT - SUBJECTIVE AND OBJECTIVE BOX
Podiatry pager #: [phone] ([place])/ [phone] ([facility])    Patient is a 74y old  Male who presents with a chief complaint of Left foot gangrene, rule out osteomyelitis (15 Nov 2019 07:16)       INTERVAL HPI/OVERNIGHT EVENTS:  Patient seen and evaluated at bedside.  Pt is resting comfortable in NAD. Denies N/V/F/C.     Allergies    No Known Allergies    Intolerances        Vital Signs Last 24 Hrs  T(C): 36.6 (15 Nov 2019 05:04), Max: 36.8 (14 Nov 2019 21:21)  T(F): 97.8 (15 Nov 2019 05:04), Max: 98.2 (14 Nov 2019 21:21)  HR: 85 (15 Nov 2019 05:04) (69 - 89)  BP: 120/61 (15 Nov 2019 05:04) (120/61 - 144/68)  BP(mean): --  RR: 17 (15 Nov 2019 05:04) (17 - 18)  SpO2: 96% (15 Nov 2019 05:04) (96% - 98%)    LABS:                        8.2    10.70 )-----------( 288      ( 15 Nov 2019 06:40 )             25.5     11-15    121<L>  |  87<L>  |  14  ----------------------------<  162<H>  4.3   |  22  |  0.69    Ca    8.6      15 Nov 2019 06:40  Phos  2.7     11-15  Mg     1.4     11-15    TPro  6.0  /  Alb  2.7<L>  /  TBili  0.3  /  DBili  x   /  AST  32  /  ALT  24  /  AlkPhos  62  11-15    PT/INR - ( 15 Nov 2019 06:40 )   PT: 26.9 SEC;   INR: 2.36          PTT - ( 15 Nov 2019 06:40 )  PTT:41.1 SEC    CAPILLARY BLOOD GLUCOSE      POCT Blood Glucose.: 185 mg/dL (15 Nov 2019 09:08)  POCT Blood Glucose.: 172 mg/dL (14 Nov 2019 21:58)  POCT Blood Glucose.: 212 mg/dL (14 Nov 2019 18:03)  POCT Blood Glucose.: 126 mg/dL (14 Nov 2019 13:02)      Lower Extremity Physical Exam:  Vascular: DP/PT 0/4, B/L, CFT <3 seconds B/L, Temperature gradient cool to cool, B/L.   Neuro: Epicritic sensation absent to level of ankles B/L.  Musculoskeletal/Ortho: left lower extremity flexion contraction of knee w/ increased pressure on plantar heel  Skin: Left foot plantar heel deep tissue injury w/ ecchymosis & no open lesions    Wound #1:  Left foot distal hallux eschar, 2.5x2.0cm, depth to subq, wound bed necrotic, no active drainage, no malodor, periwound erythematous to interphalangeal joint; etiology 2/ 2 medial hallucal nail border partial nail avulsion & PVD    Wound #2: Left foot lateral 4th digit ulcer, 1.0x1.0cm, depth to subq, wound bed fibrotic, no active drainage, no malodor, periwound stable w/ erythema to dorsal distal forefoot & ascending lymphangitis to level of midfoot, eitology 2/2 chronic pressure from hypertrophic 4th digit proximal phalanx base abutting against 5th digit proximal phalanx head & PVD    - 4th and 5th digits appear dusky     RADIOLOGY & ADDITIONAL TESTS:

## 2019-11-15 NOTE — PROGRESS NOTE ADULT - PROBLEM SELECTOR PLAN 4
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 Serum osm 276. Urine osm 470. Sodium random urine 57.   -Pt w/ intermittent confusion on exam.   -s/p 1.5% NS @ 50cc x 10 h w/ no resolution  -c/w fluid restriction 1L  -Nephrology consulted, f/u recs. Pt may benefit from salt tabs  -Will obtain CXR to r/o recurrent malignancy   -BMPs q 6H -c/w xarelto  -c/w metoprolol succinate 100mg daily  -c/w digoxin

## 2019-11-16 LAB
ALDOST SERPL-MCNC: 14 NG/DL — SIGNIFICANT CHANGE UP
ANION GAP SERPL CALC-SCNC: 12 MMO/L — SIGNIFICANT CHANGE UP (ref 7–14)
ANION GAP SERPL CALC-SCNC: 13 MMO/L — SIGNIFICANT CHANGE UP (ref 7–14)
ANION GAP SERPL CALC-SCNC: 13 MMO/L — SIGNIFICANT CHANGE UP (ref 7–14)
APTT BLD: 40.7 SEC — HIGH (ref 27.5–36.3)
BACTERIA BLD CULT: SIGNIFICANT CHANGE UP
BACTERIA BLD CULT: SIGNIFICANT CHANGE UP
BASOPHILS # BLD AUTO: 0.01 K/UL — SIGNIFICANT CHANGE UP (ref 0–0.2)
BASOPHILS NFR BLD AUTO: 0.1 % — SIGNIFICANT CHANGE UP (ref 0–2)
BUN SERPL-MCNC: 12 MG/DL — SIGNIFICANT CHANGE UP (ref 7–23)
BUN SERPL-MCNC: 12 MG/DL — SIGNIFICANT CHANGE UP (ref 7–23)
BUN SERPL-MCNC: 13 MG/DL — SIGNIFICANT CHANGE UP (ref 7–23)
CALCIUM SERPL-MCNC: 8.3 MG/DL — LOW (ref 8.4–10.5)
CALCIUM SERPL-MCNC: 8.3 MG/DL — LOW (ref 8.4–10.5)
CALCIUM SERPL-MCNC: 8.4 MG/DL — SIGNIFICANT CHANGE UP (ref 8.4–10.5)
CHLORIDE SERPL-SCNC: 84 MMOL/L — LOW (ref 98–107)
CHLORIDE SERPL-SCNC: 84 MMOL/L — LOW (ref 98–107)
CHLORIDE SERPL-SCNC: 85 MMOL/L — LOW (ref 98–107)
CO2 SERPL-SCNC: 22 MMOL/L — SIGNIFICANT CHANGE UP (ref 22–31)
CO2 SERPL-SCNC: 22 MMOL/L — SIGNIFICANT CHANGE UP (ref 22–31)
CO2 SERPL-SCNC: 25 MMOL/L — SIGNIFICANT CHANGE UP (ref 22–31)
CREAT SERPL-MCNC: 0.57 MG/DL — SIGNIFICANT CHANGE UP (ref 0.5–1.3)
CREAT SERPL-MCNC: 0.58 MG/DL — SIGNIFICANT CHANGE UP (ref 0.5–1.3)
CREAT SERPL-MCNC: 0.61 MG/DL — SIGNIFICANT CHANGE UP (ref 0.5–1.3)
EOSINOPHIL # BLD AUTO: 0.06 K/UL — SIGNIFICANT CHANGE UP (ref 0–0.5)
EOSINOPHIL NFR BLD AUTO: 0.7 % — SIGNIFICANT CHANGE UP (ref 0–6)
GLUCOSE BLDC GLUCOMTR-MCNC: 148 MG/DL — HIGH (ref 70–99)
GLUCOSE BLDC GLUCOMTR-MCNC: 161 MG/DL — HIGH (ref 70–99)
GLUCOSE BLDC GLUCOMTR-MCNC: 164 MG/DL — HIGH (ref 70–99)
GLUCOSE BLDC GLUCOMTR-MCNC: 192 MG/DL — HIGH (ref 70–99)
GLUCOSE SERPL-MCNC: 126 MG/DL — HIGH (ref 70–99)
GLUCOSE SERPL-MCNC: 154 MG/DL — HIGH (ref 70–99)
GLUCOSE SERPL-MCNC: 185 MG/DL — HIGH (ref 70–99)
HCT VFR BLD CALC: 23.5 % — LOW (ref 39–50)
HGB BLD-MCNC: 7.8 G/DL — LOW (ref 13–17)
IMM GRANULOCYTES NFR BLD AUTO: 1.5 % — SIGNIFICANT CHANGE UP (ref 0–1.5)
INR BLD: 2.59 — HIGH (ref 0.88–1.17)
LYMPHOCYTES # BLD AUTO: 0.64 K/UL — LOW (ref 1–3.3)
LYMPHOCYTES # BLD AUTO: 7.9 % — LOW (ref 13–44)
MCHC RBC-ENTMCNC: 25.7 PG — LOW (ref 27–34)
MCHC RBC-ENTMCNC: 33.2 % — SIGNIFICANT CHANGE UP (ref 32–36)
MCV RBC AUTO: 77.6 FL — LOW (ref 80–100)
MONOCYTES # BLD AUTO: 2.64 K/UL — HIGH (ref 0–0.9)
MONOCYTES NFR BLD AUTO: 32.5 % — HIGH (ref 2–14)
NEUTROPHILS # BLD AUTO: 4.65 K/UL — SIGNIFICANT CHANGE UP (ref 1.8–7.4)
NEUTROPHILS NFR BLD AUTO: 57.3 % — SIGNIFICANT CHANGE UP (ref 43–77)
NRBC # FLD: 0 K/UL — SIGNIFICANT CHANGE UP (ref 0–0)
PLATELET # BLD AUTO: 249 K/UL — SIGNIFICANT CHANGE UP (ref 150–400)
PMV BLD: 10 FL — SIGNIFICANT CHANGE UP (ref 7–13)
POTASSIUM SERPL-MCNC: 4 MMOL/L — SIGNIFICANT CHANGE UP (ref 3.5–5.3)
POTASSIUM SERPL-SCNC: 4 MMOL/L — SIGNIFICANT CHANGE UP (ref 3.5–5.3)
PROTHROM AB SERPL-ACNC: 30.4 SEC — HIGH (ref 9.8–13.1)
RBC # BLD: 3.03 M/UL — LOW (ref 4.2–5.8)
RBC # FLD: 15 % — HIGH (ref 10.3–14.5)
SODIUM SERPL-SCNC: 119 MMOL/L — CRITICAL LOW (ref 135–145)
SODIUM SERPL-SCNC: 120 MMOL/L — CRITICAL LOW (ref 135–145)
SODIUM SERPL-SCNC: 121 MMOL/L — LOW (ref 135–145)
WBC # BLD: 8.12 K/UL — SIGNIFICANT CHANGE UP (ref 3.8–10.5)
WBC # FLD AUTO: 8.12 K/UL — SIGNIFICANT CHANGE UP (ref 3.8–10.5)

## 2019-11-16 PROCEDURE — 99233 SBSQ HOSP IP/OBS HIGH 50: CPT | Mod: GC

## 2019-11-16 PROCEDURE — 99232 SBSQ HOSP IP/OBS MODERATE 35: CPT | Mod: GC

## 2019-11-16 RX ORDER — FUROSEMIDE 40 MG
40 TABLET ORAL ONCE
Refills: 0 | Status: COMPLETED | OUTPATIENT
Start: 2019-11-16 | End: 2019-11-16

## 2019-11-16 RX ORDER — PIPERACILLIN AND TAZOBACTAM 4; .5 G/20ML; G/20ML
3.38 INJECTION, POWDER, LYOPHILIZED, FOR SOLUTION INTRAVENOUS EVERY 8 HOURS
Refills: 0 | Status: DISCONTINUED | OUTPATIENT
Start: 2019-11-16 | End: 2019-11-18

## 2019-11-16 RX ORDER — VANCOMYCIN HCL 1 G
1000 VIAL (EA) INTRAVENOUS EVERY 12 HOURS
Refills: 0 | Status: DISCONTINUED | OUTPATIENT
Start: 2019-11-16 | End: 2019-11-18

## 2019-11-16 RX ORDER — SODIUM CHLORIDE 9 MG/ML
2 INJECTION INTRAMUSCULAR; INTRAVENOUS; SUBCUTANEOUS THREE TIMES A DAY
Refills: 0 | Status: DISCONTINUED | OUTPATIENT
Start: 2019-11-16 | End: 2019-11-20

## 2019-11-16 RX ORDER — PIPERACILLIN AND TAZOBACTAM 4; .5 G/20ML; G/20ML
3.38 INJECTION, POWDER, LYOPHILIZED, FOR SOLUTION INTRAVENOUS ONCE
Refills: 0 | Status: DISCONTINUED | OUTPATIENT
Start: 2019-11-16 | End: 2019-11-16

## 2019-11-16 RX ADMIN — SODIUM CHLORIDE 2 GRAM(S): 9 INJECTION INTRAMUSCULAR; INTRAVENOUS; SUBCUTANEOUS at 21:49

## 2019-11-16 RX ADMIN — RIVAROXABAN 20 MILLIGRAM(S): KIT at 17:52

## 2019-11-16 RX ADMIN — PIPERACILLIN AND TAZOBACTAM 25 GRAM(S): 4; .5 INJECTION, POWDER, LYOPHILIZED, FOR SOLUTION INTRAVENOUS at 21:56

## 2019-11-16 RX ADMIN — Medication 0.12 MILLIGRAM(S): at 06:31

## 2019-11-16 RX ADMIN — Medication 250 MILLIGRAM(S): at 06:30

## 2019-11-16 RX ADMIN — Medication 1 TABLET(S): at 11:38

## 2019-11-16 RX ADMIN — CLOPIDOGREL BISULFATE 75 MILLIGRAM(S): 75 TABLET, FILM COATED ORAL at 11:33

## 2019-11-16 RX ADMIN — Medication 250 MILLIGRAM(S): at 17:50

## 2019-11-16 RX ADMIN — Medication 1: at 17:52

## 2019-11-16 RX ADMIN — PIPERACILLIN AND TAZOBACTAM 25 GRAM(S): 4; .5 INJECTION, POWDER, LYOPHILIZED, FOR SOLUTION INTRAVENOUS at 13:40

## 2019-11-16 RX ADMIN — Medication 325 MILLIGRAM(S): at 11:33

## 2019-11-16 RX ADMIN — PIPERACILLIN AND TAZOBACTAM 25 GRAM(S): 4; .5 INJECTION, POWDER, LYOPHILIZED, FOR SOLUTION INTRAVENOUS at 06:30

## 2019-11-16 RX ADMIN — Medication 650 MILLIGRAM(S): at 12:33

## 2019-11-16 RX ADMIN — ATORVASTATIN CALCIUM 20 MILLIGRAM(S): 80 TABLET, FILM COATED ORAL at 21:49

## 2019-11-16 RX ADMIN — Medication 40 MILLIGRAM(S): at 17:51

## 2019-11-16 RX ADMIN — Medication 650 MILLIGRAM(S): at 11:33

## 2019-11-16 RX ADMIN — Medication 1: at 13:10

## 2019-11-16 RX ADMIN — Medication 150 MILLIGRAM(S): at 06:31

## 2019-11-16 RX ADMIN — Medication 1: at 09:21

## 2019-11-16 NOTE — PROGRESS NOTE ADULT - PROBLEM SELECTOR PLAN 1
Pt,. with  acute on chronic hypotenic hyponatremia in the setting of infection, urinary retention, poor oral intake. On lab review of [facility] serum sodium decrease ~ 130 since 2017. According to patient's daughter, sodium normally around 130s. Sodium 122 on admission on 11/11/19, serum sodium  down to 120 today despite HTS 1.5% and 3% HTS. 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 Recommend continue fluid restriction to 800cc/day. Avoid overcorrection > 6-8 mEq in 24 hrs.    - Please avoid hypotonic solutions. Switch antibiotic base solution to NS rather than D5.  - Pending AM sodium levels

## 2019-11-16 NOTE — PROGRESS NOTE ADULT - PROBLEM SELECTOR PLAN 2
-Unclear true etiology, no benefit of hypertonic saline  -will fluid restriction 800cc now  -Nephrology consulted, f/u recs.   -BMPs q 6H

## 2019-11-16 NOTE — PROGRESS NOTE ADULT - ATTENDING COMMENTS
Pt with persistent hyponatremia despite hypertonic saline. Will proceed with 800cc/day fluid restriction per Renal. Also check renin, aldosterone, and AM cortisol, though doubt this is a primary adrenocortical condition. If workup unrevealing and Na does not improve, could consider imaging to evaluate for any neoplasm (i.e. to rule out ADH-secreting tumor).

## 2019-11-16 NOTE — PROGRESS NOTE ADULT - SUBJECTIVE AND OBJECTIVE BOX
[facility] Division of Kidney Diseases & Hypertension  FOLLOW UP NOTE  -------------------------------------------------------------------------------  Chief Complaint:Gangrene, not elsewhere classified      HPI: 73 YO M with MHx of HTN, hyperlipidemia, afib on xarelto, AICD (placed in 2014), CAD s/p PCI on plavix, PAD s/p L bypass and L ileac stent, DMII c/b neuropathy admitted for left foot gangrene. Nephrology consulted for hyponatremia management. Patient endorses decreased PO intake on presentation. Pt. also with urinary retention on admission. On lab review of [facility] serum sodium decrease ~ 130 since 2017. According to patient's daughter, sodium normally around 130s. Sodium 122 on admission on 11/11/19, serum sodium  down to 120 yesterday despite HTS 1.5%.     Patient started with 3%HTS yesterday Recent Serum Na: 120. No repeat this morning as of yet. Seen at bedside without significant subjective complaints. No headache dizziness, nausea/vomiting.         PAST HISTORY  --------------------------------------------------------------------------------  No significant changes to PMH, PSH, FHx, SHx, unless otherwise noted    ALLERGIES & MEDICATIONS  --------------------------------------------------------------------------------  Allergies    No Known Allergies    Intolerances      Standing Inpatient Medications  atorvastatin 20 milliGRAM(s) Oral at bedtime  clopidogrel Tablet 75 milliGRAM(s) Oral daily  dextrose 5%. 1000 milliLiter(s) IV Continuous <Continuous>  dextrose 50% Injectable 12.5 Gram(s) IV Push once  dextrose 50% Injectable 25 Gram(s) IV Push once  dextrose 50% Injectable 25 Gram(s) IV Push once  digoxin     Tablet 0.125 milliGRAM(s) Oral daily  ferrous    sulfate 325 milliGRAM(s) Oral daily  furosemide   Injectable 60 milliGRAM(s) IV Push once  influenza   Vaccine 0.5 milliLiter(s) IntraMuscular once  insulin lispro (HumaLOG) corrective regimen sliding scale   SubCutaneous three times a day before meals  insulin lispro (HumaLOG) corrective regimen sliding scale   SubCutaneous at bedtime  metoprolol succinate  milliGRAM(s) Oral daily  multivitamin 1 Tablet(s) Oral daily  piperacillin/tazobactam IVPB.. 3.375 Gram(s) IV Intermittent every 8 hours  rivaroxaban 20 milliGRAM(s) Oral with dinner  vancomycin  IVPB 1000 milliGRAM(s) IV Intermittent every 12 hours    PRN Inpatient Medications  acetaminophen    Suspension .. 650 milliGRAM(s) Oral every 6 hours PRN  dextrose 40% Gel 15 Gram(s) Oral once PRN  glucagon  Injectable 1 milliGRAM(s) IntraMuscular once PRN        REVIEW OF SYSTEMS  --------------------------------------------------------------------------------  Gen: No fevers/chills  Skin: No rashes  Head/Eyes/Ears: Normal hearing,   Respiratory: No dyspnea, cough  CV: No chest pain  GI: No abdominal pain, diarrhea  : No dysuria, hematuria  MSK: No  edema  Heme: No easy bruising or bleeding  Psych: No significant depression      All other systems were reviewed and are negative, except as noted.      VITALS/PHYSICAL EXAM  --------------------------------------------------------------------------------  T(C): 37.1 (11-16-19 @ 06:25), Max: 37.1 (11-15-19 @ 15:02)  HR: 69 (11-16-19 @ 06:25) (60 - 71)  BP: 125/57 (11-16-19 @ 06:25) (101/55 - 148/52)  RR: 18 (11-16-19 @ 06:25) (17 - 18)  SpO2: 98% (11-16-19 @ 06:25) (97% - 99%)  Wt(kg): --        11-15-19 @ 07:01  -  11-16-19 @ 07:00  --------------------------------------------------------  IN: 0 mL / OUT: 1050 mL / NET: -1050 mL        Physical Exam:  	Gen: NAD, elderly, fragile  	HEENT: MMM  	Pulm: CTA B/L  	CV: S1S2  	Abd: Soft, +BS   	Ext: No LE edema B/L  	Neuro: Awake  	Skin: Warm and dry    LABS/STUDIES  --------------------------------------------------------------------------------              7.8    8.12  >-----------<  249      [11-16-19 @ 06:30]              23.5     120  |  85  |  13  ----------------------------<  126      [11-16-19 @ 01:00]  4.0   |  22  |  0.61        Ca     8.3     [11-16-19 @ 01:00]      Mg     1.4     [11-15-19 @ 06:40]      Phos  2.7     [11-15-19 @ 06:40]    TPro  6.0  /  Alb  2.7  /  TBili  0.3  /  DBili  x   /  AST  32  /  ALT  24  /  AlkPhos  62  [11-15-19 @ 06:40]    PT/INR: PT 30.4 , INR 2.59       [11-16-19 @ 06:30]  PTT: 40.7       [11-16-19 @ 06:30]      Creatinine Trend:  SCr 0.61 [11-16 @ 01:00]  SCr 0.63 [11-15 @ 18:20]  SCr 0.62 [11-15 @ 13:04]  SCr 0.69 [11-15 @ 06:40]  SCr 0.77 [11-14 @ 21:32]      Urine Sodium 57      [11-13-19 @ 12:49]  Urine Osmolality 490      [11-13-19 @ 10:45]    Iron 16, TIBC 197, %sat --      [11-13-19 @ 06:54]  Ferritin 104.4      [11-13-19 @ 06:54]  HbA1c 5.8      [11-13-19 @ 06:54]

## 2019-11-16 NOTE — PROGRESS NOTE ADULT - ATTENDING COMMENTS
Patient examined and ROS reviwed. A case of acute on chronic hypotenic hyponatremia in the setting of infection, urinary retention, poor oral intake. Serum sodium continued to be low despite hypertonic saline. Advised fluid restriction.

## 2019-11-16 NOTE — PROVIDER CONTACT NOTE (CRITICAL VALUE NOTIFICATION) - ACTION/TREATMENT ORDERED:
Continue with ongoing plan of care as per MD order.
Will continue to monitor
Continue Sodium Chloride 1.5% IVF. Continue to monitor Na level Q6hrs
Ivf dc'd pt is on fluid restriction
MD Mohan aware, no further orders at this time, will continue to monitor
No orders' made will f/u with MD.

## 2019-11-16 NOTE — PROGRESS NOTE ADULT - PROBLEM SELECTOR PLAN 1
-pt with gangrene on the left foot with lesions on the tip of L 1st and 5th digit and stage 2 ulcer on heel, ESR and CRP elevated, and mild leukocytosis  - Xray L foot: Diffuse soft tissue swelling with cutaneous ulceration over the distal phalynx of the left first digit and subjacent cortical erosion of the first distal phalynx.  -c/w vanc and zosyn (11/12-  -Blood cx NGTD, wound cx sig for coag neg staph  -F/u podiatry recs  -F/u vascular recs, which may be no surgical intervention. Will follow up

## 2019-11-16 NOTE — PROGRESS NOTE ADULT - SUBJECTIVE AND OBJECTIVE BOX
Patient is a 74y old  Male who presents with a chief complaint of Left foot gangrene, rule out osteomyelitis (16 Nov 2019 10:15)      SUBJECTIVE / OVERNIGHT EVENTS: Tolerating PO. Pain controlled.  Patient denies chest pain, SOB, palpitations, abdominal pain, nausea, vomiting, chills, and cough    MEDICATIONS  (STANDING):  atorvastatin 20 milliGRAM(s) Oral at bedtime  clopidogrel Tablet 75 milliGRAM(s) Oral daily  dextrose 5%. 1000 milliLiter(s) (50 mL/Hr) IV Continuous <Continuous>  dextrose 50% Injectable 12.5 Gram(s) IV Push once  dextrose 50% Injectable 25 Gram(s) IV Push once  dextrose 50% Injectable 25 Gram(s) IV Push once  digoxin     Tablet 0.125 milliGRAM(s) Oral daily  ferrous    sulfate 325 milliGRAM(s) Oral daily  furosemide   Injectable 60 milliGRAM(s) IV Push once  influenza   Vaccine 0.5 milliLiter(s) IntraMuscular once  insulin lispro (HumaLOG) corrective regimen sliding scale   SubCutaneous three times a day before meals  insulin lispro (HumaLOG) corrective regimen sliding scale   SubCutaneous at bedtime  metoprolol succinate  milliGRAM(s) Oral daily  multivitamin 1 Tablet(s) Oral daily  piperacillin/tazobactam IVPB.. 3.375 Gram(s) IV Intermittent every 8 hours  rivaroxaban 20 milliGRAM(s) Oral with dinner  vancomycin  IVPB 1000 milliGRAM(s) IV Intermittent every 12 hours    MEDICATIONS  (PRN):  acetaminophen    Suspension .. 650 milliGRAM(s) Oral every 6 hours PRN Mild Pain (1 - 3), Moderate Pain (4 - 6)  dextrose 40% Gel 15 Gram(s) Oral once PRN Blood Glucose LESS THAN 70 milliGRAM(s)/deciliter  glucagon  Injectable 1 milliGRAM(s) IntraMuscular once PRN Glucose LESS THAN 70 milligrams/deciliter      T(F): 98.8 (11-16 @ 06:25), Max: 98.8 (11-15 @ 15:02)  HR: 69 (11-16 @ 06:25) (63 - 71)  BP: 125/57 (11-16 @ 06:25) (112/48 - 148/52)  RR: 18 (11-16 @ 06:25) (17 - 18)  SpO2: 98% (11-16 @ 06:25) (97% - 99%)  CAPILLARY BLOOD GLUCOSE      POCT Blood Glucose.: 161 mg/dL (16 Nov 2019 09:01)  POCT Blood Glucose.: 161 mg/dL (15 Nov 2019 22:20)  POCT Blood Glucose.: 168 mg/dL (15 Nov 2019 17:49)  POCT Blood Glucose.: 207 mg/dL (15 Nov 2019 12:37)    I&O's Summary    15 Nov 2019 07:01  -  16 Nov 2019 07:00  --------------------------------------------------------  IN: 0 mL / OUT: 1050 mL / NET: -1050 mL        PHYSICAL EXAM:  GEN: Appears in no acute distress  HEENT: PERRLA, EOMI and accommodate, neck supple, no lymphadenopathy, no JVD  MOUTH: moist mucous membranes, no exudates or lesions   PULM: Clear to auscultation bilaterally, no wheezes, rales, rhonchi  CV: RRR, S1S2, no murmurs, rubs or gallops  Abdominal: Soft, nontender to palpation, non-distended, normoactive bowel sounds  Extremities: WWP, No edema, + peripheral pulses  NEURO: AAOx3, moving all four extremities spontaneously  Skin: No rashes, lesions, hematomas, ecchymosis      LABS:  Labs personally reviewed.                        7.8    8.12  )-----------( 249      ( 16 Nov 2019 06:30 )             23.5     Hgb Trend: 7.8<--, 8.2<--, 7.9<--, 7.5<--, 7.6<--  11-16    120<LL>  |  85<L>  |  13  ----------------------------<  126<H>  4.0   |  22  |  0.61    Ca    8.3<L>      16 Nov 2019 01:00  Phos  2.7     11-15  Mg     1.4     11-15    TPro  6.0  /  Alb  2.7<L>  /  TBili  0.3  /  DBili  x   /  AST  32  /  ALT  24  /  AlkPhos  62  11-15    Creatinine Trend: 0.61<--, 0.63<--, 0.62<--, 0.69<--, 0.77<--, 0.68<--  PT/INR - ( 16 Nov 2019 06:30 )   PT: 30.4 SEC;   INR: 2.59     PTT - ( 16 Nov 2019 06:30 )  PTT:40.7 SEC      RADIOLOGY & ADDITIONAL TESTS:  Imaging Personally Reviewed.    Consultants:      Cy Mohan PGY-3 Pager: KATE [phone]/ [phone]  Night Float: KATE [id number]/ NS

## 2019-11-17 LAB
ANION GAP SERPL CALC-SCNC: 12 MMO/L — SIGNIFICANT CHANGE UP (ref 7–14)
ANION GAP SERPL CALC-SCNC: 12 MMO/L — SIGNIFICANT CHANGE UP (ref 7–14)
ANION GAP SERPL CALC-SCNC: 13 MMO/L — SIGNIFICANT CHANGE UP (ref 7–14)
BUN SERPL-MCNC: 13 MG/DL — SIGNIFICANT CHANGE UP (ref 7–23)
BUN SERPL-MCNC: 13 MG/DL — SIGNIFICANT CHANGE UP (ref 7–23)
BUN SERPL-MCNC: 14 MG/DL — SIGNIFICANT CHANGE UP (ref 7–23)
CALCIUM SERPL-MCNC: 8.2 MG/DL — LOW (ref 8.4–10.5)
CALCIUM SERPL-MCNC: 8.3 MG/DL — LOW (ref 8.4–10.5)
CALCIUM SERPL-MCNC: 8.4 MG/DL — SIGNIFICANT CHANGE UP (ref 8.4–10.5)
CHLORIDE SERPL-SCNC: 88 MMOL/L — LOW (ref 98–107)
CHLORIDE SERPL-SCNC: 88 MMOL/L — LOW (ref 98–107)
CHLORIDE SERPL-SCNC: 90 MMOL/L — LOW (ref 98–107)
CO2 SERPL-SCNC: 21 MMOL/L — LOW (ref 22–31)
CO2 SERPL-SCNC: 22 MMOL/L — SIGNIFICANT CHANGE UP (ref 22–31)
CO2 SERPL-SCNC: 23 MMOL/L — SIGNIFICANT CHANGE UP (ref 22–31)
CORTIS SERPL-MCNC: 13.1 UG/DL — SIGNIFICANT CHANGE UP (ref 2.7–18.4)
CREAT SERPL-MCNC: 0.63 MG/DL — SIGNIFICANT CHANGE UP (ref 0.5–1.3)
CREAT SERPL-MCNC: 0.63 MG/DL — SIGNIFICANT CHANGE UP (ref 0.5–1.3)
CREAT SERPL-MCNC: 0.66 MG/DL — SIGNIFICANT CHANGE UP (ref 0.5–1.3)
GLUCOSE BLDC GLUCOMTR-MCNC: 114 MG/DL — HIGH (ref 70–99)
GLUCOSE BLDC GLUCOMTR-MCNC: 150 MG/DL — HIGH (ref 70–99)
GLUCOSE BLDC GLUCOMTR-MCNC: 176 MG/DL — HIGH (ref 70–99)
GLUCOSE BLDC GLUCOMTR-MCNC: 220 MG/DL — HIGH (ref 70–99)
GLUCOSE SERPL-MCNC: 122 MG/DL — HIGH (ref 70–99)
GLUCOSE SERPL-MCNC: 123 MG/DL — HIGH (ref 70–99)
GLUCOSE SERPL-MCNC: 167 MG/DL — HIGH (ref 70–99)
POTASSIUM SERPL-MCNC: 3.8 MMOL/L — SIGNIFICANT CHANGE UP (ref 3.5–5.3)
POTASSIUM SERPL-MCNC: 3.8 MMOL/L — SIGNIFICANT CHANGE UP (ref 3.5–5.3)
POTASSIUM SERPL-MCNC: 4 MMOL/L — SIGNIFICANT CHANGE UP (ref 3.5–5.3)
POTASSIUM SERPL-SCNC: 3.8 MMOL/L — SIGNIFICANT CHANGE UP (ref 3.5–5.3)
POTASSIUM SERPL-SCNC: 3.8 MMOL/L — SIGNIFICANT CHANGE UP (ref 3.5–5.3)
POTASSIUM SERPL-SCNC: 4 MMOL/L — SIGNIFICANT CHANGE UP (ref 3.5–5.3)
SODIUM SERPL-SCNC: 121 MMOL/L — LOW (ref 135–145)
SODIUM SERPL-SCNC: 123 MMOL/L — LOW (ref 135–145)
SODIUM SERPL-SCNC: 125 MMOL/L — LOW (ref 135–145)
VANCOMYCIN TROUGH SERPL-MCNC: 24.6 UG/ML — HIGH (ref 10–20)

## 2019-11-17 PROCEDURE — 99233 SBSQ HOSP IP/OBS HIGH 50: CPT | Mod: GC

## 2019-11-17 PROCEDURE — 99231 SBSQ HOSP IP/OBS SF/LOW 25: CPT | Mod: GC

## 2019-11-17 RX ADMIN — PIPERACILLIN AND TAZOBACTAM 25 GRAM(S): 4; .5 INJECTION, POWDER, LYOPHILIZED, FOR SOLUTION INTRAVENOUS at 05:10

## 2019-11-17 RX ADMIN — SODIUM CHLORIDE 2 GRAM(S): 9 INJECTION INTRAMUSCULAR; INTRAVENOUS; SUBCUTANEOUS at 14:36

## 2019-11-17 RX ADMIN — Medication 2: at 13:21

## 2019-11-17 RX ADMIN — PIPERACILLIN AND TAZOBACTAM 25 GRAM(S): 4; .5 INJECTION, POWDER, LYOPHILIZED, FOR SOLUTION INTRAVENOUS at 22:13

## 2019-11-17 RX ADMIN — CLOPIDOGREL BISULFATE 75 MILLIGRAM(S): 75 TABLET, FILM COATED ORAL at 13:21

## 2019-11-17 RX ADMIN — ATORVASTATIN CALCIUM 20 MILLIGRAM(S): 80 TABLET, FILM COATED ORAL at 22:13

## 2019-11-17 RX ADMIN — Medication 150 MILLIGRAM(S): at 05:24

## 2019-11-17 RX ADMIN — Medication 325 MILLIGRAM(S): at 13:21

## 2019-11-17 RX ADMIN — PIPERACILLIN AND TAZOBACTAM 25 GRAM(S): 4; .5 INJECTION, POWDER, LYOPHILIZED, FOR SOLUTION INTRAVENOUS at 14:36

## 2019-11-17 RX ADMIN — RIVAROXABAN 20 MILLIGRAM(S): KIT at 17:48

## 2019-11-17 RX ADMIN — Medication 0.12 MILLIGRAM(S): at 05:24

## 2019-11-17 RX ADMIN — SODIUM CHLORIDE 2 GRAM(S): 9 INJECTION INTRAMUSCULAR; INTRAVENOUS; SUBCUTANEOUS at 05:24

## 2019-11-17 RX ADMIN — Medication 1 TABLET(S): at 13:21

## 2019-11-17 RX ADMIN — SODIUM CHLORIDE 2 GRAM(S): 9 INJECTION INTRAMUSCULAR; INTRAVENOUS; SUBCUTANEOUS at 22:13

## 2019-11-17 NOTE — PROGRESS NOTE ADULT - SUBJECTIVE AND OBJECTIVE BOX
[facility] Division of Kidney Diseases & Hypertension  FOLLOW UP NOTE  -----------------------------------------------------------------------------  Chief Complaint:Gangrene, not elsewhere classified      HPI: 75 YO M with MHx of HTN, hyperlipidemia, afib on xarelto, AICD (placed in 2014), CAD s/p PCI on plavix, PAD s/p L bypass and L ileac stent, DMII c/b neuropathy admitted for left foot gangrene. Nephrology consulted for hyponatremia management. Patient endorses decreased PO intake on presentation. Pt. also with urinary retention on admission. On lab review of [facility] serum sodium decrease ~ 130 since 2017. According to patient's daughter, sodium normally around 130s. Sodium 122 on admission on 11/11/19, serum sodium  down to 120 yesterday despite HTS 1.5%.     Patient started with 3%HTS 11/15 without adequate response.  Recent Serum Na: 123 this morning after Salt tablets and IV lasix. Seen at bedside without significant subjective complaints. No headache dizziness, nausea/vomiting.           PAST HISTORY  --------------------------------------------------------------------------------  No significant changes to PMH, PSH, FHx, SHx, unless otherwise noted    ALLERGIES & MEDICATIONS  --------------------------------------------------------------------------------  Allergies    No Known Allergies    Intolerances      Standing Inpatient Medications  atorvastatin 20 milliGRAM(s) Oral at bedtime  clopidogrel Tablet 75 milliGRAM(s) Oral daily  dextrose 5%. 1000 milliLiter(s) IV Continuous <Continuous>  dextrose 50% Injectable 12.5 Gram(s) IV Push once  dextrose 50% Injectable 25 Gram(s) IV Push once  dextrose 50% Injectable 25 Gram(s) IV Push once  digoxin     Tablet 0.125 milliGRAM(s) Oral daily  ferrous    sulfate 325 milliGRAM(s) Oral daily  influenza   Vaccine 0.5 milliLiter(s) IntraMuscular once  insulin lispro (HumaLOG) corrective regimen sliding scale   SubCutaneous three times a day before meals  insulin lispro (HumaLOG) corrective regimen sliding scale   SubCutaneous at bedtime  metoprolol succinate  milliGRAM(s) Oral daily  multivitamin 1 Tablet(s) Oral daily  piperacillin/tazobactam IVPB.. 3.375 Gram(s) IV Intermittent every 8 hours  rivaroxaban 20 milliGRAM(s) Oral with dinner  sodium chloride 2 Gram(s) Oral three times a day  vancomycin  IVPB 1000 milliGRAM(s) IV Intermittent every 12 hours    PRN Inpatient Medications  acetaminophen    Suspension .. 650 milliGRAM(s) Oral every 6 hours PRN  dextrose 40% Gel 15 Gram(s) Oral once PRN  glucagon  Injectable 1 milliGRAM(s) IntraMuscular once PRN      REVIEW OF SYSTEMS  --------------------------------------------------------------------------------  Gen: No  fevers/chills  Skin: No rashes  Head/Eyes/Ears/Mouth: No headache; Normal hearing; Normal vision w/o blurriness  Respiratory: No dyspnea, cough, wheezing, hemoptysis  CV: No chest pain, PND, orthopnea  GI: No abdominal pain, diarrhea, constipation, nausea, vomiting  : No increased frequency, dysuria, hematuria, nocturia  MSK: No joint pain/swelling; no back pain; no edema  Neuro: No dizziness/lightheadedness, weakness, seizures, numbness, tingling      All other systems were reviewed and are negative, except as noted.    VITALS/PHYSICAL EXAM  --------------------------------------------------------------------------------  T(C): 36.8 (11-17-19 @ 05:12), Max: 36.9 (11-16-19 @ 15:56)  HR: 79 (11-17-19 @ 05:12) (64 - 79)  BP: 136/86 (11-17-19 @ 05:12) (110/64 - 136/86)  RR: 18 (11-17-19 @ 05:12) (17 - 18)  SpO2: 97% (11-17-19 @ 05:12) (97% - 100%)  Wt(kg): --        11-16-19 @ 07:01  -  11-17-19 @ 07:00  --------------------------------------------------------  IN: 0 mL / OUT: 1950 mL / NET: -1950 mL          Physical Exam:  	Gen: NAD, elderly, fragile  	HEENT: MMM  	Pulm: CTA B/L  	CV: S1S2  	Abd: Soft, +BS   	Ext: No LE edema B/L  	Neuro: Awake  	Skin: Warm and dry      LABS/STUDIES  --------------------------------------------------------------------------------              7.8    8.12  >-----------<  249      [11-16-19 @ 06:30]              23.5     123  |  88  |  13  ----------------------------<  123      [11-17-19 @ 05:00]  4.0   |  22  |  0.63        Ca     8.3     [11-17-19 @ 05:00]      PT/INR: PT 30.4 , INR 2.59       [11-16-19 @ 06:30]  PTT: 40.7       [11-16-19 @ 06:30]      Creatinine Trend:  SCr 0.63 [11-17 @ 05:00]  SCr 0.63 [11-17 @ 00:02]  SCr 0.57 [11-16 @ 18:38]  SCr 0.58 [11-16 @ 13:20]  SCr 0.61 [11-16 @ 01:00]      Urine Sodium 57      [11-13-19 @ 12:49]  Urine Osmolality 490      [11-13-19 @ 10:45]    Iron 16, TIBC 197, %sat --      [11-13-19 @ 06:54]  Ferritin 104.4      [11-13-19 @ 06:54]  HbA1c 5.8      [11-13-19 @ 06:54]

## 2019-11-17 NOTE — PROGRESS NOTE ADULT - ATTENDING COMMENTS
Ok to add 10/03/2017 at 1:45, please offer to patient. Thank you. Na starting to trend up. Continue NaCl tabs with 800cc/day fluid restriction. Continue broad spectrum abx. Will need guidance from Vascular Surgery as to whether pt would benefit from operative intervention.

## 2019-11-17 NOTE — PROGRESS NOTE ADULT - PROBLEM SELECTOR PLAN 2
-Unclear true etiology, no benefit of hypertonic saline  -will fluid restriction 800cc now  -Nephrology consulted, f/u recs.   -BMPs q 6H -Unclear etiology. Likely 2/2 SIADH  - no benefit of hypertonic saline  -will fluid restriction 800cc now  -c/w salt tabs 2h TID   -Avoid overcorrection > 6-8 mEq in 24 hrs.  -Nephrology consulted, f/u recs.   -BMPs q 6H

## 2019-11-17 NOTE — PROGRESS NOTE ADULT - ASSESSMENT
73 YO M with MHx of HTN, hyperlipidemia, afib on xarelto, AICD (placed in 2014), CAD s/p PCI on plavix, PAD s/p L bypass and L ileac stent, DMII c/b neuropathy admitted for left foot gangrene. 73 YO M with MHx of HTN, hyperlipidemia, afib on xarelto, AICD (placed in 2014), CAD s/p PCI on plavix, PAD s/p L bypass and L ileac stent, DMII c/b neuropathy admitted for left foot gangrene. Hospital course c/b hyponatremia

## 2019-11-17 NOTE — PROGRESS NOTE ADULT - PROBLEM SELECTOR PLAN 1
-pt with gangrene on the left foot with lesions on the tip of L 1st and 5th digit and stage 2 ulcer on heel, ESR and CRP elevated, and mild leukocytosis  - Xray L foot: Diffuse soft tissue swelling with cutaneous ulceration over the distal phalynx of the left first digit and subjacent cortical erosion of the first distal phalynx.  -c/w vanc and zosyn (11/12-  -Blood cx NGTD, wound cx sig for coag neg staph  -F/u podiatry recs  -F/u vascular recs, which may be no surgical intervention. Will follow up -Pt with gangrene on the left foot with lesions on the tip of L 1st and 5th digit and stage 2 ulcer on heel  -ESR and CRP elevated, and mild leukocytosis  - Xray L foot: Diffuse soft tissue swelling with cutaneous ulceration over the distal phalynx of the left first digit and subjacent cortical erosion of the first distal phalynx.  -c/w vanc and zosyn (11/12-  -Blood cx NGTD, wound cx sig for coag neg staph.   -F/u podiatry recs  -F/u vascular recs, which may be no surgical intervention. Will follow up

## 2019-11-17 NOTE — PROGRESS NOTE ADULT - ATTENDING COMMENTS
Patient examined and ROS reviewed. A case of hyponatremia is poorly responding to hyhpertonic saline. Advised salt tablets and fluid restriction.

## 2019-11-17 NOTE — PROGRESS NOTE ADULT - SUBJECTIVE AND OBJECTIVE BOX
Monika Masters MD  PGY 1 Department of Internal Medicine  Pager: [phone]    Patient is a 74y old  Male who presents with a chief complaint of Left foot gangrene, rule out osteomyelitis (16 Nov 2019 12:25)      SUBJECTIVE / OVERNIGHT EVENTS: Pt seen and examined. No acute overnight events.        MEDICATIONS  (STANDING):  atorvastatin 20 milliGRAM(s) Oral at bedtime  clopidogrel Tablet 75 milliGRAM(s) Oral daily  dextrose 5%. 1000 milliLiter(s) (50 mL/Hr) IV Continuous <Continuous>  dextrose 50% Injectable 12.5 Gram(s) IV Push once  dextrose 50% Injectable 25 Gram(s) IV Push once  dextrose 50% Injectable 25 Gram(s) IV Push once  digoxin     Tablet 0.125 milliGRAM(s) Oral daily  ferrous    sulfate 325 milliGRAM(s) Oral daily  influenza   Vaccine 0.5 milliLiter(s) IntraMuscular once  insulin lispro (HumaLOG) corrective regimen sliding scale   SubCutaneous three times a day before meals  insulin lispro (HumaLOG) corrective regimen sliding scale   SubCutaneous at bedtime  metoprolol succinate  milliGRAM(s) Oral daily  multivitamin 1 Tablet(s) Oral daily  piperacillin/tazobactam IVPB.. 3.375 Gram(s) IV Intermittent every 8 hours  rivaroxaban 20 milliGRAM(s) Oral with dinner  sodium chloride 2 Gram(s) Oral three times a day  vancomycin  IVPB 1000 milliGRAM(s) IV Intermittent every 12 hours    MEDICATIONS  (PRN):  acetaminophen    Suspension .. 650 milliGRAM(s) Oral every 6 hours PRN Mild Pain (1 - 3), Moderate Pain (4 - 6)  dextrose 40% Gel 15 Gram(s) Oral once PRN Blood Glucose LESS THAN 70 milliGRAM(s)/deciliter  glucagon  Injectable 1 milliGRAM(s) IntraMuscular once PRN Glucose LESS THAN 70 milligrams/deciliter      I&O's Summary    16 Nov 2019 07:01  -  17 Nov 2019 07:00  --------------------------------------------------------  IN: 0 mL / OUT: 1950 mL / NET: -1950 mL        Vital Signs Last 24 Hrs  T(C): 36.8 (17 Nov 2019 05:12), Max: 36.9 (16 Nov 2019 15:56)  T(F): 98.3 (17 Nov 2019 05:12), Max: 98.4 (16 Nov 2019 15:56)  HR: 79 (17 Nov 2019 05:12) (64 - 79)  BP: 136/86 (17 Nov 2019 05:12) (110/64 - 136/86)  BP(mean): --  RR: 18 (17 Nov 2019 05:12) (17 - 18)  SpO2: 97% (17 Nov 2019 05:12) (97% - 100%)    CAPILLARY BLOOD GLUCOSE      POCT Blood Glucose.: 148 mg/dL (16 Nov 2019 22:28)  POCT Blood Glucose.: 164 mg/dL (16 Nov 2019 17:51)  POCT Blood Glucose.: 192 mg/dL (16 Nov 2019 12:37)  POCT Blood Glucose.: 161 mg/dL (16 Nov 2019 09:01)      PHYSICAL EXAM:  GEN: Appears in no acute distress  HEENT: PERRLA, EOMI and accommodate, neck supple, no lymphadenopathy, no JVD  MOUTH: moist mucous membranes, no exudates or lesions   PULM: Clear to auscultation bilaterally, no wheezes, rales, rhonchi  CV: RRR, S1S2, no murmurs, rubs or gallops  Abdominal: Soft, nontender to palpation, non-distended, normoactive bowel sounds  Extremities: WWP, No edema, + peripheral pulses  NEURO: AAOx3, moving all four extremities spontaneously  Skin: No rashes, lesions, hematomas, ecchymosis    LABS:                        7.8    8.12  )-----------( 249      ( 16 Nov 2019 06:30 )             23.5     Auto Eosinophil # 0.06  / Auto Eosinophil % 0.7   / Auto Neutrophil # 4.65  / Auto Neutrophil % 57.3  / BANDS % x        11-17    123<L>  |  88<L>  |  13  ----------------------------<  123<H>  4.0   |  22  |  0.63  11-17    121<L>  |  88<L>  |  13  ----------------------------<  122<H>  3.8   |  21<L>  |  0.63  11-16    121<L>  |  84<L>  |  12  ----------------------------<  154<H>  4.0   |  25  |  0.57    Ca    8.3<L>      17 Nov 2019 05:00    PT/INR - ( 16 Nov 2019 06:30 )   PT: 30.4 SEC;   INR: 2.59          PTT - ( 16 Nov 2019 06:30 )  PTT:40.7 SEC              RADIOLOGY & ADDITIONAL TESTS:    Imaging Personally Reviewed:    Consultant(s) Notes Reviewed:      Care Discussed with Consultants/Other Providers: Monika Masters MD  PGY 1 Department of Internal Medicine  Pager: [phone]    Patient is a 74y old  Male who presents with a chief complaint of Left foot gangrene, rule out osteomyelitis (16 Nov 2019 12:25)      SUBJECTIVE / OVERNIGHT EVENTS: Pt seen and examined. No acute overnight events. Endorses foot pain. Denies pain anywhere else. Denies fevers, chills, N/V, CP, SOB, Constipation, Diarrhea.         MEDICATIONS  (STANDING):  atorvastatin 20 milliGRAM(s) Oral at bedtime  clopidogrel Tablet 75 milliGRAM(s) Oral daily  dextrose 5%. 1000 milliLiter(s) (50 mL/Hr) IV Continuous <Continuous>  dextrose 50% Injectable 12.5 Gram(s) IV Push once  dextrose 50% Injectable 25 Gram(s) IV Push once  dextrose 50% Injectable 25 Gram(s) IV Push once  digoxin     Tablet 0.125 milliGRAM(s) Oral daily  ferrous    sulfate 325 milliGRAM(s) Oral daily  influenza   Vaccine 0.5 milliLiter(s) IntraMuscular once  insulin lispro (HumaLOG) corrective regimen sliding scale   SubCutaneous three times a day before meals  insulin lispro (HumaLOG) corrective regimen sliding scale   SubCutaneous at bedtime  metoprolol succinate  milliGRAM(s) Oral daily  multivitamin 1 Tablet(s) Oral daily  piperacillin/tazobactam IVPB.. 3.375 Gram(s) IV Intermittent every 8 hours  rivaroxaban 20 milliGRAM(s) Oral with dinner  sodium chloride 2 Gram(s) Oral three times a day  vancomycin  IVPB 1000 milliGRAM(s) IV Intermittent every 12 hours    MEDICATIONS  (PRN):  acetaminophen    Suspension .. 650 milliGRAM(s) Oral every 6 hours PRN Mild Pain (1 - 3), Moderate Pain (4 - 6)  dextrose 40% Gel 15 Gram(s) Oral once PRN Blood Glucose LESS THAN 70 milliGRAM(s)/deciliter  glucagon  Injectable 1 milliGRAM(s) IntraMuscular once PRN Glucose LESS THAN 70 milligrams/deciliter      I&O's Summary    16 Nov 2019 07:01  -  17 Nov 2019 07:00  --------------------------------------------------------  IN: 0 mL / OUT: 1950 mL / NET: -1950 mL        Vital Signs Last 24 Hrs  T(C): 36.8 (17 Nov 2019 05:12), Max: 36.9 (16 Nov 2019 15:56)  T(F): 98.3 (17 Nov 2019 05:12), Max: 98.4 (16 Nov 2019 15:56)  HR: 79 (17 Nov 2019 05:12) (64 - 79)  BP: 136/86 (17 Nov 2019 05:12) (110/64 - 136/86)  BP(mean): --  RR: 18 (17 Nov 2019 05:12) (17 - 18)  SpO2: 97% (17 Nov 2019 05:12) (97% - 100%)    CAPILLARY BLOOD GLUCOSE      POCT Blood Glucose.: 148 mg/dL (16 Nov 2019 22:28)  POCT Blood Glucose.: 164 mg/dL (16 Nov 2019 17:51)  POCT Blood Glucose.: 192 mg/dL (16 Nov 2019 12:37)  POCT Blood Glucose.: 161 mg/dL (16 Nov 2019 09:01)      PHYSICAL EXAM:             Constitutional: WDWN resting comfortably in bed; NAD. Cachectic appearing.  	Head: NC/AT  	Eyes: PERRL, EOMI, anicteric sclera  	Neck: Supple  	Respiratory: CTA B/L  	Cardiac: +S1/S2; RRR; no M/R/G. No peripheral edema b/l.  	Gastrointestinal: Soft, NT/ND; no rebound or guarding; +BSx4  	Extremities: + Cyanosis LE. +Palpable radial pulses b/l, LE pulses non palpable B/L  	Skin: + Dry gangrene L toe. L feet cold to touch w/ blue/ purple discoloration.  	Neurologic: Alert and oriented x3, no focal deficits appreciated    LABS:                        7.8    8.12  )-----------( 249      ( 16 Nov 2019 06:30 )             23.5     Auto Eosinophil # 0.06  / Auto Eosinophil % 0.7   / Auto Neutrophil # 4.65  / Auto Neutrophil % 57.3  / BANDS % x        11-17    123<L>  |  88<L>  |  13  ----------------------------<  123<H>  4.0   |  22  |  0.63  11-17    121<L>  |  88<L>  |  13  ----------------------------<  122<H>  3.8   |  21<L>  |  0.63  11-16    121<L>  |  84<L>  |  12  ----------------------------<  154<H>  4.0   |  25  |  0.57    Ca    8.3<L>      17 Nov 2019 05:00    PT/INR - ( 16 Nov 2019 06:30 )   PT: 30.4 SEC;   INR: 2.59          PTT - ( 16 Nov 2019 06:30 )  PTT:40.7 SEC              RADIOLOGY & ADDITIONAL TESTS:    Imaging Personally Reviewed:    Consultant(s) Notes Reviewed:      Care Discussed with Consultants/Other Providers:

## 2019-11-17 NOTE — PROGRESS NOTE ADULT - PROBLEM SELECTOR PLAN 1
Pt,. with  acute on chronic hypotenic hyponatremia in the setting of infection, urinary retention, poor oral intake. On lab review of [facility] serum sodium decrease ~ 130 since 2017. According to patient's daughter, sodium normally around 130s. Sodium 122 on admission on 11/11/19, No response to HTS 1.5% and 3% HTS. 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 Recommend continue fluid restriction to 800cc/day. Avoid overcorrection > 6-8 mEq in 24 hrs. Continue salt tabs 2gm TID for now

## 2019-11-18 ENCOUNTER — TRANSCRIPTION ENCOUNTER (OUTPATIENT)
Age: 74
End: 2019-11-18

## 2019-11-18 LAB
ANION GAP SERPL CALC-SCNC: 11 MMO/L — SIGNIFICANT CHANGE UP (ref 7–14)
ANION GAP SERPL CALC-SCNC: 9 MMO/L — SIGNIFICANT CHANGE UP (ref 7–14)
BUN SERPL-MCNC: 12 MG/DL — SIGNIFICANT CHANGE UP (ref 7–23)
BUN SERPL-MCNC: 12 MG/DL — SIGNIFICANT CHANGE UP (ref 7–23)
CALCIUM SERPL-MCNC: 8.5 MG/DL — SIGNIFICANT CHANGE UP (ref 8.4–10.5)
CALCIUM SERPL-MCNC: 8.6 MG/DL — SIGNIFICANT CHANGE UP (ref 8.4–10.5)
CHLORIDE SERPL-SCNC: 92 MMOL/L — LOW (ref 98–107)
CHLORIDE SERPL-SCNC: 95 MMOL/L — LOW (ref 98–107)
CO2 SERPL-SCNC: 23 MMOL/L — SIGNIFICANT CHANGE UP (ref 22–31)
CO2 SERPL-SCNC: 24 MMOL/L — SIGNIFICANT CHANGE UP (ref 22–31)
CREAT SERPL-MCNC: 0.64 MG/DL — SIGNIFICANT CHANGE UP (ref 0.5–1.3)
CREAT SERPL-MCNC: 0.67 MG/DL — SIGNIFICANT CHANGE UP (ref 0.5–1.3)
GLUCOSE BLDC GLUCOMTR-MCNC: 144 MG/DL — HIGH (ref 70–99)
GLUCOSE BLDC GLUCOMTR-MCNC: 174 MG/DL — HIGH (ref 70–99)
GLUCOSE BLDC GLUCOMTR-MCNC: 186 MG/DL — HIGH (ref 70–99)
GLUCOSE BLDC GLUCOMTR-MCNC: 209 MG/DL — HIGH (ref 70–99)
GLUCOSE SERPL-MCNC: 139 MG/DL — HIGH (ref 70–99)
GLUCOSE SERPL-MCNC: 186 MG/DL — HIGH (ref 70–99)
HCT VFR BLD CALC: 24.1 % — LOW (ref 39–50)
HGB BLD-MCNC: 7.9 G/DL — LOW (ref 13–17)
MCHC RBC-ENTMCNC: 25.3 PG — LOW (ref 27–34)
MCHC RBC-ENTMCNC: 32.8 % — SIGNIFICANT CHANGE UP (ref 32–36)
MCV RBC AUTO: 77.2 FL — LOW (ref 80–100)
NRBC # FLD: 0 K/UL — SIGNIFICANT CHANGE UP (ref 0–0)
PLATELET # BLD AUTO: 255 K/UL — SIGNIFICANT CHANGE UP (ref 150–400)
PMV BLD: 9.5 FL — SIGNIFICANT CHANGE UP (ref 7–13)
POTASSIUM SERPL-MCNC: 3.6 MMOL/L — SIGNIFICANT CHANGE UP (ref 3.5–5.3)
POTASSIUM SERPL-MCNC: 3.7 MMOL/L — SIGNIFICANT CHANGE UP (ref 3.5–5.3)
POTASSIUM SERPL-SCNC: 3.6 MMOL/L — SIGNIFICANT CHANGE UP (ref 3.5–5.3)
POTASSIUM SERPL-SCNC: 3.7 MMOL/L — SIGNIFICANT CHANGE UP (ref 3.5–5.3)
RBC # BLD: 3.12 M/UL — LOW (ref 4.2–5.8)
RBC # FLD: 15.1 % — HIGH (ref 10.3–14.5)
RENIN DIRECT, PLASMA: 13.5 PG/ML — SIGNIFICANT CHANGE UP
SODIUM SERPL-SCNC: 126 MMOL/L — LOW (ref 135–145)
SODIUM SERPL-SCNC: 128 MMOL/L — LOW (ref 135–145)
VANCOMYCIN TROUGH SERPL-MCNC: 13.3 UG/ML — SIGNIFICANT CHANGE UP (ref 10–20)
WBC # BLD: 8.06 K/UL — SIGNIFICANT CHANGE UP (ref 3.8–10.5)
WBC # FLD AUTO: 8.06 K/UL — SIGNIFICANT CHANGE UP (ref 3.8–10.5)

## 2019-11-18 PROCEDURE — 99233 SBSQ HOSP IP/OBS HIGH 50: CPT | Mod: GC

## 2019-11-18 PROCEDURE — 99222 1ST HOSP IP/OBS MODERATE 55: CPT

## 2019-11-18 RX ORDER — GABAPENTIN 400 MG/1
100 CAPSULE ORAL
Refills: 0 | Status: DISCONTINUED | OUTPATIENT
Start: 2019-11-18 | End: 2019-11-21

## 2019-11-18 RX ADMIN — Medication 0.12 MILLIGRAM(S): at 06:06

## 2019-11-18 RX ADMIN — Medication 150 MILLIGRAM(S): at 06:05

## 2019-11-18 RX ADMIN — PIPERACILLIN AND TAZOBACTAM 25 GRAM(S): 4; .5 INJECTION, POWDER, LYOPHILIZED, FOR SOLUTION INTRAVENOUS at 06:04

## 2019-11-18 RX ADMIN — ATORVASTATIN CALCIUM 20 MILLIGRAM(S): 80 TABLET, FILM COATED ORAL at 22:04

## 2019-11-18 RX ADMIN — Medication 2: at 13:18

## 2019-11-18 RX ADMIN — SODIUM CHLORIDE 2 GRAM(S): 9 INJECTION INTRAMUSCULAR; INTRAVENOUS; SUBCUTANEOUS at 13:18

## 2019-11-18 RX ADMIN — Medication 1 TABLET(S): at 12:32

## 2019-11-18 RX ADMIN — GABAPENTIN 100 MILLIGRAM(S): 400 CAPSULE ORAL at 18:55

## 2019-11-18 RX ADMIN — SODIUM CHLORIDE 2 GRAM(S): 9 INJECTION INTRAMUSCULAR; INTRAVENOUS; SUBCUTANEOUS at 06:05

## 2019-11-18 RX ADMIN — Medication 250 MILLIGRAM(S): at 10:26

## 2019-11-18 RX ADMIN — Medication 1 TABLET(S): at 18:52

## 2019-11-18 RX ADMIN — CLOPIDOGREL BISULFATE 75 MILLIGRAM(S): 75 TABLET, FILM COATED ORAL at 12:31

## 2019-11-18 RX ADMIN — SODIUM CHLORIDE 2 GRAM(S): 9 INJECTION INTRAMUSCULAR; INTRAVENOUS; SUBCUTANEOUS at 22:04

## 2019-11-18 RX ADMIN — Medication 1: at 18:52

## 2019-11-18 RX ADMIN — Medication 325 MILLIGRAM(S): at 12:32

## 2019-11-18 NOTE — PROGRESS NOTE ADULT - PROBLEM SELECTOR PLAN 2
-Unclear etiology. Likely 2/2 SIADH  - no benefit of hypertonic saline  -will fluid restriction 800cc now  -c/w salt tabs 2h TID   -Avoid overcorrection > 6-8 mEq in 24 hrs.  -Nephrology consulted, f/u recs.   -BMPs q 6H -Unclear etiology. Likely 2/2 SIADH  -Now resolving   -will fluid restriction 800cc now  -c/w salt tabs 2h TID   -Avoid overcorrection > 6-8 mEq in 24 hrs.  -Nephrology consulted, f/u recs.   -BMPs q 12H

## 2019-11-18 NOTE — DISCHARGE NOTE PROVIDER - NSDCCPCAREPLAN_GEN_ALL_CORE_FT
PRINCIPAL DISCHARGE DIAGNOSIS  Diagnosis: Gangrene  Assessment and Plan of Treatment: You presented to the hospital with left foot pain. This was found to be due to an infection that occured due to severe stenosis and poor blood circulation in your left lower extremity. For this infection you were managed with a course of antibiotics. Vascular surgery and podiatry were consulted and recommended no surgical intervention or revascularization at this time. Hospice was consulted based on goals of care conversation with family. You are now being discharged home with home care. Please continue to take all your medications as recommended following discharge.      SECONDARY DISCHARGE DIAGNOSES  Diagnosis: Cellulitis  Assessment and Plan of Treatment:

## 2019-11-18 NOTE — CONSULT NOTE ADULT - SUBJECTIVE AND OBJECTIVE BOX
Patient is a 74y old  Male who presents with a chief complaint of Left foot gangrene, rule out osteomyelitis (18 Nov 2019 08:47)    HPI:  73 YO M with MHx of HTN, hyperlipidemia, afib on xarelto, AICD (placed in 2014), CAD s/p PCI on plavix, PAD s/p L bypass and L iliac stent, DM2 c/b neuropathy, sent to ED by his podiatrist for left foot gangrene. Pt states the the infection started 2 weeks ago and was being monitored by the podiatrist. Pt went was seen by a doctor 4 days pta and was started on bactrim, then was seen 11/11/19 by the podiatrist (Dr. Alex Leos ) who sent him to the hospital for IV abx. Pt states that he has long standing diabetic neuropathy and does not complain of pain at the site of gangrene. Pt does endorse decreased PO intake but denies fever, chills, nausea, vomiting, or abdominal pain.     In the ED: Tmax98.3F, /68, HR, Resp=, sat 98%. Labs: wbc=11.27, esr=77, crp=85.3, INR=2.33, Im=275. xray: diffuse soft tissue swelling with cutaneous ulceration over the distal phalynx of the left first digit and subjacent cortical erosion of the first distal phalynx. pt was given a dose of vanc and cefepime. (12 Nov 2019 00:47)  Seen by Vascular who noted LLE contracture and frailty and indicated Vascular intervention not indicated.  Evaluated by Podiatry who has been providing wound care  earlier today: Podiatry noted: Left foot x-rays negative for osteo; Left foot distal hallux eschar 2/2 partial nail avulsion & PVD, well-adhered and stable with no acute signs of infection. Left foot lateral 4th toe wound to subQ, fibrotic with no acute signs of infection; 4th digit showing ischemic changes to the lateral aspect, dusky 5th digit, left heel unstagable deep tissue injury       PAST MEDICAL & SURGICAL HISTORY:  AICD (automatic cardioverter/defibrillator) present  PVD (peripheral vascular disease): s/p angioplasty with stent ; pt states Left Carotid Artery occluded  Atrial fibrillation: since 2013  Hyperlipidemia  HTN (hypertension)  Lung cancer: small cell, stage 4, 1997, radiation &amp; chemo- on remission  PAD (peripheral artery disease)  GERD (gastroesophageal reflux disease)  Carotid artery occlusion: left  Neuropathy  Type 2 diabetes mellitus: 11.13.19-- Hemoglobin A1C, Whole Blood: 5.8 %  Carotid artery occlusion: right with pseudoaneurysm s/p stent and mesh 4/2017  AICD (automatic cardioverter/defibrillator) present: 10/21/14  Carotid stenosis, right: Right CEA 2013  S/P femoral-popliteal bypass surgery: x 2, 2013, right s/p Revision  S/P angioplasty with stent: left leg  S/P cervical spinal fusion: 2002, 1 plate &amp; 4 rods  hematuria  previous UTI 4/19  4/3/19 excision of patch, evaluation of hematoma, sartorius m flap: CNS grew in broth media      Social history: former  - tired of rehab,     FAMILY HISTORY:  No pertinent family history in first degree relatives      REVIEW OF SYSTEMS:  CONSTITUTIONAL: + weakness/anorexia, No  fevers or chills  EYES/ENT: No visual changes;  No vertigo or throat pain   NECK: No pain or stiffness  RESPIRATORY: No cough, wheezing, hemoptysis; No shortness of breath  CARDIOVASCULAR: No chest pain or palpitations  GASTROINTESTINAL: No abdominal or epigastric pain. No nausea, vomiting, or hematemesis; No diarrhea or constipation. No melena or hematochezia.  GENITOURINARY: No dysuria, frequency or hematuria  NEUROLOGICAL: No numbness or weakness  SKIN: No itching, burning, rashes, or lesions   All other review of systems is negative unless indicated above    Allergies  No Known Allergies    Antimicrobials:  piperacillin/tazobactam IVPB.. 3.375 Gram(s) IV Intermittent every 8 hours  vancomycin  IVPB 1000 milliGRAM(s) IV Intermittent every 12 hours      Vital Signs Last 24 Hrs  T(C): 36.6 (18 Nov 2019 05:17), Max: 36.9 (17 Nov 2019 22:24)  T(F): 97.9 (18 Nov 2019 05:17), Max: 98.5 (17 Nov 2019 22:24)  HR: 80 (18 Nov 2019 05:17) (75 - 80)  BP: 122/55 (18 Nov 2019 05:17) (119/68 - 135/80)  BP(mean): --  RR: 18 (18 Nov 2019 05:17) (17 - 18)  SpO2: 99% (18 Nov 2019 05:17) (96% - 99%)    PHYSICAL EXAM:  General: thin chronically ill appearing  NAD, Non-toxic  Neurology: A&Ox3, nonfocal  Respiratory: Clear to auscultation bilaterally  CV: RRR, S1S2, no murmurs, rubs or gallops  Abdominal: Soft, Non-tender, non-distended, normal bowel sounds  Extremities: No edema, LE in cushion boots, warm calves, dressing left foot clean and dry  Line Sites: Clear  Skin: No rash                        7.9    8.06  )-----------( 255      ( 18 Nov 2019 05:15 )             24.1   WBC Count: 8.06 (11-18 @ 05:15)  WBC Count: 8.12 (11-16 @ 06:30)  WBC Count: 10.70 (11-15 @ 06:40)  WBC Count: 9.61 (11-14 @ 04:20)    Sedimentation Rate, Erythrocyte (11.11.19 @ 19:20)    Sedimentation Rate, Erythrocyte: 77 mm/hr    C-Reactive Protein, Serum (11.11.19 @ 20:51)    C-Reactive Protein, Serum: 85.3 mg/L        11-18    126<L>  |  92<L>  |  12  ----------------------------<  139<H>  3.6   |  23  |  0.64    Ca    8.6      18 Nov 2019 05:15      MICROBIOLOGY:  URINE MIDSTREAM  11-12-19 --  --  --      OTHER  11-11-19   MANY  STCN^Staphylococcus sp.,coag neg  --  --      BLOOD PERIPHERAL  11-11-19 --  --  --    Radiology:  < from: CT Angio Lower Extremity w/ IV Cont, Bilateral (11.12.19 @ 21:57) >  Left lower extremity:  Diffuse severe stenosis of the left external iliac, femoral and popliteal   arteries. Nondiagnostic evaluation below left knee due to extensive   vascular calcification.    Right lower extremity:  Right femoral and popliteal occlusion with patent femoral peroneal bypass   graft.  Nondiagnostic evaluation below right knee due to extensive vascular   calcification.    < end of copied text >    < from: Xray Foot AP + Lateral, Left (11.11.19 @ 19:41) >  Findings:  No acute fracture or dislocation. The joint spaces are preserved. Area of   lucency within the soft tissue of the first toe, likely corresponding to   area of ulceration. Vascular calcifications.    Impression:  No acute fracture. Area of lucency within the soft tissue of the first   toe, likely corresponding to area of ulceration.     < end of copied text >  Jeff Duggan MD; Division of Infectious Disease; Pager: [phone]; nights and weekends: [phone]

## 2019-11-18 NOTE — DISCHARGE NOTE PROVIDER - NSDCFUADDINST_GEN_ALL_CORE_FT
Podiatry Discharge Instructions:  Follow up: Please follow up with Dr. acuna/eliot within 1 week of discharge from the hospital, please call  [phone] for appointment and discuss that you recently were seen in the hospital.  Wound Care: Please apply betadine, 4x4 gauze, and kerlix on Left foot distal hallux gangrene, left 4th digit lateral aspect, and left foot heel.   Weight bearing: Please weight bear as tolerated in a surgical shoe.  Antibiotics: Please continue as instructed. Podiatry Discharge Instructions:  Follow up: Please follow up with Dr. acuna/eliot within 1 week of discharge from the hospital, please call  [phone] for appointment and discuss that you recently were seen in the hospital.  Wound Care: Please apply betadine, 4x4 gauze, and kerlix on Left foot distal hallux gangrene, left 4th digit lateral aspect, and left foot heel.   Weight bearing: Please weight bear as tolerated in a surgical shoe.  Antibiotics: Please continue as instructed. Podiatry Discharge Instructions:  Follow up: Please follow up with Dr. acuna/eliot within 1 week of discharge from the hospital, please call  [phone] for appointment and discuss that you recently were seen in the hospital.  Wound Care: Please apply betadine, 4x4 gauze, and kerlix on Left foot distal hallux gangrene, left 4th digit lateral aspect, and left foot heel.   Weight bearing: Please weight bear as tolerated in a surgical shoe.  Antibiotics: Please continue as instructed.

## 2019-11-18 NOTE — DISCHARGE NOTE PROVIDER - HOSPITAL COURSE
75 YO M with MHx of HTN, hyperlipidemia, afib on xarelto, AICD (placed in 2014), CAD s/p PCI on plavix, PAD s/p L bypass and L iliac stent, DM2 c/b neuropathy, sent to ED by his podiatrist for left foot gangrene. Pt states the the infection started 2 weeks ago and was being monitored by the podiatrist. Pt went was seen by a doctor 4 days ago and was started on bactrim, then was seen today by the podiatrist (Dr. Alex Leos ) who sent him to the hospital for IV abx. Pt states that he has long standing diabetic neuropathy and does not complain of pain at the site of gangrene. Pt does endorse decreased PO intake but denies fever, chills, nausea, vomiting, or abdominal pain.         In the ED: Tmax98.3F, /68, HR, Resp=, sat 98%. Labs: wbc=11.27, esr=77, crp=85.3, INR=2.33, Ev=375. xray: diffuse soft tissue swelling with cutaneous ulceration over the distal phalynx of the left first digit and subjacent cortical erosion of the first distal phalynx. pt was given a dose of vanc and cefepime and admitted to medicine for further management. On the floors FAB/CTA obtained per vasc recs. FAB RLE borderline (.9). Pulses were unable to be obtained on doppler on LLE. CTA  showed severe stenosis of left external iliac, femoral, and popliteal arteries, as well as right femoral and popiteal occlusion w/ patent peroneal bypass graft. Vasc surg rec'd pt not a candidate for revascularization at this time. No surgical interventions recommended per podiatry. Per family request hospice was consulted. Pt accepted to hospice. Now hemodymically stable for discharge.

## 2019-11-18 NOTE — CONSULT NOTE ADULT - REASON FOR ADMISSION
Left foot gangrene, rule out osteomyelitis
left foot gangrene

## 2019-11-18 NOTE — PROGRESS NOTE ADULT - PROBLEM SELECTOR PLAN 1
-Pt with gangrene on the left foot with lesions on the tip of L 1st and 5th digit and stage 2 ulcer on heel  -ESR and CRP elevated, and mild leukocytosis  - Xray L foot: Diffuse soft tissue swelling with cutaneous ulceration over the distal phalynx of the left first digit and subjacent cortical erosion of the first distal phalynx.  -c/w vanc and zosyn (11/12-  -Blood cx NGTD, wound cx sig for coag neg staph.   -F/u podiatry recs  -F/u vascular recs, which may be no surgical intervention. Will follow up -Pt with gangrene on the left foot with lesions on the tip of L 1st and 5th digit and stage 2 ulcer on heel  -ESR and CRP elevated, and mild leukocytosis  - Xray L foot: Diffuse soft tissue swelling with cutaneous ulceration over the distal phalynx of the left first digit and subjacent cortical erosion of the first distal phalynx.  -c/w vanc and zosyn (11/12-  -Blood cx NGTD, wound cx sig for coag neg staph.   -F/u podiatry recs  -F/u vascular recs, which may be no surgical intervention. Will follow up  -ID consulted for abx recs if no intervention per vasc surg/ podiatry

## 2019-11-18 NOTE — DISCHARGE NOTE PROVIDER - CARE PROVIDER_API CALL
David Wright)  Surgery; Vascular Surgery  [address], [address]  [place] [address]  Phone: [phone]  Fax: [phone]  Follow Up Time:

## 2019-11-18 NOTE — PROGRESS NOTE ADULT - ASSESSMENT
74M w/ PMHx of DM, PVD, HTN, HLD w/ left hallux gangrene  -Pt seen and evaluated  -Left foot x-rays negative for osteo  -Left foot distal hallux eschar 2/2 partial nail avulsion & PVD, well-adhered and stable with no acute signs of infection. Left foot lateral 4th toe wound to subQ, fibrotic with no acute signs of infection; 4th digit showing ischemic changes to the lateral aspect, dusky 5th digit, left heel unstagable deep tissue injury   - applied betadine + DSD  - FAB/ PVRS poor, s/p CTA showing severe stenosis to LLE, patent fem-peroneal bypass RLE  - No podiatric surgical intervention at this time  - Continue IV abx and local wound care w/ betadine  - awaiting final vasc recs   - will continue to monitor  - see w/ attending

## 2019-11-18 NOTE — PROGRESS NOTE ADULT - ATTENDING COMMENTS
Sodium levels improving today to 126, will continue with fluid restriction of 800mL daily and salt tabs. ID consulted for long-term abx management- will d/c vanc and Zosyn and start Augmentin through 11/25. Awaiting final plan from vascular surgery regarding any inpatient intervention on b/l lower extremities given extent of vascular calcifications. Plan for discharge to [facility]

## 2019-11-18 NOTE — CONSULT NOTE ADULT - ASSESSMENT
75 YO M with MHx of HTN, hyperlipidemia, afib on xarelto, AICD (placed in 2014), CAD s/p PCI on plavix, PAD s/p L bypass and L iliac stent, DM2 c/b neuropathy admitted 11/11/19 with gangrenous changes left foot    Antibiotics  Vanco 11/11, 11/12-->13; 11/16  Cefepime 11/11  Zosyn 11/12 ---> --->    Advanced arterial insuficiency with dry gangrene - not actively infected, not surgical candidate as per Vascular and Podiatry services  Mainstay of therapy is local wound care and monitoring for adverse change    Suggest  Discontinue Vanco/Zosyn  Augmentin 500 mg twice daily through 11/25  needs wound care, physical therapy: consider rehab    discussed with resident

## 2019-11-18 NOTE — CHART NOTE - NSCHARTNOTEFT_GEN_A_CORE
RD visited with patient for requested consultation: Assessment & Education.  Admitted for Lt foot gangrene; patient also was found with hyponatremia and placed on 800mL fluid restriction; sodium levels currently noted to be improving as per recent physician note 11/18/19.  Patient with PMH of HTN, hyperlipidemia, afib, AICD, CAD s/p PCI on plavix, PAD s/p L bypass and L ileac stent, DMII c/b neuropathy.      RD Initial Evaluation completed on 11/13/19, identified with severe protein calorie malnutrition.  As per RN, PO intake is good at this time, exhibits good intake and tolerance to meals.  No reported GI distress i.e. nausea, vomiting, diarrhea; no reported chewing or swallowing difficulties.  RD reinforced education to patient (as previously provided) regarding intake of high calorie/protein foods to assist with wound healing process and to aid in optimizing nutritional status given h/o weight loss.  As per RN, family providing additional foods. RD previously recommended Glucerna Therapeutic Nutrition 240mls 3x daily (660kcals, 30g protein), however Glucerna Therapeutic Nutrition 240mls 1x daily (220kcals, 10g protein) currently ordered, likely in relation to 800 ml fluid restriction for hyponatremia.      Source: Patient [ X ]    Family [ ]     other [ X ] RN    Diet: Diet, Regular:   Consistent Carbohydrate {Evening Snack} (CSTCHOSN)  800mL Fluid Restriction ([id number])  No Carb Prosource (1pkg = 15gms Protein)     Qty per Day:  1  Supplement Feeding Modality:  Oral  Glucerna Shake Cans or Servings Per Day:  1       Frequency:  Daily (11-17-19 @ 11:14)    Current Weight: 11/15/19 - 67.3kg      Admit/Dosing - 56.5kg     Pertinent Medications: atorvastatin  dextrose 5%.  dextrose 50% Injectable  dextrose 50% Injectable  dextrose 50% Injectable  digoxin     Tablet  ferrous    sulfate  gabapentin  insulin lispro (HumaLOG) corrective regimen sliding scale  insulin lispro (HumaLOG) corrective regimen sliding scale  metoprolol succinate ER  multivitamin  sodium chloride    Pertinent Labs:  11-18 Na126 mmol/L<L> Glu 139 mg/dL<H> K+ 3.6 mmol/L Cr  0.64 mg/dL BUN 12 mg/dL 11-15 Phos 2.7 mg/dL 11-15 Alb 2.7 g/dL<L> 11-13 [id number] 5.8 %<H>    Skin: Left foot distal hallux eschar noted;  Left foot lateral 4th digit ulcer, left heel unstagable deep tissue injury as per podiatry note 11/18/19    Estimated Needs:   [X ] no change since previous assessment - Refer to 11/13/19 Initial Dietitian Evaluation  [ ] recalculated:     Previous Nutrition Diagnosis:   [X] Malnutrition. Severe, in the context of chronic illness related to severe muscle mass depletion, severe subcutaneous fat loss, <75% intake of estimated nutritional needs > 1 month.     Nutrition Diagnosis is [X] ongoing  [ ] resolved [ ] not applicable     Additional Recommendations:  1) Monitor weights, PO intake/diet tolerance, skin integrity, pertinent labs.   2) Consider increase in ProSource No Carb supplement from once daily to twice daily to optimize protein intake (60kcal/15gm pro/serving; 120kcal/30gm pro/day to be provided if increased & well accepted).  3) Please assist and encourage patient with meals as needed; honor food preferences as able. RD visited with patient for requested consultation: Assessment & Education.  Admitted for Lt foot gangrene; patient also was found with hyponatremia and placed on 800mL fluid restriction; sodium levels currently noted to be improving as per recent physician note 11/18/19.  Patient with PMH of HTN, hyperlipidemia, afib, AICD, CAD s/p PCI on plavix, PAD s/p L bypass and L ileac stent, DMII c/b neuropathy.      RD Initial Evaluation completed on 11/13/19, identified with severe protein calorie malnutrition.  As per RN, PO intake is good at this time, exhibits good intake and tolerance to meals.  No reported GI distress i.e. nausea, vomiting, diarrhea; no reported chewing or swallowing difficulties.  RD reinforced education to patient (as previously provided) regarding intake of high calorie/protein foods to assist with wound healing process and to aid in optimizing nutritional status given h/o weight loss.  As per RN, family providing additional foods. RD previously recommended Glucerna Therapeutic Nutrition 240mls 3x daily (660kcals, 30g protein), however Glucerna Therapeutic Nutrition 240mls 1x daily (220kcals, 10g protein) currently ordered, likely in relation to 800 ml fluid restriction for hyponatremia.      Source: Patient [ X ]    Family [ ]     other [ X ] RN    Diet: Diet, Regular:   Consistent Carbohydrate {Evening Snack} (CSTCHOSN)  800mL Fluid Restriction ([id number])  No Carb Prosource (1pkg = 15gms Protein)     Qty per Day:  1  Supplement Feeding Modality:  Oral  Glucerna Shake Cans or Servings Per Day:  1       Frequency:  Daily (11-17-19 @ 11:14)    Current Weight: 11/15/19 - 67.3kg      Admit/Dosing - 56.5kg     Pertinent Medications: atorvastatin  dextrose 5%.  dextrose 50% Injectable  dextrose 50% Injectable  dextrose 50% Injectable  digoxin     Tablet  ferrous    sulfate  gabapentin  insulin lispro (HumaLOG) corrective regimen sliding scale  insulin lispro (HumaLOG) corrective regimen sliding scale  metoprolol succinate ER  multivitamin  sodium chloride    Pertinent Labs:  11-18 Na126 mmol/L<L> Glu 139 mg/dL<H> K+ 3.6 mmol/L Cr  0.64 mg/dL BUN 12 mg/dL 11-15 Phos 2.7 mg/dL 11-15 Alb 2.7 g/dL<L> 11-13 [id number] 5.8 %<H>    Skin: Left foot distal hallux eschar noted;  Left foot lateral 4th digit ulcer, left heel unstageable deep tissue injury as per podiatry note 11/18/19    Estimated Needs:   [X ] no change since previous assessment - Refer to 11/13/19 Initial Dietitian Evaluation  [ ] recalculated:     Previous Nutrition Diagnosis:   [X] Malnutrition. Severe, in the context of chronic illness related to severe muscle mass depletion, severe subcutaneous fat loss, <75% intake of estimated nutritional needs > 1 month.     Nutrition Diagnosis is [X] ongoing  [ ] resolved [ ] not applicable     Additional Recommendations:  1) Monitor weights, PO intake/diet tolerance, skin integrity, pertinent labs.   2) Consider increase in ProSource No Carb supplement from once daily to twice daily to optimize protein intake (60kcal/15gm pro/serving; 120kcal/30gm pro/day to be provided if increased & well accepted).  3) Please assist and encourage patient with meals as needed; honor food preferences as able. RD visited with patient for requested consultation: Assessment & Education.  Admitted for Lt foot gangrene; patient also was found with hyponatremia and placed on 800mL fluid restriction; sodium levels currently noted to be improving as per recent physician note 11/18/19.  Patient with PMH of HTN, hyperlipidemia, afib, AICD, CAD s/p PCI on plavix, PAD s/p L bypass and L ileac stent, DMII c/b neuropathy.      RD Initial Evaluation completed on 11/13/19, identified with severe protein calorie malnutrition.  As per RN, PO intake is good at this time, exhibits good intake and tolerance to meals.  No reported GI distress i.e. nausea, vomiting, diarrhea; no reported chewing or swallowing difficulties.  RD reinforced education to patient (as previously provided) regarding intake of high calorie/protein foods to assist with wound healing process and to aid in optimizing nutritional status given h/o weight loss.  As per RN, family providing additional foods. RD previously recommended Glucerna Therapeutic Nutrition 240mls 3x daily (660kcals, 30g protein), however Glucerna Therapeutic Nutrition 240mls 1x daily (220kcals, 10g protein) currently ordered, likely in relation to 800 ml fluid restriction for hyponatremia.      Source: Patient [ X ]    Family [ ]     other [ X ] RN    Diet: Diet, Regular:   Consistent Carbohydrate {Evening Snack} (CSTCHOSN)  800mL Fluid Restriction ([id number])  No Carb Prosource (1pkg = 15gms Protein)     Qty per Day:  1  Supplement Feeding Modality:  Oral  Glucerna Shake Cans or Servings Per Day:  1       Frequency:  Daily (11-17-19 @ 11:14)    Current Weight: 11/15/19 - 67.3kg      Admit/Dosing - 56.5kg   ? weight inconsistency vs. true weight gain - continue to monitor weight trends.    Pertinent Medications: atorvastatin  dextrose 5%.  dextrose 50% Injectable  dextrose 50% Injectable  dextrose 50% Injectable  digoxin     Tablet  ferrous    sulfate  gabapentin  insulin lispro (HumaLOG) corrective regimen sliding scale  insulin lispro (HumaLOG) corrective regimen sliding scale  metoprolol succinate ER  multivitamin  sodium chloride    Pertinent Labs:  11-18 Na126 mmol/L<L> Glu 139 mg/dL<H> K+ 3.6 mmol/L Cr  0.64 mg/dL BUN 12 mg/dL 11-15 Phos 2.7 mg/dL 11-15 Alb 2.7 g/dL<L> 11-13 [id number] 5.8 %<H>    Skin: Left foot distal hallux eschar noted;  Left foot lateral 4th digit ulcer, left heel unstageable deep tissue injury as per podiatry note 11/18/19    Estimated Needs:   [X ] no change since previous assessment - Refer to 11/13/19 Initial Dietitian Evaluation  [ ] recalculated:     Previous Nutrition Diagnosis:   [X] Malnutrition. Severe, in the context of chronic illness related to severe muscle mass depletion, severe subcutaneous fat loss, <75% intake of estimated nutritional needs > 1 month.     Nutrition Diagnosis is [X] ongoing  [ ] resolved [ ] not applicable     Additional Recommendations:  1) Monitor weights, PO intake/diet tolerance, skin integrity, pertinent labs.   2) Consider increase in ProSource No Carb supplement from once daily to twice daily to optimize protein intake (60kcal/15gm pro/serving; 120kcal/30gm pro/day to be provided if increased & well accepted).  3) Please assist and encourage patient with meals as needed; honor food preferences as able.

## 2019-11-18 NOTE — PROGRESS NOTE ADULT - ASSESSMENT
75 YO M with MHx of HTN, hyperlipidemia, afib on xarelto, AICD (placed in 2014), CAD s/p PCI on plavix, PAD s/p L bypass and L ileac stent, DMII c/b neuropathy admitted for left foot gangrene. Hospital course c/b hyponatremia

## 2019-11-18 NOTE — DISCHARGE NOTE PROVIDER - NSDCMRMEDTOKEN_GEN_ALL_CORE_FT
atorvastatin 20 mg oral tablet: 1 tab(s) orally once a day  digoxin 125 mcg (0.125 mg) oral tablet: 1 tab(s) orally once a day  metFORMIN 1000 mg oral tablet:  orally once a day,  Metoprolol Succinate  mg oral tablet, extended release: 1.5 tab(s) orally once a day  Plavix 75 mg oral tablet: 1 tab(s) orally once a day am  Tradjenta 5 mg oral tablet: 1 tab(s) orally once a day  Xarelto 20 mg oral tablet: 1 tab(s) orally once a day DO NOT RESTART UNTIL 4/29/19 amoxicillin-clavulanate 875 mg-125 mg oral tablet: 1 tab(s) orally every 12 hours  atorvastatin 20 mg oral tablet: 1 tab(s) orally once a day  digoxin 125 mcg (0.125 mg) oral tablet: 1 tab(s) orally once a day  metFORMIN 1000 mg oral tablet:  orally once a day,  Metoprolol Succinate  mg oral tablet, extended release: 1.5 tab(s) orally once a day  Neurontin 100 mg oral capsule: 1 tab(s) orally 2 times a day   Plavix 75 mg oral tablet: 1 tab(s) orally once a day am  Sodium Chloride 1 g oral tablet: 2 tab(s) orally 2 times a day  Tradjenta 5 mg oral tablet: 1 tab(s) orally once a day  Xarelto 20 mg oral tablet: 1 tab(s) orally once a day DO NOT RESTART UNTIL 4/29/19

## 2019-11-18 NOTE — PROGRESS NOTE ADULT - SUBJECTIVE AND OBJECTIVE BOX
Monika Masters MD  PGY 1 Department of Internal Medicine  Pager: [phone]    Patient is a 74y old  Male who presents with a chief complaint of Left foot gangrene, rule out osteomyelitis (17 Nov 2019 11:27)      SUBJECTIVE / OVERNIGHT EVENTS: Pt seen and examined. No acute overnight events.        MEDICATIONS  (STANDING):  atorvastatin 20 milliGRAM(s) Oral at bedtime  clopidogrel Tablet 75 milliGRAM(s) Oral daily  dextrose 5%. 1000 milliLiter(s) (50 mL/Hr) IV Continuous <Continuous>  dextrose 50% Injectable 12.5 Gram(s) IV Push once  dextrose 50% Injectable 25 Gram(s) IV Push once  dextrose 50% Injectable 25 Gram(s) IV Push once  digoxin     Tablet 0.125 milliGRAM(s) Oral daily  ferrous    sulfate 325 milliGRAM(s) Oral daily  influenza   Vaccine 0.5 milliLiter(s) IntraMuscular once  insulin lispro (HumaLOG) corrective regimen sliding scale   SubCutaneous three times a day before meals  insulin lispro (HumaLOG) corrective regimen sliding scale   SubCutaneous at bedtime  metoprolol succinate  milliGRAM(s) Oral daily  multivitamin 1 Tablet(s) Oral daily  piperacillin/tazobactam IVPB.. 3.375 Gram(s) IV Intermittent every 8 hours  rivaroxaban 20 milliGRAM(s) Oral with dinner  sodium chloride 2 Gram(s) Oral three times a day  vancomycin  IVPB 1000 milliGRAM(s) IV Intermittent every 12 hours    MEDICATIONS  (PRN):  acetaminophen    Suspension .. 650 milliGRAM(s) Oral every 6 hours PRN Mild Pain (1 - 3), Moderate Pain (4 - 6)  dextrose 40% Gel 15 Gram(s) Oral once PRN Blood Glucose LESS THAN 70 milliGRAM(s)/deciliter  glucagon  Injectable 1 milliGRAM(s) IntraMuscular once PRN Glucose LESS THAN 70 milligrams/deciliter      I&O's Summary    17 Nov 2019 07:01  -  18 Nov 2019 07:00  --------------------------------------------------------  IN: 0 mL / OUT: 650 mL / NET: -650 mL        Vital Signs Last 24 Hrs  T(C): 36.6 (18 Nov 2019 05:17), Max: 36.9 (17 Nov 2019 22:24)  T(F): 97.9 (18 Nov 2019 05:17), Max: 98.5 (17 Nov 2019 22:24)  HR: 80 (18 Nov 2019 05:17) (75 - 80)  BP: 122/55 (18 Nov 2019 05:17) (119/68 - 135/80)  BP(mean): --  RR: 18 (18 Nov 2019 05:17) (17 - 18)  SpO2: 99% (18 Nov 2019 05:17) (96% - 99%)    CAPILLARY BLOOD GLUCOSE      POCT Blood Glucose.: 176 mg/dL (17 Nov 2019 22:37)  POCT Blood Glucose.: 150 mg/dL (17 Nov 2019 17:27)  POCT Blood Glucose.: 220 mg/dL (17 Nov 2019 13:12)  POCT Blood Glucose.: 114 mg/dL (17 Nov 2019 08:51)      PHYSICAL EXAM:              Constitutional: WDWN resting comfortably in bed; NAD. Cachectic appearing.  	Head: NC/AT  	Eyes: PERRL, EOMI, anicteric sclera  	Neck: Supple  	Respiratory: CTA B/L  	Cardiac: +S1/S2; RRR; no M/R/G. No peripheral edema b/l.  	Gastrointestinal: Soft, NT/ND; no rebound or guarding; +BSx4  	Extremities: + Cyanosis LE. +Palpable radial pulses b/l, LE pulses non palpable B/L  	Skin: + Dry gangrene L toe. L feet cold to touch w/ blue/ purple discoloration.  	Neurologic: Alert and oriented x3, no focal deficits appreciated       LABS:                        7.9    8.06  )-----------( 255      ( 18 Nov 2019 05:15 )             24.1     Auto Eosinophil # x     / Auto Eosinophil % x     / Auto Neutrophil # x     / Auto Neutrophil % x     / BANDS % x        11-17    125<L>  |  90<L>  |  14  ----------------------------<  167<H>  3.8   |  23  |  0.66  11-17    123<L>  |  88<L>  |  13  ----------------------------<  123<H>  4.0   |  22  |  0.63  11-17    121<L>  |  88<L>  |  13  ----------------------------<  122<H>  3.8   |  21<L>  |  0.63    Ca    8.4      17 Nov 2019 19:15                  RADIOLOGY & ADDITIONAL TESTS:    Imaging Personally Reviewed:    Consultant(s) Notes Reviewed:      Care Discussed with Consultants/Other Providers: Monika Masters MD  PGY 1 Department of Internal Medicine  Pager: [phone]    Patient is a 74y old  Male who presents with a chief complaint of Left foot gangrene, rule out osteomyelitis (17 Nov 2019 11:27)      SUBJECTIVE / OVERNIGHT EVENTS: Pt seen and examined. No acute overnight events. Endorses leg pain this AM. Denies fevers, chills, N/V, CP, SOB, Constipation, diarrhea         MEDICATIONS  (STANDING):  atorvastatin 20 milliGRAM(s) Oral at bedtime  clopidogrel Tablet 75 milliGRAM(s) Oral daily  dextrose 5%. 1000 milliLiter(s) (50 mL/Hr) IV Continuous <Continuous>  dextrose 50% Injectable 12.5 Gram(s) IV Push once  dextrose 50% Injectable 25 Gram(s) IV Push once  dextrose 50% Injectable 25 Gram(s) IV Push once  digoxin     Tablet 0.125 milliGRAM(s) Oral daily  ferrous    sulfate 325 milliGRAM(s) Oral daily  influenza   Vaccine 0.5 milliLiter(s) IntraMuscular once  insulin lispro (HumaLOG) corrective regimen sliding scale   SubCutaneous three times a day before meals  insulin lispro (HumaLOG) corrective regimen sliding scale   SubCutaneous at bedtime  metoprolol succinate  milliGRAM(s) Oral daily  multivitamin 1 Tablet(s) Oral daily  piperacillin/tazobactam IVPB.. 3.375 Gram(s) IV Intermittent every 8 hours  rivaroxaban 20 milliGRAM(s) Oral with dinner  sodium chloride 2 Gram(s) Oral three times a day  vancomycin  IVPB 1000 milliGRAM(s) IV Intermittent every 12 hours    MEDICATIONS  (PRN):  acetaminophen    Suspension .. 650 milliGRAM(s) Oral every 6 hours PRN Mild Pain (1 - 3), Moderate Pain (4 - 6)  dextrose 40% Gel 15 Gram(s) Oral once PRN Blood Glucose LESS THAN 70 milliGRAM(s)/deciliter  glucagon  Injectable 1 milliGRAM(s) IntraMuscular once PRN Glucose LESS THAN 70 milligrams/deciliter      I&O's Summary    17 Nov 2019 07:01  -  18 Nov 2019 07:00  --------------------------------------------------------  IN: 0 mL / OUT: 650 mL / NET: -650 mL        Vital Signs Last 24 Hrs  T(C): 36.6 (18 Nov 2019 05:17), Max: 36.9 (17 Nov 2019 22:24)  T(F): 97.9 (18 Nov 2019 05:17), Max: 98.5 (17 Nov 2019 22:24)  HR: 80 (18 Nov 2019 05:17) (75 - 80)  BP: 122/55 (18 Nov 2019 05:17) (119/68 - 135/80)  BP(mean): --  RR: 18 (18 Nov 2019 05:17) (17 - 18)  SpO2: 99% (18 Nov 2019 05:17) (96% - 99%)    CAPILLARY BLOOD GLUCOSE      POCT Blood Glucose.: 176 mg/dL (17 Nov 2019 22:37)  POCT Blood Glucose.: 150 mg/dL (17 Nov 2019 17:27)  POCT Blood Glucose.: 220 mg/dL (17 Nov 2019 13:12)  POCT Blood Glucose.: 114 mg/dL (17 Nov 2019 08:51)      PHYSICAL EXAM:              Constitutional: WDWN resting comfortably in bed; NAD. Cachectic appearing.  	Head: NC/AT  	Eyes: PERRL, EOMI, anicteric sclera  	Neck: Supple  	Respiratory: CTA B/L  	Cardiac: +S1/S2; RRR; no M/R/G. No peripheral edema b/l.  	Gastrointestinal: Soft, NT/ND; no rebound or guarding; +BSx4  	Extremities: + Cyanosis LE. +Palpable radial pulses b/l, LE pulses non palpable B/L  	Skin: + Dry gangrene L toe. L feet cold to touch w/ blue/ purple discoloration.  	Neurologic: Alert and oriented x3, no focal deficits appreciated       LABS:                        7.9    8.06  )-----------( 255      ( 18 Nov 2019 05:15 )             24.1     Auto Eosinophil # x     / Auto Eosinophil % x     / Auto Neutrophil # x     / Auto Neutrophil % x     / BANDS % x        11-17    125<L>  |  90<L>  |  14  ----------------------------<  167<H>  3.8   |  23  |  0.66  11-17    123<L>  |  88<L>  |  13  ----------------------------<  123<H>  4.0   |  22  |  0.63  11-17    121<L>  |  88<L>  |  13  ----------------------------<  122<H>  3.8   |  21<L>  |  0.63    Ca    8.4      17 Nov 2019 19:15                  RADIOLOGY & ADDITIONAL TESTS:    Imaging Personally Reviewed:    Consultant(s) Notes Reviewed:      Care Discussed with Consultants/Other Providers:

## 2019-11-19 DIAGNOSIS — Z02.9 ENCOUNTER FOR ADMINISTRATIVE EXAMINATIONS, UNSPECIFIED: ICD-10-CM

## 2019-11-19 LAB
ANION GAP SERPL CALC-SCNC: 12 MMO/L — SIGNIFICANT CHANGE UP (ref 7–14)
BUN SERPL-MCNC: 11 MG/DL — SIGNIFICANT CHANGE UP (ref 7–23)
CALCIUM SERPL-MCNC: 8.3 MG/DL — LOW (ref 8.4–10.5)
CHLORIDE SERPL-SCNC: 95 MMOL/L — LOW (ref 98–107)
CO2 SERPL-SCNC: 22 MMOL/L — SIGNIFICANT CHANGE UP (ref 22–31)
CREAT SERPL-MCNC: 0.6 MG/DL — SIGNIFICANT CHANGE UP (ref 0.5–1.3)
GLUCOSE BLDC GLUCOMTR-MCNC: 145 MG/DL — HIGH (ref 70–99)
GLUCOSE BLDC GLUCOMTR-MCNC: 189 MG/DL — HIGH (ref 70–99)
GLUCOSE BLDC GLUCOMTR-MCNC: 225 MG/DL — HIGH (ref 70–99)
GLUCOSE BLDC GLUCOMTR-MCNC: 230 MG/DL — HIGH (ref 70–99)
GLUCOSE SERPL-MCNC: 145 MG/DL — HIGH (ref 70–99)
HCT VFR BLD CALC: 23.9 % — LOW (ref 39–50)
HGB BLD-MCNC: 7.4 G/DL — LOW (ref 13–17)
MCHC RBC-ENTMCNC: 24.8 PG — LOW (ref 27–34)
MCHC RBC-ENTMCNC: 31 % — LOW (ref 32–36)
MCV RBC AUTO: 80.2 FL — SIGNIFICANT CHANGE UP (ref 80–100)
NRBC # FLD: 0 K/UL — SIGNIFICANT CHANGE UP (ref 0–0)
PLATELET # BLD AUTO: 234 K/UL — SIGNIFICANT CHANGE UP (ref 150–400)
PMV BLD: 9.7 FL — SIGNIFICANT CHANGE UP (ref 7–13)
POTASSIUM SERPL-MCNC: 3.8 MMOL/L — SIGNIFICANT CHANGE UP (ref 3.5–5.3)
POTASSIUM SERPL-SCNC: 3.8 MMOL/L — SIGNIFICANT CHANGE UP (ref 3.5–5.3)
RBC # BLD: 2.98 M/UL — LOW (ref 4.2–5.8)
RBC # FLD: 15.3 % — HIGH (ref 10.3–14.5)
SODIUM SERPL-SCNC: 129 MMOL/L — LOW (ref 135–145)
WBC # BLD: 6.75 K/UL — SIGNIFICANT CHANGE UP (ref 3.8–10.5)
WBC # FLD AUTO: 6.75 K/UL — SIGNIFICANT CHANGE UP (ref 3.8–10.5)

## 2019-11-19 PROCEDURE — 99232 SBSQ HOSP IP/OBS MODERATE 35: CPT | Mod: GC

## 2019-11-19 PROCEDURE — 99233 SBSQ HOSP IP/OBS HIGH 50: CPT | Mod: GC

## 2019-11-19 RX ADMIN — Medication 150 MILLIGRAM(S): at 06:03

## 2019-11-19 RX ADMIN — Medication 325 MILLIGRAM(S): at 11:52

## 2019-11-19 RX ADMIN — SODIUM CHLORIDE 2 GRAM(S): 9 INJECTION INTRAMUSCULAR; INTRAVENOUS; SUBCUTANEOUS at 13:03

## 2019-11-19 RX ADMIN — SODIUM CHLORIDE 2 GRAM(S): 9 INJECTION INTRAMUSCULAR; INTRAVENOUS; SUBCUTANEOUS at 21:47

## 2019-11-19 RX ADMIN — Medication 1 TABLET(S): at 17:14

## 2019-11-19 RX ADMIN — CLOPIDOGREL BISULFATE 75 MILLIGRAM(S): 75 TABLET, FILM COATED ORAL at 11:51

## 2019-11-19 RX ADMIN — Medication 1: at 13:03

## 2019-11-19 RX ADMIN — SODIUM CHLORIDE 2 GRAM(S): 9 INJECTION INTRAMUSCULAR; INTRAVENOUS; SUBCUTANEOUS at 06:03

## 2019-11-19 RX ADMIN — Medication 2: at 18:08

## 2019-11-19 RX ADMIN — GABAPENTIN 100 MILLIGRAM(S): 400 CAPSULE ORAL at 06:02

## 2019-11-19 RX ADMIN — Medication 1 TABLET(S): at 06:02

## 2019-11-19 RX ADMIN — Medication 1 TABLET(S): at 11:52

## 2019-11-19 RX ADMIN — GABAPENTIN 100 MILLIGRAM(S): 400 CAPSULE ORAL at 17:14

## 2019-11-19 RX ADMIN — ATORVASTATIN CALCIUM 20 MILLIGRAM(S): 80 TABLET, FILM COATED ORAL at 21:47

## 2019-11-19 RX ADMIN — RIVAROXABAN 20 MILLIGRAM(S): KIT at 17:14

## 2019-11-19 RX ADMIN — Medication 0.12 MILLIGRAM(S): at 06:02

## 2019-11-19 NOTE — PROGRESS NOTE ADULT - ASSESSMENT
73 YO M with MHx of HTN, hyperlipidemia, afib on xarelto, AICD (placed in 2014), CAD s/p PCI on plavix, PAD s/p L bypass and L ileac stent, DMII c/b neuropathy admitted for left foot gangrene. Hospital course c/b hyponatremia

## 2019-11-19 NOTE — GOALS OF CARE CONVERSATION - ADVANCED CARE PLANNING - CONVERSATION DETAILS
[facility]    Meeting held with pt's spouse and daughter. They are in agreement with hospice care at home.  Consents and approval obtained.    Family needs to move furniture in house to accommodate hospital bed. Per pt's spouse, she will be ready to receive bed on Thurs 11/21/19. Order placed.     [facility] RN remains available for care planning.

## 2019-11-19 NOTE — PROGRESS NOTE ADULT - PROBLEM SELECTOR PLAN 3
-s/p L bypass and stent to the L iliac, now on plavix  -c/w plavix  -FAB done, see above  -F/u vascular recs -s/p L bypass and stent to the L iliac, now on plavix  -c/w plavix  -FAB done, see above  -No surgical intervention per vasc recs -s/p L bypass and stent to the L iliac, now on ASA and Plavix  -FAB done, see above  -No surgical intervention per vasc recs

## 2019-11-19 NOTE — PROGRESS NOTE ADULT - NSHPATTENDINGPLANDISCUSS_GEN_ALL_CORE
team
Medicine team at bedside
Team
Team
patient, patient's daughter (at bedside) and medical team/residents

## 2019-11-19 NOTE — PROGRESS NOTE ADULT - SUBJECTIVE AND OBJECTIVE BOX
[facility] DIVISION OF KIDNEY DISEASES AND HYPERTENSION -- FOLLOW UP NOTE  --------------------------------------------------------------------------------  HPI: 73 YO M with MHx of HTN, hyperlipidemia, A fib on xarelto, AICD (placed in 2014), CAD s/p PCI on plavix, PAD s/p L bypass and L ileac stent, DM II admitted for left foot gangrene. Nephrology consulted for hyponatremia management. Patient endorses decreased PO intake on presentation. Pt. also with urinary retention on admission. On lab review of [facility] serum sodium decrease ~ 130 since 2017. According to patient's daughter, sodium normally around 130s. Sodium 122 on admission on 11/11/19, pt. received HTS 1.5% and 3% and serum sodium improved at 129 on 11/19/19.     Pt. was seen and examined with daughter at bedside, offers no complains. Denies CP, abdominal pain, nausea, vomiting, diarrhea, headache, dizziness.     PAST HISTORY  --------------------------------------------------------------------------------  No significant changes to PMH, PSH, FHx, SHx, unless otherwise noted    ALLERGIES & MEDICATIONS  --------------------------------------------------------------------------------  Allergies    No Known Allergies    Intolerances      Standing Inpatient Medications  amoxicillin  875 milliGRAM(s)/clavulanate 1 Tablet(s) Oral every 12 hours  atorvastatin 20 milliGRAM(s) Oral at bedtime  clopidogrel Tablet 75 milliGRAM(s) Oral daily  dextrose 5%. 1000 milliLiter(s) IV Continuous <Continuous>  dextrose 50% Injectable 12.5 Gram(s) IV Push once  dextrose 50% Injectable 25 Gram(s) IV Push once  dextrose 50% Injectable 25 Gram(s) IV Push once  digoxin     Tablet 0.125 milliGRAM(s) Oral daily  ferrous    sulfate 325 milliGRAM(s) Oral daily  gabapentin 100 milliGRAM(s) Oral two times a day  influenza   Vaccine 0.5 milliLiter(s) IntraMuscular once  insulin lispro (HumaLOG) corrective regimen sliding scale   SubCutaneous three times a day before meals  insulin lispro (HumaLOG) corrective regimen sliding scale   SubCutaneous at bedtime  metoprolol succinate  milliGRAM(s) Oral daily  multivitamin 1 Tablet(s) Oral daily  rivaroxaban 20 milliGRAM(s) Oral with dinner  sodium chloride 2 Gram(s) Oral three times a day    PRN Inpatient Medications  acetaminophen    Suspension .. 650 milliGRAM(s) Oral every 6 hours PRN  dextrose 40% Gel 15 Gram(s) Oral once PRN  glucagon  Injectable 1 milliGRAM(s) IntraMuscular once PRN      REVIEW OF SYSTEMS  --------------------------------------------------------------------------------  Gen: No fevers  Respiratory: No dyspnea  CV: No chest pain  GI: No abdominal pain  MSK: No  edema    All other systems were reviewed and are negative, except as noted.    VITALS/PHYSICAL EXAM  --------------------------------------------------------------------------------  T(C): 36.8 (11-19-19 @ 05:59), Max: 36.9 (11-18-19 @ 21:40)  HR: 72 (11-19-19 @ 05:59) (72 - 81)  BP: 119/63 (11-19-19 @ 05:59) (119/63 - 127/62)  RR: 18 (11-19-19 @ 05:59) (18 - 18)  SpO2: 97% (11-19-19 @ 05:59) (97% - 99%)  Wt(kg): --    11-18-19 @ 07:01  -  11-19-19 @ 07:00  --------------------------------------------------------  IN: 730 mL / OUT: 850 mL / NET: -120 mL    11-19-19 @ 07:01  -  11-19-19 @ 12:21  --------------------------------------------------------  IN: 250 mL / OUT: 300 mL / NET: -50 mL    Physical Exam:  	Gen: NAD, elderly, fragile  	HEENT: MMM  	Pulm: CTA B/L  	CV: S1S2  	Abd: Soft, +BS   	Ext: No LE edema B/L  	Neuro: Awake  	Skin: Warm and dry    LABS/STUDIES  --------------------------------------------------------------------------------              7.4    6.75  >-----------<  234      [11-19-19 @ 05:25]              23.9     129  |  95  |  11  ----------------------------<  145      [11-19-19 @ 05:25]  3.8   |  22  |  0.60        Ca     8.3     [11-19-19 @ 05:25]            Creatinine Trend:  SCr 0.60 [11-19 @ 05:25]  SCr 0.67 [11-18 @ 17:52]  SCr 0.64 [11-18 @ 05:15]  SCr 0.66 [11-17 @ 19:15]  SCr 0.63 [11-17 @ 05:00]      Urine Sodium 57      [11-13-19 @ 12:49]  Urine Osmolality 490      [11-13-19 @ 10:45]    Iron 16, TIBC 197, %sat --      [11-13-19 @ 06:54]  Ferritin 104.4      [11-13-19 @ 06:54]  HbA1c 5.8      [11-13-19 @ 06:54] [facility] DIVISION OF KIDNEY DISEASES AND HYPERTENSION -- FOLLOW UP NOTE  --------------------------------------------------------------------------------  HPI: 73 YO M with MHx of HTN, hyperlipidemia, A fib on xarelto, AICD (placed in 2014), CAD s/p PCI on plavix, PAD s/p L bypass and L ileac stent, DM II admitted for left foot gangrene. Nephrology consulted for hyponatremia management. Patient endorses decreased PO intake on presentation. Pt. also with urinary retention on admission. On lab review of [facility] serum sodium decrease ~ 130 since 2017. According to patient's daughter, sodium normally around 130s. Sodium 122 on admission on 11/11/19, pt. received HTS 1.5% and 3% and serum sodium improved at 129 on 11/19/19.     Pt. was seen and examined with daughter at bedside, offers no complains. Denies CP, abdominal pain, nausea, vomiting, diarrhea, headache or dizziness.     PAST HISTORY  --------------------------------------------------------------------------------  No significant changes to PMH, PSH, FHx, SHx, unless otherwise noted    ALLERGIES & MEDICATIONS  --------------------------------------------------------------------------------  Allergies    No Known Allergies    Intolerances      Standing Inpatient Medications  amoxicillin  875 milliGRAM(s)/clavulanate 1 Tablet(s) Oral every 12 hours  atorvastatin 20 milliGRAM(s) Oral at bedtime  clopidogrel Tablet 75 milliGRAM(s) Oral daily  dextrose 5%. 1000 milliLiter(s) IV Continuous <Continuous>  dextrose 50% Injectable 12.5 Gram(s) IV Push once  dextrose 50% Injectable 25 Gram(s) IV Push once  dextrose 50% Injectable 25 Gram(s) IV Push once  digoxin     Tablet 0.125 milliGRAM(s) Oral daily  ferrous    sulfate 325 milliGRAM(s) Oral daily  gabapentin 100 milliGRAM(s) Oral two times a day  influenza   Vaccine 0.5 milliLiter(s) IntraMuscular once  insulin lispro (HumaLOG) corrective regimen sliding scale   SubCutaneous three times a day before meals  insulin lispro (HumaLOG) corrective regimen sliding scale   SubCutaneous at bedtime  metoprolol succinate  milliGRAM(s) Oral daily  multivitamin 1 Tablet(s) Oral daily  rivaroxaban 20 milliGRAM(s) Oral with dinner  sodium chloride 2 Gram(s) Oral three times a day    PRN Inpatient Medications  acetaminophen    Suspension .. 650 milliGRAM(s) Oral every 6 hours PRN  dextrose 40% Gel 15 Gram(s) Oral once PRN  glucagon  Injectable 1 milliGRAM(s) IntraMuscular once PRN      REVIEW OF SYSTEMS  --------------------------------------------------------------------------------  Gen: No fevers  Respiratory: No dyspnea  CV: No chest pain  GI: No abdominal pain  MSK: No  edema    All other systems were reviewed and are negative, except as noted.    VITALS/PHYSICAL EXAM  --------------------------------------------------------------------------------  T(C): 36.8 (11-19-19 @ 05:59), Max: 36.9 (11-18-19 @ 21:40)  HR: 72 (11-19-19 @ 05:59) (72 - 81)  BP: 119/63 (11-19-19 @ 05:59) (119/63 - 127/62)  RR: 18 (11-19-19 @ 05:59) (18 - 18)  SpO2: 97% (11-19-19 @ 05:59) (97% - 99%)  Wt(kg): --    11-18-19 @ 07:01  -  11-19-19 @ 07:00  --------------------------------------------------------  IN: 730 mL / OUT: 850 mL / NET: -120 mL    11-19-19 @ 07:01  -  11-19-19 @ 12:21  --------------------------------------------------------  IN: 250 mL / OUT: 300 mL / NET: -50 mL    Physical Exam:  	Gen: NAD, elderly, fragile  	HEENT: MMM  	Pulm: CTA B/L  	CV: S1S2  	Abd: Soft, +BS   	Ext: No LE edema B/L  	Neuro: Awake  	Skin: Warm and dry    LABS/STUDIES  --------------------------------------------------------------------------------              7.4    6.75  >-----------<  234      [11-19-19 @ 05:25]              23.9     129  |  95  |  11  ----------------------------<  145      [11-19-19 @ 05:25]  3.8   |  22  |  0.60        Ca     8.3     [11-19-19 @ 05:25]    Creatinine Trend:  SCr 0.60 [11-19 @ 05:25]  SCr 0.67 [11-18 @ 17:52]  SCr 0.64 [11-18 @ 05:15]  SCr 0.66 [11-17 @ 19:15]  SCr 0.63 [11-17 @ 05:00]    Urine Sodium 57      [11-13-19 @ 12:49]  Urine Osmolality 490      [11-13-19 @ 10:45]

## 2019-11-19 NOTE — PROGRESS NOTE ADULT - PROBLEM SELECTOR PLAN 1
-Pt with gangrene on the left foot with lesions on the tip of L 1st and 5th digit and stage 2 ulcer on heel  -ESR and CRP elevated, and mild leukocytosis  - Xray L foot: Diffuse soft tissue swelling with cutaneous ulceration over the distal phalynx of the left first digit and subjacent cortical erosion of the first distal phalynx.  -c/w vanc and zosyn (11/12-  -Blood cx NGTD, wound cx sig for coag neg staph.   -F/u podiatry recs  -F/u vascular recs, which may be no surgical intervention. Will follow up  -ID consulted for abx recs if no intervention per vasc surg/ podiatry -Pt with gangrene on the left foot with lesions on the tip of L 1st and 5th digit and stage 2 ulcer on heel  -ESR and CRP elevated, and mild leukocytosis  - Xray L foot: Diffuse soft tissue swelling with cutaneous ulceration over the distal phalynx of the left first digit and subjacent cortical erosion of the first distal phalynx.  -s/p vanc and zosyn (11/12-11/18)  -started on PO Augmentin BID per ID recs (11/18-11/25)  -Blood cx NGTD, wound cx sig for coag neg staph.   -F/u podiatry recs  -No surgical intervention per [facility] recs

## 2019-11-19 NOTE — PROGRESS NOTE ADULT - ATTENDING COMMENTS
Patient's wife spoke with Dr. Wright yesterday who does not recommend any further surgical intervention. Discussed option of hospice with patient's family who are in agreement and will have hospice evaluate patient today

## 2019-11-19 NOTE — PROGRESS NOTE ADULT - PROBLEM SELECTOR PLAN 2
-Unclear etiology. Likely 2/2 SIADH  -Now resolving   -will fluid restriction 800cc now  -c/w salt tabs 2h TID   -Avoid overcorrection > 6-8 mEq in 24 hrs.  -Nephrology consulted, f/u recs.   -BMPs q 12H -Unclear etiology. Likely 2/2 SIADH  -Now resolving   -will c/w fluid restriction 800cc for now  -c/w salt tabs 2h TID   -Avoid overcorrection > 6-8 mEq in 24 hrs.  -Nephrology consulted, f/u recs.   -Monitor BMPs -Unclear etiology. Likely 2/2 SIADH  -will c/w fluid restriction 800cc for now  -c/w salt tabs TID   -Avoid overcorrection > 6-8 mEq in 24 hrs.  -Nephrology consulted, f/u recs.   -Monitor BMP daily

## 2019-11-19 NOTE — PROGRESS NOTE ADULT - PROBLEM SELECTOR PLAN 5
-On metformin at home. Will hold for now   -ISS and FSG qAC and qhs -On metformin at home. Will hold as inpatient  -ISS and FSG qAC and qhs  -A1c 5.8%

## 2019-11-19 NOTE — CHART NOTE - NSCHARTNOTEFT_GEN_A_CORE
Patient has stable dry gangrene to the foot. No acute podiatry surgical intervention needed at this time. Will follow up patient in outpt clinic, discharge details are in the paperwork.  discussed with Dr. Oliveros

## 2019-11-19 NOTE — PROGRESS NOTE ADULT - SUBJECTIVE AND OBJECTIVE BOX
Monika Masters MD  PGY 1 Department of Internal Medicine  Pager: [phone]    Patient is a 74y old  Male who presents with a chief complaint of Left foot gangrene, rule out osteomyelitis (18 Nov 2019 12:40)      SUBJECTIVE / OVERNIGHT EVENTS: Pt seen and examined. No acute overnight events.        MEDICATIONS  (STANDING):  amoxicillin  875 milliGRAM(s)/clavulanate 1 Tablet(s) Oral every 12 hours  atorvastatin 20 milliGRAM(s) Oral at bedtime  clopidogrel Tablet 75 milliGRAM(s) Oral daily  dextrose 5%. 1000 milliLiter(s) (50 mL/Hr) IV Continuous <Continuous>  dextrose 50% Injectable 12.5 Gram(s) IV Push once  dextrose 50% Injectable 25 Gram(s) IV Push once  dextrose 50% Injectable 25 Gram(s) IV Push once  digoxin     Tablet 0.125 milliGRAM(s) Oral daily  ferrous    sulfate 325 milliGRAM(s) Oral daily  gabapentin 100 milliGRAM(s) Oral two times a day  influenza   Vaccine 0.5 milliLiter(s) IntraMuscular once  insulin lispro (HumaLOG) corrective regimen sliding scale   SubCutaneous three times a day before meals  insulin lispro (HumaLOG) corrective regimen sliding scale   SubCutaneous at bedtime  metoprolol succinate  milliGRAM(s) Oral daily  multivitamin 1 Tablet(s) Oral daily  rivaroxaban 20 milliGRAM(s) Oral with dinner  sodium chloride 2 Gram(s) Oral three times a day    MEDICATIONS  (PRN):  acetaminophen    Suspension .. 650 milliGRAM(s) Oral every 6 hours PRN Mild Pain (1 - 3), Moderate Pain (4 - 6)  dextrose 40% Gel 15 Gram(s) Oral once PRN Blood Glucose LESS THAN 70 milliGRAM(s)/deciliter  glucagon  Injectable 1 milliGRAM(s) IntraMuscular once PRN Glucose LESS THAN 70 milligrams/deciliter      I&O's Summary    18 Nov 2019 07:01  -  19 Nov 2019 07:00  --------------------------------------------------------  IN: 730 mL / OUT: 850 mL / NET: -120 mL    19 Nov 2019 07:01  -  19 Nov 2019 08:50  --------------------------------------------------------  IN: 250 mL / OUT: 300 mL / NET: -50 mL        Vital Signs Last 24 Hrs  T(C): 36.8 (19 Nov 2019 05:59), Max: 36.9 (18 Nov 2019 21:40)  T(F): 98.2 (19 Nov 2019 05:59), Max: 98.4 (18 Nov 2019 21:40)  HR: 72 (19 Nov 2019 05:59) (72 - 81)  BP: 119/63 (19 Nov 2019 05:59) (119/63 - 127/62)  BP(mean): --  RR: 18 (19 Nov 2019 05:59) (18 - 18)  SpO2: 97% (19 Nov 2019 05:59) (97% - 99%)    CAPILLARY BLOOD GLUCOSE      POCT Blood Glucose.: 145 mg/dL (19 Nov 2019 08:49)  POCT Blood Glucose.: 174 mg/dL (18 Nov 2019 21:59)  POCT Blood Glucose.: 186 mg/dL (18 Nov 2019 18:10)  POCT Blood Glucose.: 209 mg/dL (18 Nov 2019 12:29)      PHYSICAL EXAM:             Constitutional: WDWN resting comfortably in bed; NAD. Cachectic appearing.  	Head: NC/AT  	Eyes: PERRL, EOMI, anicteric sclera  	Neck: Supple  	Respiratory: CTA B/L  	Cardiac: +S1/S2; RRR; no M/R/G. No peripheral edema b/l.  	Gastrointestinal: Soft, NT/ND; no rebound or guarding; +BSx4  	Extremities: + Cyanosis LE. +Palpable radial pulses b/l, LE pulses non palpable B/L  	Skin: + Dry gangrene L toe. L feet cold to touch w/ blue/ purple discoloration.  	Neurologic: Alert and oriented x3, no focal deficits appreciated       LABS:                        7.4    6.75  )-----------( 234      ( 19 Nov 2019 05:25 )             23.9     Auto Eosinophil # x     / Auto Eosinophil % x     / Auto Neutrophil # x     / Auto Neutrophil % x     / BANDS % x                            7.9    8.06  )-----------( 255      ( 18 Nov 2019 05:15 )             24.1     Auto Eosinophil # x     / Auto Eosinophil % x     / Auto Neutrophil # x     / Auto Neutrophil % x     / BANDS % x        11-19    129<L>  |  95<L>  |  11  ----------------------------<  145<H>  3.8   |  22  |  0.60  11-18    128<L>  |  95<L>  |  12  ----------------------------<  186<H>  3.7   |  24  |  0.67  11-18    126<L>  |  92<L>  |  12  ----------------------------<  139<H>  3.6   |  23  |  0.64    Ca    8.3<L>      19 Nov 2019 05:25                  RADIOLOGY & ADDITIONAL TESTS:    Imaging Personally Reviewed:    Consultant(s) Notes Reviewed:      Care Discussed with Consultants/Other Providers: Monika Masters MD  PGY 1 Department of Internal Medicine  Pager: [phone]    Patient is a 74y old  Male who presents with a chief complaint of Left foot gangrene, rule out osteomyelitis (18 Nov 2019 12:40)      SUBJECTIVE / OVERNIGHT EVENTS: Pt seen and examined. No acute overnight events. This AM endorses foot pain. Denies fevers, chills, N/V, CP, SOB, Constipation, diarrhea.         MEDICATIONS  (STANDING):  amoxicillin  875 milliGRAM(s)/clavulanate 1 Tablet(s) Oral every 12 hours  atorvastatin 20 milliGRAM(s) Oral at bedtime  clopidogrel Tablet 75 milliGRAM(s) Oral daily  dextrose 5%. 1000 milliLiter(s) (50 mL/Hr) IV Continuous <Continuous>  dextrose 50% Injectable 12.5 Gram(s) IV Push once  dextrose 50% Injectable 25 Gram(s) IV Push once  dextrose 50% Injectable 25 Gram(s) IV Push once  digoxin     Tablet 0.125 milliGRAM(s) Oral daily  ferrous    sulfate 325 milliGRAM(s) Oral daily  gabapentin 100 milliGRAM(s) Oral two times a day  influenza   Vaccine 0.5 milliLiter(s) IntraMuscular once  insulin lispro (HumaLOG) corrective regimen sliding scale   SubCutaneous three times a day before meals  insulin lispro (HumaLOG) corrective regimen sliding scale   SubCutaneous at bedtime  metoprolol succinate  milliGRAM(s) Oral daily  multivitamin 1 Tablet(s) Oral daily  rivaroxaban 20 milliGRAM(s) Oral with dinner  sodium chloride 2 Gram(s) Oral three times a day    MEDICATIONS  (PRN):  acetaminophen    Suspension .. 650 milliGRAM(s) Oral every 6 hours PRN Mild Pain (1 - 3), Moderate Pain (4 - 6)  dextrose 40% Gel 15 Gram(s) Oral once PRN Blood Glucose LESS THAN 70 milliGRAM(s)/deciliter  glucagon  Injectable 1 milliGRAM(s) IntraMuscular once PRN Glucose LESS THAN 70 milligrams/deciliter      I&O's Summary    18 Nov 2019 07:01  -  19 Nov 2019 07:00  --------------------------------------------------------  IN: 730 mL / OUT: 850 mL / NET: -120 mL    19 Nov 2019 07:01  -  19 Nov 2019 08:50  --------------------------------------------------------  IN: 250 mL / OUT: 300 mL / NET: -50 mL        Vital Signs Last 24 Hrs  T(C): 36.8 (19 Nov 2019 05:59), Max: 36.9 (18 Nov 2019 21:40)  T(F): 98.2 (19 Nov 2019 05:59), Max: 98.4 (18 Nov 2019 21:40)  HR: 72 (19 Nov 2019 05:59) (72 - 81)  BP: 119/63 (19 Nov 2019 05:59) (119/63 - 127/62)  BP(mean): --  RR: 18 (19 Nov 2019 05:59) (18 - 18)  SpO2: 97% (19 Nov 2019 05:59) (97% - 99%)    CAPILLARY BLOOD GLUCOSE      POCT Blood Glucose.: 145 mg/dL (19 Nov 2019 08:49)  POCT Blood Glucose.: 174 mg/dL (18 Nov 2019 21:59)  POCT Blood Glucose.: 186 mg/dL (18 Nov 2019 18:10)  POCT Blood Glucose.: 209 mg/dL (18 Nov 2019 12:29)      PHYSICAL EXAM:             Constitutional: WDWN resting comfortably in bed; NAD. Cachectic appearing.  	Head: NC/AT  	Eyes: PERRL, EOMI, anicteric sclera  	Neck: Supple  	Respiratory: CTA B/L  	Cardiac: +S1/S2; RRR; no M/R/G. No peripheral edema b/l.  	Gastrointestinal: Soft, NT/ND; no rebound or guarding; +BSx4  	Extremities: + Cyanosis LE. +Palpable radial pulses b/l, LE pulses non palpable B/L  	Skin: + Dry gangrene L toe. L feet cold to touch w/ blue/ purple discoloration.  	Neurologic: Alert and oriented x3, no focal deficits appreciated       LABS:                        7.4    6.75  )-----------( 234      ( 19 Nov 2019 05:25 )             23.9     Auto Eosinophil # x     / Auto Eosinophil % x     / Auto Neutrophil # x     / Auto Neutrophil % x     / BANDS % x                            7.9    8.06  )-----------( 255      ( 18 Nov 2019 05:15 )             24.1     Auto Eosinophil # x     / Auto Eosinophil % x     / Auto Neutrophil # x     / Auto Neutrophil % x     / BANDS % x        11-19    129<L>  |  95<L>  |  11  ----------------------------<  145<H>  3.8   |  22  |  0.60  11-18    128<L>  |  95<L>  |  12  ----------------------------<  186<H>  3.7   |  24  |  0.67  11-18    126<L>  |  92<L>  |  12  ----------------------------<  139<H>  3.6   |  23  |  0.64    Ca    8.3<L>      19 Nov 2019 05:25                  RADIOLOGY & ADDITIONAL TESTS:    Imaging Personally Reviewed:    Consultant(s) Notes Reviewed:      Care Discussed with Consultants/Other Providers:

## 2019-11-19 NOTE — PROGRESS NOTE ADULT - PROBLEM SELECTOR PLAN 1
Pt,. with  acute on chronic hypotonic hyponatremia in the setting of infection, urinary retention, poor oral intake. On lab review of [facility] serum sodium decrease ~ 130 since 2017. According to patient's daughter, sodium normally around 130s. Sodium 122 on admission on 11/11/19. Pt. received HTS 1.5% and 3% HTS. 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 Pt. continue fluid restriction to 800 cc/day and salt tabs 2 g TID. Monitor serum sodium.     Will discontinue follow up today. Please re-consult PRN. Pt,. with  acute on chronic hypotonic hyponatremia in the setting of infection, urinary retention and poor oral intake. SNa was low at 122 on admission (11/11/19),  received HTS. SNa improved to 129 today. 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 Continue fluid restriction (800 cc/day) and oral salt tablets (2 g TID). Monitor SNa.      Will discontinue follow-up. Reconsult as needed.

## 2019-11-19 NOTE — PROGRESS NOTE ADULT - PROBLEM SELECTOR PLAN 10
-DVT ppx: On xarelto Transitions of Care Status:  1.  Name of PCP: Dr. Ybarra  2.  PCP Contacted on Admission: [ ] Y    [X] N    3.  PCP contacted at Discharge: [ ] Y    [ ] N    [ ] N/A  4.  Post-Discharge Appointment Date and Location:  5.  Summary of Handoff given to PCP:

## 2019-11-20 LAB
ANION GAP SERPL CALC-SCNC: 14 MMO/L — SIGNIFICANT CHANGE UP (ref 7–14)
BLD GP AB SCN SERPL QL: NEGATIVE — SIGNIFICANT CHANGE UP
BUN SERPL-MCNC: 11 MG/DL — SIGNIFICANT CHANGE UP (ref 7–23)
CALCIUM SERPL-MCNC: 8.5 MG/DL — SIGNIFICANT CHANGE UP (ref 8.4–10.5)
CHLORIDE SERPL-SCNC: 96 MMOL/L — LOW (ref 98–107)
CO2 SERPL-SCNC: 22 MMOL/L — SIGNIFICANT CHANGE UP (ref 22–31)
CREAT SERPL-MCNC: 0.56 MG/DL — SIGNIFICANT CHANGE UP (ref 0.5–1.3)
GLUCOSE BLDC GLUCOMTR-MCNC: 146 MG/DL — HIGH (ref 70–99)
GLUCOSE BLDC GLUCOMTR-MCNC: 152 MG/DL — HIGH (ref 70–99)
GLUCOSE BLDC GLUCOMTR-MCNC: 170 MG/DL — HIGH (ref 70–99)
GLUCOSE BLDC GLUCOMTR-MCNC: 179 MG/DL — HIGH (ref 70–99)
GLUCOSE SERPL-MCNC: 165 MG/DL — HIGH (ref 70–99)
HCT VFR BLD CALC: 24.6 % — LOW (ref 39–50)
HGB BLD-MCNC: 7.7 G/DL — LOW (ref 13–17)
MAGNESIUM SERPL-MCNC: 1.4 MG/DL — LOW (ref 1.6–2.6)
MCHC RBC-ENTMCNC: 24.5 PG — LOW (ref 27–34)
MCHC RBC-ENTMCNC: 31.3 % — LOW (ref 32–36)
MCV RBC AUTO: 78.3 FL — LOW (ref 80–100)
NRBC # FLD: 0 K/UL — SIGNIFICANT CHANGE UP (ref 0–0)
PHOSPHATE SERPL-MCNC: 2.3 MG/DL — LOW (ref 2.5–4.5)
PLATELET # BLD AUTO: 242 K/UL — SIGNIFICANT CHANGE UP (ref 150–400)
PMV BLD: 9.7 FL — SIGNIFICANT CHANGE UP (ref 7–13)
POTASSIUM SERPL-MCNC: 3.6 MMOL/L — SIGNIFICANT CHANGE UP (ref 3.5–5.3)
POTASSIUM SERPL-SCNC: 3.6 MMOL/L — SIGNIFICANT CHANGE UP (ref 3.5–5.3)
RBC # BLD: 3.14 M/UL — LOW (ref 4.2–5.8)
RBC # FLD: 15.4 % — HIGH (ref 10.3–14.5)
RH IG SCN BLD-IMP: POSITIVE — SIGNIFICANT CHANGE UP
SODIUM SERPL-SCNC: 132 MMOL/L — LOW (ref 135–145)
WBC # BLD: 6.69 K/UL — SIGNIFICANT CHANGE UP (ref 3.8–10.5)
WBC # FLD AUTO: 6.69 K/UL — SIGNIFICANT CHANGE UP (ref 3.8–10.5)

## 2019-11-20 PROCEDURE — 99231 SBSQ HOSP IP/OBS SF/LOW 25: CPT

## 2019-11-20 PROCEDURE — 99233 SBSQ HOSP IP/OBS HIGH 50: CPT | Mod: GC

## 2019-11-20 RX ORDER — MAGNESIUM SULFATE 500 MG/ML
1 VIAL (ML) INJECTION ONCE
Refills: 0 | Status: COMPLETED | OUTPATIENT
Start: 2019-11-20 | End: 2019-11-20

## 2019-11-20 RX ORDER — SODIUM CHLORIDE 9 MG/ML
2 INJECTION INTRAMUSCULAR; INTRAVENOUS; SUBCUTANEOUS
Refills: 0 | Status: DISCONTINUED | OUTPATIENT
Start: 2019-11-20 | End: 2019-11-21

## 2019-11-20 RX ORDER — POTASSIUM PHOSPHATE, MONOBASIC POTASSIUM PHOSPHATE, DIBASIC 236; 224 MG/ML; MG/ML
15 INJECTION, SOLUTION INTRAVENOUS ONCE
Refills: 0 | Status: COMPLETED | OUTPATIENT
Start: 2019-11-20 | End: 2019-11-20

## 2019-11-20 RX ADMIN — Medication 1 TABLET(S): at 11:05

## 2019-11-20 RX ADMIN — Medication 0.12 MILLIGRAM(S): at 05:35

## 2019-11-20 RX ADMIN — GABAPENTIN 100 MILLIGRAM(S): 400 CAPSULE ORAL at 05:34

## 2019-11-20 RX ADMIN — Medication 1 TABLET(S): at 05:34

## 2019-11-20 RX ADMIN — RIVAROXABAN 20 MILLIGRAM(S): KIT at 17:13

## 2019-11-20 RX ADMIN — CLOPIDOGREL BISULFATE 75 MILLIGRAM(S): 75 TABLET, FILM COATED ORAL at 11:05

## 2019-11-20 RX ADMIN — POTASSIUM PHOSPHATE, MONOBASIC POTASSIUM PHOSPHATE, DIBASIC 62.5 MILLIMOLE(S): 236; 224 INJECTION, SOLUTION INTRAVENOUS at 08:41

## 2019-11-20 RX ADMIN — Medication 1: at 18:20

## 2019-11-20 RX ADMIN — SODIUM CHLORIDE 2 GRAM(S): 9 INJECTION INTRAMUSCULAR; INTRAVENOUS; SUBCUTANEOUS at 05:34

## 2019-11-20 RX ADMIN — Medication 1: at 08:55

## 2019-11-20 RX ADMIN — Medication 1 TABLET(S): at 17:13

## 2019-11-20 RX ADMIN — ATORVASTATIN CALCIUM 20 MILLIGRAM(S): 80 TABLET, FILM COATED ORAL at 21:28

## 2019-11-20 RX ADMIN — Medication 100 GRAM(S): at 07:19

## 2019-11-20 RX ADMIN — SODIUM CHLORIDE 2 GRAM(S): 9 INJECTION INTRAMUSCULAR; INTRAVENOUS; SUBCUTANEOUS at 17:13

## 2019-11-20 RX ADMIN — Medication 150 MILLIGRAM(S): at 05:34

## 2019-11-20 RX ADMIN — GABAPENTIN 100 MILLIGRAM(S): 400 CAPSULE ORAL at 17:13

## 2019-11-20 RX ADMIN — Medication 325 MILLIGRAM(S): at 11:05

## 2019-11-20 NOTE — PROGRESS NOTE ADULT - SUBJECTIVE AND OBJECTIVE BOX
Follow Up:  gangrene    Interval History/ROS: no new complaints undergoing skin assessment earlier today    Allergies  No Known Allergies    ANTIMICROBIALS:  amoxicillin  875 milliGRAM(s)/clavulanate 1 every 12 hours    OTHER MEDS:  MEDICATIONS  (STANDING):  acetaminophen    Suspension .. 650 every 6 hours PRN  atorvastatin 20 at bedtime  clopidogrel Tablet 75 daily  dextrose 40% Gel 15 once PRN  dextrose 50% Injectable 12.5 once  dextrose 50% Injectable 25 once  dextrose 50% Injectable 25 once  digoxin     Tablet 0.125 daily  gabapentin 100 two times a day  glucagon  Injectable 1 once PRN  influenza   Vaccine 0.5 once  insulin lispro (HumaLOG) corrective regimen sliding scale  three times a day before meals  insulin lispro (HumaLOG) corrective regimen sliding scale  at bedtime  metoprolol succinate  daily  rivaroxaban 20 with dinner      Vital Signs Last 24 Hrs  T(C): 36.7 (20 Nov 2019 13:08), Max: 36.7 (19 Nov 2019 21:55)  T(F): 98.1 (20 Nov 2019 13:08), Max: 98.1 (19 Nov 2019 21:55)  HR: 83 (20 Nov 2019 13:08) (75 - 84)  BP: 153/65 (20 Nov 2019 13:08) (124/59 - 153/65)  BP(mean): --  RR: 18 (20 Nov 2019 13:08) (18 - 18)  SpO2: 97% (20 Nov 2019 13:08) (95% - 100%)    PHYSICAL EXAM:  General: chronically ill appearing  NAD, Non-toxic  Neurology: A&Ox3, nonfocal  Respiratory: no resp distress  Abdominal: , non-distended,  Extremities: dressing in place  Line Sites: Clear  Skin: No rash, non infected buttock wounds                        7.7    6.69  )-----------( 242      ( 20 Nov 2019 05:40 )             24.6   WBC Count: 6.69 (11-20 @ 05:40)  WBC Count: 6.75 (11-19 @ 05:25)  WBC Count: 8.06 (11-18 @ 05:15)  WBC Count: 8.12 (11-16 @ 06:30)        11-20    132<L>  |  96<L>  |  11  ----------------------------<  165<H>  3.6   |  22  |  0.56    Ca    8.5      20 Nov 2019 05:40  Phos  2.3     11-20  Mg     1.4     11-20    MICROBIOLOGY:  URINE MIDSTREAM  11-12-19 --  --  --      OTHER  11-11-19   MANY  STCN^Staphylococcus sp.,coag neg  --  --      BLOOD PERIPHERAL  11-11-19 --  --  --        RADIOLOGY:    Jeff Duggan MD; Division of Infectious Disease; Pager: [phone]; nights and weekends: [phone]

## 2019-11-20 NOTE — ADVANCED PRACTICE NURSE CONSULT - RECOMMEDATIONS
Recommend [facility] Wound MD, Dr. Beltran, for assessment and evaluation of sacrococcygeal and left lateral buttock injuries. Contact number: [phone].     Recommend follow up care at [facility] ([phone], [address] [place] [address]).     Topical Recommendations:     Left lateral knee and left lateral buttock- apply liquid barrier film daily. Monitor for tissue type changes.     Sacrococcygeum- Cleanse with skin cleanser, pat dry. Apply [employer] moisture barrier ointment twice a day and PRN with incontinent episodes.     Left foot- continue recommendations as per Podiatry team. ([place] with betadine daily, DSD).     Continue low air loss bed therapy, continue or initiate seat cushion, heel elevation with heel offloading boots, turn & reposition q2h with Z-flow fluidized pillow, continue moisture management with barrier creams as specified above & single breathable pad, continue measures to decrease friction/shear.   Plan discussed with patient. Patient educated on topical wound therapy and on dietary recommendations (high protein, low sugar diet) to optimize wound healing. Questions answered.     Please contact Wound Care Service Line if we can be of further assistance (ext [phone]). Recommend [facility] Wound MD, Dr. Beltran, for assessment and evaluation of sacrococcygeal and left lateral buttock injuries. Contact number: [phone].     Recommend follow up care at [facility] ([phone], [address] [place] [address]).     Topical Recommendations:     Left lateral knee and left lateral buttock- apply liquid barrier film daily. Monitor for tissue type changes.     Sacrococcygeum- Cleanse with skin cleanser, pat dry. Apply [employer] moisture barrier ointment twice a day and PRN with incontinent episodes.     Left foot- continue recommendations as per Podiatry team. ([place] with betadine daily, DSD).     Continue low air loss bed therapy, continue or initiate seat cushion, heel elevation with heel offloading boots, turn & reposition q2h with Z-flow fluidized pillow, continue moisture management with barrier creams as specified above & single breathable pad, continue measures to decrease friction/shear, continue dietary recommendations.    Plan discussed with patient. Patient educated on topical wound therapy and on dietary recommendations (high protein, low sugar diet) to optimize wound healing. Questions answered.     Please contact Wound Care Service Line if we can be of further assistance (ext [phone]). Topical Recommendations:     Left lateral knee and left lateral buttock- apply liquid barrier film daily. Monitor for tissue type changes.     Sacrococcygeum- Cleanse with skin cleanser, pat dry. Apply [employer] moisture barrier ointment twice a day and PRN with incontinent episodes.     Left foot- continue recommendations as per Podiatry team. ([place] with betadine daily, DSD).     Continue low air loss bed therapy, continue or initiate seat cushion, heel elevation with heel offloading boots, turn & reposition q2h with Z-flow fluidized pillow, continue moisture management with barrier creams as specified above & single breathable pad, continue measures to decrease friction/shear, continue dietary recommendations.    Plan discussed with patient. Patient educated on topical wound therapy and on dietary recommendations (high protein, low sugar diet) to optimize wound healing. Questions answered.     Please contact Wound Care Service Line if we can be of further assistance (ext [phone]).

## 2019-11-20 NOTE — PROGRESS NOTE ADULT - ATTENDING COMMENTS
Patient to be discharged tomorrow with home hospice services. Awaiting delivery of hospice equipment and hospital bed. Per patient's wife KENNEDY Mckeon form filled out earlier this year. She will try to find it at home and bring it in tomorrow

## 2019-11-20 NOTE — PROGRESS NOTE ADULT - PROBLEM SELECTOR PLAN 3
-s/p L bypass and stent to the L iliac, now on ASA and Plavix  -FAB done, see above  -No surgical intervention per vasc recs

## 2019-11-20 NOTE — PROGRESS NOTE ADULT - SUBJECTIVE AND OBJECTIVE BOX
Monika Masters MD  PGY 1 Department of Internal Medicine  Pager: [phone]    Patient is a 74y old  Male who presents with a chief complaint of Left foot gangrene, rule out osteomyelitis (19 Nov 2019 12:20)      SUBJECTIVE / OVERNIGHT EVENTS: Pt seen and examined. No acute overnight events. Denies fevers, chills, CP, SOB, Abdominal pain, N/V, Constipation, Diarrhea        MEDICATIONS  (STANDING):  amoxicillin  875 milliGRAM(s)/clavulanate 1 Tablet(s) Oral every 12 hours  atorvastatin 20 milliGRAM(s) Oral at bedtime  clopidogrel Tablet 75 milliGRAM(s) Oral daily  dextrose 5%. 1000 milliLiter(s) (50 mL/Hr) IV Continuous <Continuous>  dextrose 50% Injectable 12.5 Gram(s) IV Push once  dextrose 50% Injectable 25 Gram(s) IV Push once  dextrose 50% Injectable 25 Gram(s) IV Push once  digoxin     Tablet 0.125 milliGRAM(s) Oral daily  ferrous    sulfate 325 milliGRAM(s) Oral daily  gabapentin 100 milliGRAM(s) Oral two times a day  influenza   Vaccine 0.5 milliLiter(s) IntraMuscular once  insulin lispro (HumaLOG) corrective regimen sliding scale   SubCutaneous three times a day before meals  insulin lispro (HumaLOG) corrective regimen sliding scale   SubCutaneous at bedtime  metoprolol succinate  milliGRAM(s) Oral daily  multivitamin 1 Tablet(s) Oral daily  rivaroxaban 20 milliGRAM(s) Oral with dinner  sodium chloride 2 Gram(s) Oral three times a day    MEDICATIONS  (PRN):  acetaminophen    Suspension .. 650 milliGRAM(s) Oral every 6 hours PRN Mild Pain (1 - 3), Moderate Pain (4 - 6)  dextrose 40% Gel 15 Gram(s) Oral once PRN Blood Glucose LESS THAN 70 milliGRAM(s)/deciliter  glucagon  Injectable 1 milliGRAM(s) IntraMuscular once PRN Glucose LESS THAN 70 milligrams/deciliter      I&O's Summary    18 Nov 2019 07:01  -  19 Nov 2019 07:00  --------------------------------------------------------  IN: 730 mL / OUT: 850 mL / NET: -120 mL    19 Nov 2019 07:01  -  20 Nov 2019 06:44  --------------------------------------------------------  IN: 600 mL / OUT: 825 mL / NET: -225 mL        Vital Signs Last 24 Hrs  T(C): 36.4 (20 Nov 2019 05:33), Max: 36.7 (19 Nov 2019 14:08)  T(F): 97.6 (20 Nov 2019 05:33), Max: 98.1 (19 Nov 2019 14:08)  HR: 84 (20 Nov 2019 05:33) (58 - 84)  BP: 138/65 (20 Nov 2019 05:33) (101/51 - 138/65)  BP(mean): --  RR: 18 (20 Nov 2019 05:33) (16 - 18)  SpO2: 95% (20 Nov 2019 05:33) (95% - 100%)    CAPILLARY BLOOD GLUCOSE      POCT Blood Glucose.: 225 mg/dL (19 Nov 2019 22:30)  POCT Blood Glucose.: 230 mg/dL (19 Nov 2019 17:43)  POCT Blood Glucose.: 189 mg/dL (19 Nov 2019 12:26)  POCT Blood Glucose.: 145 mg/dL (19 Nov 2019 08:49)      PHYSICAL EXAM:  Constitutional: WDWN resting comfortably in bed; NAD. Cachectic appearing.  	Head: NC/AT  	Eyes: PERRL, EOMI, anicteric sclera  	Neck: Supple  	Respiratory: CTA B/L  	Cardiac: +S1/S2; RRR; no M/R/G. No peripheral edema b/l.  	Gastrointestinal: Soft, NT/ND; no rebound or guarding; +BSx4  	Extremities: + Cyanosis LE. +Palpable radial pulses b/l, LE pulses non palpable B/L  	Skin: + Dry gangrene L toe. L feet cold to touch w/ blue/ purple discoloration.  	Neurologic: Alert and oriented x3, no focal deficits appreciated    LABS:                        7.7    6.69  )-----------( 242      ( 20 Nov 2019 05:40 )             24.6     Auto Eosinophil # x     / Auto Eosinophil % x     / Auto Neutrophil # x     / Auto Neutrophil % x     / BANDS % x                            7.4    6.75  )-----------( 234      ( 19 Nov 2019 05:25 )             23.9     Auto Eosinophil # x     / Auto Eosinophil % x     / Auto Neutrophil # x     / Auto Neutrophil % x     / BANDS % x        11-20    132<L>  |  96<L>  |  11  ----------------------------<  165<H>  3.6   |  22  |  0.56  11-19    129<L>  |  95<L>  |  11  ----------------------------<  145<H>  3.8   |  22  |  0.60  11-18    128<L>  |  95<L>  |  12  ----------------------------<  186<H>  3.7   |  24  |  0.67    Ca    8.5      20 Nov 2019 05:40  Mg     1.4     11-20  Phos  2.3     11-20                  RADIOLOGY & ADDITIONAL TESTS:    Imaging Personally Reviewed:    Consultant(s) Notes Reviewed:      Care Discussed with Consultants/Other Providers: Monika Masters MD  PGY 1 Department of Internal Medicine  Pager: [phone]    Patient is a 74y old  Male who presents with a chief complaint of Left foot gangrene, rule out osteomyelitis (19 Nov 2019 12:20)      SUBJECTIVE / OVERNIGHT EVENTS: Pt seen and examined. No acute overnight events. Endorses foot pain but states pain is improving. Denies fevers, chills, CP, SOB, Abdominal pain, N/V, Constipation, Diarrhea        MEDICATIONS  (STANDING):  amoxicillin  875 milliGRAM(s)/clavulanate 1 Tablet(s) Oral every 12 hours  atorvastatin 20 milliGRAM(s) Oral at bedtime  clopidogrel Tablet 75 milliGRAM(s) Oral daily  dextrose 5%. 1000 milliLiter(s) (50 mL/Hr) IV Continuous <Continuous>  dextrose 50% Injectable 12.5 Gram(s) IV Push once  dextrose 50% Injectable 25 Gram(s) IV Push once  dextrose 50% Injectable 25 Gram(s) IV Push once  digoxin     Tablet 0.125 milliGRAM(s) Oral daily  ferrous    sulfate 325 milliGRAM(s) Oral daily  gabapentin 100 milliGRAM(s) Oral two times a day  influenza   Vaccine 0.5 milliLiter(s) IntraMuscular once  insulin lispro (HumaLOG) corrective regimen sliding scale   SubCutaneous three times a day before meals  insulin lispro (HumaLOG) corrective regimen sliding scale   SubCutaneous at bedtime  metoprolol succinate  milliGRAM(s) Oral daily  multivitamin 1 Tablet(s) Oral daily  rivaroxaban 20 milliGRAM(s) Oral with dinner  sodium chloride 2 Gram(s) Oral three times a day    MEDICATIONS  (PRN):  acetaminophen    Suspension .. 650 milliGRAM(s) Oral every 6 hours PRN Mild Pain (1 - 3), Moderate Pain (4 - 6)  dextrose 40% Gel 15 Gram(s) Oral once PRN Blood Glucose LESS THAN 70 milliGRAM(s)/deciliter  glucagon  Injectable 1 milliGRAM(s) IntraMuscular once PRN Glucose LESS THAN 70 milligrams/deciliter      I&O's Summary    18 Nov 2019 07:01  -  19 Nov 2019 07:00  --------------------------------------------------------  IN: 730 mL / OUT: 850 mL / NET: -120 mL    19 Nov 2019 07:01  -  20 Nov 2019 06:44  --------------------------------------------------------  IN: 600 mL / OUT: 825 mL / NET: -225 mL        Vital Signs Last 24 Hrs  T(C): 36.4 (20 Nov 2019 05:33), Max: 36.7 (19 Nov 2019 14:08)  T(F): 97.6 (20 Nov 2019 05:33), Max: 98.1 (19 Nov 2019 14:08)  HR: 84 (20 Nov 2019 05:33) (58 - 84)  BP: 138/65 (20 Nov 2019 05:33) (101/51 - 138/65)  BP(mean): --  RR: 18 (20 Nov 2019 05:33) (16 - 18)  SpO2: 95% (20 Nov 2019 05:33) (95% - 100%)    CAPILLARY BLOOD GLUCOSE      POCT Blood Glucose.: 225 mg/dL (19 Nov 2019 22:30)  POCT Blood Glucose.: 230 mg/dL (19 Nov 2019 17:43)  POCT Blood Glucose.: 189 mg/dL (19 Nov 2019 12:26)  POCT Blood Glucose.: 145 mg/dL (19 Nov 2019 08:49)      PHYSICAL EXAM:  Constitutional: WDWN resting comfortably in bed; NAD. Cachectic appearing.  	Head: NC/AT  	Eyes: PERRL, EOMI, anicteric sclera  	Neck: Supple  	Respiratory: CTA B/L  	Cardiac: +S1/S2; RRR; no M/R/G. No peripheral edema b/l.  	Gastrointestinal: Soft, NT/ND; no rebound or guarding; +BSx4  	Extremities: + Cyanosis LE. +Palpable radial pulses b/l, LE pulses non palpable B/L  	Skin: + Dry gangrene L toe. L feet cold to touch w/ blue/ purple discoloration.  	Neurologic: Alert and oriented x3, no focal deficits appreciated    LABS:                        7.7    6.69  )-----------( 242      ( 20 Nov 2019 05:40 )             24.6     Auto Eosinophil # x     / Auto Eosinophil % x     / Auto Neutrophil # x     / Auto Neutrophil % x     / BANDS % x                            7.4    6.75  )-----------( 234      ( 19 Nov 2019 05:25 )             23.9     Auto Eosinophil # x     / Auto Eosinophil % x     / Auto Neutrophil # x     / Auto Neutrophil % x     / BANDS % x        11-20    132<L>  |  96<L>  |  11  ----------------------------<  165<H>  3.6   |  22  |  0.56  11-19    129<L>  |  95<L>  |  11  ----------------------------<  145<H>  3.8   |  22  |  0.60  11-18    128<L>  |  95<L>  |  12  ----------------------------<  186<H>  3.7   |  24  |  0.67    Ca    8.5      20 Nov 2019 05:40  Mg     1.4     11-20  Phos  2.3     11-20                  RADIOLOGY & ADDITIONAL TESTS:    Imaging Personally Reviewed:    Consultant(s) Notes Reviewed:      Care Discussed with Consultants/Other Providers:

## 2019-11-20 NOTE — PROGRESS NOTE ADULT - PROBLEM SELECTOR PLAN 2
-Unclear etiology. Likely 2/2 SIADH  -will c/w fluid restriction 800cc for now  -c/w salt tabs TID   -Avoid overcorrection > 6-8 mEq in 24 hrs.  -Nephrology consulted, f/u recs.   -Monitor BMP daily -Unclear etiology. Likely 2/2 SIADH  -will c/w fluid restriction 800cc for now  -c/w salt tabs TID . Will likely decrease to BID as Na back to baseline   -Avoid overcorrection > 6-8 mEq in 24 hrs.  -Nephrology consulted, f/u recs.   -Monitor BMP daily

## 2019-11-20 NOTE — ADVANCED PRACTICE NURSE CONSULT - ASSESSMENT
A&Ox4, bedbound, indwelling urinary catheter, incontinent of stool (Perineal care provided for large episode of fecal incontinence). Skin warm, dry with increased moisture in intertriginous folds, scattered areas of hyperpigmentation and hypopigmentation to right knee and left flank, surgical scar noted to bilateral inner thighs, scattered areas of ecchymosis on bilateral upper extremities. (+)1 edema on bilateral upper extremities. Deformation to left ear. Blanchable erythema on right heel.     Left lateral knee- Difficult differential diagnosis- arterial insufficiency vs pressure- measuring 1.5cmx1.0ity2rh presenting as 100% nonblanching erythema. No tissue changes noted. No drainage. Periwound skin intact. No induration, no erythema, no increased warmth. Goals of care: monitor for tissue type changes, continue to offload.     Left trochanter/left lateral buttock- difficult differential diagnosis vascular insufficiency vs pressure- measuring 2.0cmx3.2xes4lj. Area presenting as 100% deep purple maroon discoloration. (+) well demarcated. (+) Tissue type changes upon palpation. No drainage. Periwound skin with blanching erythema circumferentially ranging from 3cm-6cm with 6cm at 11 oclock. No induration, no erythema, no increased warmth. Goals of care: Monitor for tissue type changes, continue to offload.      Sacrum A&Ox4, bedbound, indwelling urinary catheter, incontinent of stool (Perineal care provided for large episode of fecal incontinence). Prominent bony prominences. Skin warm, dry with increased moisture in intertriginous folds, scattered areas of hyperpigmentation and hypopigmentation to right knee and left flank, surgical scar noted to bilateral inner thighs, scattered areas of ecchymosis on bilateral upper extremities. (+)1 edema on bilateral upper extremities. Deformation to left ear. Blanchable erythema on right heel.     Left lateral knee- Difficult differential diagnosis- arterial insufficiency vs pressure- measuring 1.5cmx1.4efu9pj presenting as 100% nonblanching erythema. No tissue changes noted. No drainage. Periwound skin intact. No induration, no erythema, no increased warmth. Goals of care: monitor for tissue type changes, continue to offload.     Left lateral buttock- difficult differential diagnosis vascular insufficiency vs pressure- measuring 2.0cmx3.3cto7ui. Area presenting as 100% deep purple maroon discoloration. (+) well demarcated. (+) Tissue type changes upon palpation. No drainage. Periwound skin with blanching erythema circumferentially ranging from 3cm-6cm with 6cm at 11 oclock. No induration, no increased warmth. Goals of care: Monitor for tissue type changes, continue to offload.      Sacrococcygeum- Deep tissue pressure injury complicated by incontinence associated dermatitis- measuring 2.5cmx1.7ycw9lq. Tissue type presenting as 100% deep darkened discoloration. No tissue type changes. Regular borders. No drainage. Periwound skin with blanching erythema extending to bilateral buttocks consistent with incontinence associated dermatitis. Hypopigmentation and scar tissue noted at 6oclock, possible site of previous skin impairment. Goals of care:  Monitor for tissue type changes, protect from fecal incontinence, continue to offload.     Vascular: Bilateral lower extremities with Dry, flaky skin. Thickened fungal great toenails. Multiple wounds present. No temperature changes noted. No Edema. Capillary refill >3 seconds. Nonpalpable DP/PT pulses, with absent doppler sounds.    Left foot with multiple arterial changes:  Left distal tip of great toe, fourth and fifth toe- Dry stable Gangrene  Areas of purple maroon discoloration:  Left Medial great metatarsal base measuring 1.2bot0pyh0hb.  Left medial lateral malleolus measuring 1.6jwn8sqj8mg.   Left heel measuring 6.1oyw6hlb9az.   All with regular borders. Well demarcated. No drainage. Periwound skin pale but intact. No induration, no erythema, no increased warmth. Goals of care: Monitor for tissue type changes. A&Ox4, bedbound, indwelling urinary catheter, incontinent of stool (Perineal care provided for large episode of fecal incontinence). Prominent bony prominences. Skin warm, dry with increased moisture in intertriginous folds, scattered areas of hyperpigmentation and hypopigmentation to right knee and left flank, surgical scar noted to bilateral inner thighs, scattered areas of ecchymosis on bilateral upper extremities. (+)1 edema on bilateral upper extremities. Deformation to left ear. Blanchable erythema on right heel.     Left lateral knee- Difficult differential diagnosis- arterial insufficiency vs pressure- measuring 1.5cmx1.4xqy8qm presenting as 100% nonblanching erythema. No tissue changes noted. No drainage. Periwound skin intact. No induration, no erythema, no increased warmth. Goals of care: monitor for tissue type changes, continue to offload.     Left lateral buttock- difficult differential diagnosis vascular insufficiency vs pressure- measuring 2.0cmx3.6xld2ow. Area presenting as 100% deep purple maroon discoloration. (+) well demarcated. (+) Tissue type changes upon palpation. No drainage. Periwound skin with blanching erythema circumferentially ranging from 3cm-6cm with 6cm at 11 oclock. No induration, no increased warmth. Goals of care: Monitor for tissue type changes, continue to offload.      Sacrococcygeum- Deep tissue pressure injury complicated by incontinence associated dermatitis- measuring 2.5cmx1.6hzp2po. Tissue type presenting as 100% deep darkened discoloration. No tissue type changes. Regular borders. No drainage. Periwound skin with blanching erythema extending to bilateral buttocks consistent with incontinence associated dermatitis. Hypopigmentation and scar tissue noted at 6oclock, possible site of previous skin impairment. Goals of care:  Monitor for tissue type changes, protect from fecal incontinence, continue to offload.     Vascular: Bilateral lower extremities with Dry, flaky skin. Thickened fungal great toenails. Multiple wounds present. No temperature changes noted. No Edema. Capillary refill >3 seconds. Nonpalpable DP/PT pulses, with absent doppler sounds.    Left foot with multiple arterial changes:  Left distal tip of great toe, fourth and fifth toe- Dry stable Gangrene  Areas of purple maroon discoloration:  Left Medial great metatarsal base measuring 1.3shu6jet9xx.  Left medial lateral malleolus measuring 1.3zwj0oee9bw.   Left heel measuring 6.3rzs2ytz9sq.   All with regular borders. Well demarcated. No drainage. Periwound skin pale but intact. No induration, no erythema, no increased warmth. Goals of care: Monitor for tissue type changes.     of note: Goals of care is Maintenance- wounds are not expected to improve.  Patient to be discharged to home hospice.

## 2019-11-20 NOTE — PROGRESS NOTE ADULT - ASSESSMENT
75 YO M with MHx of HTN, hyperlipidemia, afib on xarelto, AICD (placed in 2014), CAD s/p PCI on plavix, PAD s/p L bypass and L iliac stent, DM2 c/b neuropathy admitted 11/11/19 with gangrenous changes left foot    Antibiotics  Vanco 11/11, 11/12-->13; 11/16  Cefepime 11/11  Zosyn 11/12 ---> --->11/18  Augmentin 11/18 -->      Suggest  Augmentin 500 mg twice daily through 11/25  continue wound care      for home hospice

## 2019-11-20 NOTE — PROGRESS NOTE ADULT - PROBLEM SELECTOR PLAN 1
-Pt with gangrene on the left foot with lesions on the tip of L 1st and 5th digit and stage 2 ulcer on heel  -ESR and CRP elevated, and mild leukocytosis  - Xray L foot: Diffuse soft tissue swelling with cutaneous ulceration over the distal phalynx of the left first digit and subjacent cortical erosion of the first distal phalynx.  -s/p vanc and zosyn (11/12-11/18)  -started on PO Augmentin BID per ID recs (11/18-11/25)  -Blood cx NGTD, wound cx sig for coag neg staph.   -F/u podiatry recs  -No surgical intervention per [facility] recs

## 2019-11-20 NOTE — ADVANCED PRACTICE NURSE CONSULT - REASON FOR CONSULT
Patient seen on skin care rounds after wound care referral received for assessment of skin impairment and recommendations of topical management. Chart reviewed: WBC 6.69 (as high as 11.27 on admission). Hemoglobin/Hematocrit 7.7/24.6 (as low as 7.4/23.9), HgA1C 5.8%, C-reactive protein 85.3, INR 2.59, Serum albumin 2.7g/dL, Blood sugars ranging from 208-165, Mitch ranging from 13-14. CTA of bilateral lower extremities (+) severe stenosis of left external iliac, femoral and popliteal arteries and Left knee with extensive vascular calcification and Right femoral and popliteal occlusion and right below the knee with extensive vascular calcification. VA duplex with right FAB 0.90 and left FAB with absent wave lengths. Patient interviewed. Patient lives at home with wife, denying any HHA services. Patient H/O of HTN, hyperlipidemia, afib on xarelto, AICD (placed in 2014), CAD s/p PCI on plavix, PAD s/p L bypass and L iliac stent, DM2 c/b neuropathy, sent to ED by his podiatrist for left foot gangrene. Pt states the the infection started 2 weeks ago and was being monitored by the podiatrist. Pt went was seen by a doctor 4 days pta and was started on bactrim, then was seen 11/11/19 by the podiatrist (Dr. Alex Leos ) who sent him to the hospital for IV abx. Pt states that he has long standing diabetic neuropathy and does not complain of pain at the site of gangrene. Pt does endorse decreased PO intake but denies fever, chills, nausea, vomiting, or abdominal pain. Patient seen and followed by Podiatry and Vascular for left foot gangrene- not a surgical candidate at this time, Nephrology for hyponatremia and Infectious disease for gangrene. Patient seen on skin care rounds after wound care referral received for assessment of skin impairment and recommendations of topical management. Chart reviewed: WBC 6.69 (as high as 11.27 on admission). Hemoglobin/Hematocrit 7.7/24.6 (as low as 7.4/23.9), HgA1C 5.8%, C-reactive protein 85.3, INR 2.59, Serum albumin 2.7g/dL, Blood sugars ranging from 208-165, BMI 16.9kg/m2, Mitch ranging from 13-14. CTA of bilateral lower extremities (+) severe stenosis of left external iliac, femoral and popliteal arteries and Left knee with extensive vascular calcification and Right femoral and popliteal occlusion and right below the knee with extensive vascular calcification. VA duplex with right FAB 0.90 and left FAB with absent wave lengths. Patient interviewed. Patient lives at home with wife, denying any HHA services. Patient H/O of HTN, hyperlipidemia, afib on xarelto, AICD (placed in 2014), CAD s/p PCI on plavix, PAD s/p L bypass and L iliac stent, DM2 c/b neuropathy, sent to ED by his podiatrist for left foot gangrene. Pt states the the infection started 2 weeks ago and was being monitored by the podiatrist. Pt went was seen by a doctor 4 days pta and was started on bactrim, then was seen 11/11/19 by the podiatrist (Dr. Alex Leos ) who sent him to the hospital for IV abx. Pt states that he has long standing diabetic neuropathy and does not complain of pain at the site of gangrene. Pt does endorse decreased PO intake but denies fever, chills, nausea, vomiting, or abdominal pain. Patient seen and followed by Podiatry and Vascular for left foot gangrene- not a surgical candidate at this time, Nephrology for hyponatremia and Infectious disease for gangrene. Patient being followed by nutrition services for severe malnutrition. Patient seen on skin care rounds after wound care referral received for assessment of skin impairment and recommendations of topical management. Chart reviewed: WBC 6.69 (as high as 11.27 on admission). Hemoglobin/Hematocrit 7.7/24.6 (as low as 7.4/23.9), HgA1C 5.8%, C-reactive protein 85.3, INR 2.59, Serum albumin 2.7g/dL, persistent hyponatremia; Na ranging from 119-132, Blood sugars ranging from 208-165, BMI 16.9kg/m2, Mitch ranging from 13-14. CTA of bilateral lower extremities (+) severe stenosis of left external iliac, femoral and popliteal arteries and Left knee with extensive vascular calcification and Right femoral and popliteal occlusion and right below the knee with extensive vascular calcification. VA duplex with right FAB 0.90 and left FAB with absent wave lengths. Patient interviewed. Patient lives at home with wife, denying any HHA services. Patient H/O of HTN, hyperlipidemia, afib on xarelto, AICD (placed in 2014), CAD s/p PCI on plavix, PAD s/p L bypass and L iliac stent, DM2 c/b neuropathy, sent to ED by his podiatrist for left foot gangrene. Pt states the the infection started 2 weeks ago and was being monitored by the podiatrist. Pt went was seen by a doctor 4 days pta and was started on bactrim, then was seen 11/11/19 by the podiatrist (Dr. Alex Leos ) who sent him to the hospital for IV abx. Pt states that he has long standing diabetic neuropathy and does not complain of pain at the site of gangrene. Pt does endorse decreased PO intake but denies fever, chills, nausea, vomiting, or abdominal pain. Patient seen and followed by Podiatry and Vascular for left foot gangrene- not a surgical candidate at this time, Nephrology for hyponatremia and Infectious disease for gangrene. Patient being followed by nutrition services for severe malnutrition. Patient evaluated by Hospice, plan for discharge to home with hospice services.

## 2019-11-20 NOTE — PROGRESS NOTE ADULT - PROBLEM SELECTOR PLAN 10
Transitions of Care Status:  1.  Name of PCP: Dr. Ybarra  2.  PCP Contacted on Admission: [ ] Y    [X] N    3.  PCP contacted at Discharge: [ ] Y    [ ] N    [ ] N/A  4.  Post-Discharge Appointment Date and Location:  5.  Summary of Handoff given to PCP: GOC: Meeting held with pt's spouse and daughter. They are in agreement with hospice care at home. Consents and approval obtained    Transitions of Care Status:  1.  Name of PCP: Dr. Ybarra  2.  PCP Contacted on Admission: [ ] Y    [X] N    3.  PCP contacted at Discharge: [ ] Y    [ ] N    [ ] N/A  4.  Post-Discharge Appointment Date and Location:  5.  Summary of Handoff given to PCP:

## 2019-11-21 ENCOUNTER — TRANSCRIPTION ENCOUNTER (OUTPATIENT)
Age: 74
End: 2019-11-21

## 2019-11-21 VITALS
TEMPERATURE: 98 F | OXYGEN SATURATION: 99 % | DIASTOLIC BLOOD PRESSURE: 69 MMHG | SYSTOLIC BLOOD PRESSURE: 123 MMHG | HEART RATE: 66 BPM | RESPIRATION RATE: 18 BRPM

## 2019-11-21 LAB
GLUCOSE BLDC GLUCOMTR-MCNC: 127 MG/DL — HIGH (ref 70–99)
GLUCOSE BLDC GLUCOMTR-MCNC: 170 MG/DL — HIGH (ref 70–99)

## 2019-11-21 PROCEDURE — 99239 HOSP IP/OBS DSCHRG MGMT >30: CPT

## 2019-11-21 PROCEDURE — 99231 SBSQ HOSP IP/OBS SF/LOW 25: CPT

## 2019-11-21 RX ORDER — SODIUM CHLORIDE 9 MG/ML
2 INJECTION INTRAMUSCULAR; INTRAVENOUS; SUBCUTANEOUS
Qty: 120 | Refills: 0
Start: 2019-11-21 | End: 2019-12-20

## 2019-11-21 RX ORDER — GABAPENTIN 400 MG/1
1 CAPSULE ORAL
Qty: 60 | Refills: 0
Start: 2019-11-21 | End: 2019-12-20

## 2019-11-21 RX ORDER — SODIUM CHLORIDE 9 MG/ML
2 INJECTION INTRAMUSCULAR; INTRAVENOUS; SUBCUTANEOUS
Qty: 0 | Refills: 0 | DISCHARGE
Start: 2019-11-21

## 2019-11-21 RX ADMIN — Medication 1 TABLET(S): at 05:45

## 2019-11-21 RX ADMIN — Medication 1: at 13:22

## 2019-11-21 RX ADMIN — INFLUENZA VIRUS VACCINE 0.5 MILLILITER(S): 15; 15; 15; 15 SUSPENSION INTRAMUSCULAR at 13:54

## 2019-11-21 RX ADMIN — CLOPIDOGREL BISULFATE 75 MILLIGRAM(S): 75 TABLET, FILM COATED ORAL at 11:30

## 2019-11-21 RX ADMIN — Medication 1 TABLET(S): at 11:30

## 2019-11-21 RX ADMIN — Medication 325 MILLIGRAM(S): at 11:30

## 2019-11-21 RX ADMIN — Medication 0.12 MILLIGRAM(S): at 05:45

## 2019-11-21 RX ADMIN — Medication 150 MILLIGRAM(S): at 05:44

## 2019-11-21 RX ADMIN — SODIUM CHLORIDE 2 GRAM(S): 9 INJECTION INTRAMUSCULAR; INTRAVENOUS; SUBCUTANEOUS at 05:45

## 2019-11-21 RX ADMIN — GABAPENTIN 100 MILLIGRAM(S): 400 CAPSULE ORAL at 05:45

## 2019-11-21 NOTE — PROGRESS NOTE ADULT - PROBLEM SELECTOR PLAN 6
-c/w metoprolol XR succ 100mg QD

## 2019-11-21 NOTE — PROGRESS NOTE ADULT - PROBLEM SELECTOR PROBLEM 8
Coagulopathy

## 2019-11-21 NOTE — PROGRESS NOTE ADULT - ASSESSMENT
75 YO M with MHx of HTN, hyperlipidemia, afib on xarelto, AICD (placed in 2014), CAD s/p PCI on plavix, PAD s/p L bypass and L iliac stent, DM2 c/b neuropathy admitted 11/11/19 with gangrenous changes left foot    Antibiotics  Vanco 11/11, 11/12-->13; 11/16  Cefepime 11/11  Zosyn 11/12 ---> --->11/18  Augmentin 11/18 -->      Suggest  Augmentin 500 mg twice daily through 11/25  continue wound care      discussed with primary team - being discharged for home hospice  please reconsult ID if needed

## 2019-11-21 NOTE — PROGRESS NOTE ADULT - PROBLEM SELECTOR PLAN 10
GOC: Meeting held with pt's spouse and daughter. They are in agreement with hospice care at home. Consents and approval obtained    Transitions of Care Status:  1.  Name of PCP: Dr. Ybarra  2.  PCP Contacted on Admission: [ ] Y    [X] N    3.  PCP contacted at Discharge: [ ] Y    [ ] N    [ ] N/A  4.  Post-Discharge Appointment Date and Location:  5.  Summary of Handoff given to PCP: GOC: Meeting held with pt's spouse and daughter. They are in agreement with hospice care at home. Consents and approval obtained    Transitions of Care Status:  1.  Name of PCP: No PCP, primary doctor is vascular surgeon Dr. Wright  2.  PCP Contacted on Admission: [ ] Y    [X] N    3.  PCP contacted at Discharge: [ ] Y    [ ] N    [X] N/A- vascular team aware of plan to discharge home with hospice  4.  Post-Discharge Appointment Date and Location: N/A  5.  Summary of Handoff given to PCP: N/A

## 2019-11-21 NOTE — PROGRESS NOTE ADULT - SUBJECTIVE AND OBJECTIVE BOX
Monika Masters MD  PGY 1 Department of Internal Medicine  Pager: [phone]    Patient is a 74y old  Male who presents with a chief complaint of Left foot gangrene, rule out osteomyelitis (20 Nov 2019 18:12)      SUBJECTIVE / OVERNIGHT EVENTS: Pt seen and examined. No acute overnight events. Denies fevers, chills, CP, SOB, Abdominal pain, N/V, Constipation, Diarrhea        MEDICATIONS  (STANDING):  amoxicillin  875 milliGRAM(s)/clavulanate 1 Tablet(s) Oral every 12 hours  atorvastatin 20 milliGRAM(s) Oral at bedtime  clopidogrel Tablet 75 milliGRAM(s) Oral daily  dextrose 5%. 1000 milliLiter(s) (50 mL/Hr) IV Continuous <Continuous>  dextrose 50% Injectable 12.5 Gram(s) IV Push once  dextrose 50% Injectable 25 Gram(s) IV Push once  dextrose 50% Injectable 25 Gram(s) IV Push once  digoxin     Tablet 0.125 milliGRAM(s) Oral daily  ferrous    sulfate 325 milliGRAM(s) Oral daily  gabapentin 100 milliGRAM(s) Oral two times a day  influenza   Vaccine 0.5 milliLiter(s) IntraMuscular once  insulin lispro (HumaLOG) corrective regimen sliding scale   SubCutaneous three times a day before meals  insulin lispro (HumaLOG) corrective regimen sliding scale   SubCutaneous at bedtime  metoprolol succinate  milliGRAM(s) Oral daily  multivitamin 1 Tablet(s) Oral daily  rivaroxaban 20 milliGRAM(s) Oral with dinner  sodium chloride 2 Gram(s) Oral two times a day    MEDICATIONS  (PRN):  acetaminophen    Suspension .. 650 milliGRAM(s) Oral every 6 hours PRN Mild Pain (1 - 3), Moderate Pain (4 - 6)  dextrose 40% Gel 15 Gram(s) Oral once PRN Blood Glucose LESS THAN 70 milliGRAM(s)/deciliter  glucagon  Injectable 1 milliGRAM(s) IntraMuscular once PRN Glucose LESS THAN 70 milligrams/deciliter      I&O's Summary    20 Nov 2019 07:01  -  21 Nov 2019 07:00  --------------------------------------------------------  IN: 320 mL / OUT: 700 mL / NET: -380 mL        Vital Signs Last 24 Hrs  T(C): 36.9 (21 Nov 2019 05:41), Max: 36.9 (21 Nov 2019 05:41)  T(F): 98.4 (21 Nov 2019 05:41), Max: 98.4 (21 Nov 2019 05:41)  HR: 65 (21 Nov 2019 05:41) (65 - 83)  BP: 126/68 (21 Nov 2019 05:41) (126/68 - 153/65)  BP(mean): --  RR: 18 (21 Nov 2019 05:41) (18 - 18)  SpO2: 98% (21 Nov 2019 05:41) (97% - 99%)    CAPILLARY BLOOD GLUCOSE      POCT Blood Glucose.: 127 mg/dL (21 Nov 2019 09:06)  POCT Blood Glucose.: 152 mg/dL (20 Nov 2019 22:27)  POCT Blood Glucose.: 179 mg/dL (20 Nov 2019 18:09)  POCT Blood Glucose.: 146 mg/dL (20 Nov 2019 12:48)      PHYSICAL EXAM:              Constitutional: WDWN resting comfortably in bed; NAD. Cachectic appearing.  	Head: NC/AT  	Eyes: PERRL, EOMI, anicteric sclera  	Neck: Supple  	Respiratory: CTA B/L  	Cardiac: +S1/S2; RRR; no M/R/G. No peripheral edema b/l.  	Gastrointestinal: Soft, NT/ND; no rebound or guarding; +BSx4  	Extremities: + Cyanosis LE. +Palpable radial pulses b/l, LE pulses non palpable B/L  	Skin: + Dry gangrene L toe. L feet cold to touch w/ blue/ purple discoloration.  	Neurologic: Alert and oriented x3, no focal deficits appreciated       LABS:                        7.7    6.69  )-----------( 242      ( 20 Nov 2019 05:40 )             24.6     Auto Eosinophil # x     / Auto Eosinophil % x     / Auto Neutrophil # x     / Auto Neutrophil % x     / BANDS % x        11-20    132<L>  |  96<L>  |  11  ----------------------------<  165<H>  3.6   |  22  |  0.56    Ca    8.5      20 Nov 2019 05:40  Mg     1.4     11-20  Phos  2.3     11-20                  RADIOLOGY & ADDITIONAL TESTS:    Imaging Personally Reviewed:    Consultant(s) Notes Reviewed:      Care Discussed with Consultants/Other Providers: Monika Masters MD  PGY 1 Department of Internal Medicine  Pager: [phone]    Patient is a 74y old  Male who presents with a chief complaint of Left foot gangrene, rule out osteomyelitis (20 Nov 2019 18:12)      SUBJECTIVE / OVERNIGHT EVENTS: Pt seen and examined. No acute overnight events. Endorses foot pain (L).  Denies fevers, chills, CP, SOB, Abdominal pain, N/V, Constipation, Diarrhea        MEDICATIONS  (STANDING):  amoxicillin  875 milliGRAM(s)/clavulanate 1 Tablet(s) Oral every 12 hours  atorvastatin 20 milliGRAM(s) Oral at bedtime  clopidogrel Tablet 75 milliGRAM(s) Oral daily  dextrose 5%. 1000 milliLiter(s) (50 mL/Hr) IV Continuous <Continuous>  dextrose 50% Injectable 12.5 Gram(s) IV Push once  dextrose 50% Injectable 25 Gram(s) IV Push once  dextrose 50% Injectable 25 Gram(s) IV Push once  digoxin     Tablet 0.125 milliGRAM(s) Oral daily  ferrous    sulfate 325 milliGRAM(s) Oral daily  gabapentin 100 milliGRAM(s) Oral two times a day  influenza   Vaccine 0.5 milliLiter(s) IntraMuscular once  insulin lispro (HumaLOG) corrective regimen sliding scale   SubCutaneous three times a day before meals  insulin lispro (HumaLOG) corrective regimen sliding scale   SubCutaneous at bedtime  metoprolol succinate  milliGRAM(s) Oral daily  multivitamin 1 Tablet(s) Oral daily  rivaroxaban 20 milliGRAM(s) Oral with dinner  sodium chloride 2 Gram(s) Oral two times a day    MEDICATIONS  (PRN):  acetaminophen    Suspension .. 650 milliGRAM(s) Oral every 6 hours PRN Mild Pain (1 - 3), Moderate Pain (4 - 6)  dextrose 40% Gel 15 Gram(s) Oral once PRN Blood Glucose LESS THAN 70 milliGRAM(s)/deciliter  glucagon  Injectable 1 milliGRAM(s) IntraMuscular once PRN Glucose LESS THAN 70 milligrams/deciliter      I&O's Summary    20 Nov 2019 07:01  -  21 Nov 2019 07:00  --------------------------------------------------------  IN: 320 mL / OUT: 700 mL / NET: -380 mL        Vital Signs Last 24 Hrs  T(C): 36.9 (21 Nov 2019 05:41), Max: 36.9 (21 Nov 2019 05:41)  T(F): 98.4 (21 Nov 2019 05:41), Max: 98.4 (21 Nov 2019 05:41)  HR: 65 (21 Nov 2019 05:41) (65 - 83)  BP: 126/68 (21 Nov 2019 05:41) (126/68 - 153/65)  BP(mean): --  RR: 18 (21 Nov 2019 05:41) (18 - 18)  SpO2: 98% (21 Nov 2019 05:41) (97% - 99%)    CAPILLARY BLOOD GLUCOSE      POCT Blood Glucose.: 127 mg/dL (21 Nov 2019 09:06)  POCT Blood Glucose.: 152 mg/dL (20 Nov 2019 22:27)  POCT Blood Glucose.: 179 mg/dL (20 Nov 2019 18:09)  POCT Blood Glucose.: 146 mg/dL (20 Nov 2019 12:48)      PHYSICAL EXAM:              Constitutional: WDWN resting comfortably in bed; NAD. Cachectic appearing.  	Head: NC/AT  	Eyes: PERRL, EOMI, anicteric sclera  	Neck: Supple  	Respiratory: CTA B/L  	Cardiac: +S1/S2; RRR; no M/R/G. No peripheral edema b/l.  	Gastrointestinal: Soft, NT/ND; no rebound or guarding; +BSx4  	Extremities: + Cyanosis LE. +Palpable radial pulses b/l, LE pulses non palpable B/L  	Skin: + Dry gangrene L toe. L feet cold to touch w/ blue/ purple discoloration.  	Neurologic: Alert and oriented x3, no focal deficits appreciated       LABS:                        7.7    6.69  )-----------( 242      ( 20 Nov 2019 05:40 )             24.6     Auto Eosinophil # x     / Auto Eosinophil % x     / Auto Neutrophil # x     / Auto Neutrophil % x     / BANDS % x        11-20    132<L>  |  96<L>  |  11  ----------------------------<  165<H>  3.6   |  22  |  0.56    Ca    8.5      20 Nov 2019 05:40  Mg     1.4     11-20  Phos  2.3     11-20                  RADIOLOGY & ADDITIONAL TESTS:    Imaging Personally Reviewed:    Consultant(s) Notes Reviewed:      Care Discussed with Consultants/Other Providers:

## 2019-11-21 NOTE — PROGRESS NOTE ADULT - SUBJECTIVE AND OBJECTIVE BOX
Follow Up:  gangrene    Interval History/ROS: denies pain, marked fatigue, no diarrhea    Allergies  No Known Allergies    ANTIMICROBIALS:  amoxicillin  875 milliGRAM(s)/clavulanate 1 every 12 hours    OTHER MEDS:  MEDICATIONS  (STANDING):  acetaminophen    Suspension .. 650 every 6 hours PRN  atorvastatin 20 at bedtime  clopidogrel Tablet 75 daily  dextrose 40% Gel 15 once PRN  dextrose 50% Injectable 12.5 once  dextrose 50% Injectable 25 once  dextrose 50% Injectable 25 once  digoxin     Tablet 0.125 daily  gabapentin 100 two times a day  glucagon  Injectable 1 once PRN  insulin lispro (HumaLOG) corrective regimen sliding scale  three times a day before meals  insulin lispro (HumaLOG) corrective regimen sliding scale  at bedtime  metoprolol succinate  daily  rivaroxaban 20 with dinner      Vital Signs Last 24 Hrs  T(C): 36.8 (21 Nov 2019 13:21), Max: 36.9 (21 Nov 2019 05:41)  T(F): 98.2 (21 Nov 2019 13:21), Max: 98.4 (21 Nov 2019 05:41)  HR: 66 (21 Nov 2019 13:21) (65 - 68)  BP: 123/69 (21 Nov 2019 13:21) (123/69 - 135/70)  BP(mean): --  RR: 18 (21 Nov 2019 13:21) (18 - 18)  SpO2: 99% (21 Nov 2019 13:21) (98% - 99%)    PHYSICAL EXAM:  General: WN/WD NAD, Non-toxic  Neurology: A&Ox3, nonfocal  Respiratory: Clear to auscultation bilaterally  CV: RRR, S1S2, no murmurs, rubs or gallops  Extremities: No edema, dressing clean and dry  Line Sites: Clear  Skin: No rash                        7.7    6.69  )-----------( 242      ( 20 Nov 2019 05:40 )             24.6       11-20    132<L>  |  96<L>  |  11  ----------------------------<  165<H>  3.6   |  22  |  0.56    Ca    8.5      20 Nov 2019 05:40  Phos  2.3     11-20  Mg     1.4     11-20        MICROBIOLOGY:  URINE MIDSTREAM  11-12-19 --  --  --      OTHER  11-11-19   MANY  STCN^Staphylococcus sp.,coag neg  --  --      BLOOD PERIPHERAL  11-11-19 --  --  --      RADIOLOGY:    Jeff Duggan MD; Division of Infectious Disease; Pager: [phone]; nights and weekends: [phone]

## 2019-11-21 NOTE — PROGRESS NOTE ADULT - ASSESSMENT
75 YO M with MHx of HTN, hyperlipidemia, afib on xarelto, AICD (placed in 2014), CAD s/p PCI on plavix, PAD s/p L bypass and L ileac stent, DMII c/b neuropathy admitted for left foot gangrene. Hospital course c/b hyponatremia 73 YO M with MHx of HTN, hyperlipidemia, afib on xarelto, AICD (placed in 2014), CAD s/p PCI on plavix, PAD s/p L bypass and L ileac stent, DMII c/b neuropathy admitted for left foot gangrene. Hospital course c/b hyponatremia ,now resolving.

## 2019-11-21 NOTE — PROGRESS NOTE ADULT - PROBLEM SELECTOR PLAN 2
-Unclear etiology. Likely 2/2 SIADH  -will c/w fluid restriction 800cc for now  -c/w salt tabs TID . Will likely decrease to BID as Na back to baseline   -Avoid overcorrection > 6-8 mEq in 24 hrs.  -Nephrology consulted, f/u recs.   -Monitor BMP daily -Unclear etiology. Likely 2/2 SIADH  -Now resolving   -c/w salt tabs BID as Na back to baseline   -Avoid overcorrection > 6-8 mEq in 24 hrs.

## 2019-11-21 NOTE — PROGRESS NOTE ADULT - REASON FOR ADMISSION
Left foot gangrene, rule out osteomyelitis

## 2019-11-21 NOTE — DISCHARGE NOTE NURSING/CASE MANAGEMENT/SOCIAL WORK - PATIENT PORTAL LINK FT
You can access the FollowMyHealth Patient Portal offered by [facility] by registering at the following website: [email or web address]. By joining [facility]’s FollowMyHealth portal, you will also be able to view your health information using other applications (apps) compatible with our system.

## 2019-11-21 NOTE — PROGRESS NOTE ADULT - PROBLEM SELECTOR PROBLEM 1
Gangrene
Hyponatremia
Gangrene

## 2019-11-21 NOTE — DISCHARGE NOTE NURSING/CASE MANAGEMENT/SOCIAL WORK - NSDCPNINST_GEN_ALL_CORE
pt alert and oriented x3, forgetful at times. pt stable to discharge home with home care, vitals stable. treated for left foot gangrene/ cellulitis. pt has left foot wound and multiple DTI. wound care teaching provided to wife at bedside. Lyle care teaching provided. IV removed. Discharge instruction provided with copy pt alert and oriented x3, forgetful at times. pt stable to discharge home with home care, vitals stable. treated for left foot gangrene/ cellulitis. pt has multiple DTI and wounds. Dressings dry/intact. wound care teaching provided to wife at bedside. Lyle care teaching provided. IV removed. Discharge instruction provided with copy

## 2019-11-21 NOTE — DISCHARGE NOTE NURSING/CASE MANAGEMENT/SOCIAL WORK - NSDCPEWEB_GEN_ALL_CORE
[facility] for Tobacco Control website --- [email or web address] --- [email or web address]

## 2019-11-21 NOTE — PROGRESS NOTE ADULT - PROBLEM SELECTOR PLAN 1
-Pt with gangrene on the left foot with lesions on the tip of L 1st and 5th digit and stage 2 ulcer on heel  -ESR and CRP elevated, and mild leukocytosis  - Xray L foot: Diffuse soft tissue swelling with cutaneous ulceration over the distal phalynx of the left first digit and subjacent cortical erosion of the first distal phalynx.  -s/p vanc and zosyn (11/12-11/18)  -started on PO Augmentin BID per ID recs (11/18-11/25)  -Blood cx NGTD, wound cx sig for coag neg staph.   -F/u podiatry recs  -No surgical intervention per [facility] recs -Pt with gangrene on the left foot with lesions on the tip of L 1st and 5th digit and stage 2 ulcer on heel  - Xray L foot: Diffuse soft tissue swelling with cutaneous ulceration over the distal phalynx of the left first digit and subjacent cortical erosion of the first distal phalynx.  -s/p vanc and zosyn (11/12-11/18)  -started on PO Augmentin BID per ID recs (11/18-11/25)  -Blood cx NGTD, wound cx sig for coag neg staph.   -F/u podiatry recs  -No surgical intervention per [facility] recs

## 2019-11-21 NOTE — DISCHARGE NOTE NURSING/CASE MANAGEMENT/SOCIAL WORK - NSDCPEEMAIL_GEN_ALL_CORE
[facility] for Tobacco Control email [email or web address]

## 2020-03-25 NOTE — ASU PREOP CHECKLIST - # OF UNITS
----- Message from Patricia Huerta MD sent at 3/25/2020  3:57 PM CDT -----  Mild anemia. Should be taking vitamin with iron and can also take slo fe to rebuild stores. TSH normal.   2

## 2020-06-16 NOTE — ED ADULT NURSE NOTE - ISOLATION TYPE:
Patient VSS, sats > 92% on VV ECMO speed 2800 and on vent  AC 50/5 . Heparin, Lasix and Precedex gtt infusing. MAP goal 70-90. No acute events throughout shift.  UO > 100 ml/hr, see flowsheet. . Accuchecks q 4, scheduled and PRN insulin given. Plan for decannulation in AM .Will continue to monitor.    None

## 2020-09-08 NOTE — PROGRESS NOTE ADULT - PROBLEM SELECTOR PROBLEM 9
[Well Nourished] : well nourished [Alert] : alert [Normal Sclera/Conjunctiva] : normal sclera/conjunctiva [No Acute Distress] : no acute distress [Well Developed] : well developed [EOMI] : extra ocular movement intact [Normal Oropharynx] : the oropharynx was normal [No Proptosis] : no proptosis [Thyroid Not Enlarged] : the thyroid was not enlarged [No Thyroid Nodules] : no palpable thyroid nodules [No Respiratory Distress] : no respiratory distress [No Accessory Muscle Use] : no accessory muscle use [Clear to Auscultation] : lungs were clear to auscultation bilaterally [Normal S1, S2] : normal S1 and S2 [Normal Rate] : heart rate was normal [Regular Rhythm] : with a regular rhythm [No Edema] : no peripheral edema [Pedal Pulses Normal] : the pedal pulses are present [Normal Bowel Sounds] : normal bowel sounds [Not Tender] : non-tender [Not Distended] : not distended [Soft] : abdomen soft [Normal Anterior Cervical Nodes] : no anterior cervical lymphadenopathy [Normal Posterior Cervical Nodes] : no posterior cervical lymphadenopathy [No Spinal Tenderness] : no spinal tenderness [Spine Straight] : spine straight Urinary retention [No Stigmata of Cushings Syndrome] : no stigmata of Cushings Syndrome [Normal Gait] : normal gait [Normal Strength/Tone] : muscle strength and tone were normal [No Rash] : no rash [Normal Reflexes] : deep tendon reflexes were 2+ and symmetric [Oriented x3] : oriented to person, place, and time [No Tremors] : no tremors

## 2020-09-20 NOTE — PROGRESS NOTE ADULT - PROBLEM SELECTOR PLAN 8
INR=2.33 on admission. Likely 2/2 xarelto vs  recent abx use   -Trend INR  -Will continue to monitor yes

## 2020-10-20 NOTE — DISCHARGE NOTE NURSING/CASE MANAGEMENT/SOCIAL WORK - NSDCPEPT PROEDMA_GEN_ALL_CORE
Yes Mercedes Flap Text: The defect edges were debeveled with a #15 scalpel blade.  Given the location of the defect, shape of the defect and the proximity to free margins a Mercedes flap was deemed most appropriate.  Using a sterile surgical marker, an appropriate advancement flap was drawn incorporating the defect and placing the expected incisions within the relaxed skin tension lines where possible. The area thus outlined was incised deep to adipose tissue with a #15 scalpel blade.  The skin margins were undermined to an appropriate distance in all directions utilizing iris scissors.

## 2020-12-28 NOTE — PROGRESS NOTE ADULT - PROBLEM SELECTOR PROBLEM 3
PVD (peripheral vascular disease) Hydroxyzine Counseling: Patient advised that the medication is sedating and not to drive a car after taking this medication.  Patient informed of potential adverse effects including but not limited to dry mouth, urinary retention, and blurry vision.  The patient verbalized understanding of the proper use and possible adverse effects of hydroxyzine.  All of the patient's questions and concerns were addressed.

## 2021-04-13 NOTE — PHYSICAL THERAPY INITIAL EVALUATION ADULT - LEVEL OF INDEPENDENCE: GAIT, REHAB EVAL
Verified with the [facility] Dept pt's [place] + is from 4/9/21. minimum assist (75% patients effort)

## 2021-05-10 NOTE — ED PROVIDER NOTE - MUSCULOSKELETAL [+], MLM
Pre-procedure checklist reviewed, AUC complete and pre-sedation note complete.    MD aware of maximum contrast dose of 207 mL.  stiffness/LIMITED RANGE OF MOTION

## 2021-06-25 NOTE — ED ADULT NURSE NOTE - CAS DISCH ACCOMP BY
Cardiology Progress Note    Admit Date: 6/21/2021  Attending Cardiologist: Dr. Ahumada [place]:     -Syncope in setting of below.  -Sepsis with recurrent hypotension/vagal  -Transient bradycardia, likely worsened due to taking coreg 25 mg BID  -Mesenteric abscess in pelvis, most closely associated with an inflamed and narrowed small bowel loop but positioned adjacent to chronic diverticular disease in sigmoid colon, per CT chest/abd/pelvis read 6/21/2021. The patient is on Eliquis and Plavix due to recent PCI and Afib. Patient is at high risk for graft and stent occlusion if plavix is held. Appreciate IR involvement. Risks outweigh benefits of CT guided drainage of small abscess that may not be amenable to drainage due to anatomic positioning at this time. -CAD, Hx CABG x 2 (LIMA-LAD, SVG-LCx), recent NSTEMI s/p cardiac cath 5/18/2021 at [facility] with PCI to proximal SVG-LCx with 3.5 x 12 mm [place] JENNIE; PCI to mid SVG with 3.5 x 23 mm Juan J JENNIE; PCI to distal SVG with 3.5 x 15 mm Juan J JENNIE; Balloon angioplasty to distal SVG anastomosis.  Discharged on Plavix and Eliquis (no ASA to avoid triple therapy).    -Initial cardiac cath 5/18/2021 at [place] [id number] with distal LM 50% blockage; LAD is large vessel with 80% proximal stenosis with patent LIMA-LAD, there is a 70% stenosis distal to LIMA anastomosis; LCx is dominant, large diffusely diseased vessel with sequential 80% stenosis at proximal and mid portion of the vessel; RCA is non-dominant, small diffusely diseased vessel  -Echo 5/2021 at [place] with EF 50%.   -Atrial fibrillation, recent diagnosis, on Eliquis for anticoagulation.  -Leukocytosis, WBC up to 26.7K on 6/20/2021 (up from 14.3  -Severe COPD.  O2 goal 89-93% per [place] records.  -Hx squamous cell lung cancer, s/p right middle and lower lobectomy.  -Peripheral vascular disease, Hx bilateral carotid endarterectomies  -Hx renal artery stenosis, Hx bilateral stenting 2011  -Acute CVA during recent admission at [facility]; considered cardioembolic, s/p PCI 1/42/3022, symptom onset 5/20/2021 while on Eliquis; suspected plaque mobilization.  -Recent acute blood loss anemia due to melena and hematuria (self-inflicted [place] trauma), GI consulted 5/26/2021, cleared for Plavix/Eliquis  -Hx HTN with hypotension during recent admission, medications adjusted at that time.  Coreg decreased to 12.5 mg BID, Isosorbid-Hydralazine and Lisinopril discontinued. -Dementia, mild.     Primary cardiologist at [place] [id number].:     -Will keep Coreg at current dose 6.25 mg BID. -Continued on Eliquis for anticoagulation, Plavix with recent stent placement.  -Continued on Crestor.  -Pt plans to follow-up with primary cardiologist at [address] [place] [address] per primary team.      Staff addendum:  Doing well today. Heart rate now ~100-110 bpm, no symptoms while on coreg 6.25 mg BID. Patient was on 12.5 mg BID at [facility], 25 mg BID at rehab. Given recent hypotension/bradycardia and infection, I would not increase BB. Ok for rehab from cardiac standpoint. I saw, examined, and evaluated the patient. I personally reviewed the patient's labs, tests, vitals, orders, medications, updated history, and other providers assessments. I personally agree with the findings as stated and the plan as documented. Daysi Brunson MD      Subjective:     No new complaints.      Objective:      Patient Vitals for the past 8 hrs:   Temp Pulse Resp BP SpO2   06/25/21 1117 98 °F (36.7 °C) 97 18 123/84 100 %   06/25/21 1100 -- -- -- -- 100 %   06/25/21 0740 97.7 °F (36.5 °C) 99 20 135/80 100 %         Patient Vitals for the past 96 hrs:   Weight   06/25/21 0405 78.5 kg (173 lb)   06/24/21 0420 77.8 kg (171 lb 8 oz)   06/23/21 1443 80.7 kg (178 lb)   06/23/21 0435 78.6 kg (173 lb 3.2 oz)   06/22/21 0400 76.5 kg (168 lb 11.2 oz)   06/21/21 1821 76.7 kg (169 lb 3.2 oz)       TELE: AFIB               Current Facility-Administered Medications   Medication Dose Route Frequency Last Admin    cefpodoxime (VANTIN) tablet 200 mg  200 mg Oral Q12H      metroNIDAZOLE (FLAGYL) tablet 500 mg  500 mg Oral Q12H      carvediloL (COREG) tablet 6.25 mg  6.25 mg Oral BID WITH MEALS 6.25 mg at 06/25/21 0823    budesonide (PULMICORT) 500 mcg/2 ml nebulizer suspension  500 mcg Nebulization BID  mcg at 06/25/21 0900    apixaban (ELIQUIS) tablet 5 mg  5 mg Oral BID 5 mg at 06/25/21 0823    arformoteroL (BROVANA) neb solution 15 mcg  15 mcg Nebulization BID RT 15 mcg at 06/25/21 0900    sodium chloride (NS) flush 5-40 mL  5-40 mL IntraVENous Q8H 10 mL at 06/25/21 0547    sodium chloride (NS) flush 5-40 mL  5-40 mL IntraVENous PRN      acetaminophen (TYLENOL) tablet 650 mg  650 mg Oral Q6H  mg at 06/25/21 0825    Or    acetaminophen (TYLENOL) suppository 650 mg  650 mg Rectal Q6H PRN      ondansetron (ZOFRAN ODT) tablet 4 mg  4 mg Oral Q8H PRN      Or    ondansetron (ZOFRAN) injection 4 mg  4 mg IntraVENous Q6H PRN      albuterol (PROVENTIL VENTOLIN) nebulizer solution 2.5 mg  2.5 mg Nebulization Q4H PRN      clopidogreL (PLAVIX) tablet 75 mg  75 mg Oral DAILY WITH BREAKFAST 75 mg at 06/25/21 0823    gabapentin (NEURONTIN) capsule 100 mg  100 mg Oral  mg at 06/25/21 0998    multivitamin, tx-iron-ca-min (THERA-M w/ IRON) tablet 1 Tablet  1 Tablet Oral DAILY 1 Tablet at 06/25/21 0823    rosuvastatin (CRESTOR) tablet 10 mg  10 mg Oral DAILY 10 mg at 06/25/21 0823    tamsulosin (FLOMAX) capsule 0.4 mg  0.4 mg Oral PCD 0.4 mg at 06/24/21 1714    L. acidophilus,casei,rhamnosus (BIO-K PLUS) capsule 1 Capsule  1 Capsule Oral DAILY 1 Capsule at 06/25/21 0823    ipratropium (ATROVENT) 0.02 % nebulizer solution 0.5 mg  0.5 mg Nebulization TID 0.5 mg at 06/25/21 0900         Intake/Output Summary (Last 24 hours) at 6/25/2021 1213  Last data filed at 6/25/2021 0942  Gross per 24 hour   Intake 760 ml   Output --   Net 760 ml       Physical Exam:  General:  alert, cooperative, no distress, appears stated age  Neck:  supple  Lungs:  clear to auscultation bilaterally  Heart:  irregularly irregular rhythm  Abdomen:  abdomen is soft without significant tenderness, masses, organomegaly or guarding  Extremities:  atraumatic, no edema    Visit Vitals  /84   Pulse 97   Temp 98 °F (36.7 °C)   Resp 18   Ht 5' 8\" (1.727 m)   Wt 78.5 kg (173 lb)   SpO2 100%   BMI 26.30 kg/m²       Data Review:     Labs: Results:       Chemistry Recent Labs     06/24/21  0522 06/23/21  0538   GLU 70* 76   * 132*   K 4.3 4.3    100   CO2 28 28   BUN 9 13   CREA 0.62 0.71   CA 8.8 8.9   MG 2.3 2.3   PHOS  --  2.4*   AGAP 2* 4   BUCR 15 18   AP  --  40*   TP  --  6.4   ALB  --  3.1*   GLOB  --  3.3   AGRAT  --  0.9      CBC w/Diff Recent Labs     06/25/21  0534 06/24/21  0522 06/23/21  0538   WBC 18.4* 18.2* 22.1*   RBC 3.00* 2.88* 2.94*   HGB 8.6* 8.2* 8.5*   HCT 28.5* 27.4* 28.2*    246 226      Coagulation Recent Labs     06/23/21  0538   PTP 20.3*   INR 1.8*   APTT 49.2*       Liver Enzymes Recent Labs     06/23/21  0538   TP 6.4   ALB 3.1*   AP 40*      Thyroid Studies Lab Results   Component Value Date/Time    TSH 1.57 06/21/2021 02:12 PM          Signed By: Aminata Crowder PA-C     June 25, 2021 Self

## 2021-11-16 NOTE — DISCHARGE NOTE NURSING/CASE MANAGEMENT/SOCIAL WORK - NSTRANSFERBELONGINGSDISPO_GEN_A_NUR
Miesha called and would like a call as soon as Dr. Jensen gets in. She would like to discuss her medication. When asked what specific medication, she stated all of them.     not applicable

## 2022-04-06 NOTE — H&P ADULT - HISTORY OF PRESENT ILLNESS
Left patient voicemail telling her to stop the naltrexone per Dr Elizabeth Sotelo 
Pt called regarding the prescription Dr Madsen gave her, she stated she is not liking the side effects, she is experiencing dizziness and vomiting  She also stated she took half of pill  She is expecting a call on what should she do 
e
74 year old male with a medical history significant for AFib, on Xarelto, AICD, CADs/p PCI, PVD, s/p revascularization, HLD, HTN, type 2 DM, GERD, with recent left femoral pseudoaneurysm, s/p surgical intervention ~2 weeks ago, presenting from rehab with one day of gross hematuria.  Patient has indwelling bolivar catheter in place while at rehab.  At baseline, he has bladder dysfunction, follows with Urologist Dr. Villasenor, and performs CIC Q6h daily.  Hematuria was noted without traumatic event.  Denies pain.  Reports bolivar has been draining well.  Patient remains on Plavix and Xarelto.  He is currently on Cipro for recent diagnosis of UTI.  Denies fevers.

## 2022-04-11 NOTE — ASU PATIENT PROFILE, ADULT - PREOP PAIN SCORE
Please let patient know that digoxin level is within normal limits on labs.  Continue current regimen and call with some updated heart rate and blood pressure readings this week.  Call sooner for issues or concerns.
0

## 2022-05-04 NOTE — PATIENT PROFILE ADULT. - MEDICATION HERBAL REMEDIES, PROFILE
Relevant Problems   No relevant active problems       Anesthetic History   No history of anesthetic complications     Pertinent negatives: No PONV       Review of Systems / Medical History  Patient summary reviewed, nursing notes reviewed and pertinent labs reviewed    Pulmonary        Sleep apnea: CPAP      Pertinent negatives: No pneumonia, COPD and smoker     Neuro/Psych   Within defined limits           Cardiovascular    Hypertension: well controlled            Pertinent negatives: No past MI and CAD       GI/Hepatic/Renal  Within defined limits           Pertinent negatives: No GERD, liver disease and renal disease   Endo/Other  Within defined limits      Arthritis  Pertinent negatives: No diabetes   Other Findings   Comments: etoh < 1/week           Physical Exam    Airway  Mallampati: III  TM Distance: 4 - 6 cm  Neck ROM: normal range of motion   Mouth opening: Normal     Cardiovascular               Dental  No notable dental hx       Pulmonary                 Abdominal         Other Findings            Anesthetic Plan    ASA: 2  Anesthesia type: regional and spinal - femoral single shot and saphenous block          Induction: Intravenous  Anesthetic plan and risks discussed with: Patient      Spinal has several small advantages over general anesthesia. All are small but definite differences that have been shown in studies  Lower infection rate  Less Blood loss  Less Pain  Less N&V  Quicker times to discharge. Less exposure to drugs that can affect the mind afterwards. Risk of nerve injury discussed. Can be expressed by pain, numbness, weakness. May or may not recover.
no

## 2022-06-15 NOTE — PROGRESS NOTE ADULT - PROBLEM SELECTOR PROBLEM 10
Hyperlipidemia
Preventive measure
Preventive measure
warm and dry/color normal/normal/no rashes/no ulcers
warm and dry/color normal/no rashes/no ulcers

## 2022-08-11 NOTE — ED PROVIDER NOTE - GASTROINTESTINAL NEGATIVE STATEMENT, MLM
AMBULATORY CASE MANAGEMENT NOTE    Name and Relationship of Patient/Support Person: oRgelio Dee - Self  Self  Self    CCM Interim Update      Per chart review the patient has had severl labs please see chart for details. I reached out to the patient and he stated he was feeling better. He confirmed that he had visit with Dr. Hutson on 8-8 22 . Review of most recent labs confirmed by the patient. The patient stated that he was currently living with a room mate but that there relationship was strained and she was moving out on 8-23-22. Patient declined assistance. Meds reconciled no refills required. Patient stated that for Medicare purposes that he would require a copy of his medical records . I shared with him that I would forward a copy of the records release form to him and he agreed to have form emailed to the email address on file for this patient. I reached out to the CCM team and secured a copy of the form and emailed to the patient . No further needs at this time.         Education Documentation  No documentation found.        JULES HUIZAR  Ambulatory Case Management    8/11/2022, 12:38 EDT   no abdominal pain, no bloating, no constipation, no diarrhea, no nausea and no vomiting.

## 2022-08-13 NOTE — H&P ADULT - PROBLEM/PLAN-5
DISPLAY PLAN FREE TEXT impairments found/functional limitations in following categories/risk reduction/prevention/rehab potential/therapy frequency/anticipated discharge recommendation

## 2022-09-08 NOTE — ED ADULT TRIAGE NOTE - MEANS OF ARRIVAL
stretcher
CHIEF COMPLAINT:Patient is a 77y old  Female who presents with a chief complaint of Pain in both legs affecting her ability to walk long distances, occlusion & stenosis of bilateral carotid arteries (07 Sep 2022 05:36)      HISTORY OF PRESENT ILLNESS:    77 female with history as below, namely PVD, CAD s/p CABG, is now s/p left CEA  denies any chest pain, sob, palpitation, dizziness or syncope.     PAST MEDICAL & SURGICAL HISTORY:  HTN (hypertension)      Tobacco use  Quit June 2021      COPD (chronic obstructive pulmonary disease)      PVD (peripheral vascular disease)      CAD (coronary artery disease)      Right carotid bruit      GERD (gastroesophageal reflux disease)      HLD (hyperlipidemia)      S/P CABG x 2  2021              MEDICATIONS:  aspirin enteric coated 81 milliGRAM(s) Oral daily  cilostazol 100 milliGRAM(s) Oral two times a day  diltiazem    Tablet 30 milliGRAM(s) Oral three times a day  heparin   Injectable 5000 Unit(s) SubCutaneous every 8 hours  tamsulosin 0.4 milliGRAM(s) Oral daily      budesonide  80 MICROgram(s)/formoterol 4.5 MICROgram(s) Inhaler 2 Puff(s) Inhalation two times a day  tiotropium 18 MICROgram(s) Capsule 1 Capsule(s) Inhalation daily    acetaminophen     Tablet .. 650 milliGRAM(s) Oral every 6 hours  gabapentin 300 milliGRAM(s) Oral two times a day  oxyCODONE    IR 5 milliGRAM(s) Oral every 4 hours PRN    famotidine    Tablet 20 milliGRAM(s) Oral daily PRN  senna 2 Tablet(s) Oral at bedtime PRN    atorvastatin 40 milliGRAM(s) Oral at bedtime    tobramycin 0.3% Ophthalmic Solution      tobramycin 0.3% Ophthalmic Solution 1 Drop(s) Right EYE every 4 hours      FAMILY HISTORY:  No pertinent family history in first degree relatives        Non-contributory    SOCIAL HISTORY:    No tobacco, drugs or etoh    Allergies    contrast media (gadolinium-based) (Rash; Short breath; Hives)  Crab (Rash)  latex (Rash)  sulfa drugs (Rash)    Intolerances    	    REVIEW OF SYSTEMS:  as above  The rest of the 14 points ROS reviewed and except above they are unremarkable.        PHYSICAL EXAM:  T(C): 36.8 (09-08-22 @ 05:00), Max: 36.8 (09-08-22 @ 05:00)  HR: 79 (09-08-22 @ 05:00) (67 - 95)  BP: 125/69 (09-08-22 @ 05:00) (125/69 - 176/77)  RR: 16 (09-08-22 @ 05:47) (14 - 18)  SpO2: 94% (09-08-22 @ 05:47) (90% - 97%)  Wt(kg): --  I&O's Summary    07 Sep 2022 07:01  -  08 Sep 2022 07:00  --------------------------------------------------------  IN: 1865 mL / OUT: 2195 mL / NET: -330 mL      JVP: Normal  Neck: supple  Lung: clear   CV: S1 S2 , Murmur:  Abd: soft  Ext: No edema  neuro: Awake / alert  Psych: flat affect  Skin: normal      LABS/DATA:    TELEMETRY: 	    ECG:  	   	  CARDIAC MARKERS:                                      11.7   11.81 )-----------( 182      ( 08 Sep 2022 07:29 )             36.7     09-08    140  |  107  |  12  ----------------------------<  114<H>  4.3   |  20<L>  |  0.92    Ca    9.0      08 Sep 2022 07:26  Phos  3.6     09-08  Mg     2.1     09-08    TPro  6.0  /  Alb  3.3  /  TBili  0.3  /  DBili  x   /  AST  14  /  ALT  8<L>  /  AlkPhos  150<H>  09-08    proBNP:   Lipid Profile:   HgA1c:   TSH:

## 2022-09-18 NOTE — H&P PST ADULT - TRANSFUSION REACTION, PREVIOUS, PROFILE
Patient : Joyce Hanson Age: 76 year old Sex: female   MRN: [id number] Encounter Date: 9/18/2022      History     Chief Complaint   Patient presents with   • Unresponsive     HPI    76-year-old female with history per chart review of coronary disease, hypertension, COPD, pulmonary hypertension, CHF, hyperlipidemia, diabetes.  Presented for unresponsiveness per paramedics.  On arrival, paramedics state that the patient called for feeling generally unwell and on their arrival was unresponsive.  Was on the toilet.  Initially with low oxygen saturation, put on nonrebreather with improvement to the mid 80% range.  Otherwise, hypertensive, not responsive for them.  GCS of 3.  Pulses intact.    Allergies   Allergen Reactions   • Penicillins Other (See Comments)     unknown       Current Discharge Medication List      Prior to Admission Medications    Details   losartan (COZAAR) 50 MG tablet Take 50 mg by mouth daily.      albuterol 108 (90 Base) MCG/ACT inhaler Inhale 2 puffs into the lungs every 6 hours as needed for Wheezing.  Qty: 1 each, Refills: 11      potassium CHLORIDE (KLOR-CON M) 20 MEQ debbie ER tablet Take 20 mEq by mouth daily.      famotidine (PEPCID) 20 MG tablet Take 20 mg by mouth daily.      metoPROLOL succinate (TOPROL-XL) 25 MG 24 hr tablet Take 1 tablet by mouth daily.  Qty: 90 tablet, Refills: 1      aspirin (Aspirin 81) 81 MG EC tablet Take 1 tablet by mouth daily.  Qty: 30 tablet, Refills: 3      furosemide (Lasix) 20 MG tablet Take 1 tablet by mouth daily.  Qty: 30 tablet, Refills: 3      gabapentin (NEURONTIN) 300 MG capsule Take 1 capsule by mouth 3 times daily.  Qty: 90 capsule, Refills: 1      atorvastatin (LIPITOR) 80 MG tablet Take 1 tablet by mouth daily.  Qty: 90 tablet, Refills: 3      umeclidinium-vilanterol (Anoro Ellipta) 62.5-25 MCG/INH inhaler Inhale 1 puff into the lungs daily.  Qty: 3 each, Refills: 3      albuterol (VENTOLIN) (2.5 MG/3ML) 0.083% nebulizer solution Take 3 mLs by  nebulization every 4 hours as needed for Shortness of Breath or Wheezing.  Qty: 375 mL, Refills: 2      oxygen (O2) gas Inhale 3 L/min into the lungs daily as needed. prn sob      ipratropium-albuterol (DUONEB) 0.5-2.5 (3) MG/3ML nebulizer solution Take 3 mLs by nebulization Every 4 hours as needed for Shortness of Breath or Wheezing.  Qty: 270 mL, Refills: 0      Respiratory Therapy Supplies (NEBULIZER/ADULT MASK) Kit To use with albuterol for COPD Code: J44.9  Qty: 1 kit, Refills: 6             Past Medical History:   Diagnosis Date   • (HFpEF) heart failure with preserved ejection fraction (CMS/HCC) 2/25/2022   • Abnormal stress test    • Acute sinusitis    • CAD (coronary artery disease)    • Cervical radiculopathy    • Congestive cardiac failure (CMS/HCC)    • COPD (chronic obstructive pulmonary disease) (CMS/HCC)    • Diabetes mellitus (CMS/HCC)    • HLD (hyperlipidemia)    • Hypertension    • Hypokalemia    • Old MI (myocardial infarction)     Silent   • Pulmonary hypertension (CMS/HCC)    • Sleep apnea    • Ventricular hypokinesis        Past Surgical History:   Procedure Laterality Date   • CARDIAC CATHERIZATION     • COLONOSCOPY     • DENTOALVEOLAR FRACTURE REPAIR Right        Family History   Problem Relation Age of Onset   • Other Mother         accidental death: complication from ENT surgery   • Other Half-Brother         gout   • Heart disease Neg Hx        Social History     Tobacco Use   • Smoking status: Current Every Day Smoker     Packs/day: 0.50     Types: Cigarettes     Start date: 1961   • Smokeless tobacco: Never Used   Vaping Use   • Vaping Use: never used   Substance Use Topics   • Alcohol use: Not Currently     Comment: social   • Drug use: Never       Review of Systems   Unable to perform ROS: Patient unresponsive       Physical Exam     ED Triage Vitals   ED Triage Vitals Group      Temp 09/18/22 1602 96.4 °F (35.8 °C)      Heart Rate 09/18/22 1436 (!) 134      Resp 09/18/22 1436 (!) 26       BP 09/18/22 1436 (!) 162/97      SpO2 09/18/22 1436 97 %      EtCO2 mmHg 09/18/22 1445 73 mmHg      Height 09/18/22 1809 5' 1.42\" (1.56 m)      Weight 09/18/22 1400 189 lb (85.7 kg)      Weight Scale Used 09/18/22 1809 Scale in bed      BMI (Calculated) --       IBW/kg (Calculated) 09/18/22 1809 48.76       PHYSICAL:   Gen: Well nourished, unresponsive  Skin: Warm, dry.   Head: Normocephalic, atraumatic.   Neck: Supple, no tenderness.   Eye: No spontaneous ocular motion, bilateral pupils are equal, sluggishly reactive  Ears, nose, mouth and throat: Oral mucosa moist,   Cardiovascular: Regular  rate, regular rhythm, No murmur,   Arterial pulses: Bilateral radial 2+, bilateral carotid pulses 2+  Respiratory: Very minimal air movement throughout, no wheezing, no rales, no crackles  Gastrointestinal: Soft, mild distention  Musculoskeletal: No deformity, no spontaneous motion, 1+ pitting edema to bilateral lower extremities equally  Neurological: Unresponsive, bilateral pupils are equal but sluggishly reactive, GCS of 3, no gag reflex        ED Course     Intubation    Date/Time: 9/18/2022 3:18 PM  Performed by: Sohan Sparrow DO  Authorized by: Sohan Sparrow DO     Consent:     Consent obtained:  Emergent situation  Pre-procedure details:     Indication: failure to oxygenate, failure to protect airway and failure to ventilate      Patient status:  Unresponsive    Induction agents:  Etomidate    Paralytics:  Rocuronium  Procedure details:     Preoxygenation:  Nonrebreather mask    CPR in progress: no      Intubation method:  Oral    Intubation technique: video assisted      Laryngoscope blade:  Mac 4    Bougie used: no      Tube size (mm):  7.0    Tube type:  Cuffed    Number of attempts:  2    Ventilation between attempts: yes with mask      Tube visualized through cords: yes    Placement assessment:     ETT at teeth/gumline (cm):  23    Tube secured with:  ETT booth    Breath sounds:  Equal    Placement  verification: CXR verification    Post-procedure details:     Procedure completion:  Tolerated well, no immediate complications  Comments:      Initial intubation attempted with a 7.5 tube, but unable to pass.  And was downsized to a 7.0 tube with ease of passing.        Lab Results     Results for orders placed or performed during the hospital encounter of 09/18/22   Comprehensive Metabolic Panel   Result Value Ref Range    Fasting Status      Sodium 141 135 - 145 mmol/L    Potassium 4.6 3.4 - 5.1 mmol/L    Chloride 100 97 - 110 mmol/L    Carbon Dioxide 35 (H) 21 - 32 mmol/L    Anion Gap 11 7 - 19 mmol/L    Glucose 293 (H) 70 - 99 mg/dL    BUN 9 6 - 20 mg/dL    Creatinine 0.99 (H) 0.51 - 0.95 mg/dL    Glomerular Filtration Rate 60 >=60    BUN/ Creatinine Ratio 9 7 - 25    Calcium 9.5 8.4 - 10.2 mg/dL    Bilirubin, Total 0.2 0.2 - 1.0 mg/dL    GOT/AST 24 <=37 Units/L    GPT/ALT 22 <64 Units/L    Alkaline Phosphatase 104 45 - 117 Units/L    Albumin 3.0 (L) 3.6 - 5.1 g/dL    Protein, Total 7.3 6.4 - 8.2 g/dL    Globulin 4.3 (H) 2.0 - 4.0 g/dL    A/G Ratio 0.7 (L) 1.0 - 2.4   TROPONIN I, HIGH SENSITIVITY   Result Value Ref Range    Troponin I, High Sensitivity 35 <52 ng/L   NT proBNP   Result Value Ref Range    NT-proBNP 4,200 (H) <=125 pg/mL   Urinalysis & Reflex Microscopy With Culture If Indicated   Result Value Ref Range    COLOR, URINALYSIS Yellow     APPEARANCE, URINALYSIS Clear     GLUCOSE, URINALYSIS Trace (A) Negative mg/dL    BILIRUBIN, URINALYSIS Negative Negative    KETONES, URINALYSIS Negative Negative mg/dL    SPECIFIC GRAVITY, URINALYSIS >1.030 (H) 1.005 - 1.030    OCCULT BLOOD, URINALYSIS Negative Negative    PH, URINALYSIS 7.0 5.0 - 7.0    PROTEIN, URINALYSIS 100  (A) Negative mg/dL    UROBILINOGEN, URINALYSIS 0.2 0.2, 1.0 mg/dL    NITRITE, URINALYSIS Negative Negative    LEUKOCYTE ESTERASE, URINALYSIS Negative Negative    SQUAMOUS EPITHELIAL, URINALYSIS 1 to 5 None Seen, 1 to 5 /hpf    ERYTHROCYTES,  URINALYSIS 3 to 5 (A) None Seen, 1 to 2 /hpf    LEUKOCYTES, URINALYSIS 6 to 10 (A) None Seen, 1 to 5 /hpf    BACTERIA, URINALYSIS None Seen None Seen /hpf    HYALINE CASTS, URINALYSIS 1 to 5 None Seen, 1 to 5 /lpf   COVID/Flu/RSV panel   Result Value Ref Range    Rapid SARS-COV-2 by PCR Not Detected Not Detected / Detected / Presumptive Positive / Inhibitors present    Influenza A by PCR Not Detected Not Detected    Influenza B by PCR Not Detected Not Detected    RSV BY PCR Not Detected Not Detected    Isolation Guidelines      Procedural Comment     CBC with Automated Differential (performable only)   Result Value Ref Range    WBC 12.5 (H) 4.2 - 11.0 K/mcL    RBC 3.94 (L) 4.00 - 5.20 mil/mcL    HGB 8.7 (L) 12.0 - 15.5 g/dL    HCT 31.9 (L) 36.0 - 46.5 %    MCV 81.0 78.0 - 100.0 fl    MCH 22.1 (L) 26.0 - 34.0 pg    MCHC 27.3 (L) 32.0 - 36.5 g/dL    RDW-CV 18.6 (H) 11.0 - 15.0 %    RDW-SD 55.7 (H) 39.0 - 50.0 fL     140 - 450 K/mcL    NRBC 0 <=0 /100 WBC    Neutrophil, Percent 54 %    Lymphocytes, Percent 34 %    Mono, Percent 8 %    Eosinophils, Percent 3 %    Basophils, Percent 0 %    Immature Granulocytes 1 %    Absolute Neutrophils 6.9 1.8 - 7.7 K/mcL    Absolute Lymphocytes 4.2 (H) 1.0 - 4.0 K/mcL    Absolute Monocytes 1.0 (H) 0.3 - 0.9 K/mcL    Absolute Eosinophils  0.4 0.0 - 0.5 K/mcL    Absolute Basophils 0.1 0.0 - 0.3 K/mcL    Absolute Immmature Granulocytes 0.1 0.0 - 0.2 K/mcL   Prothrombin Time   Result Value Ref Range    Prothrombin Time 10.3 9.7 - 11.8 sec    INR 1.0     Partial Thromboplastin Time   Result Value Ref Range    PTT 28 22 - 30 sec   Lactic Acid Venous With Reflex   Result Value Ref Range    Lactate, Venous 4.7 (HH) 0.0 - 2.0 mmol/L   Magnesium   Result Value Ref Range    Magnesium 2.1 1.7 - 2.4 mg/dL   Procalcitonin   Result Value Ref Range    Procalcitonin <0.05 <=0.09 ng/mL   Lactic Acid, Venous   Result Value Ref Range    Lactate, Venous 2.3 (HH) 0.0 - 2.0 mmol/L   BLOOD GAS,  ARTERIAL WITH COOXIMETRY - RESPIRATORY   Result Value Ref Range    BASE EXCESS / DEFICIT, ARTERIAL - RESPIRATORY 10 (H) -2 - 3 mmol/L    HCO3, ARTERIAL - RESPIRATORY 40 (H) 22 - 28 mmol/L    PCO2, ARTERIAL - RESPIRATORY 107 (HH) 32 - 45 mm Hg    PH, ARTERIAL - RESPIRATORY 7.18 (LL) 7.35 - 7.45 Units    PO2, ARTERIAL - RESPIRATORY 358 (H) 83 - 108 mm Hg    O2 SATURATION, ARTERIAL - RESPIRATORY 100 (H) 95 - 99 %    CONDITION - RESPIRATORY ACVC 18/400/5     CARBOXYHEMOGLOBIN - RESPIRATORY 0.8 <1.5 %    FIO2 - RESPIRATORY 100 %    HEMOGLOBIN - RESPIRATORY 8.4 (L) 12.0 - 15.5 g/dL    METHEMOGLOBIN - RESPIRATORY 0.5 <=1.6 %    OXYHEMOGLOBIN, ARTERIAL - RESPIRATORY 98.7 (H) 94.0 - 98.0 %    SITE - RESPIRATORY Right Radial     TEMPERATURE - RESPIRATORY 37.0 degrees   POTASSIUM - RESPIRATORY   Result Value Ref Range    POTASSIUM - RESPIRATORY 4.6 3.4 - 5.1 mmol/L   SODIUM - RESPIRATORY   Result Value Ref Range    SODIUM - RESPIRATORY 138 135 - 145 mmol/L   CALCIUM, IONIZED - RESPIRATORY   Result Value Ref Range    CALCIUM, IONIZED - RESPIRATORY 1.18 1.15 - 1.29 mmol/L   GLUCOSE - RESPIRATORY   Result Value Ref Range    GLUCOSE - RESPIRATORY 290 (H) 65 - 99 mg/dL   CHLORIDE - RESPIRATORY   Result Value Ref Range    CHLORIDE - RESPIRATORY 98 97 - 110 mmol/L   LACTIC ACID, ARTERIAL - RESPIRATORY   Result Value Ref Range    LACTIC ACID, ARTERIAL - RESPIRATORY 2.4 (HH) <1.6 mmol/L   BLOOD GAS, ARTERIAL - RESPIRATORY   Result Value Ref Range    BASE EXCESS / DEFICIT, ARTERIAL - RESPIRATORY 12 (H) -2 - 3 mmol/L    HCO3, ARTERIAL - RESPIRATORY 41 (HH) 22 - 28 mmol/L    PCO2, ARTERIAL - RESPIRATORY 72 (HH) 32 - 45 mm Hg    PH, ARTERIAL - RESPIRATORY 7.36 7.35 - 7.45 Units    PO2, ARTERIAL - RESPIRATORY 46 (LL) 83 - 108 mm Hg    O2 SATURATION, ARTERIAL - RESPIRATORY 80 (L) 95 - 99 %    CONDITION - RESPIRATORY AC/VC 22 400 PEEP 5     FIO2 - RESPIRATORY 60 %    SITE - RESPIRATORY Right Radial     TEMPERATURE - RESPIRATORY 37.0 degrees    BLOOD GAS, ARTERIAL - RESPIRATORY   Result Value Ref Range    BASE EXCESS / DEFICIT, ARTERIAL - RESPIRATORY 11 (H) -2 - 3 mmol/L    HCO3, ARTERIAL - RESPIRATORY 38 (H) 22 - 28 mmol/L    PCO2, ARTERIAL - RESPIRATORY 54 (H) 32 - 45 mm Hg    PH, ARTERIAL - RESPIRATORY 7.45 7.35 - 7.45 Units    PO2, ARTERIAL - RESPIRATORY 149 (H) 83 - 108 mm Hg    O2 SATURATION, ARTERIAL - RESPIRATORY 99 95 - 99 %    CONDITION - RESPIRATORY AC/VC 22 400 PEEP 5     FIO2 - RESPIRATORY 60 %    SITE - RESPIRATORY Right Radial     TEMPERATURE - RESPIRATORY 35.6 degrees       EKG Results     EKG Interpretation  Rate: 135, sinus tachycardia with frequent PVCs, QTC of 429, no STEMI    EKG tracing interpreted by ED physician    Radiology Results     Imaging Results          CTA CHEST ABDOMEN PELVIS W CONTRAST (Final result)  Result time 09/18/22 16:49:32    Final result                 Impression:    1.   No aortic dissection or other acute arterial pathology.  2.   Moderate to severe atherosclerotic disease involving the aorta and its  major branches, resulting in severe focal narrowing of the infrarenal  abdominal aorta with luminal diameter of 8 mm.  3.   Stable 7 mm penetrating atherosclerotic ulcer just superior to the  celiac trunk.  4.   Coronary artery calcifications.  5.   Severe centrilobular emphysema.  6.   Nonspecific mildly enlarged precarinal lymph node.      Dictated by: Shahid Crain D.O.  Dictated on: 9/18/2022 3:39 PM     IPAM M.D., have reviewed the images and report and concur  with these findings interpreted by Shahid Crain D.O..    Electronically Signed by: PAM SIEGEL M.D.   Signed on: 9/18/2022 4:49 PM                Narrative:    EXAM:  CTA CHEST ABDOMEN PELVIS W CONTRAST    CLINICAL INDICATION: Abdominal pain, altered mental status, hypoxia,  hypotension.    COMPARISON: CTA chest pulmonary embolism with contrast July 13, 2021, CT  chest abdomen pelvis without contrast July 13,  2021.    TECHNIQUE: Helical CTA was performed of the chest, abdomen and pelvis, with  axial, coronal, and sagittal reconstructions performed and provided for  review. A total of 80 ml Omnipaque 350 was administered intravenously  during the study.  Automated exposure control was utilized. 3D  reconstructions (using MIP, volume rendering, and/or other techniques) were  generated on acquisition workstation.    FINDINGS:    Chest/abdomen/pelvis:    VESSELS:    No evidence of aortic dissection, rupture, acute intramural hematoma, or  occlusion.  Moderate to severe diffuse atherosclerotic calcification.    The thoracic aorta is normal in course and caliber with moderate soft and  calcific plaque. Patent aortic arch branch vessels. Coronary artery  calcifications. Limited evaluation of the pulmonary arteries is without  gross abnormality.    The abdominal aorta is normal in caliber with severe soft and calcific  plaque.  Stable 7 mm focal outpouching along the anterior abdominal aorta  (2/215 and 602/109) just superior to the takeoff of the celiac trunk, and  is compatible with a small penetrating atherosclerotic ulcer. Patent  origins of the celiac artery and SMA.  Patent MANUEL origin.  Moderate  narrowing at the origin of the left renal artery, otherwise the bilateral  renal arteries are patent.      Significant narrowing of the infrarenal abdominal aorta with luminal  diameter of 8 mm focally (series 2, 301).    Patent bilateral common and external iliac arteries and visualized femoral  arterial branches.     Chest:    LUNGS: Endotracheal tube terminates 6.1 cm above the mercedez.     Severe centrilobular emphysema. Mild atelectasis within the lung bases,  left greater than right. No pleural effusion, or pneumothorax.  No  suspicious pulmonary nodules are identified.    HEART: The heart is normal in size. There is no pericardial effusion.    LYMPH NODES/MEDIASTINUM: 1.1 cm precarinal lymph node. No hilar or  axillary  lymphadenopathy. Enteric tube present within the esophagus and terminates  within the stomach. Stable 1.9 cm hypodense nodule within the left lobe of  the thyroid.    Abdomen/pelvis:    LIVER: Normal.    BILIARY TREE AND GALLBLADDER: No intrahepatic or extrahepatic biliary  ductal dilation. No radiodense gallstones.    PANCREAS: Scattered calcifications throughout the pancreas, likely sequela  of chronic pancreatitis. Pancreas is otherwise unremarkable without focal  lesions or pancreatic ductal dilation.    SPLEEN: Normal.    ADRENAL GLANDS: Stable thickening of the adrenal glands, likely related to  hyperplasia.    KIDNEYS AND URETERS: Stable 2.6 cm exophytic cyst along the superior pole  of the left kidney. The superior pole of the right kidney is slightly  atrophic with decreased enhancement and mild perinephric stranding. No  hydronephrosis.    BLADDER: Unremarkable.    REPRODUCTIVE ORGANS: Lobulated uterus containing multiple degenerated  fibroids. Bilateral adnexa are unremarkable.    BOWEL: Moderate colonic diverticulosis without diverticulitis. No bowel  wall thickening or obstruction. The appendix is normal.    PERITONEUM AND RETROPERITONEUM: No free air or fluid.    LYMPH NODES: No lymphadenopathy.    Chest/abdomen/pelvis:    BONES/BODY WALL: The soft tissues of the body wall are grossly  unremarkable. No suspicious osseous lesions are identified. Degenerative  changes of the spine.                      Preliminary result                 Impression:    1.   No aortic dissection or other acute arterial pathology.  2.   Moderate to severe atherosclerotic disease involving the aorta and its  major branches.  3.   Stable 7 mm penetrating atherosclerotic ulcer just superior to the  celiac trunk.  4.   Coronary artery calcifications.  5.   Severe centrilobular emphysema.  6.   Nonspecific mildly enlarged precarinal lymph node.    Dictated by: Shahid Crain D.O.  Dictated on: 9/18/2022 3:39 PM       *  * * * * * * * * * * * * * PRELIMINARY REPORT * * * * * * * * * * * * * *                  Narrative:        * * * * * * * * * * * * * * PRELIMINARY REPORT * * * * * * * * * * * * * *     EXAM:  CTA CHEST ABDOMEN PELVIS W CONTRAST    CLINICAL INDICATION: Abdominal pain, altered mental status, hypoxia,  hypotension.    COMPARISON: CTA chest pulmonary embolism with contrast July 13, 2021, CT  chest abdomen pelvis without contrast July 13, 2021.    TECHNIQUE: Helical CTA was performed of the chest, abdomen and pelvis, with  axial, coronal, and sagittal reconstructions performed and provided for  review. A total of 80 ml Omnipaque 350 was administered intravenously  during the study.  Automated exposure control was utilized. 3D  reconstructions (using MIP, volume rendering, and/or other techniques) were  generated on acquisition workstation.    FINDINGS:    Chest/abdomen/pelvis:    VESSELS:    No evidence of aortic dissection, rupture, acute intramural hematoma, or  occlusion. Stable 7 mm focal outpouching along the anterior abdominal aorta  (2/215 and 602/109) just superior to the takeoff of the celiac trunk, and  is compatible with a small penetrating atherosclerotic ulcer.    There is overall moderate to severe atherosclerotic disease.    The thoracic aorta is normal in course and caliber with moderate soft and  calcific plaque. Patent aortic arch branch vessels. Coronary artery  calcifications. Limited evaluation of the pulmonary arteries is without  gross abnormality.    The abdominal aorta is normal in course and caliber with severe soft and  calcific plaque.  Patent origins of the celiac artery and SMA.  Patent MANUEL  origin. Narrowing at the origin of the left renal artery, otherwise the  bilateral renal arteries are patent.  Patent bilateral common and external  iliac arteries and visualized femoral arterial branches. Limited evaluation  of the systemic and portal veins is without gross  abnormality.    Chest:    LUNGS: Endotracheal tube terminates 6.1 cm above the mercedez. Severe  centrilobular emphysema. Mild atelectasis within the lung bases, left  greater than right. No pleural effusion, or pneumothorax.  No suspicious  pulmonary nodules are identified.    HEART: The heart is normal in size. There is no pericardial effusion.    LYMPH NODES/MEDIASTINUM: 1.1 cm precarinal lymph node. No hilar or axillary  lymphadenopathy. Enteric tube present within the esophagus and terminates  within the stomach. Stable 1.9 cm hypodense nodule within the left lobe of  the thyroid.    Abdomen/pelvis:    LIVER: Normal.    BILIARY TREE AND GALLBLADDER: No intrahepatic or extrahepatic biliary  ductal dilation. No radiodense gallstones.    PANCREAS: Scattered calcifications throughout the pancreas, likely sequela  of prior pancreatitis. Pancreas is otherwise unremarkable without focal  lesions or pancreatic ductal dilation.    SPLEEN: Normal.    ADRENAL GLANDS: Stable thickening of the adrenal glands, likely related to  hyperplasia.    KIDNEYS AND URETERS: Stable 2.6 cm exophytic cyst along the superior pole  of the left kidney. The superior pole of the right kidney is slightly  atrophic with mild perinephric stranding. No hydronephrosis.    BLADDER: Unremarkable.    REPRODUCTIVE ORGANS: Lobulated uterus containing multiple degenerating  fibroids. Bilateral adnexa are unremarkable.    BOWEL: Moderate colonic diverticulosis without diverticulitis. No bowel  wall thickening or obstruction. The appendix is normal.    PERITONEUM AND RETROPERITONEUM: No free air or fluid.    LYMPH NODES: No lymphadenopathy.    Chest/abdomen/pelvis:    BONES/BODY WALL: The soft tissues of the body wall are grossly  unremarkable. No suspicious osseous lesions are identified. Degenerative  changes of the spine.                                 CT HEAD WO CONTRAST (Final result)  Result time 09/18/22 15:49:46    Final result                  Impression:    No acute intracranial hemorrhage allowing for limitations related to  residual IV contrast from prior CTA.  No other acute process.    Electronically Signed by: NATY GÓMEZ M.D.   Signed on: 9/18/2022 3:49 PM                Narrative:    EXAM: CT HEAD WO CONTRAST    CLINICAL INDICATION: Mental status change    COMPARISON: CT head 07/13/2021    TECHNIQUE: Noncontrast CT of the head was performed. Automated exposure  control was employed as a radiation dose reduction strategy on this  patient.    FINDINGS:   Although this study was performed without IV contrast, there is residual IV  contrast from CTA chest abdomen pelvis performed just before.    There is no evidence of acute intracranial hemorrhage, allowing for  limitations related to contrast.    There is no definite evidence of acute ischemia. The gray-white  differentiation is preserved. Mild periventricular hypodensities are  present which are nonspecific but most commonly seen in chronic small  vessel ischemic changes.    The ventricular system and sulci are normal for patient's age.    No displaced calvarial fracture or aggressive osseous lesions. Clouding of  the right mastoid air cells.     Layering secretions in the nasopharynx.                               XR CHEST PA OR AP 1 VIEW (Final result)  Result time 09/18/22 15:16:18    Final result                 Impression:     Limited exam with support hardware as above but no acute finding.    Electronically Signed by: NATY GÓMEZ M.D.   Signed on: 9/18/2022 3:16 PM                Narrative:    EXAM: XR CHEST PA OR AP 1 VIEW    CLINICAL INDICATION: post intubation.    COMPARISON: Chest x-ray 03/18/2022    TECHNIQUE: Frontal view of the chest.    FINDINGS:   Limited assessment due to positioning.  Endotracheal tube tip  approximately 7 cm above the mercedez.  Enteric tube since below the  diaphragm.    The cardiomediastinal silhouette is grossly normal in size allowing for  AP  technique and is stable.    There is no airspace consolidation, sizable pleural effusion, or  pneumothorax.                                    ED Medication Orders (From admission, onward)    Ordered Start     Status Ordering Provider    09/18/22 1709 09/18/22 1710  fentaNYL (SUBLIMAZE) injection 100 mcg  ONCE         Last MAR action: Given MYA MELARA    09/18/22 1709 09/18/22 1710  fentaNYL (SUBLIMAZE) 2,500 mcg/250 mL in sodium chloride 0.9 % infusion  (fentaNYL infusion and injection)  CONTINUOUS        \"And\" Linked Group Details    Last MAR action: Rate/Dose Verify PFLIZBET MITCHELL    09/18/22 1629 09/18/22 1630  sodium chloride (NORMAL SALINE) 0.9 % bolus 500 mL  ONCE         Last MAR action: Stopped MYA MELARA    09/18/22 1628 09/18/22 1629  cefepime (MAXIPIME) 2,000 mg in sodium chloride 0.9 % 100 mL IVPB  ONCE         Last MAR action: Completed MYA MELARA    09/18/22 1624 09/18/22 1625  methylPREDNISolone (SOLU-Medrol) PF injection 125 mg  ONCE         Last MAR action: Given MYA MELARA    09/18/22 1453 09/18/22 1454  ipratropium-albuterol (DUONEB) 0.5-2.5 (3) MG/3ML nebulizer solution 3 mL  ONCE         Last MAR action: Given MYA MELARA          ED Course as of 09/18/22 2257   Sun Sep 18, 2022   1632 Spoke with ICU resident who will accept the patient.  Patient end-tidal CO2 is improving.  Right now it is 47.  Patient is somewhat improving clinically, as she is occasionally opening her eyes and looking around the room which she was not doing on arrival.  Otherwise, work-up as above.  Patient with severe hypercapnic respiratory failure as likely etiology.  Doubt bacterial etiology, or sepsis overall, however, in the setting of lactic acid without clear other source, will cover with cefepime and vancomycin as she does have an unknown penicillin allergy listed. [NS]   1635 Patient continuing to wake up somewhat more, occasionally bucking the vent.  Will increase sedation and add  fentanyl. [NS]      ED Course User Index  [NS] Sohan Sparrow DO       MDM    76-year-old female brought in by paramedics for unresponsiveness.  Only known medication on her initial arrival was aspirin.  And medical history was significantly limited until further chart review after her arrival.  On arrival she had a GCS of 3.  Was unresponsive.  But no focal deficit that was obvious.  Patient with very decreased air movement throughout.  Remainder of exam as above.  On arrival, patient satting in the mid 80% range on nonrebreather.  Put on nonrebreather in the emergency department, with jaw thrust, and saturations improved.  Patient on clinical status, needed intubation.  Please see note above.  Otherwise, I do suspect primary respiratory pathology, COPD versus CHF in the setting of significantly decreased air movement.  Additionally, after intubation, end-tidal CO2 was in the mid 80 range.  We will start very broad work-up.  Patient ultimately will need ICU admission.  -Work-up as above.  Patient admitted to the ICU.  Did have some clinical improvement gradually during stay in the emergency department while in the ventilator.  Etiology likely severe COPD exacerbation with carbon dioxide narcosis.    Clinical Impression     ED Diagnosis   1. Acute respiratory failure with hypercapnia (CMS/HCC)         Disposition        Admit 9/18/2022  4:51 PM  Telemetry Bed?: Yes  Admitting Physician: ESTUARDO STEVENS [[id number]]  Comments: No COVID Symptoms  Is this a telephone or verbal order?: This is a telephone order from the admitting physician                     Sohan Sparrow DO  Resident  09/18/22 2383     no
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
## 2023-09-06 NOTE — ED ADULT NURSE NOTE - NS PRO AD ANY ON CHART
Comment: no outbreak for years. She will stop hydroxychloroquine Render Risk Assessment In Note?: no Detail Level: Simple Yes

## 2023-10-18 NOTE — H&P PST ADULT - BACK COMMENTS
Xolair Pregnancy And Lactation Text: This medication is Pregnancy Category B and is considered safe during pregnancy. This medication is excreted in breast milk. s/p Cervical Spinal Fusion; Pt with extremely limited mobility cervical spine

## 2023-10-22 NOTE — ED PROVIDER NOTE - NS ED MD EM SELECTION
HTN (hypertension) Debility Toxic metabolic encephalopathy Toxic metabolic encephalopathy Toxic metabolic encephalopathy Debility Toxic metabolic encephalopathy Toxic metabolic encephalopathy 68529 Exp Problem Focused - Mod. Complex Toxic metabolic encephalopathy Debility Toxic metabolic encephalopathy Debility Toxic metabolic encephalopathy

## 2024-04-21 NOTE — H&P PST ADULT - RESPIRATORY
Will get full thyroid panel, Will continue current Synthroid dose, will FU.  Suggest to repeat thyroid function test in 4-6 weeks. Outpatient follow up.
detailed exam

## 2024-05-02 NOTE — PATIENT PROFILE ADULT - FUNCTIONAL SCREEN CURRENT LEVEL: COMMUNICATION, MLM
Attempted to reach patient. No answer. LVM asking patient to call the clinic back at [phone]. [employer] message sent.    0 = understands/communicates without difficulty
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
## 2024-10-30 NOTE — H&P ADULT - NSHPPOADEEPVENOUSTHROMB_GEN_A_CORE
[facility]  Department of Cardiology  Progress Note      PATIENT NAME: Dhaval Hernández  MRN: [id number]  TODAY'S DATE: 10/30/2024  ADMIT DATE: 10/22/2024                          CONSULT REQUESTED BY: Evin Courtney MD    SUBJECTIVE     PRINCIPAL PROBLEM: Septic shock      REASON FOR CONSULT:  MSSA bacteremia, patient has a pacemaker, ?ROSE    Interval history:  10/31/24  NPO after midnight for ROSE today  Platelet count 326, H&H 9.9/31.5    HPI:  Pt is 69 yo male with H/O CVA, Cardiomyopathy, ICD, MI, paroxysmal afib, HTN, HLD, traumatic Left BKA, HLD, DM, who presented to ER on 10/22/24 w/ complaints of nausea, vomiting and diarrhea x 4 days. Found to have septic shock with MSSA bacteremia. He had left elbow abscess with surgical debridement on 10/27/24 which is also growing MSSA. Cardiology has been consulted for ROSE per infectious disease recommendation.     Pt has no problems problems, recent bleeding or loose teeth.      Review of patient's allergies indicates:   Allergen Reactions    Fish containing products      Other reaction(s): .       Past Medical History:   Diagnosis Date    Diabetes mellitus, type 2     GERD (gastroesophageal reflux disease)     Hyperlipidemia     Hypertension      Past Surgical History:   Procedure Laterality Date    FRACTURE SURGERY      INCISION AND DRAINAGE OF ABSCESS Left 10/27/2024    Procedure: INCISION AND DRAINAGE, ABSCESS;  Surgeon: Gokul Troy MD;  Location: [facility];  Service: Orthopedics;  Laterality: Left;  INCISION AND DRAIN OF LEFT ELBOW ABCESS    INSERTION OF PACEMAKER      LEG AMPUTATION       Social History     Tobacco Use    Smoking status: Every Day     Types: Cigarettes    Smokeless tobacco: Former     Types: Snuff     Quit date: 04/2024    Tobacco comments:     Pt used smokeless tobacco from 9555-0605 and quit. Started dipping tobacco throughout most of his 20's. Started smoking cigarettes again in 06/2024.         REVIEW OF  SYSTEMS  Negative except as mentioned in HPI    OBJECTIVE     VITAL SIGNS (Most Recent)  Temp: 98.7 °F (37.1 °C) (10/30/24 1621)  Pulse: 73 (10/30/24 1621)  Resp: 16 (10/30/24 1621)  BP: (!) 178/74 (10/30/24 1621)  SpO2: (!) 92 % (10/30/24 1621)    VENTILATION STATUS  Resp: 16 (10/30/24 1621)  SpO2: (!) 92 % (10/30/24 1621)           I & O (Last 24H):  Intake/Output Summary (Last 24 hours) at 10/30/2024 1711  Last data filed at 10/30/2024 1418  Gross per 24 hour   Intake --   Output 2300 ml   Net -2300 ml       WEIGHTS  Wt Readings from Last 3 Encounters:   10/24/24 0405 104.6 kg (230 lb 9.6 oz)   10/22/24 1115 102.1 kg (225 lb)   10/25/24 1233 104.3 kg (230 lb)   07/23/24 0930 102.4 kg (225 lb 12 oz)       PHYSICAL EXAM    GENERAL:chronically ill elderly male resting in bed in no apparent distress.  HEENT: Normocephalic.    NECK: No JVD.   CARDIAC: Regular rate and rhythm. S1 is normal.S2 is normal.No gallops, clicks or murmurs noted at this time.  CHEST ANATOMY: normal. Left CW pacer device present  LUNGS: Clear to auscultation. No wheezing or rhonchi..   ABDOMEN: Soft .  Normal bowel sounds.    EXTREMITIES: No edema; left BKA; left elbow with ACE wrap;  edema to left hand noted  CENTRAL NERVOUS SYSTEM: AAO x 3  SKIN: No rash     HOME MEDICATIONS:  No current facility-administered medications on file prior to encounter.     Current Outpatient Medications on File Prior to Encounter   Medication Sig Dispense Refill    amiodarone (PACERONE) 200 MG Tab Take 1 tablet (200 mg total) by mouth once daily. 30 tablet 11    amLODIPine (NORVASC) 5 MG tablet Take 1 tablet (5 mg total) by mouth once daily. 90 tablet 1    aspirin (ECOTRIN) 81 MG EC tablet Take 81 mg by mouth once daily.      carvediloL (COREG) 12.5 MG tablet Take 1 tablet (12.5 mg total) by mouth 2 (two) times daily. (Patient taking differently: Take 25 mg by mouth once daily.) 180 tablet 3    cholecalciferol, vitamin D3, (VITAMIN D3) 125 mcg (5,000 unit) Tab  Take 5,000 Units by mouth once daily.      empagliflozin (JARDIANCE) 25 mg tablet Take 1 tablet (25 mg total) by mouth once daily. 90 tablet 0    ezetimibe (ZETIA) 10 mg tablet Take 10 mg by mouth.      famotidine (PEPCID) 20 MG tablet Take 20 mg by mouth 2 (two) times daily.      insulin glargine U-100, Lantus, (LANTUS SOLOSTAR U-100 INSULIN) 100 unit/mL (3 mL) InPn pen Inject 16 Units into the skin every evening. 14.4 mL 1    JANUVIA 100 mg Tab Take 1 tablet (100 mg total) by mouth once daily. 90 tablet 1    levothyroxine (SYNTHROID) 75 MCG tablet Take 1 tablet (75 mcg total) by mouth every morning. 90 tablet 1    losartan (COZAAR) 100 MG tablet Take 1 tablet (100 mg total) by mouth once daily. 90 tablet 3    metFORMIN (GLUCOPHAGE) 1000 MG tablet Take 1 tablet (1,000 mg total) by mouth 2 (two) times daily with meals. 180 tablet 1    omega 3-dha-epa-fish oil (FISH OIL) 1,200 (144-216) mg Cap Take 1 capsule by mouth once daily.      spironolactone (ALDACTONE) 25 MG tablet Take 0.5 tablets (12.5 mg total) by mouth once daily. 45 tablet 3    atorvastatin (LIPITOR) 80 MG tablet Take 1 tablet (80 mg total) by mouth every evening. 90 tablet 3    cholecalciferol, vitamin D3, 1,250 mcg (50,000 unit) capsule Take 50,000 Int'l Units by mouth.      ciclopirox (PENLAC) 8 % Soln Apply topically nightly. 6.6 mL 5    EScitalopram oxalate (LEXAPRO) 10 MG tablet Take 1 tablet (10 mg total) by mouth once daily. 90 tablet 3    multivitamin (THERAGRAN) per tablet Take 1 tablet by mouth.         SCHEDULED MEDS:   ceFAZolin  2 g Intravenous Q8H    citalopram  10 mg Oral Daily    enoxparin  40 mg Subcutaneous Daily    insulin glargine U-100  20 Units Subcutaneous QHS    levothyroxine  75 mcg Oral Before breakfast    pantoprazole  40 mg Intravenous Daily    [START ON 11/1/2024] rifAMpin  300 mg Oral BID       CONTINUOUS INFUSIONS:    PRN MEDS:  Current Facility-Administered Medications:     acetaminophen, 650 mg, Oral, Q8H PRN     acetaminophen, 650 mg, Oral, Q4H PRN    aluminum-magnesium hydroxide-simethicone, 30 mL, Oral, QID PRN    dextrose 50%, 12.5 g, Intravenous, PRN    dextrose 50%, 12.5 g, Intravenous, PRN    dextrose 50%, 25 g, Intravenous, PRN    diphenhydrAMINE, 12.5 mg, Intravenous, Q6H PRN    glucagon (human recombinant), 1 mg, Intramuscular, PRN    glucagon (human recombinant), 1 mg, Intramuscular, PRN    glucose, 16 g, Oral, PRN    glucose, 24 g, Oral, PRN    HYDROmorphone, 0.2 mg, Intravenous, Q5 Min PRN    insulin aspart U-100, 0-10 Units, Subcutaneous, QID (AC + HS) PRN    magnesium oxide, 800 mg, Oral, PRN    magnesium oxide, 800 mg, Oral, PRN    melatonin, 6 mg, Oral, Nightly PRN    naloxone, 0.02 mg, Intravenous, PRN    ondansetron, 4 mg, Intravenous, Q6H PRN    ondansetron, 4 mg, Intravenous, Daily PRN    oxyCODONE, 5 mg, Oral, PRN    oxyCODONE-acetaminophen, 1 tablet, Oral, Q4H PRN    potassium bicarbonate, 35 mEq, Oral, PRN    potassium bicarbonate, 50 mEq, Oral, PRN    potassium bicarbonate, 60 mEq, Oral, PRN    potassium, sodium phosphates, 2 packet, Oral, PRN    potassium, sodium phosphates, 2 packet, Oral, PRN    potassium, sodium phosphates, 2 packet, Oral, PRN    LABS AND DIAGNOSTICS     CBC LAST 3 DAYS  Recent Labs   Lab 10/28/24  0716 10/29/24  0518 10/30/24  0334   WBC 10.21 9.42 11.25   RBC 4.33* 3.92* 3.75*   HGB 11.3* 10.2* 9.9*   HCT 36.5* 32.6* 31.5*   MCV 84 83 84   MCH 26.1* 26.0* 26.4*   MCHC 31.0* 31.3* 31.4*   RDW 18.6* 18.6* 18.3*    288 326   MPV 9.7 10.0 10.2   GRAN 67.5  6.9 60.2  5.7 67.2  7.6   LYMPH 17.3*  1.8 22.8  2.2 17.5*  2.0   MONO 8.9  0.9 9.0  0.9 9.1  1.0   BASO 0.06 0.07 0.06   NRBC 0 0 0       COAGULATION LAST 3 DAYS  Recent Labs   Lab 10/29/24  0906   INR 1.0       CHEMISTRY LAST 3 DAYS  Recent Labs   Lab 10/28/24  0716 10/29/24  0518 10/30/24  0334   * 134* 132*   K 4.3 4.0 4.5    100 99   CO2 25 29 26   ANIONGAP 6* 5* 7*   BUN 16 13 11   CREATININE 0.8  "0.8 0.8   * 130* 194*   CALCIUM 8.3* 7.9* 8.3*   MG 1.4* 1.4* 2.2   ALBUMIN 2.9* 2.7* 2.8*   PROT 6.5 6.0 6.5   ALKPHOS 227* 295* 252*   ALT 21 24 15   AST 88* 104* 47*   BILITOT 0.5 0.5 0.4       CARDIAC PROFILE LAST 3 DAYS  No results for input(s): "BNP", "CPK", "CPKMB", "LDH", "TROPONINI" in the last 168 hours.      ENDOCRINE LAST 3 DAYS  Recent Labs   Lab 10/24/24  0333   PROCAL 4.129*       LAST ARTERIAL BLOOD GAS  ABG  No results for input(s): "PH", "PO2", "PCO2", "HCO3", "BE" in the last 168 hours.      LAST 7 DAYS MICROBIOLOGY   Microbiology Results (last 7 days)       Procedure Component Value Units Date/Time    Blood culture [[id number]] Collected: 10/27/24 1513    Order Status: Completed Specimen: Blood from Peripheral, Hand, Left Updated: 10/30/24 1632     Blood Culture, Routine No Growth to date      No Growth to date      No Growth to date      No Growth to date    AFB Culture & Smear [[id number]] Collected: 10/27/24 0805    Order Status: Completed Specimen: Abscess from Elbow, Left Updated: 10/30/24 1116     AFB CULTURE STAIN No acid fast bacilli seen.     AFB CULTURE STAIN Testing performed by:     AFB CULTURE STAIN Lab [facility]     AFB CULTURE STAIN [address]     [place] [address]     AFB CULTURE STAIN Dr. Aditya Greenfield MD    Narrative:      Elbow, Left    Blood culture [[id number]] Collected: 10/28/24 0716    Order Status: Completed Specimen: Blood Updated: 10/30/24 0832     Blood Culture, Routine No Growth to date      No Growth to date      No Growth to date    Narrative:      Collection has been rescheduled by GISEL at 10/27/2024 15:21 Reason:   Unable to collect. Notified ALYSSIA Rios.  Collection has been rescheduled by GISEL at 10/27/2024 15:21 Reason:   Unable to collect. Notified ALYSSIA Rios.    Blood culture [[id number]] Collected: 10/26/24 0456    Order Status: Completed Specimen: Blood Updated: 10/30/24 0632     Blood Culture, Routine No " Growth to date      No Growth to date      No Growth to date      No Growth to date      No Growth to date    Narrative:      can be drawn with am labs per rn Fernnado  Collection has been rescheduled by 2MB at 10/26/2024 00:49 Reason:   Draw with am labs per rn Fernando   Collection has been rescheduled by 2MB at 10/26/2024 00:49 Reason:   Draw with am labs per rn Fernando     Blood culture [[id number]] Collected: 10/26/24 0012    Order Status: Completed Specimen: Blood from Peripheral, Hand, Right Updated: 10/30/24 0232     Blood Culture, Routine No Growth to date      No Growth to date      No Growth to date      No Growth to date      No Growth to date    Culture, Anaerobe [[id number]] Collected: 10/27/24 0805    Order Status: Completed Specimen: Abscess from Elbow, Left Updated: 10/29/24 1417     Anaerobic Culture No anaerobes isolated    Narrative:      Elbow, Left    Aerobic culture [[id number]]  (Abnormal) Collected: 10/27/24 0805    Order Status: Completed Specimen: Abscess from Elbow, Left Updated: 10/29/24 0633     Aerobic Bacterial Culture STAPHYLOCOCCUS AUREUS  Moderate  For susceptibility see order #[id number]      Narrative:      Elbow, Left    Blood culture [[id number]]  (Abnormal) Collected: 10/24/24 1019    Order Status: Completed Specimen: Blood from Peripheral, Hand, Left Updated: 10/29/24 0632     Blood Culture, Routine Gram stain aer bottle: Gram positive cocci      Positive results previously called on order [id number]      STAPHYLOCOCCUS AUREUS  ID consult required for Staph aureus bacteremia.  For susceptibility see order #[id number]      Gram stain [[id number]] Collected: 10/27/24 0805    Order Status: Completed Specimen: Abscess from Elbow, Left Updated: 10/28/24 0816     Gram Stain Result Few WBC's      Rare Gram positive cocci    Narrative:      Elbow, Left    Aerobic culture [[id number]]  (Abnormal)  (Susceptibility) Collected: 10/26/24 1552    Order Status: Completed Specimen: Abscess from  Elbow, Left Updated: 10/28/24 0639     Aerobic Bacterial Culture STAPHYLOCOCCUS AUREUS  Moderate      Blood culture [[id number]]  (Abnormal)  (Susceptibility) Collected: 10/24/24 1018    Order Status: Completed Specimen: Blood from Peripheral, Hand, Right Updated: 10/28/24 0623     Blood Culture, Routine Gram stain aer bottle: Gram positive cocci in clusters resembling Staph      Results called to and read back by:Krista Lobato Charge Nurse 10/26/2024      15:26 ncb      STAPHYLOCOCCUS AUREUS  ID consult required for Staph aureus bacteremia.      Fungus culture [[id number]] Collected: 10/27/24 0805    Order Status: Sent Specimen: Abscess from Elbow, Left Updated: 10/27/24 0956    Rapid Organism ID by PCR (from Blood culture) [[id number]]  (Abnormal) Collected: 10/24/24 0927    Order Status: Completed Updated: 10/26/24 1637     Enterococcus faecalis Not Detected     Enterococcus faecium Not Detected     Listeria monocytogenes Not Detected     Staphylococcus spp. See species for ID     Staphylococcus aureus Detected     Staphylococcus epidermidis Not Detected     Staphylococcus lugdunensis Not Detected     Streptococcus species Not Detected     Streptococcus agalactiae Not Detected     Streptococcus pneumoniae Not Detected     Streptococcus pyogenes Not Detected     Acinetobacter calcoaceticus/baumannii complex Not Detected     Bacteroides fragilis Not Detected     Enterobacterales Not Detected     Enterobacter cloacae complex Not Detected     Escherichia coli Not Detected     Klebsiella aerogenes Not Detected     Klebsiella oxytoca Not Detected     Klebsiella pneumoniae group Not Detected     Proteus Not Detected     Salmonella sp Not Detected     Serratia marcescens Not Detected     Haemophilus influenzae Not Detected     Neisseria meningtidis Not Detected     Pseudomonas aeruginosa Not Detected     Stenotrophomonas maltophilia Not Detected     Candida albicans Not Detected     Candida auris Not Detected     Candida  glabrata Not Detected     Candida krusei Not Detected     Candida parapsilosis Not Detected     Candida tropicalis Not Detected     Cryptococcus neoformans/gattii Not Detected     CTX-M (ESBL ) Test not applicable     IMP (Carbapenem resistant) Test not applicable     KPC resistance gene (Carbapenem resistant) Test not applicable     mcr-1  Test not applicable     mec A/C  Test not applicable     mec A/C and MREJ (MRSA) gene Not Detected     NDM (Carbapenem resistant) Test not applicable     OXA-48-like (Carbapenem resistant) Test not applicable     van A/B (VRE gene) Test not applicable     VIM (Carbapenem resistant) Test not applicable    Gram stain [[id number]]     Order Status: No result Specimen: Abscess from Elbow, Left     Stool culture **cannot be ordered stat** [[id number]] Collected: 10/23/24 1357    Order Status: Completed Specimen: Stool Updated: 10/25/24 1101     Stool Culture No Salmonella,Shigella,Vibrio,Campylobacter.      No E coli 0157:H7 isolated.    Urine culture [[id number]] Collected: 10/22/24 1514    Order Status: Completed Specimen: Urine Updated: 10/25/24 0715     Urine Culture, Routine No growth    Narrative:      Specimen Source->Urine    Blood culture x two cultures. Draw prior to antibiotics. [[id number]]  (Abnormal) Collected: 10/22/24 1136    Order Status: Completed Specimen: Blood from Peripheral, Hand, Left Updated: 10/25/24 0645     Blood Culture, Routine Gram stain aer bottle: Gram positive cocci      Positive results previously called on Order #[id number]      STAPHYLOCOCCUS AUREUS  ID consult required for Staph aureus bacteremia.  For susceptibility see order #[id number]      Narrative:      Aerobic and anaerobic  Collection has been rescheduled by MAI at 10/22/2024 11:44 Reason:   Done  Collection has been rescheduled by MAI at 10/22/2024 11:44 Reason:   Done    Blood culture x two cultures. Draw prior to antibiotics. [[id number]]  (Abnormal)  (Susceptibility) Collected:  10/22/24 1143    Order Status: Completed Specimen: Blood from Peripheral, Hand, Right Updated: 10/25/24 0645     Blood Culture, Routine Gram stain aer bottle: Gram positive cocci      Results called to and read back by:Tiffani Moulton RN-1ICU;      10/23/2024  07:54 CJD      STAPHYLOCOCCUS AUREUS  ID consult required for Staph aureus bacteremia.      Narrative:      Aerobic and anaerobic  Collection has been rescheduled by MAI at 10/22/2024 11:44 Reason:   Done  Collection has been rescheduled by MAI at 10/22/2024 11:44 Reason:   Done            MOST RECENT IMAGING  Transesophageal echo (ROSE)    Left Ventricle: The left ventricle is normal in size. Normal wall   thickness. Normal wall motion. There is normal systolic function with a   visually estimated ejection fraction of 55 - 60%.    Right Ventricle: Normal right ventricular cavity size. Wall thickness   is normal. Systolic function is normal. Pacemaker lead present in the   ventricle.    Left Atrium: Agitated saline study of the atrial septum is negative   after vasalva maneuver, suggesting absence of intracardiac shunt at the   atrial level. No patent foramen ovale confirmed by agitated saline   contrast. The left atrial appendage appears normal. The left atrial   appendage has a chicken wing morphology. Appendage velocity is normal at   greater than 40 cm/sec. There is no thrombus in the cavity.    Right Atrium: Multiple leads present in the right atrium.    Aorta: Grade 3 atherosclerosis changes noted in the descending aorta    No obvious echodense hypermobile structures to suggest vegetation on   the leads or on the valves identified.  X-Ray Elbow 2 Views Left  Narrative: CLINICAL HISTORY:  (MRN [id number] y/o  (1956) M    Left elbow abscess status post I and D with removal of some hardware.  One to evaluate and identify the residual hardware;    TECHNIQUE:  (A# [id number], Exam time 10/30/2024 14:44)    IMG90 XR ELBOW 2 VIEWS LEFT 2 view(s)  no obtained.    COMPARISON:  Radiograph from 10/24/2024.    FINDINGS:  Moderate subcutaneous soft tissue edema and emphysema is seen along the dorsal aspect of the elbow in keeping with a history of recent incision and drainage.  Consider correlation with physical exam.    Osseous structures show no acute displaced fracture.  No focal osseous erosion, lucency or periosteal reaction is seen along the olecranon to specifically suggest osteomyelitis, noting that MRI is more sensitive/specific.  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  Unchanged rounded ossific density measuring 15 mm in size is seen in the antecubital region.  A moderate-sized elbow joint effusion is likely present, although this is technically suboptimal.    Postoperative hardware shows partial interval removal of the figure-of-eight cerclage wire along the posterior olecranon, with a single 6 cm loop remaining.  In addition there is partial removal of the dominant fractured cancellous screw through the posterior ulna.  There are 4 additional partially threaded cancellous screws through the distal humerus, with no periprosthetic lucency or finding of hardware loosening/failure.  Impression: Osseous findings as above.    Electronically signed by: Dhaval Sibley  Date:    10/30/2024  Time:    15:32      ECHOCARDIOGRAM RESULTS (last 5)  Results for orders placed during the hospital encounter of 10/22/24    Echo    Interpretation Summary    Left Ventricle: The left ventricle is normal in size. Normal wall thickness. There is normal systolic function. There is normal diastolic function. E/A ratio is 0.99.    Right Ventricle: Normal right ventricular cavity size. Wall thickness is normal. Systolic function is normal. Pacemaker lead present in the ventricle.    Mitral Valve: There is mild regurgitation.    Tricuspid Valve: There is mild to moderate regurgitation. The estimated PA systolic pressure is at least 33 mmHg.     IVC/SVC: Normal venous pressure at 3 mmHg.      Results for orders placed in visit on 04/17/24    Echo Saline Bubble? No    Interpretation Summary    Left Ventricle: The left ventricle is normal in size. Normal wall thickness. There is normal systolic function with a visually estimated ejection fraction of 65 - 70%. There is normal diastolic function.    Right Ventricle: Normal right ventricular cavity size. Wall thickness is normal. Right ventricle wall motion  is normal. Systolic function is normal.    Left Atrium: Left atrium is moderately dilated.    IVC/SVC: Normal venous pressure at 3 mmHg.      CURRENT/PREVIOUS VISIT EKG  Results for orders placed or performed during the hospital encounter of 10/22/24   EKG 12-lead    Collection Time: 10/22/24 11:45 AM   Result Value Ref Range    QRS Duration 90 ms    OHS QTC Calculation 428 ms    Narrative    Test Reason : I95.9,    Vent. Rate : 056 BPM     Atrial Rate : 056 BPM     P-R Int : 306 ms          QRS Dur : 090 ms      QT Int : 444 ms       P-R-T Axes : 031 -27 034 degrees     QTc Int : 428 ms    Sinus bradycardia with 1st degree A-V block  Low voltage QRS  Inferior infarct (cited on or before 17-JAN-2023)  Possible Anterolateral infarct (cited on or before 12-APR-2024)  Abnormal ECG  When compared with ECG of 12-APR-2024 09:00,  No significant change was found    Referred By: [email or web address]   SELF           Confirmed By:            ASSESSMENT/PLAN:     Active Hospital Problems    Diagnosis    *Septic shock    Olecranon bursitis of left elbow    Tobacco dependency    High anion gap metabolic acidosis    SIRIA (acute kidney injury)    Hyperkalemia    Atrial fibrillation, chronic    Severe obesity (BMI 35.0-39.9) with comorbidity    Cardiac pacemaker    Status post unilateral below-knee amputation    Type 2 diabetes mellitus with stage 2 chronic kidney disease, without long-term current use of insulin       ASSESSMENT & PLAN:   Septic shock  MSSA  bacteremia  Cardiomyopathy  ICD in-situ  Chronic atrial fibrillation  Left AKA 1988  DM 2  SIRIA/CKD  Hypomagnesemia  Hyperkalemia  Left elbow abscess  S/P I& D left elbow abscess  H/O Cva 2016  H/o MI 2016    RECOMMENDATIONS:  TTE showed normal systolic and diastolic function and mild-moderate TR  Magnesium level 2.2; Potassium 4.5;    Patient has been NPO after midnight for ROSE today. Risks of ROSE were discussed with patient the risks include but not limited to oropharyngeal trauma, dental trauma, trauma to the esophagus, possible aspiration, and reaction to anesthesia.    ROSE completed with normal findings and without complications  Full report to follow                  Dr. Jose Raul Garcia M.D.  [facility]  Department of Cardiology  Date of Service: 10/31/24  4:00 PM

## 2025-02-12 NOTE — ED ADULT NURSE NOTE - NS ED NURSE DISCH DISPOSITION
The os peroneum is an accessory bone in the foot that's located in the peroneus longus tendon. It's usually asymptomatic, but it can cause pain in the foot and ankle if it's injured.     What is it?  The os peroneum is a small, roundish bone that's located in the distal part of the peroneus longus tendon.   It's one of several accessory bones in the foot and ankle.   It's found in about 25% of the population.     **What does it do?  The os peroneum protects the peroneus longus tendon from injury caused by stress.   It plays a role in foot biomechanics.     **What can cause pain?   Painful os peroneum syndrome (POPS) can be caused by trauma, repetitive injuries, or inflammation.  Symptoms include pain and swelling over the cuboid bone, and pain when rising on the heel.    **How is it diagnosed?   Imaging, such as radiography, ultrasonography, or MRI, can help diagnose POPS.    **How is it treated?   Treatment for POPS can include rest, ice, elevation, and immobilization.      X-rays were offered, but declined.  We should obtain such is size is increasing significantly   Admitted
